# Patient Record
Sex: MALE | Race: WHITE | Employment: OTHER | URBAN - METROPOLITAN AREA
[De-identification: names, ages, dates, MRNs, and addresses within clinical notes are randomized per-mention and may not be internally consistent; named-entity substitution may affect disease eponyms.]

---

## 2016-03-02 ASSESSMENT — MONTREAL COGNITIVE ASSESSMENT (MOCA)
VISUOSPATIAL/EXECUTIVE SUBSCORE: 2
7. [VIGILENCE] TAP WHEN HEARING DESIGNATED LETTER: 1
9. REPEAT EACH SENTENCE: 2
12. MEMORY INDEX SCORE: 1
WHAT LEVEL OF EDUCATION WAS ATTAINED: 0
13. ORIENTATION SUBSCORE: 6
10. [FLUENCY] NAME WORDS STARTING WITH DESIGNATED LETTER: 0
6. READ LIST OF DIGITS [FORWARD/BACKWARD]: 2
8. SERIAL SUBTRACTION OF 7S: 3
WHAT IS THE TOTAL SCORE (OUT OF 30): 20
4. NAME EACH OF THE THREE ANIMALS SHOWN: 2
11. FOR EACH PAIR OF WORDS, WHAT CATEGORY DO THEY BELONG TO (OUT OF 2): 1

## 2017-01-02 ENCOUNTER — ANTICOAGULATION THERAPY VISIT (OUTPATIENT)
Dept: ANTICOAGULATION | Facility: CLINIC | Age: 72
End: 2017-01-02
Payer: COMMERCIAL

## 2017-01-02 DIAGNOSIS — Z79.01 LONG-TERM (CURRENT) USE OF ANTICOAGULANTS: Primary | ICD-10-CM

## 2017-01-02 LAB — INR POINT OF CARE: 1.6 (ref 0.86–1.14)

## 2017-01-02 PROCEDURE — 85610 PROTHROMBIN TIME: CPT | Mod: QW

## 2017-01-02 PROCEDURE — 36416 COLLJ CAPILLARY BLOOD SPEC: CPT

## 2017-01-02 PROCEDURE — 99207 ZZC NO CHARGE NURSE ONLY: CPT

## 2017-01-02 NOTE — MR AVS SNAPSHOT
Cricket Klein   1/2/2017 8:45 AM   Anticoagulation Therapy Visit    Description:  71 year old male   Provider:  NB ANTI HANNA   Department:  [facility]           INR as of 1/2/2017     Selected INR 1.6! (1/2/2017)      Anticoagulation Summary as of 1/2/2017     INR goal 2.0-3.0   Selected INR 1.6! (1/2/2017)   Full instructions 1/2: 3.75 mg; Otherwise 1.25 mg on Mon, Fri; 2.5 mg all other days   Next INR check 1/30/2017    Indications   PE (pulmonary embolism) [I26.99]  Other and unspecified coagulation defects [D68.9]  Long-term (current) use of anticoagulants [Z79.01] [Z79.01]         Description     Take 3.75mg Monday 1/2/2017, then resume 1.25mg every Mon & Fri and 2.5mg all other days.  Recheck INR in 4 weeks.      Your next Anticoagulation Clinic appointment(s)     Jan 30, 2017  9:45 AM   Anticoagulation Visit with NB ANTI COAG   [facility] ([facility])    [address] [place] [address] [phone]              Contact Numbers     Please call [phone] to cancel and/or reschedule your appointment.  Please call [phone] with any problems or questions regarding your therapy          January 2017 Details    Sun Mon Tue Wed Thu Fri Sat     1               2      3.75 mg   See details      3      2.5 mg         4      2.5 mg         5      2.5 mg         6      1.25 mg         7      2.5 mg           8      2.5 mg         9      1.25 mg         10      2.5 mg         11      2.5 mg         12      2.5 mg         13      1.25 mg         14      2.5 mg           15      2.5 mg         16      1.25 mg         17      2.5 mg         18      2.5 mg         19      2.5 mg         20      1.25 mg         21      2.5 mg           22      2.5 mg         23      1.25 mg         24      2.5 mg         25      2.5 mg         26      2.5 mg         27      1.25 mg         28      2.5 mg           29      2.5 mg         30            31                     Date Details   01/02 This INR check       Date of next INR:  1/30/2017         How to take your warfarin dose     To take:  1.25 mg Take 0.5 of a 2.5 mg tablet.    To take:  2.5 mg Take 1 of the 2.5 mg tablets.    To take:  3.75 mg Take 1.5 of the 2.5 mg tablets.

## 2017-01-02 NOTE — PROGRESS NOTES
ANTICOAGULATION FOLLOW-UP CLINIC VISIT    Patient Name:  Cricket Klein  Date:  1/2/2017  Contact Type:  Face to Face    SUBJECTIVE:     Patient Findings     Positives Missed doses (12/30), No Problem Findings           OBJECTIVE    INR PROTIME   Date Value Ref Range Status   01/02/2017 1.6* 0.86 - 1.14 Final       ASSESSMENT / PLAN  INR assessment SUB due to missed dose   Recheck INR In: 4 WEEKS    INR Location Clinic      Anticoagulation Summary as of 1/2/2017     INR goal 2.0-3.0   Selected INR 1.6! (1/2/2017)   Maintenance plan 1.25 mg (2.5 mg x 0.5) on Mon, Fri; 2.5 mg (2.5 mg x 1) all other days   Full instructions 1/2: 3.75 mg; Otherwise 1.25 mg on Mon, Fri; 2.5 mg all other days   Weekly total 15 mg   Plan last modified Gisell Taylor RN (12/12/2016)   Next INR check 1/30/2017   Priority INR   Target end date Indefinite    Indications   PE (pulmonary embolism) [I26.99]  Other and unspecified coagulation defects [D68.9]  Long-term (current) use of anticoagulants [Z79.01] [Z79.01]         Anticoagulation Episode Summary     INR check location     Preferred lab     Send INR reminders to [facility] POOL    Comments * Had PE in 2010 following hip surgery. Has heterozygous prothrombin gene mutation. Second PE 7-26-16. Needs lifelong warfarin. was on warfarin [id number]        Anticoagulation Care Providers     Provider Role Specialty Phone number    Saud Ramon MD [facility] [phone]            See the Encounter Report to view Anticoagulation Flowsheet and Dosing Calendar (Go to Encounters tab in chart review, and find the Anticoagulation Therapy Visit)    Gisell Taylor, [place]

## 2017-01-18 DIAGNOSIS — I26.99 PULMONARY EMBOLUS, LEFT (H): Primary | ICD-10-CM

## 2017-01-18 RX ORDER — WARFARIN SODIUM 2.5 MG/1
TABLET ORAL
Qty: 80 TABLET | Refills: 0 | Status: SHIPPED | OUTPATIENT
Start: 2017-01-18 | End: 2017-04-25

## 2017-01-26 ENCOUNTER — OFFICE VISIT (OUTPATIENT)
Dept: FAMILY MEDICINE | Facility: CLINIC | Age: 72
End: 2017-01-26
Payer: COMMERCIAL

## 2017-01-26 ENCOUNTER — RADIANT APPOINTMENT (OUTPATIENT)
Dept: GENERAL RADIOLOGY | Facility: CLINIC | Age: 72
End: 2017-01-26
Attending: FAMILY MEDICINE
Payer: COMMERCIAL

## 2017-01-26 VITALS
TEMPERATURE: 98.3 F | WEIGHT: 210 LBS | SYSTOLIC BLOOD PRESSURE: 127 MMHG | HEART RATE: 76 BPM | HEIGHT: 67 IN | BODY MASS INDEX: 32.96 KG/M2 | OXYGEN SATURATION: 97 % | DIASTOLIC BLOOD PRESSURE: 77 MMHG

## 2017-01-26 DIAGNOSIS — M25.551 HIP PAIN, RIGHT: Primary | ICD-10-CM

## 2017-01-26 DIAGNOSIS — G56.01 CARPAL TUNNEL SYNDROME OF RIGHT WRIST: ICD-10-CM

## 2017-01-26 DIAGNOSIS — M25.551 HIP PAIN, RIGHT: ICD-10-CM

## 2017-01-26 DIAGNOSIS — S50.02XA TRAUMATIC HEMATOMA OF LEFT ELBOW, INITIAL ENCOUNTER: ICD-10-CM

## 2017-01-26 PROCEDURE — 99213 OFFICE O/P EST LOW 20 MIN: CPT | Mod: 25 | Performed by: FAMILY MEDICINE

## 2017-01-26 PROCEDURE — 73502 X-RAY EXAM HIP UNI 2-3 VIEWS: CPT

## 2017-01-26 PROCEDURE — 20605 DRAIN/INJ JOINT/BURSA W/O US: CPT | Performed by: FAMILY MEDICINE

## 2017-01-26 RX ORDER — TRIAMCINOLONE ACETONIDE 40 MG/ML
20 INJECTION, SUSPENSION INTRA-ARTICULAR; INTRAMUSCULAR ONCE
Qty: 0.5 ML | Refills: 0 | COMMUNITY
Start: 2017-01-26 | End: 2017-07-12

## 2017-01-26 NOTE — MR AVS SNAPSHOT
After Visit Summary   1/26/2017    Cricket Klein    MRN: [id number]           Patient Information     Date Of Birth          1945        Visit Information        Provider Department      1/26/2017 10:00 AM Saud Ramon MD [facility]        Today's Diagnoses     Hip pain, right    -  1     Carpal tunnel syndrome of right wrist         Traumatic hematoma of left elbow, initial encounter           Care Instructions    1. Lets see in injection helps the carpal tunnel.    2. Surgery is still a good option.     3. The hip is arthritis and is stable.  Weight lose  Tylenol ok .     4. Lets watch the elbow it should clear.         Follow-ups after your visit        Your next 10 appointments already scheduled     Jan 30, 2017  9:45 AM   Anticoagulation Visit with NB ANTI COAG   [facility] ([facility])    [address] [place] [address] [phone]              Who to contact     If you have questions or need follow up information about today's clinic visit or your schedule please contact [facility] directly at [phone].  Normal or non-critical lab and imaging results will be communicated to you by Dashridehart, letter or phone within 4 business days after the clinic has received the results. If you do not hear from us within 7 days, please contact the clinic through Premium Storet or phone. If you have a critical or abnormal lab result, we will notify you by phone as soon as possible.  Submit refill requests through Prescreen or call your pharmacy and they will forward the refill request to us. Please allow 3 business days for your refill to be completed.          Additional Information About Your Visit        MyChart Information     Prescreen lets you send messages to your doctor, view your test results, renew your prescriptions, schedule appointments and more. To sign up, go to [email or web address] .  "Click on \"Log in\" on the left side of the screen, which will take you to the Welcome page. Then click on \"Sign up Now\" on the right side of the page.     You will be asked to enter the access code listed below, as well as some personal information. Please follow the directions to create your username and password.     Your access code is: [email or web address]  Expires: 2017 10:47 AM     Your access code will  in 90 days. If you need help or a new code, please call your [facility] or [phone].        Care EveryWhere ID     This is your Care EveryWhere ID. This could be used by other organizations to access your [facility] medical records  [id number]        Your Vitals Were     Pulse Temperature Height BMI (Body Mass Index) Pulse Oximetry       76 98.3  F (36.8  C) (Tympanic) 5' 7\" (1.702 m) 32.88 kg/m2 97%        Blood Pressure from Last 3 Encounters:   17 127/77   10/17/16 149/86   09/15/16 136/82    Weight from Last 3 Encounters:   17 210 lb (95.255 kg)   10/17/16 216 lb (97.977 kg)   09/15/16 216 lb 3.2 oz (98.068 kg)              We Performed the Following     INJECTION INTRAMUSCULAR OR SUB-Q     TRIAMCINOLONE ACET INJ 10 MG        Primary Care Provider Office Phone # Fax #    Saud Ramon [phone]       [address] [address]        Thank you!     Thank you for choosing [facility]  for your care. Our goal is always to provide you with excellent care. Hearing back from our patients is one way we can continue to improve our services. Please take a few minutes to complete the written survey that you may receive in the mail after your visit with us. Thank you!             Your Updated Medication List - Protect others around you: Learn how to safely use, store and throw away your medicines at www.disposemymeds.org.          This list is accurate as of: 17 10:47 AM.  Always use your most " recent med list.                   Brand Name Dispense Instructions for use    atenolol 50 MG tablet    TENORMIN    180 tablet    Take 1 tablet (50 mg) by mouth 2 times daily       ezetimibe 10 MG tablet    ZETIA    90 tablet    Take 1 tablet (10 mg) by mouth daily OFFICE VISIT NEEDED       order for DME      Equipment being ordered: LEXI  Cricket Klein received a [employer] AirSense 10 Auto. Pressures were set at Auto 10 - 18 cm H2O.       triamcinolone acetonide 40 MG/ML injection    KENALOG-40    0.5 mL    0.5 mLs (20 mg) by INTRA-ARTICULAR route once       vitamin D 1000 UNITS capsule      Take 1 capsule by mouth daily.       warfarin 2.5 MG tablet    COUMADIN    80 tablet    Take 1.25mg by mouth every Mon & Fri and 2.5mg all other days or as directed by Anticoagulation Clinic

## 2017-01-26 NOTE — PATIENT INSTRUCTIONS
1. Lets see in injection helps the carpal tunnel.    2. Surgery is still a good option.     3. The hip is arthritis and is stable.  Weight lose  Tylenol ok .     4. Lets watch the elbow it should clear.

## 2017-01-26 NOTE — NURSING NOTE
"Chief Complaint   Patient presents with     Musculoskeletal Problem     Right wrist, Right leg and hip and Right middle finger- requesting cortisone injection     Injury     left elbow/arm       Initial /77 mmHg  Pulse 76  Temp(Src) 98.3  F (36.8  C) (Tympanic)  Ht 5' 7\" (1.702 m)  Wt 210 lb (95.255 kg)  BMI 32.88 kg/m2  SpO2 97% Estimated body mass index is 32.88 kg/(m^2) as calculated from the following:    Height as of this encounter: 5' 7\" (1.702 m).    Weight as of this encounter: 210 lb (95.255 kg).  BP completed using cuff size: Lana Brown / Certified Medical Assistant......1/26/2017 10:06 AM    "

## 2017-01-26 NOTE — PROGRESS NOTES
SUBJECTIVE:                                                    Cricket Klein is a 71 year old male who presents to clinic today for the following health issues: he is in with carpal tunnel on the right hand.  Also some pain in right hip.  Fall on left elbow       Patient is here because of right wrist pain (carpal tunnel) which he is requesting a cortisone injection for.  Also having pain and numbness with right middle finger which goes into palm and lower arm for the past couple months.  Patient's right hip and right leg has been having some pain and has been bothering him for the last couple months and seems to be similar to how his left hip was before he had a replacement.  Patient also had an injury last week and fell over a gate and landed on cement.  Patient's left arm is very bruised/purple/dark, swollen- he states it seems to be going down and getting better, but the elbow does have large nodule that is visible.  Pt states it is not bothering him much though.        Problem list and histories reviewed & adjusted, as indicated.  Additional history: as documented    Patient Active Problem List   Diagnosis     Essential hypertension     Sleep apnea     Transient cerebral ischemia     Pure hypercholesterolemia     Impotence of organic origin     Pulmonary embolism (H)     S/P hip replacement     Other and unspecified coagulation defects     Hyperlipidemia LDL goal <130     Advanced directives, counseling/discussion     CHRISTIANO (obstructive sleep apnea)     PE (pulmonary embolism)     Pulmonary emboli (H)     Long-term (current) use of anticoagulants [Z79.01]     Past Surgical History   Procedure Laterality Date     Colonoscopy  03/17/2003     normal     Surgical history of -   1970     achilles tendon repair     Surgical history of -   3-2010     left hip relpacement     Arthrotomy shoulder, rotator cuff repair, combined  4/2/2012     Procedure:COMBINED ARTHROTOMY SHOULDER, ROTATOR CUFF REPAIR; Left Distal  clavicle excision, Subacromial decompression, Rotator cuff repair; Surgeon:DAVY HUTCHINS; Location:[facility] OR     Arthrotomy shoulder, rotator cuff repair, combined  10/12/2012     Procedure: COMBINED ARTHROTOMY SHOULDER, ROTATOR CUFF REPAIR;  Right shoulder biceps tenodesiss,subacromial decompression;  Surgeon: Davy Hutchins MD;  Location: [facility] OR     Colonoscopy  4/15/2014     Procedure: Colonoscopy;  Surgeon: Angela May MD;  Location: [facility]       Social History   Substance Use Topics     Smoking status: Never Smoker      Smokeless tobacco: Never Used     Alcohol Use: 0.0 oz/week     0 Standard drinks or equivalent per week      Comment: rare     Family History   Problem Relation Age of Onset     Thyroid Disease Mother      thyroid cancer     HEART DISEASE Mother       of heart attack     HEART DISEASE Father       of heart attack     DIABETES Father      Circulatory Brother      Alzheimer Disease Brother      Circulatory Brother      CANCER Brother      laryngeal     Circulatory Brother      HEART DISEASE Brother      CHF     Congenital Anomalies Sister      valve     HEART DISEASE Brother      Heart failure         Current Outpatient Prescriptions   Medication Sig Dispense Refill     warfarin (COUMADIN) 2.5 MG tablet Take 1.25mg by mouth every Mon & Fri and 2.5mg all other days or as directed by Anticoagulation Clinic 80 tablet 0     ezetimibe (ZETIA) 10 MG tablet Take 1 tablet (10 mg) by mouth daily OFFICE VISIT NEEDED 90 tablet 0     atenolol (TENORMIN) 50 MG tablet Take 1 tablet (50 mg) by mouth 2 times daily 180 tablet 1     order for DME Equipment being ordered: LEXI  Cricket Klein received a [employer] AirSense 10 Auto. Pressures were set at Auto 10 - 18 cm H2O.       Cholecalciferol (VITAMIN D) 1000 UNITS capsule Take 1 capsule by mouth daily.       Allergies   Allergen Reactions     Atorvastatin Calcium Other (See Comments)     Myalgia.     Pravastatin Cramps      "Myalgias       Zocor [Hmg-Coa-R Inhibitors] Other (See Comments)     Muscle pain       ROS:  C: NEGATIVE for fever, chills, change in weight    OBJECTIVE:                                                    /77 mmHg  Pulse 76  Temp(Src) 98.3  F (36.8  C) (Tympanic)  Ht 5' 7\" (1.702 m)  Wt 210 lb (95.255 kg)  BMI 32.88 kg/m2  SpO2 97%  Body mass index is 32.88 kg/(m^2).  Right wrist and hand with carpal tunnel findings.    Left elbow with hematoma in olecranon bursa    Right hip with some pain with internal rotation.     Procedure.   15 mg kenalog and 3 cc xylocaine into carpal tunnel on the right   The right hip has early DJD.        ASSESSMENT/PLAN:                                                            1. Hip pain, right  DJD  - XR Pelvis w Hip Right G/E 2 Views; Future    2. Carpal tunnel syndrome of right wrist    - TRIAMCINOLONE ACET INJ 10 MG  - INJECTION INTRAMUSCULAR OR SUB-Q  - triamcinolone acetonide (KENALOG-40) 40 MG/ML injection; 0.5 mLs (20 mg) by INTRA-ARTICULAR route once  Dispense: 0.5 mL; Refill: 0    3. Traumatic hematoma of left elbow, initial encounter  ASSESSMENT/PLAN:      ICD-10-CM    1. Hip pain, right M25.551 XR Pelvis w Hip Right G/E 2 Views   2. Carpal tunnel syndrome of right wrist G56.01 TRIAMCINOLONE ACET INJ 10 MG     INJECTION INTRAMUSCULAR OR SUB-Q     triamcinolone acetonide (KENALOG-40) 40 MG/ML injection   3. Traumatic hematoma of left elbow, initial encounter S50.02XA        Patient Instructions   1. Lets see in injection helps the carpal tunnel.    2. Surgery is still a good option.     3. The hip is arthritis and is stable.  Weight lose  Tylenol ok .     4. Lets watch the elbow it should clear.       Saud Ramon MD  [facility]  "

## 2017-01-30 ENCOUNTER — ANTICOAGULATION THERAPY VISIT (OUTPATIENT)
Dept: ANTICOAGULATION | Facility: CLINIC | Age: 72
End: 2017-01-30
Payer: COMMERCIAL

## 2017-01-30 DIAGNOSIS — Z79.01 LONG-TERM (CURRENT) USE OF ANTICOAGULANTS: Primary | ICD-10-CM

## 2017-01-30 LAB — INR POINT OF CARE: 2.1 (ref 0.86–1.14)

## 2017-01-30 PROCEDURE — 85610 PROTHROMBIN TIME: CPT | Mod: QW

## 2017-01-30 PROCEDURE — 99207 ZZC NO CHARGE NURSE ONLY: CPT

## 2017-01-30 PROCEDURE — 36416 COLLJ CAPILLARY BLOOD SPEC: CPT

## 2017-01-30 NOTE — PROGRESS NOTES
"  ANTICOAGULATION FOLLOW-UP CLINIC VISIT    Patient Name:  Cricket Klein  Date:  1/30/2017  Contact Type:  Face to Face accompanied by spouse    SUBJECTIVE:     Patient Findings     Positives OTC meds (taking over the counter medicines (DayQuil, Mucinex, Tylenol and Benadryl))    Comments Has had a cold \"off and on.\" Productive cough, pt describes as yellow with and has had blood tinge. Advised to see provider if does not improve by end of this week.           OBJECTIVE    INR PROTIME   Date Value Ref Range Status   01/30/2017 2.1* 0.86 - 1.14 Final       ASSESSMENT / PLAN  INR assessment THER    Recheck INR In: 4 WEEKS    INR Location Clinic      Anticoagulation Summary as of 1/30/2017     INR goal 2.0-3.0   Selected INR 2.1 (1/30/2017)   Maintenance plan 1.25 mg (2.5 mg x 0.5) on Mon, Fri; 2.5 mg (2.5 mg x 1) all other days   Full instructions 1.25 mg on Mon, Fri; 2.5 mg all other days   Weekly total 15 mg   No change documented Gisell Taylor RN   Plan last modified Gisell Taylor RN (12/12/2016)   Next INR check 2/27/2017   Priority INR   Target end date Indefinite    Indications   PE (pulmonary embolism) [I26.99]  Other and unspecified coagulation defects [D68.9]  Long-term (current) use of anticoagulants [Z79.01] [Z79.01]         Anticoagulation Episode Summary     INR check location     Preferred lab     Send INR reminders to [facility] CLINIC POOL    Comments * Had PE in 2010 following hip surgery. Has heterozygous prothrombin gene mutation. Second PE 7-26-16. Needs lifelong warfarin. was on warfarin [id number]        Anticoagulation Care Providers     Provider Role Specialty Phone number    Saud Ramon MD Responsible Family Practice [phone]            See the Encounter Report to view Anticoagulation Flowsheet and Dosing Calendar (Go to Encounters tab in chart review, and find the Anticoagulation Therapy Visit)    Gisell Taylor RN                 "

## 2017-01-30 NOTE — MR AVS SNAPSHOT
Cricket Klein   1/30/2017 9:45 AM   Anticoagulation Therapy Visit    Description:  71 year old male   Provider:  NB ANTI COAG   Department:  [facility]           INR as of 1/30/2017     Selected INR 2.1 (1/30/2017)      Anticoagulation Summary as of 1/30/2017     INR goal 2.0-3.0   Selected INR 2.1 (1/30/2017)   Full instructions 1.25 mg on Mon, Fri; 2.5 mg all other days   Next INR check 2/27/2017    Indications   PE (pulmonary embolism) [I26.99]  Other and unspecified coagulation defects [D68.9]  Long-term (current) use of anticoagulants [Z79.01] [Z79.01]         Description     No change, recheck INR in 4 weeks.      Your next Anticoagulation Clinic appointment(s)     Feb 27, 2017  9:45 AM   [facility]    [address] [place] [address] [phone]              Contact Numbers     Please call [phone] to cancel and/or reschedule your appointment.  Please call [phone] with any problems or questions regarding your therapy          January 2017 Details    Sun Mon Tue Wed Thu Fri Sat     1               2               3               4               5               6               7                 8               9               10               11               12               13               14                 15               16               17               18               19               20               21                 22               23               24               25               26               27               28                 29               30      1.25 mg   See details      31      2.5 mg              Date Details   01/30 This INR check               How to take your warfarin dose     To take:  1.25 mg Take 0.5 of a 2.5 mg tablet.    To take:  2.5 mg Take 1 of the 2.5 mg tablets.           February 2017 Details    Sun Mon Tue Wed Thu Fri Sat        1      2.5  mg         2      2.5 mg         3      1.25 mg         4      2.5 mg           5      2.5 mg         6      1.25 mg         7      2.5 mg         8      2.5 mg         9      2.5 mg         10      1.25 mg         11      2.5 mg           12      2.5 mg         13      1.25 mg         14      2.5 mg         15      2.5 mg         16      2.5 mg         17      1.25 mg         18      2.5 mg           19      2.5 mg         20      1.25 mg         21      2.5 mg         22      2.5 mg         23      2.5 mg         24      1.25 mg         25      2.5 mg           26      2.5 mg         27            28                    Date Details   No additional details    Date of next INR:  2/27/2017         How to take your warfarin dose     To take:  1.25 mg Take 0.5 of a 2.5 mg tablet.    To take:  2.5 mg Take 1 of the 2.5 mg tablets.

## 2017-02-10 DIAGNOSIS — E78.00 PURE HYPERCHOLESTEROLEMIA: Primary | ICD-10-CM

## 2017-02-10 RX ORDER — EZETIMIBE 10 MG/1
10 TABLET ORAL DAILY
Qty: 30 TABLET | Refills: 0 | Status: SHIPPED | OUTPATIENT
Start: 2017-02-10 | End: 2017-03-29

## 2017-02-10 NOTE — TELEPHONE ENCOUNTER
Zetia 10  Last Written Prescription Date: 11/07/16  Last Fill Quantity: 90, # refills: 0    Last Office Visit with [facility] [place], P or [facility] prescribing provider:  01/26/17   Future Office Visit:      CHOLESTEROL   Date Value Ref Range Status   02/10/2016 235* <200 mg/dL Final     Comment:     Desirable:       <200 mg/dl     HDL CHOLESTEROL   Date Value Ref Range Status   02/10/2016 64 >39 mg/dL Final     LDL CHOLESTEROL CALCULATED   Date Value Ref Range Status   02/10/2016 140* <100 mg/dL Final     Comment:     Above desirable:  100-129 mg/dl   Borderline High:  130-159 mg/dL   High:             160-189 mg/dL   Very high:       >189 mg/dl       LDL CHOLESTEROL DIRECT   Date Value Ref Range Status   10/02/2012 188* 0 - 129 mg/dL Final     Comment:     Optimal:         <100 mg/dL   Near Optimal:     100-129 mg/dL   Borderline High:  130-159 mg/dL   High:             160-189 mg/dL   Very high:  greater than or equal to 190 mg/dL   Cannot estimate LDL when triglyceride exceeds 400 mg/dL     TRIGLYCERIDES   Date Value Ref Range Status   02/10/2016 154* <150 mg/dL Final     Comment:     Borderline high:  150-199 mg/dl   High:             200-499 mg/dl   Very high:       >499 mg/dl       CHOLESTEROL/HDL RATIO   Date Value Ref Range Status   05/13/2015 3.9 0.0 - 5.0 Final     ALT   Date Value Ref Range Status   07/26/2016 44 0 - 70 U/L Final        Thank You!  Sada Davalos  [facility]  P: [phone] F:[phone]

## 2017-02-27 ENCOUNTER — ANTICOAGULATION THERAPY VISIT (OUTPATIENT)
Dept: ANTICOAGULATION | Facility: CLINIC | Age: 72
End: 2017-02-27
Payer: COMMERCIAL

## 2017-02-27 DIAGNOSIS — Z79.01 LONG-TERM (CURRENT) USE OF ANTICOAGULANTS: ICD-10-CM

## 2017-02-27 LAB — INR POINT OF CARE: 2.4 (ref 0.86–1.14)

## 2017-02-27 PROCEDURE — 99207 ZZC NO CHARGE NURSE ONLY: CPT

## 2017-02-27 PROCEDURE — 85610 PROTHROMBIN TIME: CPT | Mod: QW

## 2017-02-27 PROCEDURE — 36416 COLLJ CAPILLARY BLOOD SPEC: CPT

## 2017-02-27 NOTE — MR AVS SNAPSHOT
Cricket Klein   2/27/2017 9:45 AM   Anticoagulation Therapy Visit    Description:  71 year old male   Provider:  NB ANTI COAG   Department:  Nb Anticoag           INR as of 2/27/2017     Today's INR 2.4      Anticoagulation Summary as of 2/27/2017     INR goal 2.0-3.0   Today's INR 2.4   Full instructions 1.25 mg on Mon, Fri; 2.5 mg all other days   Next INR check 3/27/2017    Indications   PE (pulmonary embolism) [I26.99]  Other and unspecified coagulation defects [D68.9]  Long-term (current) use of anticoagulants [Z79.01] [Z79.01]         Description     No change, recheck INR in 4 weeks.      Your next Anticoagulation Clinic appointment(s)     Feb 27, 2017  9:45 AM CST   [facility]ticoagulation [facility] with NB ANTI COAG   [facility]    [address] [place] [id number] [phone]            Mar 27, 2017  9:30 AM CDT   Anticoagulation Visit with NB ANTI COAG   [facility] ([facility]    [address] [place] [id number]   [phone]              Contact Numbers     Please call [phone] to cancel and/or reschedule your appointment.  Please call [phone] with any problems or questions regarding your therapy          February 2017 Details    Sun Mon Tue Wed Thu Fri Sat        1               2               3               4                 5               6               7               8               9               10               11                 12               13               14               15               16               17               18                 19               20               21               22               23               24               25                 26               27      1.25 mg   See details      28      2.5 mg              Date Details   02/27 This INR check               How to take your warfarin dose     To take:  1.25 mg Take 0.5 of a 2.5 mg  tablet.    To take:  2.5 mg Take 1 of the 2.5 mg tablets.           March 2017 Details    Sun Mon Tue Wed Thu Fri Sat        1      2.5 mg         2      2.5 mg         3      1.25 mg         4      2.5 mg           5      2.5 mg         6      1.25 mg         7      2.5 mg         8      2.5 mg         9      2.5 mg         10      1.25 mg         11      2.5 mg           12      2.5 mg         13      1.25 mg         14      2.5 mg         15      2.5 mg         16      2.5 mg         17      1.25 mg         18      2.5 mg           19      2.5 mg         20      1.25 mg         21      2.5 mg         22      2.5 mg         23      2.5 mg         24      1.25 mg         25      2.5 mg           26      2.5 mg         27            28               29               30               31                 Date Details   No additional details    Date of next INR:  3/27/2017         How to take your warfarin dose     To take:  1.25 mg Take 0.5 of a 2.5 mg tablet.    To take:  2.5 mg Take 1 of the 2.5 mg tablets.

## 2017-02-27 NOTE — PROGRESS NOTES
ANTICOAGULATION FOLLOW-UP CLINIC VISIT    Patient Name:  Cricket Klein  Date:  2/27/2017  Contact Type:  Face to Face    SUBJECTIVE:     Patient Findings     Positives No Problem Findings           OBJECTIVE    INR Protime   Date Value Ref Range Status   02/27/2017 2.4 (A) 0.86 - 1.14 Final       ASSESSMENT / PLAN  INR assessment THER    Recheck INR In: 4 WEEKS    INR Location Clinic      Anticoagulation Summary as of 2/27/2017     INR goal 2.0-3.0   Today's INR 2.4   Maintenance plan 1.25 mg (2.5 mg x 0.5) on Mon, Fri; 2.5 mg (2.5 mg x 1) all other days   Full instructions 1.25 mg on Mon, Fri; 2.5 mg all other days   Weekly total 15 mg   No change documented Gisell Taylor RN   Plan last modified Gisell Taylor RN (12/12/2016)   Next INR check 3/27/2017   Priority INR   Target end date Indefinite    Indications   PE (pulmonary embolism) [I26.99]  Other and unspecified coagulation defects [D68.9]  Long-term (current) use of anticoagulants [Z79.01] [Z79.01]         Anticoagulation Episode Summary     INR check location     Preferred lab     Send INR reminders to [facility]    Comments * Had PE in 2010 following hip surgery. Has heterozygous prothrombin gene mutation. Second PE 7-26-16. Needs lifelong warfarin. was on warfarin [id number]        Anticoagulation Care Providers     Provider Role Specialty Phone number    Saud Ramon MD [facility] [phone]            See the Encounter Report to view Anticoagulation Flowsheet and Dosing Calendar (Go to Encounters tab in chart review, and find the Anticoagulation Therapy Visit)    Gisell Taylor RN

## 2017-03-14 ENCOUNTER — OFFICE VISIT (OUTPATIENT)
Dept: FAMILY MEDICINE | Facility: CLINIC | Age: 72
End: 2017-03-14
Payer: COMMERCIAL

## 2017-03-14 VITALS
HEART RATE: 67 BPM | BODY MASS INDEX: 33.56 KG/M2 | TEMPERATURE: 98.2 F | WEIGHT: 213.8 LBS | HEIGHT: 67 IN | SYSTOLIC BLOOD PRESSURE: 145 MMHG | DIASTOLIC BLOOD PRESSURE: 82 MMHG

## 2017-03-14 DIAGNOSIS — R41.3 MEMORY LOSS: Primary | ICD-10-CM

## 2017-03-14 PROCEDURE — 99213 OFFICE O/P EST LOW 20 MIN: CPT | Performed by: FAMILY MEDICINE

## 2017-03-14 NOTE — MR AVS SNAPSHOT
After Visit Summary   3/14/2017    Cricket Klein    MRN: [id number]           Patient Information     Date Of Birth          1945        Visit Information        Provider Department      3/14/2017 10:40 AM Saud Ramon MD [facility]        Today's Diagnoses     Memory loss    -  1      Care Instructions    1. Go back and see neurology.    2. Lets decide after the visit about next step.     3. Hold on health [facility].         Follow-ups after your visit        Additional Services     NEUROLOGY ADULT REFERRAL       Your provider has referred you to: [facility]: [facility] [phone]   [email or web address]    Reason for Referral: Consult    Please be aware that coverage of these services is subject to the terms and limitations of your health insurance plan.  Call member services at your health plan with any benefit or coverage questions.      Please bring the following with you to your appointment:    (1) Any X-Rays, CTs or MRIs which have been performed.  Contact the facility where they were done to arrange for  prior to your scheduled appointment.    (2) List of current medications  (3) This referral request   (4) Any documents/labs given to you for this referral                  Your next 10 appointments already scheduled     Mar 27, 2017  9:30 AM CDT   Anticoagulation Visit with NB ANTI COAG   [facility] ([facility])    [address] [place] [phone] [phone]              Who to contact     If you have questions or need follow up information about today's clinic visit or your schedule please contact [facility] directly at [phone].  Normal or non-critical lab and imaging results will be communicated to you by MyChart, letter or phone within 4 business days after the clinic has received the results. If you do not hear from us within  "7 days, please contact the clinic through [employer] or phone. If you have a critical or abnormal lab result, we will notify you by phone as soon as possible.  Submit refill requests through [employer] or call your pharmacy and they will forward the refill request to us. Please allow 3 business days for your refill to be completed.          Additional Information About Your Visit        [employer] Information     [employer] lets you send messages to your doctor, view your test results, renew your prescriptions, schedule appointments and more. To sign up, go to [email or web address] . Click on \"Log in\" on the left side of the screen, which will take you to the Welcome page. Then click on \"Sign up Now\" on the right side of the page.     You will be asked to enter the access code listed below, as well as some personal information. Please follow the directions to create your username and password.     Your access code is: [email or web address]  Expires: 2017 11:47 AM     Your access code will  in 90 days. If you need help or a new code, please call your [place] clinic or [phone].        Care EveryWhere ID     This is your Care EveryWhere ID. This could be used by other organizations to access your [place] medical records  [id number]        Your Vitals Were     Pulse Temperature Height BMI (Body Mass Index)          67 98.2  F (36.8  C) (Tympanic) 5' 7\" (1.702 m) 33.49 kg/m2         Blood Pressure from Last 3 Encounters:   17 145/82   17 127/77   10/17/16 149/86    Weight from Last 3 Encounters:   17 213 lb 12.8 oz (97 kg)   17 210 lb (95.3 kg)   10/17/16 216 lb (98 kg)              We Performed the Following     NEUROLOGY ADULT REFERRAL        Primary Care Provider Office Phone # Fax #    Saud Ramon [phone]       [address] [address]        Thank you!     Thank you for choosing [facility]" for your care. Our goal is always to provide you with excellent care. Hearing back from our patients is one way we can continue to improve our services. Please take a few minutes to complete the written survey that you may receive in the mail after your visit with us. Thank you!             Your Updated Medication List - Protect others around you: Learn how to safely use, store and throw away your medicines at www.disposemymeds.org.          This list is accurate as of: 3/14/17 11:28 AM.  Always use your most recent med list.                   Brand Name Dispense Instructions for use    atenolol 50 MG tablet    TENORMIN    180 tablet    Take 1 tablet (50 mg) by mouth 2 times daily       ezetimibe 10 MG tablet    ZETIA    30 tablet    Take 1 tablet (10 mg) by mouth daily DUE FOR FASTING LAB       order for DME      Equipment being ordered: LEXI Klein received a [employer] AirSense 10 Auto. Pressures were set at Auto 10 - 18 cm H2O.       triamcinolone acetonide 40 MG/ML injection    KENALOG-40    0.5 mL    0.5 mLs (20 mg) by INTRA-ARTICULAR route once       vitamin D 1000 UNITS capsule      Take 1 capsule by mouth daily.       warfarin 2.5 MG tablet    COUMADIN    80 tablet    Take 1.25mg by mouth every Mon & Fri and 2.5mg all other days or as directed by Anticoagulation Clinic

## 2017-03-14 NOTE — PROGRESS NOTES
SUBJECTIVE:                                                    Cricket Klein is a 71 year old male who presents to clinic today for the following health issues: HE COMES WITH WIFE TO DISCUSS MEMORY LOSE.  THEY ARE THINKING OF ENTERING A TRAIL OF NASAL INSULIN AT [facility] AND WANT CONSULT ON SAME.  HIS WORK UP for his memory loss so far has included an EEG which was normal, a brain scan that showed some localized chronic atrophy and a psychodynamic testing at the [facility] that was inconclusive. The patient is here today wondering about entering a program of inhaled nasal penicillin sponsored as this double blind study through [facility].    Dizziness     Onset: 1+ weeks ago    Description:   Do you feel faint:  no   Does it feel like the surroundings (bed, room) are moving: no   Unsteady/off balance: YES  Have you passed out or fallen: no     Intensity: moderate    Progression of Symptoms:  intermittent    Accompanying Signs & Symptoms:  Heart palpitations: no   Nausea, vomiting: no   Weakness in arms or legs: no   Fatigue: no   Vision or speech changes: no   Ringing in ears (Tinnitus): no   Hearing Loss: no    History:   Head trauma/concussion hx: YES  Previous similar symptoms: no   Recent bleeding history: no     Precipitating factors:   Worse with activity or head movement: no   Any new medications (BP?): no   Alcohol/drug abuse/withdrawal: no     Alleviating factors:          Patient would also like to discuss short term memory loss.    Problem list and histories reviewed & adjusted, as indicated.  Additional history:     Patient Active Problem List   Diagnosis     Essential hypertension     Sleep apnea     Transient cerebral ischemia     Pure hypercholesterolemia     Impotence of organic origin     Pulmonary embolism (H)     S/P hip replacement     Other and unspecified coagulation defects     Hyperlipidemia LDL goal <130     Advanced directives, counseling/discussion     CHRISTIANO  (obstructive sleep apnea)     PE (pulmonary embolism)     Pulmonary emboli (H)     Long-term (current) use of anticoagulants [Z79.01]     Past Surgical History   Procedure Laterality Date     Colonoscopy  2003     normal     Surgical history of -        achilles tendon repair     Surgical history of -   3-     left hip relpacement     Arthrotomy shoulder, rotator cuff repair, combined  2012     Procedure:COMBINED ARTHROTOMY SHOULDER, ROTATOR CUFF REPAIR; Left Distal clavicle excision, Subacromial decompression, Rotator cuff repair; Surgeon:JOSE MIGUEL HUTCHINS; Location:[facility] OR     Arthrotomy shoulder, rotator cuff repair, combined  10/12/2012     Procedure: COMBINED ARTHROTOMY SHOULDER, ROTATOR CUFF REPAIR;  Right shoulder biceps tenodesiss,subacromial decompression;  Surgeon: Jose Miguel Hutchins MD;  Location: [facility] OR     Colonoscopy  4/15/2014     Procedure: Colonoscopy;  Surgeon: Angela May MD;  Location: [facility]       Social History   Substance Use Topics     Smoking status: Never Smoker     Smokeless tobacco: Never Used     Alcohol use 0.0 oz/week     0 Standard drinks or equivalent per week      Comment: rare     Family History   Problem Relation Age of Onset     Thyroid Disease Mother      thyroid cancer     HEART DISEASE Mother       of heart attack     HEART DISEASE Father       of heart attack     DIABETES Father      Circulatory Brother      Alzheimer Disease Brother      Circulatory Brother      CANCER Brother      laryngeal     Circulatory Brother      HEART DISEASE Brother      CHF     Congenital Anomalies Sister      valve     HEART DISEASE Brother      Heart failure         Current Outpatient Prescriptions   Medication Sig Dispense Refill     ezetimibe (ZETIA) 10 MG tablet Take 1 tablet (10 mg) by mouth daily DUE FOR FASTING LAB 30 tablet 0     triamcinolone acetonide (KENALOG-40) 40 MG/ML injection 0.5 mLs (20 mg) by INTRA-ARTICULAR route once 0.5 mL 0      "warfarin (COUMADIN) 2.5 MG tablet Take 1.25mg by mouth every Mon & Fri and 2.5mg all other days or as directed by Anticoagulation Clinic 80 tablet 0     atenolol (TENORMIN) 50 MG tablet Take 1 tablet (50 mg) by mouth 2 times daily 180 tablet 1     order for DME Equipment being ordered: LEXI Klein received a [employer] AirSense 10 Auto. Pressures were set at Auto 10 - 18 cm H2O.       Cholecalciferol (VITAMIN D) 1000 UNITS capsule Take 1 capsule by mouth daily.         Reviewed and updated as needed this visit by clinical staff       Reviewed and updated as needed this visit by Provider         ROS:  C: NEGATIVE for fever, chills, change in weight  E/M: NEGATIVE for ear, mouth and throat problems  R: NEGATIVE for significant cough or SOB  CV: NEGATIVE for chest pain, palpitations or peripheral edema    OBJECTIVE:                                                    /82  Pulse 67  Temp 98.2  F (36.8  C) (Tympanic)  Ht 5' 7\" (1.702 m)  Wt 213 lb 12.8 oz (97 kg)  BMI 33.49 kg/m2  Body mass index is 33.49 kg/(m^2).  GENERAL: healthy, alert and no distress  NECK: no adenopathy, no asymmetry, masses, or scars and thyroid normal to palpation  MS: no gross musculoskeletal defects noted, no edema  PSYCH: mentation appears normal, affect normal/bright         ASSESSMENT/PLAN:                                                            1. Memory loss  We'll go ahead and have him see Dr. Oconnor again. Currently it looks like we are uncertain about the exact cause of his mental malfunction. I suggested that we revisit with neurology possibly consider either starting medication or consider repeat his tests at the [facility]. He is to come back after that we will sit down and decide next step.- NEUROLOGY ADULT REFERRAL    ASSESSMENT/PLAN:      ICD-10-CM    1. Memory loss R41.3 NEUROLOGY ADULT REFERRAL       Patient Instructions   1. Go back and see neurology.    2. Lets decide after the visit about next step.     3. Hold " on [facility].           Saud Ramon MD  [facility]

## 2017-03-14 NOTE — PATIENT INSTRUCTIONS
1. Go back and see neurology.    2. Lets decide after the visit about next step.     3. Hold on [facility].

## 2017-03-14 NOTE — NURSING NOTE
"Chief Complaint   Patient presents with     Dizziness       Initial /75  Pulse 76  Temp 98.2  F (36.8  C) (Tympanic)  Ht 5' 7\" (1.702 m)  Wt 213 lb 12.8 oz (97 kg)  BMI 33.49 kg/m2 Estimated body mass index is 33.49 kg/(m^2) as calculated from the following:    Height as of this encounter: 5' 7\" (1.702 m).    Weight as of this encounter: 213 lb 12.8 oz (97 kg).  Medication Reconciliation: complete    "

## 2017-03-27 ENCOUNTER — ANTICOAGULATION THERAPY VISIT (OUTPATIENT)
Dept: ANTICOAGULATION | Facility: CLINIC | Age: 72
End: 2017-03-27
Payer: COMMERCIAL

## 2017-03-27 DIAGNOSIS — Z79.01 LONG-TERM (CURRENT) USE OF ANTICOAGULANTS: ICD-10-CM

## 2017-03-27 LAB — INR POINT OF CARE: 2.2 (ref 0.86–1.14)

## 2017-03-27 PROCEDURE — 36416 COLLJ CAPILLARY BLOOD SPEC: CPT

## 2017-03-27 PROCEDURE — 85610 PROTHROMBIN TIME: CPT | Mod: QW

## 2017-03-27 PROCEDURE — 99207 ZZC NO CHARGE NURSE ONLY: CPT

## 2017-03-27 NOTE — PROGRESS NOTES
ANTICOAGULATION FOLLOW-UP CLINIC VISIT    Patient Name:  Cricket Klein  Date:  3/27/2017  Contact Type:  Face to Face    SUBJECTIVE:     Patient Findings     Positives No Problem Findings           OBJECTIVE    INR Protime   Date Value Ref Range Status   03/27/2017 2.2 (A) 0.86 - 1.14 Final       ASSESSMENT / PLAN  INR assessment THER    Recheck INR In: 5 WEEKS    INR Location Clinic      Anticoagulation Summary as of 3/27/2017     INR goal 2.0-3.0   Today's INR 2.2   Maintenance plan 1.25 mg (2.5 mg x 0.5) on Mon, Fri; 2.5 mg (2.5 mg x 1) all other days   Full instructions 1.25 mg on Mon, Fri; 2.5 mg all other days   Weekly total 15 mg   No change documented Kimberly Zamora RN   Plan last modified Gisell Taylor RN (12/12/2016)   Next INR check 5/1/2017   Priority INR   Target end date Indefinite    Indications   PE (pulmonary embolism) [I26.99]  Other and unspecified coagulation defects [D68.9]  Long-term (current) use of anticoagulants [Z79.01] [Z79.01]         Anticoagulation Episode Summary     INR check location     Preferred lab     Send INR reminders to [facility]    Comments * Had PE in 2010 following hip surgery. Has heterozygous prothrombin gene mutation. Second PE 7-26-16. Needs lifelong warfarin. was on warfarin [id number]        Anticoagulation Care Providers     Provider Role Specialty Phone number    Saud Ramon MD [facility] [phone]            See the Encounter Report to view Anticoagulation Flowsheet and Dosing Calendar (Go to Encounters tab in chart review, and find the Anticoagulation Therapy Visit)        Kimberly Zamora RN

## 2017-03-27 NOTE — MR AVS SNAPSHOT
Cricket Klein   3/27/2017 9:30 AM   Anticoagulation Therapy Visit    Description:  71 year old male   Provider:  NB ANTI COAG   Department:  Nb Anticoag           INR as of 3/27/2017     Today's INR 2.2      Anticoagulation Summary as of 3/27/2017     INR goal 2.0-3.0   Today's INR 2.2   Full instructions 1.25 mg on Mon, Fri; 2.5 mg all other days   Next INR check 5/1/2017    Indications   PE (pulmonary embolism) [I26.99]  Other and unspecified coagulation defects [D68.9]  Long-term (current) use of anticoagulants [Z79.01] [Z79.01]         Your next Anticoagulation Clinic appointment(s)     Mar 27, 2017  9:30 AM CDT   Anticoagulation Visit with NB ANTI COAG   [facility]    [address] [place] [id number] [phone]            May 01, 2017  9:30 AM CDT   Anticoagulation Visit with NB ANTI HANNA   [facility] ([facility]    [address] [place] [id number] [phone]              Contact Numbers     Please call [phone] to cancel and/or reschedule your appointment.  Please call [phone] with any problems or questions regarding your therapy          March 2017 Details    Sun Mon Tue Wed Thu Fri Sat        1               2               3               4                 5               6               7               8               9               10               11                 12               13               14               15               16               17               18                 19               20               21               22               23               24               25                 26               27      1.25 mg   See details      28      2.5 mg         29      2.5 mg         30      2.5 mg         31      1.25 mg           Date Details   03/27 This INR check               How to take your warfarin dose     To take:  1.25 mg Take 0.5 of a  2.5 mg tablet.    To take:  2.5 mg Take 1 of the 2.5 mg tablets.           April 2017 Details    Sun Mon Tue Wed Thu Fri Sat           1      2.5 mg           2      2.5 mg         3      1.25 mg         4      2.5 mg         5      2.5 mg         6      2.5 mg         7      1.25 mg         8      2.5 mg           9      2.5 mg         10      1.25 mg         11      2.5 mg         12      2.5 mg         13      2.5 mg         14      1.25 mg         15      2.5 mg           16      2.5 mg         17      1.25 mg         18      2.5 mg         19      2.5 mg         20      2.5 mg         21      1.25 mg         22      2.5 mg           23      2.5 mg         24      1.25 mg         25      2.5 mg         26      2.5 mg         27      2.5 mg         28      1.25 mg         29      2.5 mg           30      2.5 mg                Date Details   No additional details            How to take your warfarin dose     To take:  1.25 mg Take 0.5 of a 2.5 mg tablet.    To take:  2.5 mg Take 1 of the 2.5 mg tablets.           May 2017 Details    Sun Mon Tue Wed Thu Fri Sat      1            2               3               4               5               6                 7               8               9               10               11               12               13                 14               15               16               17               18               19               20                 21               22               23               24               25               26               27                 28               29               30               31                   Date Details   No additional details    Date of next INR:  5/1/2017         How to take your warfarin dose     To take:  1.25 mg Take 0.5 of a 2.5 mg tablet.

## 2017-03-29 DIAGNOSIS — E78.00 PURE HYPERCHOLESTEROLEMIA: ICD-10-CM

## 2017-03-29 RX ORDER — EZETIMIBE 10 MG/1
10 TABLET ORAL DAILY
Qty: 30 TABLET | Refills: 0 | Status: SHIPPED | OUTPATIENT
Start: 2017-03-29 | End: 2017-03-31

## 2017-03-29 NOTE — TELEPHONE ENCOUNTER
Zetia 10       Last Written Prescription Date: 2/10/17  Last Fill Quantity: 30, # refills: 0    Last Office Visit with [facility], [facility] or [facility] prescribing provider:  3/14/17   Future Office Visit:    Next 5 appointments (look out 90 days)     May 03, 2017  9:30 AM CDT   [facility]    [address] [facility] [place] [address]   [phone]                  Cholesterol   Date Value Ref Range Status   02/10/2016 235 (H) <200 mg/dL Final     Comment:     Desirable:       <200 mg/dl     HDL Cholesterol   Date Value Ref Range Status   02/10/2016 64 >39 mg/dL Final     LDL Cholesterol Calculated   Date Value Ref Range Status   02/10/2016 140 (H) <100 mg/dL Final     Comment:     Above desirable:  100-129 mg/dl   Borderline High:  130-159 mg/dL   High:             160-189 mg/dL   Very high:       >189 mg/dl       Triglycerides   Date Value Ref Range Status   02/10/2016 154 (H) <150 mg/dL Final     Comment:     Borderline high:  150-199 mg/dl   High:             200-499 mg/dl   Very high:       >499 mg/dl       Cholesterol/HDL Ratio   Date Value Ref Range Status   05/13/2015 3.9 0.0 - 5.0 Final     ALT   Date Value Ref Range Status   07/26/2016 44 0 - 70 U/L Final        Thank You!  Sada Davalos  [facility]  P: [phone] F:[phone]

## 2017-03-30 DIAGNOSIS — E78.00 PURE HYPERCHOLESTEROLEMIA: ICD-10-CM

## 2017-03-30 LAB
CHOLEST SERPL-MCNC: 241 MG/DL
HDLC SERPL-MCNC: 61 MG/DL
LDLC SERPL CALC-MCNC: 154 MG/DL
NONHDLC SERPL-MCNC: 180 MG/DL
TRIGL SERPL-MCNC: 131 MG/DL

## 2017-03-30 PROCEDURE — 36415 COLL VENOUS BLD VENIPUNCTURE: CPT | Performed by: FAMILY MEDICINE

## 2017-03-30 PROCEDURE — 80061 LIPID PANEL: CPT | Performed by: FAMILY MEDICINE

## 2017-03-31 DIAGNOSIS — E78.00 PURE HYPERCHOLESTEROLEMIA: ICD-10-CM

## 2017-03-31 RX ORDER — EZETIMIBE 10 MG/1
10 TABLET ORAL DAILY
Qty: 90 TABLET | Refills: 3 | Status: SHIPPED | OUTPATIENT
Start: 2017-03-31 | End: 2018-05-07

## 2017-04-25 DIAGNOSIS — I26.99 PULMONARY EMBOLUS, LEFT (H): ICD-10-CM

## 2017-04-25 RX ORDER — WARFARIN SODIUM 2.5 MG/1
TABLET ORAL
Qty: 80 TABLET | Refills: 0 | Status: SHIPPED | OUTPATIENT
Start: 2017-04-25 | End: 2017-08-03

## 2017-04-25 NOTE — TELEPHONE ENCOUNTER
[place]    Last Written Prescription Date: 01/18/2017  Last Fill Qty: 80, # refills: 0  Last Office Visit with [facility], [facility] or [facility] prescribing provider: 03/14/2017  Next 5 appointments (look out 90 days)     May 03, 2017  9:30 AM CDT   Return Visit with Emily Oconnor MD   [facility] ([facility]    [address] [address]   [phone]                   Date and Result of Last PT/INR:   Lab Results   Component Value Date    INR 2.2 03/27/2017    INR 2.4 02/27/2017    INR 1.02 07/27/2016    INR 0.97 07/26/2016      Quynh Mendiola CPhT  [facility]  Float Technician  [place]

## 2017-04-25 NOTE — TELEPHONE ENCOUNTER
Prescription approved per [facility] Refill Protocol.    Roshni Paul, PharmD  [facility]  On behalf of [facility]

## 2017-05-01 ENCOUNTER — ANTICOAGULATION THERAPY VISIT (OUTPATIENT)
Dept: ANTICOAGULATION | Facility: CLINIC | Age: 72
End: 2017-05-01
Payer: COMMERCIAL

## 2017-05-01 DIAGNOSIS — Z79.01 LONG-TERM (CURRENT) USE OF ANTICOAGULANTS: ICD-10-CM

## 2017-05-01 LAB — INR POINT OF CARE: 3.3 (ref 0.86–1.14)

## 2017-05-01 PROCEDURE — 99207 ZZC NO CHARGE NURSE ONLY: CPT

## 2017-05-01 PROCEDURE — 85610 PROTHROMBIN TIME: CPT | Mod: QW

## 2017-05-01 PROCEDURE — 36416 COLLJ CAPILLARY BLOOD SPEC: CPT

## 2017-05-01 NOTE — MR AVS SNAPSHOT
Cricket Klein   5/1/2017 9:30 AM   Anticoagulation Therapy Visit    Description:  71 year old male   Provider:  NB ANTI COAG   Department:  N[facility] Anticoag           INR as of 5/1/2017     Today's INR 3.3!      Anticoagulation Summary as of 5/1/2017     INR goal 2.0-3.0   Today's INR 3.3!   Full instructions 1.25 mg on Mon, Fri; 2.5 mg all other days   Next INR check 6/5/2017    Indications   PE (pulmonary embolism) [I26.99]  Other and unspecified coagulation defects [D68.9]  Long-term (current) use of anticoagulants [Z79.01] [Z79.01]         Description     No change, recheck INR in 5 weeks.      Your next Anticoagulation Clinic appointment(s)     May 01, 2017  9:30 AM CDT   [facility] with NB ANTI COAG   [facility]    [address] [place] [address]   [phone]            Jun 05, 2017 10:30 AM CDT   Anticoagulation Visit with NB ANTI COAG   [facility]    [address] [place] [address]   [phone]              Contact Numbers     Please call [phone] to cancel and/or reschedule your appointment.  Please call [phone] with any problems or questions regarding your therapy          May 2017 Details    Sun Mon Tue Wed Thu Fri Sat      1      1.25 mg   See details      2      2.5 mg         3      2.5 mg         4      2.5 mg         5      1.25 mg         6      2.5 mg           7      2.5 mg         8      1.25 mg         9      2.5 mg         10      2.5 mg         11      2.5 mg         12      1.25 mg         13      2.5 mg           14      2.5 mg         15      1.25 mg         16      2.5 mg         17      2.5 mg         18      2.5 mg         19      1.25 mg         20      2.5 mg           21      2.5 mg         22      1.25 mg         23      2.5 mg         24      2.5 mg         25      2.5 mg         26      1.25 mg         27      2.5 mg            28      2.5 mg         29      1.25 mg         30      2.5 mg         31      2.5 mg             Date Details   05/01 This INR check               How to take your warfarin dose     To take:  1.25 mg Take 0.5 of a 2.5 mg tablet.    To take:  2.5 mg Take 1 of the 2.5 mg tablets.           June 2017 Details    Sun Mon Tue Wed Thu Fri Sat         1      2.5 mg         2      1.25 mg         3      2.5 mg           4      2.5 mg         5            6               7               8               9               10                 11               12               13               14               15               16               17                 18               19               20               21               22               23               24                 25               26               27               28               29               30                 Date Details   No additional details    Date of next INR:  6/5/2017         How to take your warfarin dose     To take:  1.25 mg Take 0.5 of a 2.5 mg tablet.    To take:  2.5 mg Take 1 of the 2.5 mg tablets.

## 2017-05-01 NOTE — PROGRESS NOTES
ANTICOAGULATION FOLLOW-UP CLINIC VISIT    Patient Name:  Cricket Klein  Date:  5/1/2017  Contact Type:  Face to Face    SUBJECTIVE:     Patient Findings     Positives Change in diet/appetite (hasn't had any spinach in awhile), No Problem Findings           OBJECTIVE    INR Protime   Date Value Ref Range Status   05/01/2017 3.3 (A) 0.86 - 1.14 Final       ASSESSMENT / PLAN  INR assessment SUPRA no change for INR 1.8 - 3.3   Recheck INR In: 5 WEEKS    INR Location Clinic      Anticoagulation Summary as of 5/1/2017     INR goal 2.0-3.0   Today's INR 3.3!   Maintenance plan 1.25 mg (2.5 mg x 0.5) on Mon, Fri; 2.5 mg (2.5 mg x 1) all other days   Full instructions 1.25 mg on Mon, Fri; 2.5 mg all other days   Weekly total 15 mg   No change documented Gisell Taylor RN   Plan last modified Gisell Taylor RN (12/12/2016)   Next INR check 6/5/2017   Priority INR   Target end date Indefinite    Indications   PE (pulmonary embolism) [I26.99]  Other and unspecified coagulation defects [D68.9]  Long-term (current) use of anticoagulants [Z79.01] [Z79.01]         Anticoagulation Episode Summary     INR check location     Preferred lab     Send INR reminders to [facility] CLINIC POOL    Comments * Had PE in 2010 following hip surgery. Has heterozygous prothrombin gene mutation. Second PE 7-26-16. Needs lifelong warfarin. was on warfarin [id number]        Anticoagulation Care Providers     Provider Role Specialty Phone number    Saud Ramon MD [facility] [phone]            See the Encounter Report to view Anticoagulation Flowsheet and Dosing Calendar (Go to Encounters tab in chart review, and find the Anticoagulation Therapy Visit)    Gisell Taylor, TRISTON

## 2017-05-03 ENCOUNTER — OFFICE VISIT (OUTPATIENT)
Dept: NEUROLOGY | Facility: CLINIC | Age: 72
End: 2017-05-03
Payer: COMMERCIAL

## 2017-05-03 VITALS
DIASTOLIC BLOOD PRESSURE: 88 MMHG | HEIGHT: 67 IN | SYSTOLIC BLOOD PRESSURE: 158 MMHG | WEIGHT: 213 LBS | HEART RATE: 86 BPM | BODY MASS INDEX: 33.43 KG/M2

## 2017-05-03 DIAGNOSIS — Z87.828 HISTORY OF HEAD INJURY: ICD-10-CM

## 2017-05-03 DIAGNOSIS — R41.3 MEMORY PROBLEM: Primary | ICD-10-CM

## 2017-05-03 PROCEDURE — 99215 OFFICE O/P EST HI 40 MIN: CPT | Performed by: PSYCHIATRY & NEUROLOGY

## 2017-05-03 ASSESSMENT — MONTREAL COGNITIVE ASSESSMENT (MOCA)
WHAT LEVEL OF EDUCATION WAS ATTAINED: 0
7. [VIGILENCE] TAP WHEN HEARING DESIGNATED LETTER: 1
8. SERIAL SUBTRACTION OF 7S: 1
13. ORIENTATION SUBSCORE: 5
VISUOSPATIAL/EXECUTIVE SUBSCORE: 4
10. [FLUENCY] NAME WORDS STARTING WITH DESIGNATED LETTER: 0
6. READ LIST OF DIGITS [FORWARD/BACKWARD]: 2
WHAT IS THE TOTAL SCORE (OUT OF 30): 19
11. FOR EACH PAIR OF WORDS, WHAT CATEGORY DO THEY BELONG TO (OUT OF 2): 2
9. REPEAT EACH SENTENCE: 2
4. NAME EACH OF THE THREE ANIMALS SHOWN: 2
12. MEMORY INDEX SCORE: 0

## 2017-05-03 NOTE — NURSING NOTE
"Chief Complaint   Patient presents with     RECHECK     memory loss       Initial /88 (BP Location: Left arm, Cuff Size: Adult Large)  Pulse 86  Ht 5' 7\" (1.702 m)  Wt 213 lb (96.6 kg)  BMI 33.36 kg/m2 Estimated body mass index is 33.36 kg/(m^2) as calculated from the following:    Height as of this encounter: 5' 7\" (1.702 m).    Weight as of this encounter: 213 lb (96.6 kg).  Medication Reconciliation: complete   Dinora Jon LPN   5/3/2017       "

## 2017-05-03 NOTE — PATIENT INSTRUCTIONS
Plan:    Repeat Neuropsych (cognitive) testing down at the [facility].   Return to neurology after the testing to discuss the results (2-3 months).  Caution with driving (especially since you feel like you are slowing down). We can give you information about a Driving Evaluation if you are interested.   Stay active as much as you are able.

## 2017-05-03 NOTE — MR AVS SNAPSHOT
After Visit Summary   5/3/2017    Cricket Klein    MRN: [id number]           Patient Information     Date Of Birth          1945        Visit Information        Provider Department      5/3/2017 9:30 AM Emily Oconnor MD [facility]        Care Instructions    Plan:    Repeat Neuropsych (cognitive) testing down at the [place].   Return to neurology after the testing to discuss the results (2-3 months).  Caution with driving (especially since you feel like you are slowing down). We can give you information about a Driving Evaluation if you are interested.   Stay active as much as you are able.         Follow-ups after your visit        Your next 10 appointments already scheduled     May 09, 2017 10:00 AM CDT   SHORT with Saud Ramon MD   [facility] ([facility])    [address] [place] [address]   [phone]            Jun 05, 2017 10:30 AM CDT   Anticoagulation Visit with NB ANTI COAG   [facility] ([facility])    [address] [place] [id number] [phone]              Who to contact     If you have questions or need follow up information about today's clinic visit or your schedule please contact [facility] directly at [phone].  Normal or non-critical lab and imaging results will be communicated to you by MyChart, letter or phone within 4 business days after the clinic has received the results. If you do not hear from us within 7 days, please contact the clinic through MyChart or phone. If you have a critical or abnormal lab result, we will notify you by phone as soon as possible.  Submit refill requests through Foxwordy or call your pharmacy and they will forward the refill request to us. Please allow 3 business days for your refill to be completed.          Additional Information About Your Visit        MyChart Information      "[employer]un lets you send messages to your doctor, view your test results, renew your prescriptions, schedule appointments and more. To sign up, go to [email or web address] . Click on \"Log in\" on the left side of the screen, which will take you to the Welcome page. Then click on \"Sign up Now\" on the right side of the page.     You will be asked to enter the access code listed below, as well as some personal information. Please follow the directions to create your username and password.     Your access code is: [email or web address]  Expires: 2017 10:10 AM     Your access code will  in 90 days. If you need help or a new code, please call your [facility] clinic or [phone].        Care EveryWhere ID     This is your Care EveryWhere ID. This could be used by other organizations to access your [facility] medical records  [id number]        Your Vitals Were     Pulse Height BMI (Body Mass Index)             86 5' 7\" (1.702 m) 33.36 kg/m2          Blood Pressure from Last 3 Encounters:   17 158/88   17 145/82   17 127/77    Weight from Last 3 Encounters:   17 213 lb (96.6 kg)   17 213 lb 12.8 oz (97 kg)   17 210 lb (95.3 kg)              Today, you had the following     No orders found for display       Primary Care Provider Office Phone # Fax #    Saud Ramon [phone]       [address] [address]        Thank you!     Thank you for choosing [facility]  for your care. Our goal is always to provide you with excellent care. Hearing back from our patients is one way we can continue to improve our services. Please take a few minutes to complete the written survey that you may receive in the mail after your visit with us. Thank you!             Your Updated Medication List - Protect others around you: Learn how to safely use, store and throw away your medicines at www.disposemymeds.org.        "   This list is accurate as of: 5/3/17 10:10 AM.  Always use your most recent med list.                   Brand Name Dispense Instructions for use    atenolol 50 MG tablet    TENORMIN    180 tablet    Take 1 tablet (50 mg) by mouth 2 times daily       ezetimibe 10 MG tablet    ZETIA    90 tablet    Take 1 tablet (10 mg) by mouth daily       order for DME      Equipment being ordered: LEXI  Cricket Klein received a [employer]master AirSense 10 Auto. Pressures were set at Auto 10 - 18 cm H2O.       triamcinolone acetonide 40 MG/ML injection    KENALOG-40    0.5 mL    0.5 mLs (20 mg) by INTRA-ARTICULAR route once       vitamin D 1000 UNITS capsule      Take 1 capsule by mouth daily.       warfarin 2.5 MG tablet    COUMADIN    80 tablet    Take 1.25mg by mouth every Mon & Fri and 2.5mg all other days or as directed by Anticoagulation Clinic

## 2017-05-03 NOTE — PROGRESS NOTES
ESTABLISHED PATIENT NEUROLOGY NOTE    DATE OF VISIT: 5/3/2017  MRN: [id number]  PATIENT NAME: Cricket Klein  YOB: 1945    Chief Complaint   Patient presents with     RECHECK     memory loss     SUBJECTIVE:                                                      HISTORY OF PRESENT ILLNESS:  Cricket is here for follow up regarding memory. The patient has a history of head injury as a teenager and short term memory problems since. Later evaluation revealed skull fracture and parenchymal damage on the Left. He had some amnestic events and a mildly  abnormal electroencephalogram upon that work-up. He presented to my clinic a little over one year ago for increased forgetfulness, word-finding difficulties and trouble doing certain tasks that were previously easy. Neuropsych testing was consistent with Left-sided brain dysfunction.    The patient is accompanied by his wife in clinic today. The patient tells me that he is noticing more slowing. 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 He does continue to take care of his own ADLs. He also continues to drive. He denies problems with driving. His wife has taken over some of the driving, but does not have any safety concerns when he is behind the wheel. They only drive locally. Names are difficult and he feels that in general the short term memory is getting worse.      He denies problems with stiffness, gait changes, balance. No changes in voice. He endorses rare dizziness.      He does endorse some anxiety about not being able to do things he used to do. Otherwise his wife has not noticed any changes in mood or behavior.      After his previous visit we did follow-up with a brain MRI (which showed only slight progression of his cerebral atrophy and stable Left frontal encephalomalacia. electroencephalogram was normal. In light of this an no clear  spells of altered awareness or recent amnestic events, I did not start the patient on any medication for seizure.     CURRENT MEDICATIONS:     Current Outpatient Prescriptions on File Prior to Visit:  warfarin (COUMADIN) 2.5 MG tablet Take 1.25mg by mouth every Mon & Fri and 2.5mg all other days or as directed by Anticoagulation Clinic   ezetimibe (ZETIA) 10 MG tablet Take 1 tablet (10 mg) by mouth daily   triamcinolone acetonide (KENALOG-40) 40 MG/ML injection 0.5 mLs (20 mg) by INTRA-ARTICULAR route once   atenolol (TENORMIN) 50 MG tablet Take 1 tablet (50 mg) by mouth 2 times daily   order for DME Equipment being ordered: LEXI Klein received a [employer] AirSense 10 Auto. Pressures were set at Auto 10 - 18 cm H2O.   Cholecalciferol (VITAMIN D) 1000 UNITS capsule Take 1 capsule by mouth daily.     No current facility-administered medications on file prior to visit.     RECENT DIAGNOSTIC STUDIES:   Labs:   Results for orders placed or performed in visit on 05/01/17   INR point of care   Result Value Ref Range    INR Protime 3.3 (A) 0.86 - 1.14       Imaging:   MRI Brain (5.23.16):  IMPRESSION:  1. Mild-to-moderate cerebral atrophy which is slightly progressed  since 2007.  2. Stable left frontal encephalomalacia.  3. Few stable nonspecific white matter lesions.  4. No evidence for intracranial hemorrhage, acute infarct, or any  focal mass lesions.    Electroencephalogram (6.2.16):  RESULTS:   BACKGROUND ACTIVITIES: During maximal wakefulness, there is a symmetric, moderate amplitude, poorly formed, approximately 8 1/2 Hz posterior dominant rhythm, which attenuated with eye opening. Sleep stages were observed with well formed sleep spindles and vertex sharps. Hyperventilation was not performed. Photic stimulation produced no driving responses at multiple frequencies.   INTERICTAL ACTIVITIES: There are no focal pathological slowing or epileptiform abnormalities.   ICTAL ACTIVITIES: There are no clinical or  "electrographic seizures during this recording.  IMPRESSION: This is a normal waking and sleep EEG.    REVIEW OF SYSTEMS:                                                      10-point review of systems is negative except as mentioned above in HPI.     EXAM:                                                      Physical Exam:   Vitals: /88 (BP Location: Left arm, Cuff Size: Adult Large)  Pulse 86  Ht 5' 7\" (1.702 m)  Wt 213 lb (96.6 kg)  BMI 33.36 kg/m2  BMI= Body mass index is 33.36 kg/(m^2).  GENERAL: NAD.   Neurologic:  MENTAL STATUS: Alert, attentive. Speech is fluent. Occasional word-finding difficulties. Fair comprehension. MOCA: 19/30 (previous score was 20/30). Processing speed seems very slow.   CRANIAL NERVES: Visual fields intact to confrontation. Pupils equally, round and reactive to light. Facial sensation and movement normal. EOM full. Hearing intact to conversation. Trapezius strength intact. Palate moves symmetrically. Tongue midline.  MOTOR: 5/5 in proximal and distal muscle groups of upper and lower extremities. Tone and bulk normal.   DTRs: Intact and symmetric. Babinski juno-going.   SENSATION: Normal light touch throughout. Intact proprioception. Vibration: Normal at both ankles.   COORDINATION: Normal finger nose finger. Finger tapping normal. Knee heel shin normal.  STATION AND GAIT: Romberg negative. Tandem minimally unsteady.  CV: RRR. S1, S2.   NECK: No bruits.      ASSESSMENT and PLAN:                                                      Assessment and Plan:    ICD-10-CM    1. Memory problem R41.3 NEUROPSYCHOLOGY REFERRAL   2. History of head injury Z87.828 NEUROPSYCHOLOGY REFERRAL       Mr. Klein is a pleasant 72 yo man with history of remote brain injury and memory/language difficulties. We reviewed the results of his prior Neuropsych testing, MRI and electroencephalogram in clinic today. MoCA score was 19/30 compared to previous score of 20/30, so clinically he appears stable. " He does report some increased difficulties at home, so I recommend we repeat the Neuropsych testing at this time. His previous testing did not provide clear etiology for his dysfunction.    In the meantime I reassured the patient that some anxiety in the setting of memory difficulties/frustration is normal. I would like him to let either myself or his primary care provider know if this worsens. The patient understands and agrees with the plan.      Patient Instructions:  Repeat Neuropsych (cognitive) testing down at the [facility].   Return to neurology after the testing to discuss the results (2-3 months).  Caution with driving (especially since you feel like you are slowing down). We can give you information about a Driving Evaluation if you are interested.   Stay active as much as you are able.       Total Time: >40 minutes were spent with the patient. More than 50% of the time spent on counseling (as described above in Assessment and Plan) /coordinating the care.    Emily Oconnor MD  Neurology

## 2017-05-09 ENCOUNTER — OFFICE VISIT (OUTPATIENT)
Dept: FAMILY MEDICINE | Facility: CLINIC | Age: 72
End: 2017-05-09
Payer: COMMERCIAL

## 2017-05-09 VITALS
SYSTOLIC BLOOD PRESSURE: 142 MMHG | HEIGHT: 67 IN | HEART RATE: 71 BPM | OXYGEN SATURATION: 94 % | BODY MASS INDEX: 33.06 KG/M2 | DIASTOLIC BLOOD PRESSURE: 84 MMHG | TEMPERATURE: 99 F | WEIGHT: 210.6 LBS

## 2017-05-09 DIAGNOSIS — M16.11 PRIMARY OSTEOARTHRITIS OF RIGHT HIP: Primary | ICD-10-CM

## 2017-05-09 DIAGNOSIS — L72.3 SEBACEOUS CYST: ICD-10-CM

## 2017-05-09 PROCEDURE — 99214 OFFICE O/P EST MOD 30 MIN: CPT | Performed by: FAMILY MEDICINE

## 2017-05-09 NOTE — MR AVS SNAPSHOT
"              After Visit Summary   5/9/2017    Cricket Klein    MRN: [id number]           Patient Information     Date Of Birth          1945        Visit Information        Provider Department      5/9/2017 10:00 AM Saud Ramon MD [facility]        Care Instructions    1.  This is most likely your right hip arthritis.    2. Lets just observe for now:     3. Lets plan to remove the cyst in one month unless it goes away.         Follow-ups after your visit        Your next 10 appointments already scheduled     Jun 05, 2017 10:30 AM CDT   Anticoagulation Visit with NB ANTI COAG   [facility] ([facility])    [address] [place] [address]   [phone]              Who to contact     If you have questions or need follow up information about today's clinic visit or your schedule please contact [facility] directly at [phone].  Normal or non-critical lab and imaging results will be communicated to you by MyChart, letter or phone within 4 business days after the clinic has received the results. If you do not hear from us within 7 days, please contact the clinic through InvisibleCRMhart or phone. If you have a critical or abnormal lab result, we will notify you by phone as soon as possible.  Submit refill requests through Covocative or call your pharmacy and they will forward the refill request to us. Please allow 3 business days for your refill to be completed.          Additional Information About Your Visit        InvisibleCRMhart Information     Covocative lets you send messages to your doctor, view your test results, renew your prescriptions, schedule appointments and more. To sign up, go to [email or web address] . Click on \"Log in\" on the left side of the screen, which will take you to the Welcome page. Then click on \"Sign up Now\" on the right side of the page.     You will be asked to enter the access code listed " "below, as well as some personal information. Please follow the directions to create your username and password.     Your access code is: [email or web address]  Expires: 2017 10:10 AM     Your access code will  in 90 days. If you need help or a new code, please call your [facility] or [phone].        Care EveryWhere ID     This is your Care EveryWhere ID. This could be used by other organizations to access your [facility] medical records  [id number]        Your Vitals Were     Pulse Temperature Height Pulse Oximetry BMI (Body Mass Index)       71 99  F (37.2  C) (Tympanic) 5' 7\" (1.702 m) 94% 32.98 kg/m2        Blood Pressure from Last 3 Encounters:   17 142/84   17 158/88   17 145/82    Weight from Last 3 Encounters:   17 210 lb 9.6 oz (95.5 kg)   17 213 lb (96.6 kg)   17 213 lb 12.8 oz (97 kg)              Today, you had the following     No orders found for display       Primary Care Provider Office Phone # Fax #    Saud Ramon [phone]       [address] [address]        Thank you!     Thank you for choosing [facility]  for your care. Our goal is always to provide you with excellent care. Hearing back from our patients is one way we can continue to improve our services. Please take a few minutes to complete the written survey that you may receive in the mail after your visit with us. Thank you!             Your Updated Medication List - Protect others around you: Learn how to safely use, store and throw away your medicines at www.disposemymeds.org.          This list is accurate as of: 17 10:36 AM.  Always use your most recent med list.                   Brand Name Dispense Instructions for use    atenolol 50 MG tablet    TENORMIN    180 tablet    Take 1 tablet (50 mg) by mouth 2 times daily       ezetimibe 10 MG tablet    ZETIA    90 tablet    Take 1 tablet (10 " mg) by mouth daily       order for DME      Equipment being ordered: LEXI Klein received a [employer] AirSense 10 Auto. Pressures were set at Auto 10 - 18 cm H2O.       triamcinolone acetonide 40 MG/ML injection    KENALOG-40    0.5 mL    0.5 mLs (20 mg) by INTRA-ARTICULAR route once       vitamin D 1000 UNITS capsule      Take 1 capsule by mouth daily.       warfarin 2.5 MG tablet    COUMADIN    80 tablet    Take 1.25mg by mouth every Mon & Fri and 2.5mg all other days or as directed by Anticoagulation Clinic

## 2017-05-09 NOTE — PROGRESS NOTES
SUBJECTIVE:                                                    Cricket Klein is a 71 year old male who presents to clinic today for the following health issues:he comes with a boil on his back that has ruptured.  Also with right hip pain.  Previous left hip replacement.  Also is planning [facility]     Joint Pain     Onset: 1 month     Description:   Location: right leg   Character: Sharp, Stabbing and Burning    Intensity: moderate 6/10 at its worst     Progression of Symptoms: intermittent    Accompanying Signs & Symptoms:  Other symptoms: radiation of pain to hip   History:   Previous similar pain: No     Precipitating factors:   Trauma or overuse: no     Alleviating factors:  Improved by: rest/inactivity and immobilization       Therapies Tried and outcome: Tylenol with some relief       Concern - Bump on back     Onset: 2 weeks     Description:   Left upper back     Intensity: mild    Progression of Symptoms:  improving    Accompanying Signs & Symptoms:None       Previous history of similar problem:   None     Precipitating factors:   Worsened by: hitting it with the chair.     Alleviating factors:  Improved by: he bummed it on the chair about a week ago, it was bloody and yellow       Therapies Tried and outcome: Tylenol with some relief      Problem list and histories reviewed & adjusted, as indicated.  Additional history: as documented    Patient Active Problem List   Diagnosis     Essential hypertension     Sleep apnea     Transient cerebral ischemia     Pure hypercholesterolemia     Impotence of organic origin     Pulmonary embolism (H)     S/P hip replacement     Other and unspecified coagulation defects     Hyperlipidemia LDL goal <130     Advanced directives, counseling/discussion     CHRISTIANO (obstructive sleep apnea)     PE (pulmonary embolism)     Pulmonary emboli (H)     Long-term (current) use of anticoagulants [Z79.01]     Past Surgical History:   Procedure Laterality  Date     ARTHROTOMY SHOULDER, ROTATOR CUFF REPAIR, COMBINED  2012    Procedure:COMBINED ARTHROTOMY SHOULDER, ROTATOR CUFF REPAIR; Left Distal clavicle excision, Subacromial decompression, Rotator cuff repair; Surgeon:JOSE MIGUEL HUTCHINS; Location:[facility]     ARTHROTOMY SHOULDER, ROTATOR CUFF REPAIR, COMBINED  10/12/2012    Procedure: COMBINED ARTHROTOMY SHOULDER, ROTATOR CUFF REPAIR;  Right shoulder biceps tenodesiss,subacromial decompression;  Surgeon: Jose Miguel Hutchins MD;  Location: [facility]     COLONOSCOPY  2003    normal     COLONOSCOPY  4/15/2014    Procedure: Colonoscopy;  Surgeon: Angela May MD;  Location: [facility]     SURGICAL HISTORY OF -       achilles tendon repair     SURGICAL HISTORY OF -   3-2010    left hip relpacement       Social History   Substance Use Topics     Smoking status: Never Smoker     Smokeless tobacco: Never Used     Alcohol use 0.0 oz/week     0 Standard drinks or equivalent per week      Comment: rare     Family History   Problem Relation Age of Onset     Thyroid Disease Mother      thyroid cancer     HEART DISEASE Mother       of heart attack     HEART DISEASE Father       of heart attack     DIABETES Father      Circulatory Brother      Alzheimer Disease Brother      Circulatory Brother      CANCER Brother      laryngeal     Circulatory Brother      HEART DISEASE Brother      CHF     Congenital Anomalies Sister      valve     HEART DISEASE Brother      Heart failure         Current Outpatient Prescriptions   Medication Sig Dispense Refill     warfarin (COUMADIN) 2.5 MG tablet Take 1.25mg by mouth every Mon & Fri and 2.5mg all other days or as directed by Anticoagulation Clinic 80 tablet 0     ezetimibe (ZETIA) 10 MG tablet Take 1 tablet (10 mg) by mouth daily 90 tablet 3     triamcinolone acetonide (KENALOG-40) 40 MG/ML injection 0.5 mLs (20 mg) by INTRA-ARTICULAR route once 0.5 mL 0     atenolol (TENORMIN) 50 MG tablet Take 1 tablet (50 mg) by  "mouth 2 times daily 180 tablet 1     order for DME Equipment being ordered: CPAP  Cricket Klein received a Resmed AirSense 10 Auto. Pressures were set at Auto 10 - 18 cm H2O.       Cholecalciferol (VITAMIN D) 1000 UNITS capsule Take 1 capsule by mouth daily.         Reviewed and updated as needed this visit by clinical staff       Reviewed and updated as needed this visit by Provider         ROS:  C: NEGATIVE for fever, chills, change in weight  E/M: NEGATIVE for ear, mouth and throat problems  R: NEGATIVE for significant cough or SOB  CV: NEGATIVE for chest pain, palpitations or peripheral edema    OBJECTIVE:                                                    /84 (BP Location: Right arm, Patient Position: Chair, Cuff Size: Adult Large)  Pulse 71  Temp 99  F (37.2  C) (Tympanic)  Ht 5' 7\" (1.702 m)  Wt 210 lb 9.6 oz (95.5 kg)  SpO2 94%  BMI 32.98 kg/m2  Body mass index is 32.98 kg/(m^2).  GENERAL: healthy, alert and no distress  NECK: no adenopathy, no asymmetry, masses, or scars and thyroid normal to palpation  RESP: lungs clear to auscultation - no rales, rhonchi or wheezes  MS: no gross musculoskeletal defects noted, no edema  Back with sebaceous cyst        ASSESSMENT/PLAN:                                                      ASSESSMENT/PLAN:      ICD-10-CM    1. Primary osteoarthritis of right hip M16.11    2. Sebaceous cyst L72.3        Patient Instructions   1.  This is most likely your right hip arthritis.    2. Lets just observe for now:     3. Lets plan to remove the cyst in one month unless it goes away.             There are no diagnoses linked to this encounter.        Saud Ramon MD  [facility]  "

## 2017-05-09 NOTE — NURSING NOTE
"No chief complaint on file.      Initial LMP 02/24/2017 (Exact Date) Estimated body mass index is 23.49 kg/(m^2) as calculated from the following:    Height as of 4/4/17: 5' 7\" (1.702 m).    Weight as of 4/4/17: 150 lb (68 kg).  Medication Reconciliation: complete     Chen Mohamud CMA (AAMA)  "

## 2017-05-09 NOTE — PATIENT INSTRUCTIONS
1.  This is most likely your right hip arthritis.    2. Lets just observe for now:     3. Lets plan to remove the cyst in one month unless it goes away.

## 2017-06-05 ENCOUNTER — ANTICOAGULATION THERAPY VISIT (OUTPATIENT)
Dept: ANTICOAGULATION | Facility: CLINIC | Age: 72
End: 2017-06-05
Payer: COMMERCIAL

## 2017-06-05 DIAGNOSIS — Z79.01 LONG-TERM (CURRENT) USE OF ANTICOAGULANTS: ICD-10-CM

## 2017-06-05 LAB — INR POINT OF CARE: 2.7 (ref 0.86–1.14)

## 2017-06-05 PROCEDURE — 99207 ZZC NO CHARGE NURSE ONLY: CPT

## 2017-06-05 PROCEDURE — 85610 PROTHROMBIN TIME: CPT | Mod: QW

## 2017-06-05 PROCEDURE — 36416 COLLJ CAPILLARY BLOOD SPEC: CPT

## 2017-06-05 NOTE — MR AVS SNAPSHOT
Cricket Klein   6/5/2017 10:30 AM   Anticoagulation Therapy Visit    Description:  71 year old male   Provider:  NB ANTI COAG   Department:  [facility] Antico[facility]           INR as of 6/5/2017     Today's INR 2.7      Anticoagulation Summary as of 6/5/2017     INR goal 2.0-3.0   Today's INR 2.7   Full instructions 1.25 mg on Mon, Fri; 2.5 mg all other days   Next INR check 7/10/2017    Indications   PE (pulmonary embolism) [I26.99]  Other and unspecified coagulation defects [D68.9]  Long-term (current) use of anticoagulants [Z79.01] [Z79.01]         Description     No change, recheck INR in 5 weeks.      Your next Anticoagulation Clinic appointment(s)     Jun 05, 2017 10:30 AM CDT   [facility]    [address] [place] [id number] [phone]            Jul 10, 2017 10:15 AM CDT   An[facility]agulation [facility] with NB ANTI HANNA   [facility]    [address] [place] [id number]   [phone]              Contact Numbers     Please call [phone] to cancel and/or reschedule your appointment.  Please call [phone] with any problems or questions regarding your therapy          June 2017 Details    Sun Mon Tue Wed Thu Fri Sat         1               2               3                 4               5      1.25 mg   See details      6      2.5 mg         7      2.5 mg         8      2.5 mg         9      1.25 mg         10      2.5 mg           11      2.5 mg         12      1.25 mg         13      2.5 mg         14      2.5 mg         15      2.5 mg         16      1.25 mg         17      2.5 mg           18      2.5 mg         19      1.25 mg         20      2.5 mg         21      2.5 mg         22      2.5 mg         23      1.25 mg         24      2.5 mg           25      2.5 mg         26      1.25 mg         27      2.5 mg         28      2.5 mg          29      2.5 mg         30      1.25 mg           Date Details   06/05 This INR check               How to take your warfarin dose     To take:  1.25 mg Take 0.5 of a 2.5 mg tablet.    To take:  2.5 mg Take 1 of the 2.5 mg tablets.           July 2017 Details    Sun Mon Tue Wed Thu Fri Sat           1      2.5 mg           2      2.5 mg         3      1.25 mg         4      2.5 mg         5      2.5 mg         6      2.5 mg         7      1.25 mg         8      2.5 mg           9      2.5 mg         10            11               12               13               14               15                 16               17               18               19               20               21               22                 23               24               25               26               27               28               29                 30               31                     Date Details   No additional details    Date of next INR:  7/10/2017         How to take your warfarin dose     To take:  1.25 mg Take 0.5 of a 2.5 mg tablet.    To take:  2.5 mg Take 1 of the 2.5 mg tablets.

## 2017-06-05 NOTE — PROGRESS NOTES
ANTICOAGULATION FOLLOW-UP CLINIC VISIT    Patient Name:  Cricket Klein  Date:  6/5/2017  Contact Type:  Face to Face    SUBJECTIVE:     Patient Findings     Positives Missed doses (6/4/17)           OBJECTIVE    INR Protime   Date Value Ref Range Status   06/05/2017 2.7 (A) 0.86 - 1.14 Final       ASSESSMENT / PLAN  INR assessment THER    Recheck INR In: 5 WEEKS    INR Location Clinic      Anticoagulation Summary as of 6/5/2017     INR goal 2.0-3.0   Today's INR 2.7   Maintenance plan 1.25 mg (2.5 mg x 0.5) on Mon, Fri; 2.5 mg (2.5 mg x 1) all other days   Full instructions 1.25 mg on Mon, Fri; 2.5 mg all other days   Weekly total 15 mg   No change documented Gisell Taylor RN   Plan last modified Gisell Taylor RN (12/12/2016)   Next INR check 7/10/2017   Priority INR   Target end date Indefinite    Indications   PE (pulmonary embolism) [I26.99]  Other and unspecified coagulation defects [D68.9]  Long-term (current) use of anticoagulants [Z79.01] [Z79.01]         Anticoagulation Episode Summary     INR check location     Preferred lab     Send INR reminders to [facility] CLINIC POOL    Comments * Had PE in 2010 following hip surgery. Has heterozygous prothrombin gene mutation. Second PE 7-26-16. Needs lifelong warfarin. was on warfarin [id number]        Anticoagulation Care Providers     Provider Role Specialty Phone number    Saud Ramon MD [facility] [phone]            See the Encounter Report to view Anticoagulation Flowsheet and Dosing Calendar (Go to Encounters tab in chart review, and find the Anticoagulation Therapy Visit)    Gisell Taylor RN

## 2017-06-22 ENCOUNTER — OFFICE VISIT (OUTPATIENT)
Dept: FAMILY MEDICINE | Facility: CLINIC | Age: 72
End: 2017-06-22
Payer: COMMERCIAL

## 2017-06-22 VITALS
OXYGEN SATURATION: 96 % | WEIGHT: 210 LBS | DIASTOLIC BLOOD PRESSURE: 79 MMHG | BODY MASS INDEX: 32.96 KG/M2 | TEMPERATURE: 98.1 F | SYSTOLIC BLOOD PRESSURE: 131 MMHG | HEIGHT: 67 IN | HEART RATE: 66 BPM

## 2017-06-22 DIAGNOSIS — M25.551 HIP PAIN, RIGHT: Primary | ICD-10-CM

## 2017-06-22 PROCEDURE — 99214 OFFICE O/P EST MOD 30 MIN: CPT | Performed by: FAMILY MEDICINE

## 2017-06-22 NOTE — MR AVS SNAPSHOT
After Visit Summary   6/22/2017    Cricket Klein    MRN: [id number]           Patient Information     Date Of Birth          1945        Visit Information        Provider Department      6/22/2017 2:00 PM Saud Ramon MD [facility]        Today's Diagnoses     Hip pain, right    -  1       Follow-ups after your visit        Additional Services     ORTHO  REFERRAL       [facility] is referring you to the Orthopedic  Services at [facility].       The  Representative will assist you in the coordination of your Orthopedic and Musculoskeletal Care as prescribed by your physician.    The  Representative will call you within 1 business day to help schedule your appointment, or you may contact the [facility] Representative at:    All areas ~ [phone]     Type of Referral : Surgical / Specialist       Timeframe requested: call him with appontment   He want s to see Dr Martinez at [facility] for his arthritis right hip.     Coverage of these services is subject to the terms and limitations of your health insurance plan.  Please call member services at your health plan with any benefit or coverage questions.      If X-rays, CT or MRI's have been performed, please contact the facility where they were done to arrange for , prior to your scheduled appointment.  Please bring this referral request to your appointment and present it to your specialist.                  Your next 10 appointments already scheduled     Jul 10, 2017 10:15 AM CDT   [facility] with NB [facility]TI COAG   [facility]    [address] [place] [id number] [phone]              Who to contact     If you have questions or need follow up information about today's clinic visit or your schedule please contact [facility] directly at  "[phone].  Normal or non-critical lab and imaging results will be communicated to you by MyChart, letter or phone within 4 business days after the clinic has received the results. If you do not hear from us within 7 days, please contact the clinic through [employer] or phone. If you have a critical or abnormal lab result, we will notify you by phone as soon as possible.  Submit refill requests through [employer] or call your pharmacy and they will forward the refill request to us. Please allow 3 business days for your refill to be completed.          Additional Information About Your Visit        EndologixharCrackle Information     [employer]Watt lets you send messages to your doctor, view your test results, renew your prescriptions, schedule appointments and more. To sign up, go to [email or web address] . Click on \"Log in\" on the left side of the screen, which will take you to the Welcome page. Then click on \"Sign up Now\" on the right side of the page.     You will be asked to enter the access code listed below, as well as some personal information. Please follow the directions to create your username and password.     Your access code is: [email or web address]  Expires: 2017 10:10 AM     Your access code will  in 90 days. If you need help or a new code, please call your [facility] clinic or [phone].        Care EveryWhere ID     This is your Care EveryWhere ID. This could be used by other organizations to access your [facility] medical records  [id number]        Your Vitals Were     Pulse Temperature Height Pulse Oximetry BMI (Body Mass Index)       66 98.1  F (36.7  C) (Tympanic) 5' 7\" (1.702 m) 96% 32.89 kg/m2        Blood Pressure from Last 3 Encounters:   17 131/79   17 142/84   17 158/88    Weight from Last 3 Encounters:   17 210 lb (95.3 kg)   17 210 lb 9.6 oz (95.5 kg)   17 213 lb (96.6 kg)              We Performed the Following     ORTHO  REFERRAL        Primary Care Provider " Office Phone # Fax #    Saud Daniel Ramon [phone]       [address] [address]        Equal Access to Services     ROSARIO MOE : Hadchristin bradley hadvestao Soomaali, waaxda luqadaha, qaybta kaalmada adeegyada, nory ramos yogeshkeesha mccall wen hinkle. So [facility] [phone].    ATENCIÓN: Si habla español, tiene a barton disposición servicios gratuitos de asistencia lingüística. Llame al [phone].    We comply with applicable federal civil rights laws and [place] laws. We do not discriminate on the basis of race, color, national origin, age, disability sex, sexual orientation or gender identity.            Thank you!     Thank you for choosing [facility]  for your care. Our goal is always to provide you with excellent care. Hearing back from our patients is one way we can continue to improve our services. Please take a few minutes to complete the written survey that you may receive in the mail after your visit with us. Thank you!             Your Updated Medication List - Protect others around you: Learn how to safely use, store and throw away your medicines at www.disposemymeds.org.          This list is accurate as of: 6/22/17  2:29 PM.  Always use your most recent med list.                   Brand Name Dispense Instructions for use Diagnosis    atenolol 50 MG tablet    TENORMIN    180 tablet    Take 1 tablet (50 mg) by mouth 2 times daily    Benign essential HTN       ezetimibe 10 MG tablet    ZETIA    90 tablet    Take 1 tablet (10 mg) by mouth daily    Pure hypercholesterolemia       order for DME      Equipment being ordered: LEXI Gainesbanks received a Resmed AirSense 10 Auto. Pressures were set at Auto 10 - 18 cm H2O.        triamcinolone acetonide 40 MG/ML injection    KENALOG-40    0.5 mL    0.5 mLs (20 mg) by INTRA-ARTICULAR route once    Carpal tunnel syndrome of right wrist       vitamin D 1000 UNITS  capsule      Take 1 capsule by mouth daily.        warfarin 2.5 MG tablet    COUMADIN    80 tablet    Take 1.25mg by mouth every Mon & Fri and 2.5mg all other days or as directed by Anticoagulation Clinic    Pulmonary embolus, left (H)

## 2017-06-22 NOTE — PROGRESS NOTES
"  SUBJECTIVE:                                                    Cricket Klein is a 71 year old male who presents to clinic today for the following health issues:skin tabs  ALSO WITH RIGHT HIP PAIN  DJD     WART(S)      Onset: couple of days ago started hurting, been around for years    Description (location/number): upper inside right arm  x1    Accompanying signs and symptoms: Painful: YES- when it touchs or hits anything    History: prior warts: no     Therapies tried and outcome: None           Problem list and histories reviewed & adjusted, as indicated.  Additional history:         Reviewed and updated as needed this visit by clinical staff       Reviewed and updated as needed this visit by Provider         ROS:  C: NEGATIVE for fever, chills, change in weight  E/M: NEGATIVE for ear, mouth and throat problems  R: NEGATIVE for significant cough or SOB  CV: NEGATIVE for chest pain, palpitations or peripheral edema    OBJECTIVE:                                                    /79 (BP Location: Right arm, Patient Position: Chair, Cuff Size: Adult Regular)  Pulse 66  Temp 98.1  F (36.7  C) (Tympanic)  Ht 5' 7\" (1.702 m)  Wt 210 lb (95.3 kg)  SpO2 96%  BMI 32.89 kg/m2  Body mass index is 32.89 kg/(m^2).  GENERAL: healthy, alert and no distress  NECK: no adenopathy, no asymmetry, masses, or scars and thyroid normal to palpation  RESP: lungs clear to auscultation - no rales, rhonchi or wheezes  CV: regular rate and rhythm, normal S1 S2, no S3 or S4, no murmur, click or rub, no peripheral edema and peripheral pulses strong  ABDOMEN: soft, nontender, no hepatosplenomegaly, no masses and bowel sounds normal  MS: no gross musculoskeletal defects noted, no edema  SKIN TAGS UNDER RIGHT AXILLA  THREE REMOVER WITH ELECTROCAUTERY       ASSESSMENT/PLAN:                                                            1. Hip pain, right  2. SKIN TAGS  RIGHT AXILLA   - ORTHO  REFERRAL        Saud Sanchez " MD Tristen  [facility]

## 2017-06-22 NOTE — NURSING NOTE
"Chief Complaint   Patient presents with     Derm Problem       Initial /79 (BP Location: Right arm, Patient Position: Chair, Cuff Size: Adult Regular)  Pulse 66  Temp 98.1  F (36.7  C) (Tympanic)  Ht 5' 7\" (1.702 m)  Wt 210 lb (95.3 kg)  SpO2 96%  BMI 32.89 kg/m2 Estimated body mass index is 32.89 kg/(m^2) as calculated from the following:    Height as of this encounter: 5' 7\" (1.702 m).    Weight as of this encounter: 210 lb (95.3 kg).  Medication Reconciliation: complete    Health Maintenance that is potentially due pending provider review:  Gave a copy of the advanced directive for chart.    N/a    [place] cantu        "

## 2017-07-07 ENCOUNTER — TRANSFERRED RECORDS (OUTPATIENT)
Dept: HEALTH INFORMATION MANAGEMENT | Facility: CLINIC | Age: 72
End: 2017-07-07

## 2017-07-10 ENCOUNTER — TELEPHONE (OUTPATIENT)
Dept: ANTICOAGULATION | Facility: CLINIC | Age: 72
End: 2017-07-10

## 2017-07-10 ENCOUNTER — ANTICOAGULATION THERAPY VISIT (OUTPATIENT)
Dept: ANTICOAGULATION | Facility: CLINIC | Age: 72
End: 2017-07-10
Payer: COMMERCIAL

## 2017-07-10 DIAGNOSIS — Z79.01 LONG-TERM (CURRENT) USE OF ANTICOAGULANTS: ICD-10-CM

## 2017-07-10 LAB — INR POINT OF CARE: 1.9 (ref 0.86–1.14)

## 2017-07-10 PROCEDURE — 85610 PROTHROMBIN TIME: CPT | Mod: QW

## 2017-07-10 PROCEDURE — 99207 ZZC NO CHARGE NURSE ONLY: CPT

## 2017-07-10 PROCEDURE — 36416 COLLJ CAPILLARY BLOOD SPEC: CPT

## 2017-07-10 NOTE — PROGRESS NOTES
ANTICOAGULATION FOLLOW-UP CLINIC VISIT    Patient Name:  Cricket Klein  Date:  7/10/2017  Contact Type:  Face to Face    SUBJECTIVE:     Patient Findings     Comments Pt having tooth extraction 7/17, he will plan to have a large serving of greens 7/16/17 and will be coming in for INR that day. Pt also has R hip arthroscopy scheduled 7/28/17 writer sent a telephone encounter to Dr. Ramon to give the ACC Lovenox Bridging instructions           OBJECTIVE    INR Protime   Date Value Ref Range Status   07/10/2017 1.9 (A) 0.86 - 1.14 Final       ASSESSMENT / PLAN  INR assessment THER    Recheck INR In: 1 WEEK    INR Location Clinic      Anticoagulation Summary as of 7/10/2017     INR goal 2.0-3.0   Today's INR 1.9!   Maintenance plan 1.25 mg (2.5 mg x 0.5) on Mon, Fri; 2.5 mg (2.5 mg x 1) all other days   Full instructions 1.25 mg on Mon, Fri; 2.5 mg all other days   Weekly total 15 mg   Plan last modified Gisell Taylor RN (12/12/2016)   Next INR check 7/17/2017   Priority INR   Target end date Indefinite    Indications   PE (pulmonary embolism) [I26.99]  Other and unspecified coagulation defects [D68.9]  Long-term (current) use of anticoagulants [Z79.01] [Z79.01]         Anticoagulation Episode Summary     INR check location     Preferred lab     Send INR reminders to [facility] CLINIC POOL    Comments * Had PE in 2010 following hip surgery. Has heterozygous prothrombin gene mutation. Second PE 7-26-16. Needs lifelong warfarin. was on warfarin [id number]        Anticoagulation Care Providers     Provider Role Specialty Phone number    Saud Ramon MD [facility] [phone]            See the Encounter Report to view Anticoagulation Flowsheet and Dosing Calendar (Go to Encounters tab in chart review, and find the Anticoagulation Therapy Visit)        Citlalli Sethi RN

## 2017-07-10 NOTE — MR AVS SNAPSHOT
Cricket Klein   7/10/2017 10:15 AM   Anticoagulation Therapy Visit    Description:  72 year old male   Provider:  NB ANTI COAG   Department:  Nb Anticoag           INR as of 7/10/2017     Today's INR 1.9!      Anticoagulation Summary as of 7/10/2017     INR goal 2.0-3.0   Today's INR 1.9!   Full instructions 1.25 mg on Mon, Fri; 2.5 mg all other days   Next INR check 7/17/2017    Indications   PE (pulmonary embolism) [I26.99]  Other and unspecified coagulation defects [D68.9]  Long-term (current) use of anticoagulants [Z79.01] [Z79.01]         Description     Warfarin dose: 1.25mg MF and 2.5mg the rest of the days of the week.        Your next Anticoagulation Clinic appointment(s)     Jul 17, 2017 10:30 AM CDT   [facility]agulation [facility] with NB ANTI COAG   [facility]    [address] [place] [id number] [phone]            Aug 21, 2017 10:15 AM CDT   Anticoagulation Visit with NB ANTI HANNA   [facility]    [address] [place] [phone]   [phone]              Contact Numbers     Please call [phone] to cancel and/or reschedule your appointment.  Please call [phone] with any problems or questions regarding your therapy          July 2017 Details    Sun Mon Tue Wed Thu Fri Sat           1                 2               3               4               5               6               7               8                 9               10      1.25 mg   See details      11      2.5 mg         12      2.5 mg         13      2.5 mg         14      1.25 mg         15      2.5 mg           16      2.5 mg   See details      17            18               19               20               21               22                 23               24               25               26               27               28               29                 30               31                      Date Details   07/10 This INR check      07/16 please have a larger serving of greens in preparation for tooth extraction       Date of next INR:  7/17/2017         How to take your warfarin dose     To take:  1.25 mg Take 0.5 of a 2.5 mg tablet.    To take:  2.5 mg Take 1 of the 2.5 mg tablets.

## 2017-07-10 NOTE — TELEPHONE ENCOUNTER
Dr. Iraj Snyder is scheduled to have RIGHT Hip Arthroscopy completed on 7/28/17 and will need to hold warfarin for 5 days.   Patient is currently on warfarin for  heterozygous prothrombin gene mutation and Pulmonary Embolism.  Current CHEST guidelines suggest considering bridging for those with high thrombotic risk.   While the patient is off warfarin, would you recommend the patient use enoxaparin injections as a bridge? If yes, would you like the prophylactic dose (40mg daily) or the therapeutic dose (1mg/kg twice daily)? Should the patient use enoxaparin both before and after the procedure or only afterwards?  CURRENT BRIDGING GUIDELINES  *NOTE: This does not take into consideration the bleeding risk of the procedure.   To calculate HASBLED score click HERE  Pre-Procedural bridging is not needed for most patient's except for the following:    VTE within the previous month    Prior history of recurrent VTE during anticoagulation therapy interruption    Underingoing a procedure with high inherent risk for VTE (ie. Joint replacement, major abdominal cancer resection)     Perioperative Thrombotic Risk Stratification    High Thrombotic Risk Moderate Thrombotic Risk Low Thrombotic Risk     Mechanical Heart Valve   Any mitral valve prosthesis    Any caged-ball or tilting disk aortic valve prothesis    Stroke or TIA within 6 months   Bileaflet aortic valve prothesis and one or more of the following risk factors: Afib, prior stroke or TIA, hypertension, diabetes, CHF, age >75 years   Bileaflet aortic valve prothesis without Afib and no other risk factors for stroke     Atrial Fibrillation   CHADS2-VASc score 7 to 9    Stroke or TIA within 3 months    Rheumatic vavlular heart disease     CHADS2-VASc score of 5 to 6   CHADS2 score of 4 or less (assuming no prior stroke or TIA)       VTE   VTE within 3 months    Severe thrombophilia (eg. Deficiency of protein C, protein S, or antithrombin; antiphospholipid  antibodies; Homozygous Factor V Leiden or Prothrombin Gene Mutation; muliple abormalities)   VTE within the past 3-12 months    Non-severe thrombophilia (eg. Heterozygous Factor V Leiden or Prothrombin Gene Mutation)    Recurrent VTE    Active cancer (treated within 6 months or palliative)   VTE >12 months and no other risk factors    For additional Anticoagulation Bridging Guidelines -- Click HERE    Please respond to the Antico pool ([id number]) so all staff are aware of the plan. The Anticoagulation Clinic will order any necessary medications and contact the patient with a written plan regarding the upcoming procedure. Thank you!  Anticoagulation Clinic Staff  Phone: [phone]  Fax: [phone]  Pool # [id number]

## 2017-07-12 ENCOUNTER — OFFICE VISIT (OUTPATIENT)
Dept: FAMILY MEDICINE | Facility: CLINIC | Age: 72
End: 2017-07-12
Payer: COMMERCIAL

## 2017-07-12 VITALS
HEART RATE: 80 BPM | DIASTOLIC BLOOD PRESSURE: 80 MMHG | WEIGHT: 210 LBS | HEIGHT: 67 IN | SYSTOLIC BLOOD PRESSURE: 144 MMHG | TEMPERATURE: 97.7 F | BODY MASS INDEX: 32.96 KG/M2

## 2017-07-12 DIAGNOSIS — Z01.818 PREOP GENERAL PHYSICAL EXAM: Primary | ICD-10-CM

## 2017-07-12 DIAGNOSIS — M16.11 PRIMARY OSTEOARTHRITIS OF RIGHT HIP: ICD-10-CM

## 2017-07-12 LAB
ALBUMIN SERPL-MCNC: 3.6 G/DL (ref 3.4–5)
ALP SERPL-CCNC: 64 U/L (ref 40–150)
ALT SERPL W P-5'-P-CCNC: 28 U/L (ref 0–70)
ANION GAP SERPL CALCULATED.3IONS-SCNC: 5 MMOL/L (ref 3–14)
AST SERPL W P-5'-P-CCNC: 20 U/L (ref 0–45)
BILIRUB SERPL-MCNC: 0.6 MG/DL (ref 0.2–1.3)
BUN SERPL-MCNC: 14 MG/DL (ref 7–30)
CALCIUM SERPL-MCNC: 8.7 MG/DL (ref 8.5–10.1)
CHLORIDE SERPL-SCNC: 104 MMOL/L (ref 94–109)
CO2 SERPL-SCNC: 27 MMOL/L (ref 20–32)
CREAT SERPL-MCNC: 0.8 MG/DL (ref 0.66–1.25)
ERYTHROCYTE [DISTWIDTH] IN BLOOD BY AUTOMATED COUNT: 13.6 % (ref 10–15)
GFR SERPL CREATININE-BSD FRML MDRD: NORMAL ML/MIN/1.7M2
GLUCOSE SERPL-MCNC: 90 MG/DL (ref 70–99)
HCT VFR BLD AUTO: 39.5 % (ref 40–53)
HGB BLD-MCNC: 13.6 G/DL (ref 13.3–17.7)
MCH RBC QN AUTO: 30.7 PG (ref 26.5–33)
MCHC RBC AUTO-ENTMCNC: 34.4 G/DL (ref 31.5–36.5)
MCV RBC AUTO: 89 FL (ref 78–100)
PLATELET # BLD AUTO: 225 10E9/L (ref 150–450)
POTASSIUM SERPL-SCNC: 4.2 MMOL/L (ref 3.4–5.3)
PROT SERPL-MCNC: 7.4 G/DL (ref 6.8–8.8)
RBC # BLD AUTO: 4.43 10E12/L (ref 4.4–5.9)
SODIUM SERPL-SCNC: 136 MMOL/L (ref 133–144)
WBC # BLD AUTO: 5.2 10E9/L (ref 4–11)

## 2017-07-12 PROCEDURE — 85027 COMPLETE CBC AUTOMATED: CPT | Performed by: FAMILY MEDICINE

## 2017-07-12 PROCEDURE — 93000 ELECTROCARDIOGRAM COMPLETE: CPT | Performed by: FAMILY MEDICINE

## 2017-07-12 PROCEDURE — 99214 OFFICE O/P EST MOD 30 MIN: CPT | Performed by: FAMILY MEDICINE

## 2017-07-12 PROCEDURE — 36415 COLL VENOUS BLD VENIPUNCTURE: CPT | Performed by: FAMILY MEDICINE

## 2017-07-12 PROCEDURE — 80053 COMPREHEN METABOLIC PANEL: CPT | Performed by: FAMILY MEDICINE

## 2017-07-12 NOTE — NURSING NOTE
"Chief Complaint   Patient presents with     Pre-Op Exam       Initial /80 (BP Location: Right arm, Cuff Size: Adult Large)  Pulse 80  Temp 97.7  F (36.5  C) (Tympanic)  Ht 5' 7\" (1.702 m)  Wt 210 lb (95.3 kg)  BMI 32.89 kg/m2 Estimated body mass index is 32.89 kg/(m^2) as calculated from the following:    Height as of this encounter: 5' 7\" (1.702 m).    Weight as of this encounter: 210 lb (95.3 kg).  Medication Reconciliation: complete    Health Maintenance that is potentially due pending provider review:  Hep C, Tdap    Possibly completing today per provider review.    Jailene Rincon MA       "

## 2017-07-12 NOTE — TELEPHONE ENCOUNTER
High risk procedure in a high risk patient.   He should use the enoxaparin injections both before and after the procedure. I think the prophylactic dose would be fine. Please call and let him know about this.  Call me if any questions. Thanks Citlalli for the help.  Saud Ramon

## 2017-07-12 NOTE — PROGRESS NOTES
[facility]  [address] [place] [id number]  [phone]  Dept: [phone]    PRE-OP EVALUATION:  Today's date: 2017    Cricket Klein (: 1945) presents for pre-operative evaluation assessment as requested by Dr. Hutchins.  He requires evaluation and anesthesia risk assessment prior to undergoing surgery/procedure for treatment of hilp .  Proposed procedure: arthroplasty hipl    Date of Surgery/ Procedure: 17  Time of Surgery/ Procedure: 1:00pm  Hospital/Surgical Facility: [facility]  Primary Physician: Saud Ramon  Type of Anesthesia Anticipated: Spinal    Patient has a Health Care Directive or Living Will:  YES     1. NO - Do you have a history of heart attack, stroke, stent, bypass or surgery on an artery in the head, neck, heart or legs?  2. NO - Do you ever have any pain or discomfort in your chest?  3. NO - Do you have a history of  Heart Failure?  4. NO - Are you troubled by shortness of breath when: walking on the level, up a slight hill or at night?  5. NO - Do you currently have a cold, bronchitis or other respiratory infection?  6. NO - Do you have a cough, shortness of breath or wheezing?  7. NO - Do you sometimes get pains in the calves of your legs when you walk?  8. YES, patient had blood clots in his lungs a few years ago - Do you or anyone in your family have previous history of blood clots?  9. NO - Do you or does anyone in your family have a serious bleeding problem such as prolonged bleeding following surgeries or cuts?  10. NO - Have you ever had problems with anemia or been told to take iron pills?  11. NO - Have you had any abnormal blood loss such as black, tarry or bloody stools, or abnormal vaginal bleeding?  12. NO - Have you ever had a blood transfusion?  13. YES, relatives take a long time to come out of anesthesia - Have you or any of your relatives ever had problems with anesthesia?  14. YES, sleep  apnea, patient uses a CPAP- Do you have sleep apnea, excessive snoring or daytime drowsiness?  15. NO - Do you have any prosthetic heart valves?  16. NO - Do you have prosthetic joints?  17. NO - Is there any chance that you may be pregnant?      HPI:                                                      Brief HPI related to upcoming procedure:  Schedule for right hip replacement.  Has some memory lose and also past history of pulmonay emboli      See problem list for active medical problems.  Problems all longstanding and stable, except as noted/documented.  See ROS for pertinent symptoms related to these conditions.                                                                                                  .    MEDICAL HISTORY:                                                      Patient Active Problem List    Diagnosis Date Noted     Long-term (current) use of anticoagulants [Z79.01] 07/27/2016     Priority: Medium     PE (pulmonary embolism) 07/26/2016     Priority: Medium     Pulmonary emboli (H) 07/26/2016     Priority: Medium     CHRISTIANO (obstructive sleep apnea) 06/02/2015     Priority: Medium     Advanced directives, counseling/discussion 10/02/2012     Priority: Medium     Discussed advance care planning with patient; information given to patient to review. 10/2/2012          Hyperlipidemia LDL goal <130 10/31/2010     Priority: Medium     1/3/2011 Patient did not tolerate simvastatin, atorvastatin and pravastatin on a daily basis because of muscle cramping and weakness. Cholestyramine does not cause significant side effects, but is now doing a very good job of lowering the LDL toward goal.       Other and unspecified coagulation defects 04/13/2010     Priority: Medium     Factor  2 mutation-heterozygote  4/13/10 I had discussed this with Dr Chandler, and she recommended that warfarin for life would be advisable.   Cricket did see Dr. Ramos,  Who recommended no warfarin, and stopped it 2/23/12  See 4/28/12 note  of visit with Dr. Ramos. Recommends no warfarin. Would need compression stockings for any surgery. And perhaps prophylaxis.          Pulmonary embolism (H) 03/29/2010     Priority: Medium     Within week after  hip replacement -March 2010.  Hematologist feels no increase risk for recurrence of pulmonary embolism or DVT due to heterozygote status of Factor 2 mutation.       S/P hip replacement 03/29/2010     Priority: Medium     date of surgery was March 8, 2010. had pulmonary embolus about a week later.       Impotence of organic origin 05/14/2007     Priority: Medium     Pure hypercholesterolemia 03/28/2006     Priority: Medium     Essential hypertension      Priority: Medium     Goal is <130/80  Problem list name updated by automated process. Provider to review       Sleep apnea      Priority: Medium     Problem list name updated by automated process. Provider to review       Transient cerebral ischemia      Priority: Medium     April 19, 2007. Transient memory loss.    MRI HEAD SAME DAY    1. Old left frontal depressed skull fracture with underlying gliosis    and encephalomalacia.     2. Nonspecific periventricular white matter ischemic disease adjacent    to the right lateral ventricle anteriorly.    Problem list name updated by automated process. Provider to review        Past Medical History:   Diagnosis Date     TIA      Past Surgical History:   Procedure Laterality Date     ARTHROTOMY SHOULDER, ROTATOR CUFF REPAIR, COMBINED  4/2/2012    Procedure:COMBINED ARTHROTOMY SHOULDER, ROTATOR CUFF REPAIR; Left Distal clavicle excision, Subacromial decompression, Rotator cuff repair; Surgeon:JOSE MIGUEL HUTCHINS; Location:[facility] OR     ARTHROTOMY SHOULDER, ROTATOR CUFF REPAIR, COMBINED  10/12/2012    Procedure: COMBINED ARTHROTOMY SHOULDER, ROTATOR CUFF REPAIR;  Right shoulder biceps tenodesiss,subacromial decompression;  Surgeon: Jose Miguel Hutchins MD;  Location: [facility] OR     COLONOSCOPY  03/17/2003    normal      COLONOSCOPY  4/15/2014    Procedure: Colonoscopy;  Surgeon: Angela May MD;  Location: [facility]     SURGICAL HISTORY OF -   1970    achilles tendon repair     SURGICAL HISTORY OF -   3-2010    left hip relpacement     Current Outpatient Prescriptions   Medication Sig Dispense Refill     warfarin (COUMADIN) 2.5 MG tablet Take 1.25mg by mouth every Mon & Fri and 2.5mg all other days or as directed by Anticoagulation Clinic 80 tablet 0     ezetimibe (ZETIA) 10 MG tablet Take 1 tablet (10 mg) by mouth daily 90 tablet 3     triamcinolone acetonide (KENALOG-40) 40 MG/ML injection 0.5 mLs (20 mg) by INTRA-ARTICULAR route once 0.5 mL 0     atenolol (TENORMIN) 50 MG tablet Take 1 tablet (50 mg) by mouth 2 times daily 180 tablet 1     order for DME Equipment being ordered: LEXI  Cricket Klein received a [employer] AirSense 10 Auto. Pressures were set at Auto 10 - 18 cm H2O.       Cholecalciferol (VITAMIN D) 1000 UNITS capsule Take 1 capsule by mouth daily.       OTC products: None, except as noted above    Allergies   Allergen Reactions     Atorvastatin Calcium Other (See Comments)     Myalgia.     Pravastatin Cramps     Myalgias       Zocor [Hmg-Coa-R Inhibitors] Other (See Comments)     Muscle pain      Latex Allergy: NO    Social History   Substance Use Topics     Smoking status: Never Smoker     Smokeless tobacco: Never Used     Alcohol use 0.0 oz/week     0 Standard drinks or equivalent per week      Comment: rare     History   Drug Use No       REVIEW OF SYSTEMS:                                                    C: NEGATIVE for fever, chills, change in weight  E/M: NEGATIVE for ear, mouth and throat problems  R: NEGATIVE for significant cough or SOB  CV: NEGATIVE for chest pain, palpitations or peripheral edema    EXAM:                                                    There were no vitals taken for this visit.  GENERAL APPEARANCE: healthy, alert and no distress  HENT: ear canals and TM's normal and nose  and mouth without ulcers or lesions  RESP: lungs clear to auscultation - no rales, rhonchi or wheezes  CV: regular rate and rhythm, normal S1 S2, no S3 or S4 and no murmur, click or rub   ABDOMEN: soft, nontender, no HSM or masses and bowel sounds normal  NEURO: Normal strength and tone, sensory exam grossly normal, mentation intact and speech normal    DIAGNOSTICS:                                                    ekg normal and labs pending     Recent Labs   Lab Test 07/10/17 06/05/17   07/26/16   0150  02/10/16   1055   HGB   --    --    --   13.5  14.2   PLT   --    --    --   230  253   INR  1.9*  2.7*   < >   --    --    NA   --    --    --   137  138   POTASSIUM   --    --    --   4.5  4.0   CR   --    --    --   0.81  0.80    < > = values in this interval not displayed.        IMPRESSION:                                                    Diagnosis/reason for consult: hip pain     The proposed surgical procedure is considered LOW risk.    REVISED CARDIAC RISK INDEX  The patient has the following serious cardiovascular risks for perioperative complications such as (MI, PE, VFib and 3  AV Block):  No serious cardiac risks  INTERPRETATION: 0 risks: Class I (very low risk - 0.4% complication rate)    The patient has the following additional risks for perioperative complications:  No identified additional risks  Needs to remain on coumadin because of past history of PE     No diagnosis found.    RECOMMENDATIONS:                                                              APPROVAL GIVEN to proceed with proposed procedure, without further diagnostic evaluation       Signed Electronically by: Saud Ramon MD    Copy of this evaluation report is provided to requesting physician.    [place] Preop Guidelines

## 2017-07-12 NOTE — MR AVS SNAPSHOT
After Visit Summary   7/12/2017    Cricket Klein    MRN: [id number]           Patient Information     Date Of Birth          1945        Visit Information        Provider Department      7/12/2017 10:00 AM Saud Ramon MD [facility]        Today's Diagnoses     Preop general physical exam    -  1    Primary osteoarthritis of right hip          Care Instructions      Before Your Surgery      Call your surgeon if there is any change in your health. This includes signs of a cold or flu (such as a sore throat, runny nose, cough, rash or fever).    Do not smoke, drink alcohol or take over the counter medicine (unless your surgeon or primary care doctor tells you to) for the 24 hours before and after surgery.    If you take prescribed drugs: Follow your doctor s orders about which medicines to take and which to stop until after surgery.    Eating and drinking prior to surgery: follow the instructions from your surgeon    Take a shower or bath the night before surgery. Use the soap your surgeon gave you to gently clean your skin. If you do not have soap from your surgeon, use your regular soap. Do not shave or scrub the surgery site.  Wear clean pajamas and have clean sheets on your bed.           Follow-ups after your visit        Your next 10 appointments already scheduled     Jul 17, 2017 10:30 AM CDT   Anticoagulation Visit with NB ANTI COAG   [facility] ([facility])    [address] [place] [id number] [phone]            Jul 28, 2017   Procedure with Jose Miguel Hutchins MD   [facility] (--)    [address] [place] [id number]   [phone]           The medical center is located at [facility]. (between [address] and [address] in [place], [place] north of [place]).            Aug 21, 2017 10:15 AM CDT   Anticoagulation Visit with NB ANTI COAG   [facility]  "[place] [facility]    [address] [place] [phone]   [phone]              Who to contact     If you have questions or need follow up information about today's clinic visit or your schedule please contact [facility] directly at [phone].  Normal or non-critical lab and imaging results will be communicated to you by MyChart, letter or phone within 4 business days after the clinic has received the results. If you do not hear from us within 7 days, please contact the clinic through MyChart or phone. If you have a critical or abnormal lab result, we will notify you by phone as soon as possible.  Submit refill requests through [employer] or call your pharmacy and they will forward the refill request to us. Please allow 3 business days for your refill to be completed.          Additional Information About Your Visit        MyChart Information     [employer] lets you send messages to your doctor, view your test results, renew your prescriptions, schedule appointments and more. To sign up, go to [email or web address] . Click on \"Log in\" on the left side of the screen, which will take you to the Welcome page. Then click on \"Sign up Now\" on the right side of the page.     You will be asked to enter the access code listed below, as well as some personal information. Please follow the directions to create your username and password.     Your access code is: [email or web address]  Expires: 2017 10:10 AM     Your access code will  in 90 days. If you need help or a new code, please call your [facility] or [phone].        Care EveryWhere ID     This is your Care EveryWhere ID. This could be used by other organizations to access your [facility] medical records  [id number]        Your Vitals Were     Pulse Temperature Height BMI (Body Mass Index)          80 97.7  F (36.5  C) (Tympanic) 5' 7\" (1.702 m) 32.89 kg/m2         Blood Pressure from Last 3 " Encounters:   07/12/17 144/80   06/22/17 131/79   05/09/17 142/84    Weight from Last 3 Encounters:   07/12/17 210 lb (95.3 kg)   06/22/17 210 lb (95.3 kg)   05/09/17 210 lb 9.6 oz (95.5 kg)              We Performed the Following     CBC with platelets     Comprehensive metabolic panel     EKG 12-lead complete w/read - Clinics        Primary Care Provider Office Phone # Fax #    Saud Daniel Ramon [phone]       [address] [address]        Equal Access to Services     THIERRY MOE : Hadii laron bradley hadasho Soinessa, waaxda luqadaha, qaybta kaalmada adekeeshayada, nory hinkle. So [facility] [phone].    ATENCIÓN: Si habla español, tiene a barton disposición servicios gratuitos de asistencia lingüística. Llame al [phone].    We comply with applicable federal civil rights laws and [place] laws. We do not discriminate on the basis of race, color, national origin, age, disability sex, sexual orientation or gender identity.            Thank you!     Thank you for choosing [facility]  for your care. Our goal is always to provide you with excellent care. Hearing back from our patients is one way we can continue to improve our services. Please take a few minutes to complete the written survey that you may receive in the mail after your visit with us. Thank you!             Your Updated Medication List - Protect others around you: Learn how to safely use, store and throw away your medicines at www.disposemymeds.org.          This list is accurate as of: 7/12/17 10:48 AM.  Always use your most recent med list.                   Brand Name Dispense Instructions for use Diagnosis    atenolol 50 MG tablet    TENORMIN    180 tablet    Take 1 tablet (50 mg) by mouth 2 times daily    Benign essential HTN       ezetimibe 10 MG tablet    ZETIA    90 tablet    Take 1 tablet (10 mg) by mouth daily    Pure  hypercholesterolemia       order for DME      Equipment being ordered: CPAP  Cricket Klein received a [employer] AirSense 10 Auto. Pressures were set at Auto 10 - 18 cm H2O.        vitamin D 1000 UNITS capsule      Take 1 capsule by mouth daily.        warfarin 2.5 MG tablet    COUMADIN    80 tablet    Take 1.25mg by mouth every Mon & Fri and 2.5mg all other days or as directed by Anticoagulation Clinic    Pulmonary embolus, left (H)

## 2017-07-13 ENCOUNTER — ANTICOAGULATION THERAPY VISIT (OUTPATIENT)
Dept: ANTICOAGULATION | Facility: CLINIC | Age: 72
End: 2017-07-13
Payer: COMMERCIAL

## 2017-07-13 DIAGNOSIS — Z79.01 LONG-TERM (CURRENT) USE OF ANTICOAGULANTS: ICD-10-CM

## 2017-07-13 PROCEDURE — 99207 ZZC NO CHARGE NURSE ONLY: CPT | Performed by: REGISTERED NURSE

## 2017-07-13 NOTE — PROGRESS NOTES
Last warfarin dose: 7/22/17 7/23/17, NO warfarin  7/24/17, NO warfarin  7/25/17, NO warfarin, begin enoxaparin injections into the abdomen every 12 hours (AM and PM)  7/26/17, NO warfarin, enoxaparin injection into the abdomen every 12 hours (AM and PM)  7/27/17, NO warfarin, enoxaparin injection into the abdomen AM only (no enoxaparin 24 hours prior to surgery)  7/28/17, DAY OF PROCEDURE, NO enoxaparin.   Inpatient Pharmacy will handle your Anticoagulation needs and warfarin dosing while hospitalized.   If you have any questions please call the Anticoagulation Clinic at [phone].  To schedule your appointment please call [phone].          ANTICOAGULATION FOLLOW-UP CLINIC VISIT    Patient Name:  Cricket Klein  Date:  7/13/2017  Contact Type:  Telephone/ Claudio    SUBJECTIVE:     Patient Findings     Comments Upcoming surgery telephone patient and review Enoxaparin usage and explained he will received instructions on Monday at his [facility] appt.  Last warfarin dose: 7/22/17 7/23/17, NO warfarin  7/24/17, NO warfarin  7/25/17, NO warfarin, begin enoxaparin injections into the abdomen every 12 hours (AM and PM)  7/26/17, NO warfarin, enoxaparin injection into the abdomen every 12 hours (AM and PM)  7/27/17, NO warfarin, enoxaparin injection into the abdomen AM only (no enoxaparin 24 hours prior to surgery)  7/28/17, DAY OF PROCEDURE, NO enoxaparin.   Inpatient Pharmacy will handle your Anticoagulation needs and warfarin dosing while hospitalized.   If you have any questions please call the Anticoagulation Clinic at [phone].  To schedule your appointment please call [phone].                     OBJECTIVE    INR Protime   Date Value Ref Range Status   07/10/2017 1.9 (A) 0.86 - 1.14 Final       ASSESSMENT / PLAN  No question data found.  Anticoagulation Summary as of 7/13/2017     INR goal 2.0-3.0   Today's INR No new INR was available at the time of this encounter.   Maintenance plan 1.25 mg (2.5 mg x  0.5) on Mon, Fri; 2.5 mg (2.5 mg x 1) all other days   Full instructions 1.25 mg on Mon, Fri; 2.5 mg all other days   Weekly total 15 mg   Plan last modified Gisell Taylor RN (12/12/2016)   Next INR check 7/17/2017   Priority INR   Target end date Indefinite    Indications   PE (pulmonary embolism) [I26.99]  Other and unspecified coagulation defects [D68.9]  Long-term (current) use of anticoagulants [Z79.01] [Z79.01]         Anticoagulation Episode Summary     INR check location     Preferred lab     Send INR reminders to [facility] POOL    Comments * Had PE in 2010 following hip surgery. Has heterozygous prothrombin gene mutation. Second PE 7-26-16. Needs lifelong warfarin. was on warfarin [phone]        Anticoagulation Care Providers     Provider Role Specialty Phone number    Saud Ramon MD [facility] [phone]            See the Encounter Report to view Anticoagulation Flowsheet and Dosing Calendar (Go to Encounters tab in chart review, and find the Anticoagulation Therapy Visit)        Citlalli Sethi RN

## 2017-07-13 NOTE — MR AVS SNAPSHOT
Cricket Klein   7/13/2017   Anticoagulation Therapy Visit    Description:  72 year old male   Provider:  Citlalli Sethi, RN   Department:  [facility]           INR as of 7/13/2017     Today's INR No new INR was available at the time of this encounter.      Anticoagulation Summary as of 7/13/2017     INR goal 2.0-3.0   Today's INR No new INR was available at the time of this encounter.   Full instructions 1.25 mg on Mon, Fri; 2.5 mg all other days   Next INR check 7/17/2017    Indications   PE (pulmonary embolism) [I26.99]  Other and unspecified coagulation defects [D68.9]  Long-term (current) use of anticoagulants [Z79.01] [Z79.01]         Your next Anticoagulation Clinic appointment(s)     Jul 17, 2017 10:30 AM CDT   [facility]agulation [facility] with NB ANTI COAG   [facility]    [address] [place] [address]   [phone]            Aug 21, 2017 10:15 AM CDT   Anticoagulation Visit with NB ANTI COAG   [facility]    [address] [place] [address]   [phone]              July 2017 Details    Sun Mon Tue Wed Thu Fri Sat           1                 2               3               4               5               6               7               8                 9               10               11               12               13      2.5 mg   See details      14      1.25 mg         15      2.5 mg           16      2.5 mg   See details      17            18               19               20               21               22                 23               24               25               26               27               28               29                 30               31                     Date Details   07/13 This INR check      07/16 please have a larger serving of greens in preparation for tooth extraction       Date of next INR:  7/17/2017         How to take your  warfarin dose     To take:  1.25 mg Take 0.5 of a 2.5 mg tablet.    To take:  2.5 mg Take 1 of the 2.5 mg tablets.

## 2017-07-17 ENCOUNTER — TRANSFERRED RECORDS (OUTPATIENT)
Dept: HEALTH INFORMATION MANAGEMENT | Facility: CLINIC | Age: 72
End: 2017-07-17

## 2017-07-17 ENCOUNTER — ANTICOAGULATION THERAPY VISIT (OUTPATIENT)
Dept: ANTICOAGULATION | Facility: CLINIC | Age: 72
End: 2017-07-17
Payer: COMMERCIAL

## 2017-07-17 DIAGNOSIS — Z79.01 LONG-TERM (CURRENT) USE OF ANTICOAGULANTS: ICD-10-CM

## 2017-07-17 DIAGNOSIS — I26.99 PULMONARY EMBOLUS, LEFT (H): ICD-10-CM

## 2017-07-17 LAB — INR POINT OF CARE: 2.1 (ref 0.86–1.14)

## 2017-07-17 PROCEDURE — 99207 ZZC NO BILLABLE SERVICE THIS VISIT: CPT

## 2017-07-17 PROCEDURE — 85610 PROTHROMBIN TIME: CPT | Mod: QW

## 2017-07-17 PROCEDURE — 36416 COLLJ CAPILLARY BLOOD SPEC: CPT

## 2017-07-17 NOTE — PROGRESS NOTES
ANTICOAGULATION FOLLOW-UP CLINIC VISIT    Patient Name:  Cricket Klein  Date:  2017  Contact Type:  Face to Face, accompanied by spouse    SUBJECTIVE:     Patient Findings     Positives Change in medications (needs to bridge with therapeutic Lovenox), Change in diet/appetite (increased greens the past week so INR would stay well enough for dental work), Dental/Other procedures (dental appt today, one tooth to be extracted. Hip surgery scheduled 17.), Intentional hold of therapy (needs to hold warfarin 5 days prior to surgery,  - )    Comments Pt reports his current weight as 210lbs (95kg).  Needs to have quick refresher on administration and wasting medication for proper dosing - pt will schedule with clinic RN sometime either today after dental appt, or this week.  Has eight 100mg/mL syringes that do not  until 2018; box brought in to appt. Instructed to bring with for RN appt.           OBJECTIVE    INR Protime   Date Value Ref Range Status   2017 2.1 (A) 0.86 - 1.14 Final       ASSESSMENT / PLAN  INR assessment THER    Recheck INR In: 2 WEEKS TBD after surgery   INR Location Clinic      Anticoagulation Summary as of 2017     INR goal 2.0-3.0   Today's INR 2.1   Maintenance plan 1.25 mg (2.5 mg x 0.5) on Mon, Fri; 2.5 mg (2.5 mg x 1) all other days   Full instructions : Hold; : Hold; : Hold; : Hold; : Hold; Otherwise 1.25 mg on Mon, Fri; 2.5 mg all other days   Weekly total 15 mg   Plan last modified Gisell Taylor, RN (2016)   Next INR check 2017   Priority INR   Target end date Indefinite    Indications   PE (pulmonary embolism) [I26.99]  Other and unspecified coagulation defects [D68.9]  Long-term (current) use of anticoagulants [Z79.01] [Z79.01]         Anticoagulation Episode Summary     INR check location     Preferred lab     Send INR reminders to [facility] CLINIC POOL    Comments * Had PE in  following hip surgery. Has  heterozygous prothrombin gene mutation. Second PE 7-26-16. Needs lifelong warfarin. was on warfarin [id number]        Anticoagulation Care Providers     Provider Role Specialty Phone number    Saud Ramon MD [facility] [phone]            See the Encounter Report to view Anticoagulation Flowsheet and Dosing Calendar (Go to Encounters tab in chart review, and find the Anticoagulation Therapy Visit)    Dosage adjustment made based on physician directed care plan.  Pre-surgical bridging plan teaching done.    Gisell Taylor RN

## 2017-07-17 NOTE — MR AVS SNAPSHOT
Cricket ROB Ernie   7/17/2017 10:30 AM   Anticoagulation Therapy Visit    Description:  72 year old male   Provider:  NB ANTI COAG   Department:  [facility]           INR as of 7/17/2017     Today's INR 2.1      Anticoagulation Summary as of 7/17/2017     INR goal 2.0-3.0   Today's INR 2.1   Full instructions 7/23: Hold; 7/24: Hold; 7/25: Hold; 7/26: Hold; 7/27: Hold; Otherwise 1.25 mg on Mon, Fri; 2.5 mg all other days   Next INR check 7/28/2017    Indications   PE (pulmonary embolism) [I26.99]  Other and unspecified coagulation defects [D68.9]  Long-term (current) use of anticoagulants [Z79.01] [Z79.01]         Description     Continue warfarin 1.25mg Mon & Fri and 2.5mg all other days.  Last warfarin dose: 7/22/17 7/23/17, NO warfarin, no enoxaparin    7/24/17, NO warfarin, no enoxaparin    7/25/17, NO warfarin, begin enoxaparin injections 0.95mL into the abdomen every 12 hours (AM and PM)    7/26/17, NO warfarin, enoxaparin injection into the abdomen every 12 hours (AM and PM)    7/27/17, NO warfarin, enoxaparin injection into the abdomen AM only (no enoxaparin 24 hours prior to surgery)    7/28/17, DAY OF PROCEDURE, NO enoxaparin.     Inpatient Pharmacy will handle your Anticoagulation needs and warfarin dosing while you are hospitalized.     If you have any questions please call the Anticoagulation Clinic at [phone].    To schedule your appointment please call [phone].      Your next Anticoagulation Clinic appointment(s)     Aug 21, 2017 10:15 AM CDT   Anticoagulation Visit with NB ANTI COAG   [facility]    [address] [place] [address] [phone]              Contact Numbers     Please call [phone] to cancel and/or reschedule your appointment.  Please call [phone] with any problems or questions regarding your therapy          July 2017 Details    Sun Mon Tue Wed Thu Fri Sat           1                 2                3               4               5               6               7               8                 9               10               11               12               13               14               15                 16               17      1.25 mg   See details      18      2.5 mg         19      2.5 mg         20      2.5 mg         21      1.25 mg         22      2.5 mg   See details        23      Hold   See details      24      Hold   See details      25      Hold   See details      26      Hold   See details      27      Hold   See details      28      See details      29                 30               31                     Date Details   07/17 This INR check      07/22 Last warfarin dose: 7/22/17 07/23 Hold dose   NO warfarin, no enoxaparin      07/24 Hold dose   NO warfarin, no enoxaparin      07/25 Hold dose   NO warfarin, begin enoxaparin injections into the abdomen every 12 hours (AM and PM)        07/26 Hold dose   NO warfarin, enoxaparin injection into the abdomen every 12 hours (AM and PM)      07/27 Hold dose   NO warfarin, enoxaparin injection into the abdomen AM only (no enoxaparin 24 hours prior to surgery)        07/28 DAY OF PROCEDURE, NO enoxaparin. Inpatient Pharmacy will handle your Anticoagulation needs and warfarin dosing while you are hospitalized.        Date of next INR:  7/28/2017         How to take your warfarin dose     To take:  1.25 mg Take 0.5 of a 2.5 mg tablet.    To take:  2.5 mg Take 1 of the 2.5 mg tablets.    Hold Do not take your warfarin dose. See the Details table to the right for additional instructions.

## 2017-07-18 ENCOUNTER — ALLIED HEALTH/NURSE VISIT (OUTPATIENT)
Dept: FAMILY MEDICINE | Facility: CLINIC | Age: 72
End: 2017-07-18
Payer: COMMERCIAL

## 2017-07-18 DIAGNOSIS — Z79.01 LONG-TERM (CURRENT) USE OF ANTICOAGULANTS: Primary | ICD-10-CM

## 2017-07-18 PROCEDURE — 99207 ZZC NO CHARGE NURSE ONLY: CPT

## 2017-07-18 NOTE — MR AVS SNAPSHOT
"              After Visit Summary   7/18/2017    Cricket Klein    MRN: [id number]           Patient Information     Date Of Birth          1945        Visit Information        Provider Department      7/18/2017 10:00 AM [place] NB RN [facility]        Today's Diagnoses     Long-term (current) use of anticoagulants [Z79.01]    -  1       Follow-ups after your visit        Your next 10 appointments already scheduled     Jul 28, 2017   Procedure with Jose Miguel Hutchins MD   [facility] (--)    [address] [facility] [address] [phone]           The medical center is located at [address]. (between [address] in [place], [place] miles north of [place]).            Aug 21, 2017 10:15 AM CDT   Anticoagulation Visit with NB ANTI COAG   [facility] ([facility])    [address] [place] [address] [phone]              Who to contact     If you have questions or need follow up information about today's clinic visit or your schedule please contact [facility] directly at [phone].  Normal or non-critical lab and imaging results will be communicated to you by MyChart, letter or phone within 4 business days after the clinic has received the results. If you do not hear from us within 7 days, please contact the clinic through MyChart or phone. If you have a critical or abnormal lab result, we will notify you by phone as soon as possible.  Submit refill requests through [employer] or call your pharmacy and they will forward the refill request to us. Please allow 3 business days for your refill to be completed.          Additional Information About Your Visit        MyChart Information     [employer] lets you send messages to your doctor, view your test results, renew your prescriptions, schedule appointments and more. To sign up, go to [email or web address] . Click on \"Log in\" on " "the left side of the screen, which will take you to the Welcome page. Then click on \"Sign up Now\" on the right side of the page.     You will be asked to enter the access code listed below, as well as some personal information. Please follow the directions to create your username and password.     Your access code is: [email or web address]  Expires: 2017 10:10 AM     Your access code will  in 90 days. If you need help or a new code, please call your [facility] clinic or [phone].        Care EveryWhere ID     This is your Care EveryWhere ID. This could be used by other organizations to access your [facility] medical records  [id number]         Blood Pressure from Last 3 Encounters:   17 144/80   17 131/79   17 142/84    Weight from Last 3 Encounters:   17 210 lb (95.3 kg)   17 210 lb (95.3 kg)   17 210 lb 9.6 oz (95.5 kg)              Today, you had the following     No orders found for display       Primary Care Provider Office Phone # Fax #    Saud Ramon [phone]       [place] [id number]        Equal Access to Services     ROSARIO MOE : Hadii aad ku hadasho Soomaali, waaxda luqadaha, qaybta kaalmada adeegyada, waxalva dacostain hayaan dory carver . So [facility] [phone].    ATENCIÓN: Si habla español, tiene a barton disposición servicios gratuitos de asistencia lingüística. Llame al [phone].    We comply with applicable federal civil rights laws and [place] laws. We do not discriminate on the basis of race, color, national origin, age, disability sex, sexual orientation or gender identity.            Thank you!     Thank you for choosing [facility]  for your care. Our goal is always to provide you with excellent care. Hearing back from our patients is one way we can continue to improve our services. Please take a few minutes to complete the written survey that you " may receive in the mail after your visit with us. Thank you!             Your Updated Medication List - Protect others around you: Learn how to safely use, store and throw away your medicines at www.disposemymeds.org.          This list is accurate as of: 7/18/17 10:10 AM.  Always use your most recent med list.                   Brand Name Dispense Instructions for use Diagnosis    atenolol 50 MG tablet    TENORMIN    180 tablet    Take 1 tablet (50 mg) by mouth 2 times daily    Benign essential HTN       enoxaparin 100 MG/ML injection    LOVENOX    15.2 mL    Inject 0.95 mLs (95 mg) Subcutaneous every 12 hours    Pulmonary embolus, left (H)       ezetimibe 10 MG tablet    ZETIA    90 tablet    Take 1 tablet (10 mg) by mouth daily    Pure hypercholesterolemia       order for DME      Equipment being ordered: LEXI Klein received a [employer] AirSense 10 Auto. Pressures were set at Auto 10 - 18 cm H2O.        vitamin D 1000 UNITS capsule      Take 1 capsule by mouth daily.        warfarin 2.5 MG tablet    COUMADIN    80 tablet    Take 1.25mg by mouth every Mon & Fri and 2.5mg all other days or as directed by Anticoagulation Clinic    Pulmonary embolus, left (H)

## 2017-07-18 NOTE — NURSING NOTE
Claudio is here for a refresher is Lovenox injections. He has done this in the past. He and his wife saw [facility] yesterday and reviewed dosing instructions and how to waste the medication. He does not have the medication with him today and he is not due to begin it until next week. He is able to show me where to give the medication and proper technique. They will stop by or call if they have further questions next week.

## 2017-07-26 ENCOUNTER — ANESTHESIA EVENT (OUTPATIENT)
Dept: SURGERY | Facility: CLINIC | Age: 72
DRG: 470 | End: 2017-07-26
Payer: MEDICARE

## 2017-07-28 ENCOUNTER — ANESTHESIA (OUTPATIENT)
Dept: SURGERY | Facility: CLINIC | Age: 72
DRG: 470 | End: 2017-07-28
Payer: MEDICARE

## 2017-07-28 ENCOUNTER — HOSPITAL ENCOUNTER (INPATIENT)
Facility: CLINIC | Age: 72
LOS: 2 days | Discharge: HOME | DRG: 470 | End: 2017-07-30
Attending: ORTHOPAEDIC SURGERY | Admitting: ORTHOPAEDIC SURGERY
Payer: MEDICARE

## 2017-07-28 ENCOUNTER — APPOINTMENT (OUTPATIENT)
Dept: GENERAL RADIOLOGY | Facility: CLINIC | Age: 72
DRG: 470 | End: 2017-07-28
Attending: ORTHOPAEDIC SURGERY
Payer: MEDICARE

## 2017-07-28 DIAGNOSIS — I26.99 PULMONARY EMBOLISM (H): Primary | Chronic | ICD-10-CM

## 2017-07-28 DIAGNOSIS — Z96.641 S/P HIP REPLACEMENT, RIGHT: ICD-10-CM

## 2017-07-28 DIAGNOSIS — I26.99 PULMONARY EMBOLUS, LEFT (H): ICD-10-CM

## 2017-07-28 PROBLEM — M16.9 HIP ARTHROSIS: Status: ACTIVE | Noted: 2017-07-28

## 2017-07-28 LAB — INR PPP: 0.93 (ref 0.86–1.14)

## 2017-07-28 PROCEDURE — 25000128 H RX IP 250 OP 636: Performed by: NURSE ANESTHETIST, CERTIFIED REGISTERED

## 2017-07-28 PROCEDURE — 25000128 H RX IP 250 OP 636: Performed by: ORTHOPAEDIC SURGERY

## 2017-07-28 PROCEDURE — C1776 JOINT DEVICE (IMPLANTABLE): HCPCS | Performed by: ORTHOPAEDIC SURGERY

## 2017-07-28 PROCEDURE — 27210794 ZZH OR GENERAL SUPPLY STERILE: Performed by: ORTHOPAEDIC SURGERY

## 2017-07-28 PROCEDURE — 27210995 ZZH RX 272: Performed by: ORTHOPAEDIC SURGERY

## 2017-07-28 PROCEDURE — 25800025 ZZH RX 258: Performed by: ORTHOPAEDIC SURGERY

## 2017-07-28 PROCEDURE — 25000132 ZZH RX MED GY IP 250 OP 250 PS 637: Mod: GY | Performed by: ORTHOPAEDIC SURGERY

## 2017-07-28 PROCEDURE — 27110028 ZZH OR GENERAL SUPPLY NON-STERILE: Performed by: ORTHOPAEDIC SURGERY

## 2017-07-28 PROCEDURE — A9270 NON-COVERED ITEM OR SERVICE: HCPCS | Mod: GY | Performed by: ORTHOPAEDIC SURGERY

## 2017-07-28 PROCEDURE — 36000063 ZZH SURGERY LEVEL 4 EA 15 ADDTL MIN: Performed by: ORTHOPAEDIC SURGERY

## 2017-07-28 PROCEDURE — 40000306 ZZH STATISTIC PRE PROC ASSESS II: Performed by: ORTHOPAEDIC SURGERY

## 2017-07-28 PROCEDURE — 25000132 ZZH RX MED GY IP 250 OP 250 PS 637: Mod: GY | Performed by: PHYSICIAN ASSISTANT

## 2017-07-28 PROCEDURE — 72170 X-RAY EXAM OF PELVIS: CPT

## 2017-07-28 PROCEDURE — 25000125 ZZHC RX 250: Performed by: NURSE ANESTHETIST, CERTIFIED REGISTERED

## 2017-07-28 PROCEDURE — 71000012 ZZH RECOVERY PHASE 1 LEVEL 1 FIRST HR: Performed by: ORTHOPAEDIC SURGERY

## 2017-07-28 PROCEDURE — 37000008 ZZH ANESTHESIA TECHNICAL FEE, 1ST 30 MIN: Performed by: ORTHOPAEDIC SURGERY

## 2017-07-28 PROCEDURE — 0SR901A REPLACEMENT OF RIGHT HIP JOINT WITH METAL SYNTHETIC SUBSTITUTE, UNCEMENTED, OPEN APPROACH: ICD-10-PCS | Performed by: ORTHOPAEDIC SURGERY

## 2017-07-28 PROCEDURE — A9270 NON-COVERED ITEM OR SERVICE: HCPCS | Mod: GY | Performed by: PHYSICIAN ASSISTANT

## 2017-07-28 PROCEDURE — 25000125 ZZHC RX 250: Performed by: ORTHOPAEDIC SURGERY

## 2017-07-28 PROCEDURE — 12000007 ZZH R&B INTERMEDIATE

## 2017-07-28 PROCEDURE — 40000275 ZZH STATISTIC RCP TIME EA 10 MIN

## 2017-07-28 PROCEDURE — 36415 COLL VENOUS BLD VENIPUNCTURE: CPT | Performed by: NURSE ANESTHETIST, CERTIFIED REGISTERED

## 2017-07-28 PROCEDURE — 36000093 ZZH SURGERY LEVEL 4 1ST 30 MIN: Performed by: ORTHOPAEDIC SURGERY

## 2017-07-28 PROCEDURE — 37000009 ZZH ANESTHESIA TECHNICAL FEE, EACH ADDTL 15 MIN: Performed by: ORTHOPAEDIC SURGERY

## 2017-07-28 PROCEDURE — 25000128 H RX IP 250 OP 636: Performed by: PHYSICIAN ASSISTANT

## 2017-07-28 PROCEDURE — 85610 PROTHROMBIN TIME: CPT | Performed by: NURSE ANESTHETIST, CERTIFIED REGISTERED

## 2017-07-28 DEVICE — IMP INSERT HIP DEPUY PINNACLE ALTRX 36X52MM 1221-36-052: Type: IMPLANTABLE DEVICE | Site: HIP | Status: FUNCTIONAL

## 2017-07-28 DEVICE — IMPLANTABLE DEVICE: Type: IMPLANTABLE DEVICE | Site: HIP | Status: FUNCTIONAL

## 2017-07-28 DEVICE — IMP HEAD FEMORAL DEPUY 36MM +5 1365-52-000: Type: IMPLANTABLE DEVICE | Site: HIP | Status: FUNCTIONAL

## 2017-07-28 RX ORDER — PROCHLORPERAZINE MALEATE 5 MG
5 TABLET ORAL EVERY 6 HOURS PRN
Status: DISCONTINUED | OUTPATIENT
Start: 2017-07-28 | End: 2017-07-30 | Stop reason: HOSPADM

## 2017-07-28 RX ORDER — DEXAMETHASONE SODIUM PHOSPHATE 4 MG/ML
4 INJECTION, SOLUTION INTRA-ARTICULAR; INTRALESIONAL; INTRAMUSCULAR; INTRAVENOUS; SOFT TISSUE
Status: DISCONTINUED | OUTPATIENT
Start: 2017-07-28 | End: 2017-07-28 | Stop reason: HOSPADM

## 2017-07-28 RX ORDER — ACETAMINOPHEN 325 MG/1
650 TABLET ORAL EVERY 4 HOURS PRN
Status: DISCONTINUED | OUTPATIENT
Start: 2017-07-31 | End: 2017-07-30 | Stop reason: HOSPADM

## 2017-07-28 RX ORDER — CEFAZOLIN SODIUM 1 G/3ML
1 INJECTION, POWDER, FOR SOLUTION INTRAMUSCULAR; INTRAVENOUS SEE ADMIN INSTRUCTIONS
Status: DISCONTINUED | OUTPATIENT
Start: 2017-07-28 | End: 2017-07-28 | Stop reason: HOSPADM

## 2017-07-28 RX ORDER — DEXAMETHASONE SODIUM PHOSPHATE 4 MG/ML
INJECTION, SOLUTION INTRA-ARTICULAR; INTRALESIONAL; INTRAMUSCULAR; INTRAVENOUS; SOFT TISSUE PRN
Status: DISCONTINUED | OUTPATIENT
Start: 2017-07-28 | End: 2017-07-28

## 2017-07-28 RX ORDER — HYDROXYZINE HYDROCHLORIDE 10 MG/1
10 TABLET, FILM COATED ORAL EVERY 6 HOURS PRN
Status: DISCONTINUED | OUTPATIENT
Start: 2017-07-28 | End: 2017-07-30 | Stop reason: HOSPADM

## 2017-07-28 RX ORDER — ONDANSETRON 2 MG/ML
4 INJECTION INTRAMUSCULAR; INTRAVENOUS EVERY 30 MIN PRN
Status: DISCONTINUED | OUTPATIENT
Start: 2017-07-28 | End: 2017-07-28 | Stop reason: HOSPADM

## 2017-07-28 RX ORDER — ONDANSETRON 2 MG/ML
INJECTION INTRAMUSCULAR; INTRAVENOUS PRN
Status: DISCONTINUED | OUTPATIENT
Start: 2017-07-28 | End: 2017-07-28

## 2017-07-28 RX ORDER — EPHEDRINE SULFATE 50 MG/ML
INJECTION, SOLUTION INTRAMUSCULAR; INTRAVENOUS; SUBCUTANEOUS PRN
Status: DISCONTINUED | OUTPATIENT
Start: 2017-07-28 | End: 2017-07-28

## 2017-07-28 RX ORDER — AMOXICILLIN 250 MG
1-2 CAPSULE ORAL 2 TIMES DAILY
Status: DISCONTINUED | OUTPATIENT
Start: 2017-07-28 | End: 2017-07-30 | Stop reason: HOSPADM

## 2017-07-28 RX ORDER — NALOXONE HYDROCHLORIDE 0.4 MG/ML
.1-.4 INJECTION, SOLUTION INTRAMUSCULAR; INTRAVENOUS; SUBCUTANEOUS
Status: DISCONTINUED | OUTPATIENT
Start: 2017-07-28 | End: 2017-07-30 | Stop reason: HOSPADM

## 2017-07-28 RX ORDER — LIDOCAINE 40 MG/G
CREAM TOPICAL
Status: DISCONTINUED | OUTPATIENT
Start: 2017-07-28 | End: 2017-07-29

## 2017-07-28 RX ORDER — LIDOCAINE HYDROCHLORIDE 10 MG/ML
INJECTION, SOLUTION INFILTRATION; PERINEURAL PRN
Status: DISCONTINUED | OUTPATIENT
Start: 2017-07-28 | End: 2017-07-28

## 2017-07-28 RX ORDER — GABAPENTIN 100 MG/1
100 CAPSULE ORAL
Status: COMPLETED | OUTPATIENT
Start: 2017-07-28 | End: 2017-07-28

## 2017-07-28 RX ORDER — WARFARIN SODIUM 2.5 MG/1
2.5 TABLET ORAL
Status: COMPLETED | OUTPATIENT
Start: 2017-07-28 | End: 2017-07-28

## 2017-07-28 RX ORDER — ONDANSETRON 4 MG/1
4 TABLET, ORALLY DISINTEGRATING ORAL EVERY 30 MIN PRN
Status: DISCONTINUED | OUTPATIENT
Start: 2017-07-28 | End: 2017-07-28 | Stop reason: HOSPADM

## 2017-07-28 RX ORDER — FENTANYL CITRATE 50 UG/ML
25-50 INJECTION, SOLUTION INTRAMUSCULAR; INTRAVENOUS
Status: DISCONTINUED | OUTPATIENT
Start: 2017-07-28 | End: 2017-07-28 | Stop reason: HOSPADM

## 2017-07-28 RX ORDER — SODIUM CHLORIDE, SODIUM LACTATE, POTASSIUM CHLORIDE, CALCIUM CHLORIDE 600; 310; 30; 20 MG/100ML; MG/100ML; MG/100ML; MG/100ML
INJECTION, SOLUTION INTRAVENOUS CONTINUOUS
Status: DISCONTINUED | OUTPATIENT
Start: 2017-07-28 | End: 2017-07-28 | Stop reason: HOSPADM

## 2017-07-28 RX ORDER — ONDANSETRON 4 MG/1
4 TABLET, ORALLY DISINTEGRATING ORAL EVERY 6 HOURS PRN
Status: DISCONTINUED | OUTPATIENT
Start: 2017-07-28 | End: 2017-07-30 | Stop reason: HOSPADM

## 2017-07-28 RX ORDER — CEFAZOLIN SODIUM 2 G/100ML
2 INJECTION, SOLUTION INTRAVENOUS EVERY 8 HOURS
Status: COMPLETED | OUTPATIENT
Start: 2017-07-28 | End: 2017-07-29

## 2017-07-28 RX ORDER — PROPOFOL 10 MG/ML
INJECTION, EMULSION INTRAVENOUS CONTINUOUS PRN
Status: DISCONTINUED | OUTPATIENT
Start: 2017-07-28 | End: 2017-07-28

## 2017-07-28 RX ORDER — GABAPENTIN 100 MG/1
100 CAPSULE ORAL 3 TIMES DAILY
Status: DISCONTINUED | OUTPATIENT
Start: 2017-07-28 | End: 2017-07-30 | Stop reason: HOSPADM

## 2017-07-28 RX ORDER — HYDROMORPHONE HYDROCHLORIDE 2 MG/1
2-4 TABLET ORAL EVERY 4 HOURS PRN
Status: DISCONTINUED | OUTPATIENT
Start: 2017-07-28 | End: 2017-07-30 | Stop reason: HOSPADM

## 2017-07-28 RX ORDER — OXYCODONE HCL 10 MG/1
10 TABLET, FILM COATED, EXTENDED RELEASE ORAL ONCE
Status: COMPLETED | OUTPATIENT
Start: 2017-07-28 | End: 2017-07-28

## 2017-07-28 RX ORDER — DEXTROSE MONOHYDRATE, SODIUM CHLORIDE, AND POTASSIUM CHLORIDE 50; 1.49; 4.5 G/1000ML; G/1000ML; G/1000ML
INJECTION, SOLUTION INTRAVENOUS CONTINUOUS
Status: DISCONTINUED | OUTPATIENT
Start: 2017-07-28 | End: 2017-07-30 | Stop reason: HOSPADM

## 2017-07-28 RX ORDER — ACETAMINOPHEN 325 MG/1
975 TABLET ORAL EVERY 8 HOURS
Status: DISCONTINUED | OUTPATIENT
Start: 2017-07-28 | End: 2017-07-30 | Stop reason: HOSPADM

## 2017-07-28 RX ORDER — EZETIMIBE 10 MG/1
10 TABLET ORAL DAILY
Status: DISCONTINUED | OUTPATIENT
Start: 2017-07-28 | End: 2017-07-30 | Stop reason: HOSPADM

## 2017-07-28 RX ORDER — ATENOLOL 50 MG/1
50 TABLET ORAL 2 TIMES DAILY
Status: DISCONTINUED | OUTPATIENT
Start: 2017-07-28 | End: 2017-07-30 | Stop reason: HOSPADM

## 2017-07-28 RX ORDER — ONDANSETRON 2 MG/ML
4 INJECTION INTRAMUSCULAR; INTRAVENOUS EVERY 6 HOURS PRN
Status: DISCONTINUED | OUTPATIENT
Start: 2017-07-28 | End: 2017-07-30 | Stop reason: HOSPADM

## 2017-07-28 RX ORDER — CEFAZOLIN SODIUM 2 G/100ML
2 INJECTION, SOLUTION INTRAVENOUS
Status: COMPLETED | OUTPATIENT
Start: 2017-07-28 | End: 2017-07-28

## 2017-07-28 RX ORDER — LIDOCAINE 40 MG/G
CREAM TOPICAL
Status: DISCONTINUED | OUTPATIENT
Start: 2017-07-28 | End: 2017-07-28 | Stop reason: HOSPADM

## 2017-07-28 RX ORDER — HYDROMORPHONE HYDROCHLORIDE 1 MG/ML
.3-.5 INJECTION, SOLUTION INTRAMUSCULAR; INTRAVENOUS; SUBCUTANEOUS
Status: DISCONTINUED | OUTPATIENT
Start: 2017-07-28 | End: 2017-07-30 | Stop reason: HOSPADM

## 2017-07-28 RX ORDER — FENTANYL CITRATE 50 UG/ML
INJECTION, SOLUTION INTRAMUSCULAR; INTRAVENOUS PRN
Status: DISCONTINUED | OUTPATIENT
Start: 2017-07-28 | End: 2017-07-28

## 2017-07-28 RX ADMIN — PROPOFOL 50 MCG/KG/MIN: 10 INJECTION, EMULSION INTRAVENOUS at 13:32

## 2017-07-28 RX ADMIN — ONDANSETRON 4 MG: 2 INJECTION INTRAMUSCULAR; INTRAVENOUS at 13:27

## 2017-07-28 RX ADMIN — Medication 5 MG: at 13:54

## 2017-07-28 RX ADMIN — POTASSIUM CHLORIDE, DEXTROSE MONOHYDRATE AND SODIUM CHLORIDE: 150; 5; 450 INJECTION, SOLUTION INTRAVENOUS at 20:16

## 2017-07-28 RX ADMIN — WARFARIN SODIUM 2.5 MG: 2.5 TABLET ORAL at 17:21

## 2017-07-28 RX ADMIN — SODIUM CHLORIDE, POTASSIUM CHLORIDE, SODIUM LACTATE AND CALCIUM CHLORIDE: 600; 310; 30; 20 INJECTION, SOLUTION INTRAVENOUS at 12:06

## 2017-07-28 RX ADMIN — Medication 5 MG: at 13:56

## 2017-07-28 RX ADMIN — HYDROMORPHONE HYDROCHLORIDE 4 MG: 2 TABLET ORAL at 22:04

## 2017-07-28 RX ADMIN — ACETAMINOPHEN 975 MG: 325 TABLET, FILM COATED ORAL at 17:20

## 2017-07-28 RX ADMIN — EZETIMIBE 10 MG: 10 TABLET ORAL at 17:21

## 2017-07-28 RX ADMIN — MIDAZOLAM HYDROCHLORIDE 1 MG: 1 INJECTION, SOLUTION INTRAMUSCULAR; INTRAVENOUS at 13:19

## 2017-07-28 RX ADMIN — Medication 10 MG: at 14:18

## 2017-07-28 RX ADMIN — GABAPENTIN 100 MG: 100 CAPSULE ORAL at 11:52

## 2017-07-28 RX ADMIN — GABAPENTIN 100 MG: 100 CAPSULE ORAL at 20:13

## 2017-07-28 RX ADMIN — Medication 5 MG: at 13:36

## 2017-07-28 RX ADMIN — SENNOSIDES AND DOCUSATE SODIUM 1 TABLET: 8.6; 5 TABLET ORAL at 20:13

## 2017-07-28 RX ADMIN — LIDOCAINE HYDROCHLORIDE 5 ML: 10 INJECTION, SOLUTION INFILTRATION; PERINEURAL at 13:32

## 2017-07-28 RX ADMIN — FENTANYL CITRATE 50 MCG: 50 INJECTION, SOLUTION INTRAMUSCULAR; INTRAVENOUS at 13:17

## 2017-07-28 RX ADMIN — CEFAZOLIN SODIUM 2 G: 2 INJECTION, SOLUTION INTRAVENOUS at 21:59

## 2017-07-28 RX ADMIN — LIDOCAINE HYDROCHLORIDE 1 ML: 10 INJECTION, SOLUTION EPIDURAL; INFILTRATION; INTRACAUDAL; PERINEURAL at 12:05

## 2017-07-28 RX ADMIN — HYDROMORPHONE HYDROCHLORIDE 2 MG: 2 TABLET ORAL at 17:20

## 2017-07-28 RX ADMIN — Medication 5 MG: at 13:38

## 2017-07-28 RX ADMIN — FENTANYL CITRATE 25 MCG: 50 INJECTION, SOLUTION INTRAMUSCULAR; INTRAVENOUS at 14:38

## 2017-07-28 RX ADMIN — Medication 5 MG: at 13:45

## 2017-07-28 RX ADMIN — ATENOLOL 50 MG: 50 TABLET ORAL at 20:13

## 2017-07-28 RX ADMIN — CEFAZOLIN SODIUM 2 G: 2 INJECTION, SOLUTION INTRAVENOUS at 13:17

## 2017-07-28 RX ADMIN — OXYCODONE HYDROCHLORIDE 10 MG: 10 TABLET, FILM COATED, EXTENDED RELEASE ORAL at 11:52

## 2017-07-28 RX ADMIN — FENTANYL CITRATE 25 MCG: 50 INJECTION, SOLUTION INTRAMUSCULAR; INTRAVENOUS at 14:32

## 2017-07-28 RX ADMIN — MIDAZOLAM HYDROCHLORIDE 1 MG: 1 INJECTION, SOLUTION INTRAMUSCULAR; INTRAVENOUS at 13:17

## 2017-07-28 RX ADMIN — DEXAMETHASONE SODIUM PHOSPHATE 6 MG: 4 INJECTION, SOLUTION INTRA-ARTICULAR; INTRALESIONAL; INTRAMUSCULAR; INTRAVENOUS; SOFT TISSUE at 13:27

## 2017-07-28 RX ADMIN — SODIUM CHLORIDE, POTASSIUM CHLORIDE, SODIUM LACTATE AND CALCIUM CHLORIDE: 600; 310; 30; 20 INJECTION, SOLUTION INTRAVENOUS at 14:18

## 2017-07-28 NOTE — IP AVS SNAPSHOT
[facility]    [address] [facility] [address]    Phone:  [phone]    Fax:  [phone]                                       After Visit Summary   7/28/2017    Cricket Klein    MRN: [id number]           After Visit Summary Signature Page     I have received my discharge instructions, and my questions have been answered. I have discussed any challenges I see with this plan with the nurse or doctor.    ..........................................................................................................................................  Patient/Patient Representative Signature      ..........................................................................................................................................  Patient Representative Print Name and Relationship to Patient    ..................................................               ................................................  Date                                            Time    ..........................................................................................................................................  Reviewed by Signature/Title    ...................................................              ..............................................  Date                                                            Time

## 2017-07-28 NOTE — ANESTHESIA POSTPROCEDURE EVALUATION
Patient: Cricket Klein    Procedure(s):  Right total hip arthroplasty - Wound Class: I-Clean    Diagnosis:Right hip DJD  Diagnosis Additional Information: No value filed.    Anesthesia Type:  Spinal    Note:  Anesthesia Post Evaluation    Patient location during evaluation: PACU and Bedside  Patient participation: Able to participate in evaluation but full recovery from regional anesthesia has not yet ocurrred but is anticipated to occur within 48 hours  Level of consciousness: awake and alert  Pain management: adequate  Airway patency: patent  Cardiovascular status: acceptable  Respiratory status: acceptable  Hydration status: acceptable  PONV: none     Anesthetic complications: None          Last vitals:  Vitals:    07/28/17 1500 07/28/17 1515 07/28/17 1530   BP: 123/87 113/88 103/74   Resp: 16 16 16   Temp:      SpO2: 98% 95% 94%         Electronically Signed By: JATINDER Graves CRNA  July 28, 2017  3:42 PM

## 2017-07-28 NOTE — PHARMACY-ANTICOAGULATION SERVICE
Clinical Pharmacy - Warfarin Dosing Consult     Pharmacy has been consulted to manage this patient s warfarin therapy.  Indication: DVT/ PE Treatment  Therapy Goal: INR 2-3  OP Anticoag Clinic: [facility]  Warfarin Prior to Admission: Yes  Warfarin PTA Regimen: 1.25 mg MF and 2.5 mg ROW  Recent documented change in oral intake/nutrition: Unknown  Dose Comments: INR of 0.93 will give 2.5 mg    INR   Date Value Ref Range Status   07/28/2017 0.93 0.86 - 1.14 Final     INR Protime   Date Value Ref Range Status   07/17/2017 2.1 (A) 0.86 - 1.14 Final       Recommend warfarin 2.5 mg today.  Pharmacy will monitor Cricket Klein daily and order warfarin doses to achieve specified goal.      Please contact pharmacy as soon as possible if the warfarin needs to be held for a procedure or if the warfarin goals change.

## 2017-07-28 NOTE — ANESTHESIA CARE TRANSFER NOTE
Patient: Cricket Klein    Procedure(s):  Right total hip arthroplasty - Wound Class: I-Clean    Diagnosis: Right hip DJD  Diagnosis Additional Information: No value filed.    Anesthesia Type:   Spinal     Note:  Airway :Nasal Cannula  Patient transferred to:PACU  Comments: Patient's VSS. Spontaneous respirations. Patient awake and oriented. IV patent. Spinal level T4. Report to TRISTON Guy.      Vitals: (Last set prior to Anesthesia Care Transfer)    CRNA VITALS  7/28/2017 1419 - 7/28/2017 1500      7/28/2017             Pulse: 84    SpO2: 97 %                Electronically Signed By: JATINDER Graves CRNA  July 28, 2017  3:00 PM

## 2017-07-28 NOTE — BRIEF OP NOTE
[facility]  Brief Operative Note      Pre-operative diagnosis: Right hip DJD   Post-operative diagnosis: Same   Procedure: Total hip arthoplasty (Right)   Surgeon: Jose Miguel Hutchins MD     Assistant(s): FAWN Pace   Anesthesia: Spinal Anesthesia   Estimated blood loss: Less than 100 ml               Drains: None   Specimens: None       Findings: See full dictated operative note for details   Complications: None                   Comments: See dictated operative report for full details     Condition: Stable   Weight bearing status: Weight bearing as tolerated   Activity: Activity as tolerated  Patient may move about with assist as indicated or with supervision   Anticoagulation plan:                 Lovenox as ordered with Coumadin until INR 1.7, then D/C lovenox and continue coumadin   for 42 days  Follow up plan                           Follow up in 2 week(s)

## 2017-07-28 NOTE — PROGRESS NOTES
Numbness at knees at this time. Pt unable to void, bladder zski=928, straight cath 800cc clear michel urine.  Dilaudid po given at 1725 anticipation of pain, denies pain with each check. Ice to hip, CMS +.  Pt wife plan to stay NOC as pt has history of confusion after ansthesia. Pt currently oriented x3, forgetful, repeats same question. Reorients easily.

## 2017-07-28 NOTE — H&P (VIEW-ONLY)
[facility]  [address] [place] [id number]  [phone]  Dept: [phone]    PRE-OP EVALUATION:  Today's date: 2017    Cricket Klein (: 1945) presents for pre-operative evaluation assessment as requested by Dr. Hutchins.  He requires evaluation and anesthesia risk assessment prior to undergoing surgery/procedure for treatment of hilp .  Proposed procedure: arthroplasty hipl    Date of Surgery/ Procedure: 17  Time of Surgery/ Procedure: 1:00pm  Hospital/Surgical Facility: [facility]  Primary Physician: Saud Ramon  Type of Anesthesia Anticipated: Spinal    Patient has a Health Care Directive or Living Will:  YES     1. NO - Do you have a history of heart attack, stroke, stent, bypass or surgery on an artery in the head, neck, heart or legs?  2. NO - Do you ever have any pain or discomfort in your chest?  3. NO - Do you have a history of  Heart Failure?  4. NO - Are you troubled by shortness of breath when: walking on the level, up a slight hill or at night?  5. NO - Do you currently have a cold, bronchitis or other respiratory infection?  6. NO - Do you have a cough, shortness of breath or wheezing?  7. NO - Do you sometimes get pains in the calves of your legs when you walk?  8. YES, patient had blood clots in his lungs a few years ago - Do you or anyone in your family have previous history of blood clots?  9. NO - Do you or does anyone in your family have a serious bleeding problem such as prolonged bleeding following surgeries or cuts?  10. NO - Have you ever had problems with anemia or been told to take iron pills?  11. NO - Have you had any abnormal blood loss such as black, tarry or bloody stools, or abnormal vaginal bleeding?  12. NO - Have you ever had a blood transfusion?  13. YES, relatives take a long time to come out of anesthesia - Have you or any of your relatives ever had problems with anesthesia?  14. YES, sleep  apnea, patient uses a CPAP- Do you have sleep apnea, excessive snoring or daytime drowsiness?  15. NO - Do you have any prosthetic heart valves?  16. NO - Do you have prosthetic joints?  17. NO - Is there any chance that you may be pregnant?      HPI:                                                      Brief HPI related to upcoming procedure:  Schedule for right hip replacement.  Has some memory lose and also past history of pulmonay emboli      See problem list for active medical problems.  Problems all longstanding and stable, except as noted/documented.  See ROS for pertinent symptoms related to these conditions.                                                                                                  .    MEDICAL HISTORY:                                                      Patient Active Problem List    Diagnosis Date Noted     Long-term (current) use of anticoagulants [Z79.01] 07/27/2016     Priority: Medium     PE (pulmonary embolism) 07/26/2016     Priority: Medium     Pulmonary emboli (H) 07/26/2016     Priority: Medium     CHRISTIANO (obstructive sleep apnea) 06/02/2015     Priority: Medium     Advanced directives, counseling/discussion 10/02/2012     Priority: Medium     Discussed advance care planning with patient; information given to patient to review. 10/2/2012          Hyperlipidemia LDL goal <130 10/31/2010     Priority: Medium     1/3/2011 Patient did not tolerate simvastatin, atorvastatin and pravastatin on a daily basis because of muscle cramping and weakness. Cholestyramine does not cause significant side effects, but is now doing a very good job of lowering the LDL toward goal.       Other and unspecified coagulation defects 04/13/2010     Priority: Medium     Factor  2 mutation-heterozygote  4/13/10 I had discussed this with Dr Chandler, and she recommended that warfarin for life would be advisable.   Cricket did see Dr. Ramos,  Who recommended no warfarin, and stopped it 2/23/12  See 4/28/12 note  of visit with Dr. Ramos. Recommends no warfarin. Would need compression stockings for any surgery. And perhaps prophylaxis.          Pulmonary embolism (H) 03/29/2010     Priority: Medium     Within week after  hip replacement -March 2010.  Hematologist feels no increase risk for recurrence of pulmonary embolism or DVT due to heterozygote status of Factor 2 mutation.       S/P hip replacement 03/29/2010     Priority: Medium     date of surgery was March 8, 2010. had pulmonary embolus about a week later.       Impotence of organic origin 05/14/2007     Priority: Medium     Pure hypercholesterolemia 03/28/2006     Priority: Medium     Essential hypertension      Priority: Medium     Goal is <130/80  Problem list name updated by automated process. Provider to review       Sleep apnea      Priority: Medium     Problem list name updated by automated process. Provider to review       Transient cerebral ischemia      Priority: Medium     April 19, 2007. Transient memory loss.    MRI HEAD SAME DAY    1. Old left frontal depressed skull fracture with underlying gliosis    and encephalomalacia.     2. Nonspecific periventricular white matter ischemic disease adjacent    to the right lateral ventricle anteriorly.    Problem list name updated by automated process. Provider to review        Past Medical History:   Diagnosis Date     TIA      Past Surgical History:   Procedure Laterality Date     ARTHROTOMY SHOULDER, ROTATOR CUFF REPAIR, COMBINED  4/2/2012    Procedure:COMBINED ARTHROTOMY SHOULDER, ROTATOR CUFF REPAIR; Left Distal clavicle excision, Subacromial decompression, Rotator cuff repair; Surgeon:JOSE MIGUEL HUTCHINS; Location:[facility]     ARTHROTOMY SHOULDER, ROTATOR CUFF REPAIR, COMBINED  10/12/2012    Procedure: COMBINED ARTHROTOMY SHOULDER, ROTATOR CUFF REPAIR;  Right shoulder biceps tenodesiss,subacromial decompression;  Surgeon: Jose Miguel Hutchins MD;  Location: [facility] OR     COLONOSCOPY  03/17/2003    normal      COLONOSCOPY  4/15/2014    Procedure: Colonoscopy;  Surgeon: Angela May MD;  Location: [facility]     SURGICAL HISTORY OF -   1970    achilles tendon repair     SURGICAL HISTORY OF -   3-2010    left hip relpacement     Current Outpatient Prescriptions   Medication Sig Dispense Refill     warfarin (COUMADIN) 2.5 MG tablet Take 1.25mg by mouth every Mon & Fri and 2.5mg all other days or as directed by Anticoagulation Clinic 80 tablet 0     ezetimibe (ZETIA) 10 MG tablet Take 1 tablet (10 mg) by mouth daily 90 tablet 3     triamcinolone acetonide (KENALOG-40) 40 MG/ML injection 0.5 mLs (20 mg) by INTRA-ARTICULAR route once 0.5 mL 0     atenolol (TENORMIN) 50 MG tablet Take 1 tablet (50 mg) by mouth 2 times daily 180 tablet 1     order for DME Equipment being ordered: LEXI  Cricket Klein received a [employer] AirSense 10 Auto. Pressures were set at Auto 10 - 18 cm H2O.       Cholecalciferol (VITAMIN D) 1000 UNITS capsule Take 1 capsule by mouth daily.       OTC products: None, except as noted above    Allergies   Allergen Reactions     Atorvastatin Calcium Other (See Comments)     Myalgia.     Pravastatin Cramps     Myalgias       Zocor [Hmg-Coa-R Inhibitors] Other (See Comments)     Muscle pain      Latex Allergy: NO    Social History   Substance Use Topics     Smoking status: Never Smoker     Smokeless tobacco: Never Used     Alcohol use 0.0 oz/week     0 Standard drinks or equivalent per week      Comment: rare     History   Drug Use No       REVIEW OF SYSTEMS:                                                    C: NEGATIVE for fever, chills, change in weight  E/M: NEGATIVE for ear, mouth and throat problems  R: NEGATIVE for significant cough or SOB  CV: NEGATIVE for chest pain, palpitations or peripheral edema    EXAM:                                                    There were no vitals taken for this visit.  GENERAL APPEARANCE: healthy, alert and no distress  HENT: ear canals and TM's normal and nose  and mouth without ulcers or lesions  RESP: lungs clear to auscultation - no rales, rhonchi or wheezes  CV: regular rate and rhythm, normal S1 S2, no S3 or S4 and no murmur, click or rub   ABDOMEN: soft, nontender, no HSM or masses and bowel sounds normal  NEURO: Normal strength and tone, sensory exam grossly normal, mentation intact and speech normal    DIAGNOSTICS:                                                    ekg normal and labs pending     Recent Labs   Lab Test 07/10/17 06/05/17   07/26/16   0150  02/10/16   1055   HGB   --    --    --   13.5  14.2   PLT   --    --    --   230  253   INR  1.9*  2.7*   < >   --    --    NA   --    --    --   137  138   POTASSIUM   --    --    --   4.5  4.0   CR   --    --    --   0.81  0.80    < > = values in this interval not displayed.        IMPRESSION:                                                    Diagnosis/reason for consult: hip pain     The proposed surgical procedure is considered LOW risk.    REVISED CARDIAC RISK INDEX  The patient has the following serious cardiovascular risks for perioperative complications such as (MI, PE, VFib and 3  AV Block):  No serious cardiac risks  INTERPRETATION: 0 risks: Class I (very low risk - 0.4% complication rate)    The patient has the following additional risks for perioperative complications:  No identified additional risks  Needs to remain on coumadin because of past history of PE     No diagnosis found.    RECOMMENDATIONS:                                                              APPROVAL GIVEN to proceed with proposed procedure, without further diagnostic evaluation       Signed Electronically by: Saud Ramon MD    Copy of this evaluation report is provided to requesting physician.    [place] Preop Guidelines

## 2017-07-28 NOTE — IP AVS SNAPSHOT
MRN:[id number]                      After Visit Summary   7/28/2017    Cricket Klein    MRN: [id number]           Thank you!     Thank you for choosing [facility] for your care. Our goal is always to provide you with excellent care. Hearing back from our patients is one way we can continue to improve our services. Please take a few minutes to complete the written survey that you may receive in the mail after you visit with us. Thank you!        Patient Information     Date Of Birth          1945        Designated Caregiver       Most Recent Value    Caregiver    Will someone help with your care after discharge? yes    Name of designated caregiver SEE COMMENT    Phone number of caregiver SEE COMMENT ABOVE    Caregiver address SEE COMMENT ABOVE      About your hospital stay     You were admitted on:  July 28, 2017 You last received care in the:  [facility]    You were discharged on:  July 30, 2017        Reason for your hospital stay       Right total hip replacment                  Who to Call     For medical emergencies, please call 911.  For non-urgent questions about your medical care, please call your primary care provider or clinic, [phone]  For questions related to your surgery, please call your surgery clinic        Attending Provider     Provider Specialty    Jose Miguel Hutchins MD Orthopaedic Surgery       Primary Care Provider Office Phone # Fax #    Saud Ramon [phone]      After Care Instructions     Activity       Your activity upon discharge: activity as tolerated            Diet       Follow this diet upon discharge: Orders Placed This Encounter      Advance Diet as Tolerated: Regular Diet Adult            Discharge Instructions                 Follow-up Appointments     Follow-up and recommended labs and tests        Follow up with Dr. Hutchins , at (location with clinic name or city) ortho clinic, within 2  "weeks  to evaluate after surgery. No follow up labs or test are needed.                  Your next 10 appointments already scheduled     Aug 21, 2017 10:15 AM CDT   Anticoagulation [facility] with [facility] ANTI COAG   [facility]    [address] [place] [id number] [phone]              Additional Services     INR CLINIC REFERRAL       Your provider has referred you to INR Services.    Please be aware that coverage of these services is subject to the terms and limitations of your health insurance plan.  Call member services at your health plan with any benefit or coverage questions.      Indication for Anticoagulation: Other: PE history  If nonstandard INR is desired, indicate goal range and explanation: 2-3  Expected Duration of Therapy: Lifetime    To [facility] on Thursday, 8/3/17.                  Further instructions from your care team       lovenox needs to be continued until the INR is 1.7 or higher.  Pharmacy will provide you with specific coumadin clinic follow up instructions.    Pending Results     No orders found from 7/26/2017 to 7/29/2017.            Statement of Approval     Ordered          07/30/17 1237  I have reviewed and agree with all the recommendations and orders detailed in this document.  EFFECTIVE NOW     Approved and electronically signed by:  Edwar Ramirez MD             Admission Information     Date & Time Provider Department Dept. Phone    7/28/2017 Jose Miguel Hutchins MD [facility] [phone]      Your Vitals Were     Blood Pressure Pulse Temperature Respirations Height Weight    110/59 64 98.6  F (37  C) (Oral) 18 1.702 m (5' 7\") 93 kg (205 lb)    Pulse Oximetry BMI (Body Mass Index)                96% 32.11 kg/m2          MyChart Information     [employer] lets you send messages to your doctor, view your test results, renew your prescriptions, schedule appointments and " "more. To sign up, go to [email or web address] . Click on \"Log in\" on the left side of the screen, which will take you to the Welcome page. Then click on \"Sign up Now\" on the right side of the page.     You will be asked to enter the access code listed below, as well as some personal information. Please follow the directions to create your username and password.     Your access code is: [email or web address]  Expires: 2017 10:10 AM     Your access code will  in 90 days. If you need help or a new code, please call your [facility] clinic or [phone].        Care EveryWhere ID     This is your Care EveryWhere ID. This could be used by other organizations to access your [facility] medical records  [id number]        Equal Access to Services     ROSARIO MOE : Kaylee Soto, amaris umana, jose urbano, nory carver . So [facility] [phone].    ATENCIÓN: Si habla español, tiene a barton disposición servicios gratuitos de asistencia lingüística. Desiree al [phone].    We comply with applicable federal civil rights laws and [place] laws. We do not discriminate on the basis of race, color, national origin, age, disability sex, sexual orientation or gender identity.               Review of your medicines      START taking        Dose / Directions    HYDROmorphone 2 MG tablet   Commonly known as:  DILAUDID   Used for:  S/P hip replacement, right        Dose:  2-4 mg   Take 1-2 tablets (2-4 mg) by mouth every 4 hours as needed for moderate to severe pain   Quantity:  60 tablet   Refills:  0         CONTINUE these medicines which may have CHANGED, or have new prescriptions. If we are uncertain of the size of tablets/capsules you have at home, strength may be listed as something that might have changed.        Dose / Directions    enoxaparin 100 MG/ML injection   Commonly known as:  LOVENOX   This may have changed:    - how much to take  - when to take this  - " additional instructions   Used for:  Pulmonary embolus, left (H)        Dose:  40 mg   Inject 0.4 mLs (40 mg) Subcutaneous daily for 3 days Use until appointment on Thursday, 8/3/17 or until further notice.   Quantity:  1.2 mL   Refills:  0         CONTINUE these medicines which have NOT CHANGED        Dose / Directions    atenolol 50 MG tablet   Commonly known as:  TENORMIN   Used for:  Benign essential HTN        Dose:  50 mg   Take 1 tablet (50 mg) by mouth 2 times daily   Quantity:  180 tablet   Refills:  1       ezetimibe 10 MG tablet   Commonly known as:  ZETIA   Used for:  Pure hypercholesterolemia        Dose:  10 mg   Take 1 tablet (10 mg) by mouth daily   Quantity:  90 tablet   Refills:  3       order for DME        Equipment being ordered: LEXI Klein received a [employer] AirSense 10 Auto. Pressures were set at Auto 10 - 18 cm H2O.   Refills:  0       vitamin D 1000 UNITS capsule        Dose:  1 capsule   Take 1 capsule by mouth daily.   Refills:  0       warfarin 2.5 MG tablet   Commonly known as:  COUMADIN   Used for:  Pulmonary embolus, left (H)        Take 1.25mg by mouth every Mon & Fri and 2.5mg all other days or as directed by Anticoagulation Clinic   Quantity:  80 tablet   Refills:  0            Where to get your medicines      These medications were sent to [facility] [place], [place] - [address] [facility]  [address] [facility] [address]     Phone:  [phone]     enoxaparin 100 MG/ML injection         Some of these will need a paper prescription and others can be bought over the counter. Ask your nurse if you have questions.     Bring a paper prescription for each of these medications     HYDROmorphone 2 MG tablet                Protect others around you: Learn how to safely use, store and throw away your medicines at www.disposemymeds.org.             Medication List: This is a list of all your medications and when to take them. Check marks below indicate your daily  home schedule. Keep this list as a reference.      Medications           Morning Afternoon Evening Bedtime As Needed    atenolol 50 MG tablet   Commonly known as:  TENORMIN   Take 1 tablet (50 mg) by mouth 2 times daily   Last time this was given:  50 mg on 7/30/2017  8:06 AM                                enoxaparin 100 MG/ML injection   Commonly known as:  LOVENOX   Inject 0.4 mLs (40 mg) Subcutaneous daily for 3 days Use until appointment on Thursday, 8/3/17 or until further notice.   Last time this was given:  40 mg on 7/29/2017  1:08 PM                                ezetimibe 10 MG tablet   Commonly known as:  ZETIA   Take 1 tablet (10 mg) by mouth daily   Last time this was given:  10 mg on 7/30/2017  8:06 AM                                HYDROmorphone 2 MG tablet   Commonly known as:  DILAUDID   Take 1-2 tablets (2-4 mg) by mouth every 4 hours as needed for moderate to severe pain   Last time this was given:  4 mg on 7/30/2017  2:38 AM                                order for DME   Equipment being ordered: LEXI Klein received a [employer] AirSense 10 Auto. Pressures were set at Auto 10 - 18 cm H2O.                                vitamin D 1000 UNITS capsule   Take 1 capsule by mouth daily.                                warfarin 2.5 MG tablet   Commonly known as:  COUMADIN   Take 1.25mg by mouth every Mon & Fri and 2.5mg all other days or as directed by Anticoagulation Clinic   Last time this was given:  5 mg on 7/30/2017 12:43 PM

## 2017-07-28 NOTE — ANESTHESIA PREPROCEDURE EVALUATION
Anesthesia Evaluation     . Pt has had prior anesthetic. Type: General, MAC and Regional    No history of anesthetic complications          ROS/MED HX    ENT/Pulmonary:     (+)sleep apnea, uses CPAP , . .    Neurologic:     (+)TIA     Cardiovascular:     (+) Dyslipidemia, hypertension----. Taking blood thinners (pt last took 95mg lovenox on 7/27/17 @ 0800) : . . . :. . Previous cardiac testing Echodate:7/26/16results:Interpretation Summary     The right ventricle is normal in structure, function and size.  The left ventricle is normal in structure, function and size.  The visual ejection fraction is estimated at 55-60%.  Doppler interrogation does not demonstrate signifcant stenosis or  insufficiency involvng cardiac valves.  ______________________________________________________________________________           Left Ventricle  The left ventricle is normal in structure, function and size. The visual  ejection fraction is estimated at 55-60%. There is no thrombus seen in the  left ventricle.     Right Ventricle  The right ventricle is normal in structure, function and size. There is no  mass or thrombus in the right ventricle.  Atria  Normal left atrial size. Right atrial size is normal. There is no atrial  shunt seen.     Mitral Valve  The mitral valve leaflets appear normal. There is no evidence of stenosis,  fluttering, or prolapse. There is no mitral regurgitation noted. There is no  mitral valve stenosis.     Tricuspid Valve  Normal tricuspid valve. No tricuspid regurgitation. Right ventricular  systolic pressure could not be approximated due to inadequate tricuspid  regurgitation. There is no tricuspid stenosis.     Aortic Valve  The aortic valve is trileaflet. No aortic regurgitation is present. No aortic  stenosis is present.     Pulmonic Valve  Normal pulmonic valve. There is no pulmonic valvular regurgitation. There is  no pulmonic valvular stenosis.     Vessels  Borderline aortic root dilatation. Normal  size ascending aorta. The IVC is  normal in size and reactivity with respiration, suggesting normal central  venous pressure. The pulmonary artery is normal size.  Pericardium  The pericardium appears normal. There is no pleural effusion.     Rhythm  The rhythm was normal sinus.date: results:ECG reviewed date:7/12/17 results:Sinus Rhythm date: results:          METS/Exercise Tolerance:     Hematologic:     (+) History of blood clots (PE) pt is anticoagulated, -      Musculoskeletal:  - neg musculoskeletal ROS       GI/Hepatic:  - neg GI/hepatic ROS       Renal/Genitourinary:  - ROS Renal section negative       Endo:  - neg endo ROS       Psychiatric:  - neg psychiatric ROS       Infectious Disease:  - neg infectious disease ROS       Malignancy:      - no malignancy   Other:                     Physical Exam  Normal systems: cardiovascular, pulmonary and dental    Airway   Mallampati: II  TM distance: >3 FB  Neck ROM: full    Dental     Cardiovascular       Pulmonary                     Anesthesia Plan      History & Physical Review  History and physical reviewed and following examination; no interval change.    ASA Status:  2 .    NPO Status:  > 8 hours    Plan for Spinal   PONV prophylaxis:  Ondansetron (or other 5HT-3) and Dexamethasone or Solumedrol       Postoperative Care  Postoperative pain management:  IV analgesics, Oral pain medications and Neuraxial analgesia.      Consents  Anesthetic plan, risks, benefits and alternatives discussed with:  Patient.  Use of blood products discussed: No .   .                          .

## 2017-07-28 NOTE — PROGRESS NOTES
"[facility] ADMISSION NOTE    Patient admitted to room 2213 at approximately 1625 via cart from surgery. Patient was accompanied by transport tech.     Verbal SBAR report received from Malena GOLDSTEIN prior to patient arrival.     Patient trasferred to bed via air socttie. Patient alert and oriented X 3. The patient is not having any pain.  . Admission vital signs: Blood pressure 139/77, temperature 95.6  F (35.3  C), temperature source Axillary, resp. rate 18, height 1.702 m (5' 7\"), weight 93 kg (205 lb), SpO2 95 %. Patient and spouse were oriented to plan of care, call light, bed controls, tv, telephone, bathroom and visiting hours.     The following safety risks were identified during admission: fall. Yellow risk band applied: YES.     Lily Villegas"

## 2017-07-29 ENCOUNTER — APPOINTMENT (OUTPATIENT)
Dept: PHYSICAL THERAPY | Facility: CLINIC | Age: 72
DRG: 470 | End: 2017-07-29
Attending: ORTHOPAEDIC SURGERY
Payer: MEDICARE

## 2017-07-29 LAB
CREAT SERPL-MCNC: 0.66 MG/DL (ref 0.66–1.25)
GFR SERPL CREATININE-BSD FRML MDRD: NORMAL ML/MIN/1.7M2
HGB BLD-MCNC: 12.1 G/DL (ref 13.3–17.7)
INR PPP: 1.09 (ref 0.86–1.14)
PLATELET # BLD AUTO: 241 10E9/L (ref 150–450)

## 2017-07-29 PROCEDURE — 97116 GAIT TRAINING THERAPY: CPT | Mod: GP | Performed by: PHYSICAL THERAPIST

## 2017-07-29 PROCEDURE — A9270 NON-COVERED ITEM OR SERVICE: HCPCS | Mod: GY | Performed by: ORTHOPAEDIC SURGERY

## 2017-07-29 PROCEDURE — 97530 THERAPEUTIC ACTIVITIES: CPT | Mod: GP | Performed by: PHYSICAL THERAPIST

## 2017-07-29 PROCEDURE — 36415 COLL VENOUS BLD VENIPUNCTURE: CPT | Performed by: ORTHOPAEDIC SURGERY

## 2017-07-29 PROCEDURE — 12000000 ZZH R&B MED SURG/OB

## 2017-07-29 PROCEDURE — 85049 AUTOMATED PLATELET COUNT: CPT | Performed by: ORTHOPAEDIC SURGERY

## 2017-07-29 PROCEDURE — 99232 SBSQ HOSP IP/OBS MODERATE 35: CPT | Performed by: FAMILY MEDICINE

## 2017-07-29 PROCEDURE — 25800025 ZZH RX 258: Performed by: ORTHOPAEDIC SURGERY

## 2017-07-29 PROCEDURE — A9270 NON-COVERED ITEM OR SERVICE: HCPCS | Mod: GY | Performed by: PHYSICIAN ASSISTANT

## 2017-07-29 PROCEDURE — 25000128 H RX IP 250 OP 636: Performed by: ORTHOPAEDIC SURGERY

## 2017-07-29 PROCEDURE — 97110 THERAPEUTIC EXERCISES: CPT | Mod: GP | Performed by: PHYSICAL THERAPIST

## 2017-07-29 PROCEDURE — 85610 PROTHROMBIN TIME: CPT | Performed by: ORTHOPAEDIC SURGERY

## 2017-07-29 PROCEDURE — 97162 PT EVAL MOD COMPLEX 30 MIN: CPT | Mod: GP | Performed by: PHYSICAL THERAPIST

## 2017-07-29 PROCEDURE — 25000132 ZZH RX MED GY IP 250 OP 250 PS 637: Mod: GY | Performed by: ORTHOPAEDIC SURGERY

## 2017-07-29 PROCEDURE — 25000132 ZZH RX MED GY IP 250 OP 250 PS 637: Mod: GY | Performed by: PHYSICIAN ASSISTANT

## 2017-07-29 PROCEDURE — 82565 ASSAY OF CREATININE: CPT | Performed by: ORTHOPAEDIC SURGERY

## 2017-07-29 PROCEDURE — 85018 HEMOGLOBIN: CPT | Performed by: ORTHOPAEDIC SURGERY

## 2017-07-29 PROCEDURE — 40000193 ZZH STATISTIC PT WARD VISIT: Performed by: PHYSICAL THERAPIST

## 2017-07-29 RX ORDER — WARFARIN SODIUM 2.5 MG/1
2.5 TABLET ORAL
Status: COMPLETED | OUTPATIENT
Start: 2017-07-29 | End: 2017-07-29

## 2017-07-29 RX ADMIN — SENNOSIDES AND DOCUSATE SODIUM 1 TABLET: 8.6; 5 TABLET ORAL at 21:10

## 2017-07-29 RX ADMIN — GABAPENTIN 100 MG: 100 CAPSULE ORAL at 08:17

## 2017-07-29 RX ADMIN — CEFAZOLIN SODIUM 2 G: 2 INJECTION, SOLUTION INTRAVENOUS at 06:06

## 2017-07-29 RX ADMIN — ACETAMINOPHEN 975 MG: 325 TABLET, FILM COATED ORAL at 16:52

## 2017-07-29 RX ADMIN — GABAPENTIN 100 MG: 100 CAPSULE ORAL at 21:10

## 2017-07-29 RX ADMIN — HYDROMORPHONE HYDROCHLORIDE 2 MG: 2 TABLET ORAL at 21:10

## 2017-07-29 RX ADMIN — POTASSIUM CHLORIDE, DEXTROSE MONOHYDRATE AND SODIUM CHLORIDE: 150; 5; 450 INJECTION, SOLUTION INTRAVENOUS at 03:01

## 2017-07-29 RX ADMIN — ACETAMINOPHEN 975 MG: 325 TABLET, FILM COATED ORAL at 08:19

## 2017-07-29 RX ADMIN — WARFARIN SODIUM 2.5 MG: 2.5 TABLET ORAL at 17:15

## 2017-07-29 RX ADMIN — ATENOLOL 50 MG: 50 TABLET ORAL at 08:17

## 2017-07-29 RX ADMIN — ATENOLOL 50 MG: 50 TABLET ORAL at 21:10

## 2017-07-29 RX ADMIN — HYDROMORPHONE HYDROCHLORIDE 4 MG: 2 TABLET ORAL at 03:01

## 2017-07-29 RX ADMIN — ACETAMINOPHEN 975 MG: 325 TABLET, FILM COATED ORAL at 01:28

## 2017-07-29 RX ADMIN — GABAPENTIN 100 MG: 100 CAPSULE ORAL at 13:08

## 2017-07-29 RX ADMIN — HYDROMORPHONE HYDROCHLORIDE 2 MG: 2 TABLET ORAL at 13:08

## 2017-07-29 RX ADMIN — HYDROMORPHONE HYDROCHLORIDE 2 MG: 2 TABLET ORAL at 16:52

## 2017-07-29 RX ADMIN — EZETIMIBE 10 MG: 10 TABLET ORAL at 08:17

## 2017-07-29 RX ADMIN — SENNOSIDES AND DOCUSATE SODIUM 1 TABLET: 8.6; 5 TABLET ORAL at 08:17

## 2017-07-29 RX ADMIN — ENOXAPARIN SODIUM 40 MG: 40 INJECTION SUBCUTANEOUS at 13:08

## 2017-07-29 NOTE — PROGRESS NOTES
Attempted to see Pt for OT eval.  Per RN, Pt is experiencing a lot of pain and would benefit from rest.   Will plan to initiate OT tomorrow.

## 2017-07-29 NOTE — PROGRESS NOTES
[facility]      Subjective:  Feels good  Some pain  Had to be cathed  Apparently has some baseline cognitive issues    Review of Systems:  C: NEGATIVE for fever, chills, change in weight  I: NEGATIVE for worrisome rashes, moles or lesions  E: NEGATIVE for vision changes or irritation  E/M: NEGATIVE for ear, mouth and throat problems  R: NEGATIVE for significant cough or SOB  B: NEGATIVE for masses, tenderness or discharge  CV: NEGATIVE for chest pain, palpitations or peripheral edema  GI: NEGATIVE for nausea, abdominal pain, heartburn, or change in bowel habits  : NEGATIVE for frequency, dysuria, or hematuria  MUSCULOSKELETAL:right hip pain  N: NEGATIVE for weakness, dizziness or paresthesias  E: NEGATIVE for temperature intolerance, skin/hair changes  H: NEGATIVE for bleeding problems  P: NEGATIVE for changes in mood or affect    Physical Exam:  Vitals Were Reviewed    Patient Vitals for the past 16 hrs:   BP Temp Temp src Heart Rate Resp SpO2   07/29/17 0815 151/82 97.5  F (36.4  C) Oral 68 18 96 %   07/29/17 0419 133/81 96.5  F (35.8  C) Oral 77 18 96 %   07/29/17 0013 118/61 96.7  F (35.9  C) Oral 64 18 97 %         Intake/Output Summary (Last 24 hours) at 07/29/17 1510  Last data filed at 07/29/17 1400   Gross per 24 hour   Intake             3129 ml   Output             2250 ml   Net              879 ml       GENERAL APPEARANCE: healthy, alert and no distress---some baseline confusion  EYES: conjunctiva clear, eyes grossly normal  RESP: lungs clear to auscultation - no rales, rhonchi or wheezes  CV: regular rate and rhythm, normal S1 S2, no S3 or S4 and no murmur, click or rub   ABDOMEN: soft, nontender, no HSM or masses and bowel sounds normal  MS: incision clean  SKIN: clear without significant rashes or lesions    Lab:  Recent Labs   Lab Test  07/29/17   0710  07/12/17   1028  07/26/16   0150   NA   --   136  137   POTASSIUM   --   4.2  4.5   CHLORIDE   --   104  106   CO2   --    27  23   ANIONGAP   --   5  8   GLC   --   90  108*   BUN   --   14  22   CR  0.66  0.80  0.81   ROSA MARIA   --   8.7  8.1*     CBC RESULTS:   Recent Labs   Lab Test  07/29/17   0710  07/12/17   1028  07/26/16   0150   WBC   --   5.2  7.8   RBC   --   4.43  4.48   HGB  12.1*  13.6  13.5   HCT   --   39.5*  39.4*   PLT  241  225  230       Results for orders placed or performed during the hospital encounter of 07/28/17 (from the past 24 hour(s))   XR Pelvis Port 1/2 Views    Narrative    XR PELVIS PORT 1/2 VW 7/28/2017 3:23 PM    COMPARISON: 1/26/2017    HISTORY: Arthroplasty      Impression    IMPRESSION: Postoperative changes of bilateral total hip  arthroplasties, new on the right and unchanged on the left. No  fractures are seen. Hardware appears intact.    ANDERSON NINO   Hemoglobin   Result Value Ref Range    Hemoglobin 12.1 (L) 13.3 - 17.7 g/dL   INR   Result Value Ref Range    INR 1.09 0.86 - 1.14   Creatinine   Result Value Ref Range    Creatinine 0.66 0.66 - 1.25 mg/dL    GFR Estimate >90  Non  GFR Calc   >60 mL/min/1.7m2    GFR Estimate If Black >90   GFR Calc   >60 mL/min/1.7m2   Platelet count   Result Value Ref Range    Platelet Count 241 150 - 450 10e9/L       Assessment and Plan:    Day one right hip arthroplasty    Essential hypertension  -atenolol    Hyperlipidemia LDL goal <130  -zetia    CHRISTIANO (obstructive sleep apnea)    Long-term (current) use of anticoagulants [Z79.01]  Had dvt after hip surgery, getting lovenox until inr 1.7 then couamdin    ? Baseline dementia

## 2017-07-29 NOTE — OP NOTE
Surgeon / Clinician: Jose Miguel Hutchins MD    DATE OF SURGERY:  07/28/2017    PREOPERATIVE DIAGNOSIS:  Primary osteoarthritis right hip.    POSTOPERATIVE DIAGNOSIS:  Primary osteoarthritis right hip.     Procedure:  DePuy MIS right total hip arthroplasty.    Components:  Acetabular 52 mm outside diameter, pinnacle ingrowth with GRIPTION, the liner is a neutral ALTRIX.  The stem is a #4 high offset [place] ingrowth, the head and neck +5/36 mm cobalt chrome.     Surgeon:  Jose Miguel Hutchins MD    Assistant:  Cyndie Epperson PA-C    Anesthesia:  Spinal.    Estimated Blood Loss:  100 mL.      Complications:  None apparent.    Indications:  Cricket Klein is a very pleasant 72-year-old gentleman presented with endstage primary osteoarthrosis of the right hip, recalcitrant to conservative treatment.  After alternatives, risks, possible complications were carefully discussed, the patient desired surgical intervention therefore  consent was obtained.    Description of Procedure:  The patient was brought to the main operating room after spinal anesthesia was obtained, placed in the left lateral decubitus position.  Two grams of Ancef were given intravenously.  The right lower extremity was prepped and draped in the usual sterile fashion.    An oblique incision was made from posterolateral approach of the right hip.  Subcutaneous tissue was divided sharply and the deep fascia incised in line with the incision.  ___ retractor was placed and released external rotators, incised the capsule and dislocated the femoral head.  Then resected the capital fragment at the appropriate level, removing it from the operative field.      With retractors placed around the acetabulum, resected out the remains of the capsular labral tissue and resected osteophytes.  We then sequentially reamed to 51 mm with excellent circumferential bleeding bone.  Therefore, a size 52-mm cup went back in place with appropriate anteversion and coverage and a neutral  liner was placed.      We then reamed and broached the femoral canal, fitted a 4 high offset stem.  Immediately had stability, excellent rotation.  Therefore, I removed trial components, pulse lavaged surfaces clean.  We then impacted the cup into place verifying it had seated appropriately and likewise impacted the liner solidly into place again verifying that all tabs had seated appropriately.  Likewise impacted the stem solidly down onto the calcar.  Again performed trial reduction, elected to go with a +5 head and neck combination; therefore, the bulb was impacted on a clean Loera taper and the hip was reduced.    Leg lengths were appropriate and definitely not tight on extension, unable to dislocate in extension, external rotation.  Ninety degrees of flexion, and 20 degrees of abduction, that took us to 90 degrees of internal rotation before he began to ride out of the cup.  Therefore, final concentric reduction performed.  Pulse lavaged surfaces clean.  We then soaked the hip with weak Betadine solution and closed deep fascia  #1 STRATAFIX.  We then closed subcutaneous tissue with #2 STRATAFIX, completed closure with 3-0 STRATAFIX.  PRINEO was placed around the wound, followed by sterile compression bandage and the hip abduction pillow.  The patient was returned supine, returned to PAR in stable condition without apparent complication.        Jose Miguel Hutchins MD    D:  07/28/2017 15:33 T:  07/28/2017 21:20  Document:  [id number] GC\DM\HS

## 2017-07-29 NOTE — PLAN OF CARE
Problem: Pain, Acute (Adult)  Goal: Acceptable Pain Control/Comfort Level  Patient will demonstrate the desired outcomes by discharge/transition of care.   Outcome: Improving  Patient did not c/o pain until 1300 this afternoon at which time oral pain meds were given. His pain is in the right hip/groin area. Ice was also applied. Patient has been up quite a bit today working with physical therapy and rehab. He has been in the chair for both meals also. After one hour of giving Dilaudid the patient's pain was reassessed and he stated he felt it was a little better (he looks to his wife for support and confirmation). She states she felt it was better also. Will continue to assess and will offer pain meds to patient on a frequent basis rather than wait for him to ask as this writer feels he may just have waited to long to request pain medication .

## 2017-07-29 NOTE — PROGRESS NOTES
"[facility] Orthopaedics Progress Note      Post-operative Day: 1 Day Post-Op    Procedure(s):  Right total hip arthroplasty - Wound Class: I-Clean      Subjective:   Up in chair and feels well.  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  PO and void ok.  Pleased with progress for his POD1.    Pain: moderate  Chest pain, SOB:  No      Objective:  Blood pressure 151/82, temperature 97.5  F (36.4  C), temperature source Oral, resp. rate 18, height 1.702 m (5' 7\"), weight 93 kg (205 lb), SpO2 96 %.    Patient Vitals for the past 24 hrs:   BP Temp Temp src Heart Rate Resp SpO2 Height Weight   07/29/17 0815 151/82 97.5  F (36.4  C) Oral 68 18 96 % - -   07/29/17 0419 133/81 96.5  F (35.8  C) Oral 77 18 96 % - -   07/29/17 0013 118/61 96.7  F (35.9  C) Oral 64 18 97 % - -   07/28/17 1930 138/80 - - 76 18 95 % - -   07/28/17 1830 132/83 96.9  F (36.1  C) Oral 82 18 96 % - -   07/28/17 1730 136/77 - - 81 18 95 % - -   07/28/17 1700 130/68 - - 61 16 94 % - -   07/28/17 1632 139/77 95.6  F (35.3  C) Axillary 70 18 95 % - -   07/28/17 1600 114/66 - - 60 16 - - -   07/28/17 1545 111/72 - - 61 16 92 % - -   07/28/17 1543 - 97.4  F (36.3  C) Oral - - - - -   07/28/17 1530 103/74 - - 78 16 94 % - -   07/28/17 1515 113/88 - - 61 16 95 % - -   07/28/17 1500 123/87 - - 67 16 98 % - -   07/28/17 1455 116/77 97.5  F (36.4  C) Oral 78 16 97 % - -   07/28/17 1137 161/90 98  F (36.7  C) Oral 71 18 99 % 1.702 m (5' 7\") 93 kg (205 lb)   07/28/17 1134 - 98  F (36.7  C) Oral 66 18 98 % 1.702 m (5' 7\") 93 kg (205 lb)       Wt Readings from Last 4 Encounters:   07/28/17 93 kg (205 lb)   07/12/17 95.3 kg (210 lb)   06/22/17 95.3 kg (210 lb)   05/09/17 95.5 kg (210 lb 9.6 oz)         Motor function, sensation, and circulation intact   Yes  Wound status: incisions are clean dry and intact. Yes  Calf tenderness: Bilateral  No    Pertinent Labs   Lab Results: personally reviewed.     Recent Labs   Lab Test  07/29/17   0710  " 07/28/17   1200 07/17/17 07/12/17   1028   07/26/16   0150  02/10/16   1055   INR  1.09  0.93  2.1*   --    < >   --    --    HGB  12.1*   --    --   13.6   --   13.5  14.2   HCT   --    --    --   39.5*   --   39.4*  41.4   MCV   --    --    --   89   --   88  89   PLT  241   --    --   225   --   230  253   NA   --    --    --   136   --   137  138    < > = values in this interval not displayed.       Plan: Anticoagulation protocol: Lovenox as ordered with Coumadin until INR 1.7, then D/C lovenox and continue coumadin   x lifelong            Pain medications:  oxycontin            Weight bearing status:  WBAT            Disposition:  [facility] home tomorrow.             Continue cares and rehabilitation     Report completed by:  Edwar Ramirez MD  Date: 7/29/2017  Time: 8:33 AM

## 2017-07-30 ENCOUNTER — APPOINTMENT (OUTPATIENT)
Dept: OCCUPATIONAL THERAPY | Facility: CLINIC | Age: 72
DRG: 470 | End: 2017-07-30
Attending: ORTHOPAEDIC SURGERY
Payer: MEDICARE

## 2017-07-30 ENCOUNTER — APPOINTMENT (OUTPATIENT)
Dept: PHYSICAL THERAPY | Facility: CLINIC | Age: 72
DRG: 470 | End: 2017-07-30
Attending: ORTHOPAEDIC SURGERY
Payer: MEDICARE

## 2017-07-30 VITALS
HEIGHT: 67 IN | SYSTOLIC BLOOD PRESSURE: 110 MMHG | DIASTOLIC BLOOD PRESSURE: 59 MMHG | OXYGEN SATURATION: 96 % | WEIGHT: 205 LBS | RESPIRATION RATE: 18 BRPM | BODY MASS INDEX: 32.18 KG/M2 | TEMPERATURE: 98.6 F | HEART RATE: 64 BPM

## 2017-07-30 LAB
HGB BLD-MCNC: 11.2 G/DL (ref 13.3–17.7)
INR PPP: 1.16 (ref 0.86–1.14)

## 2017-07-30 PROCEDURE — 40000133 ZZH STATISTIC OT WARD VISIT

## 2017-07-30 PROCEDURE — 97116 GAIT TRAINING THERAPY: CPT | Mod: GP | Performed by: PHYSICAL THERAPIST

## 2017-07-30 PROCEDURE — 85610 PROTHROMBIN TIME: CPT | Performed by: ORTHOPAEDIC SURGERY

## 2017-07-30 PROCEDURE — 97110 THERAPEUTIC EXERCISES: CPT | Mod: GP | Performed by: PHYSICAL THERAPIST

## 2017-07-30 PROCEDURE — A9270 NON-COVERED ITEM OR SERVICE: HCPCS | Mod: GY | Performed by: ORTHOPAEDIC SURGERY

## 2017-07-30 PROCEDURE — 36415 COLL VENOUS BLD VENIPUNCTURE: CPT | Performed by: ORTHOPAEDIC SURGERY

## 2017-07-30 PROCEDURE — 97535 SELF CARE MNGMENT TRAINING: CPT | Mod: GO

## 2017-07-30 PROCEDURE — 97165 OT EVAL LOW COMPLEX 30 MIN: CPT | Mod: GO

## 2017-07-30 PROCEDURE — 99232 SBSQ HOSP IP/OBS MODERATE 35: CPT | Performed by: FAMILY MEDICINE

## 2017-07-30 PROCEDURE — A9270 NON-COVERED ITEM OR SERVICE: HCPCS | Mod: GY | Performed by: PHYSICIAN ASSISTANT

## 2017-07-30 PROCEDURE — 40000193 ZZH STATISTIC PT WARD VISIT: Performed by: PHYSICAL THERAPIST

## 2017-07-30 PROCEDURE — 25000132 ZZH RX MED GY IP 250 OP 250 PS 637: Mod: GY | Performed by: PHYSICIAN ASSISTANT

## 2017-07-30 PROCEDURE — 25000132 ZZH RX MED GY IP 250 OP 250 PS 637: Mod: GY | Performed by: ORTHOPAEDIC SURGERY

## 2017-07-30 PROCEDURE — 85018 HEMOGLOBIN: CPT | Performed by: ORTHOPAEDIC SURGERY

## 2017-07-30 RX ORDER — HYDROMORPHONE HYDROCHLORIDE 2 MG/1
2-4 TABLET ORAL EVERY 4 HOURS PRN
Qty: 60 TABLET | Refills: 0 | Status: SHIPPED | OUTPATIENT
Start: 2017-07-30 | End: 2017-10-06

## 2017-07-30 RX ORDER — WARFARIN SODIUM 5 MG/1
5 TABLET ORAL ONCE
Status: COMPLETED | OUTPATIENT
Start: 2017-07-30 | End: 2017-07-30

## 2017-07-30 RX ORDER — WARFARIN SODIUM 5 MG/1
5 TABLET ORAL
Status: DISCONTINUED | OUTPATIENT
Start: 2017-07-30 | End: 2017-07-30

## 2017-07-30 RX ADMIN — EZETIMIBE 10 MG: 10 TABLET ORAL at 08:06

## 2017-07-30 RX ADMIN — SENNOSIDES AND DOCUSATE SODIUM 1 TABLET: 8.6; 5 TABLET ORAL at 08:07

## 2017-07-30 RX ADMIN — HYDROMORPHONE HYDROCHLORIDE 4 MG: 2 TABLET ORAL at 02:38

## 2017-07-30 RX ADMIN — GABAPENTIN 100 MG: 100 CAPSULE ORAL at 08:06

## 2017-07-30 RX ADMIN — ACETAMINOPHEN 975 MG: 325 TABLET, FILM COATED ORAL at 00:04

## 2017-07-30 RX ADMIN — WARFARIN SODIUM 5 MG: 5 TABLET ORAL at 12:43

## 2017-07-30 RX ADMIN — HYDROXYZINE HYDROCHLORIDE 10 MG: 10 TABLET ORAL at 02:38

## 2017-07-30 RX ADMIN — ATENOLOL 50 MG: 50 TABLET ORAL at 08:06

## 2017-07-30 RX ADMIN — ACETAMINOPHEN 975 MG: 325 TABLET, FILM COATED ORAL at 08:06

## 2017-07-30 NOTE — PLAN OF CARE
Problem: Goal Outcome Summary  Goal: Goal Outcome Summary  Occupational Therapy Discharge Summary     Reason for therapy discharge:    Discharged to home with home therapy.     Progress towards therapy goal(s). See goals on Care Plan in [facility] electronic health record for goal details.  Goals met     Therapy recommendation(s):    No further therapy is recommended.

## 2017-07-30 NOTE — DISCHARGE INSTRUCTIONS
lovenox needs to be continued until the INR is 1.7 or higher.  Pharmacy will provide you with specific coumadin clinic follow up instructions.

## 2017-07-30 NOTE — PROGRESS NOTES
07/29/17 0900   Quick Adds   Type of Visit Initial PT Evaluation   Living Environment   Lives With spouse   Living Arrangements house   Home Accessibility bed and bath are not on the first floor;bed and bath on same level;stairs (2 railings present);stairs to enter home;stairs within home;tub/shower is not walk in   Number of Stairs to Enter Home 3   Number of Stairs Within Home 13   Stair Railings at Home inside, present at both sides;outside, present at both sides   Transportation Available car;family or friend will provide   Self-Care   Dominant Hand right   Usual Activity Tolerance moderate   Current Activity Tolerance fair   Regular Exercise no   Equipment Currently Used at Home none   Functional Level Prior   Ambulation 0-->independent   Transferring 0-->independent   Toileting 0-->independent   Bathing 0-->independent   Dressing 0-->independent   Eating 0-->independent   Communication 0-->understands/communicates without difficulty   Swallowing 0-->swallows foods/liquids without difficulty   Cognition 1 - attention or memory deficits   Fall history within last six months no   Which of the above functional risks had a recent onset or change? none   General Information   Onset of Illness/Injury or Date of Surgery - Date 07/28/17   Referring Physician Zac Hutchins   Patient/Family Goals Statement Out patient per spouse   Weight-Bearing Status - LUE full weight-bearing   Weight-Bearing Status - RUE full weight-bearing   Weight-Bearing Status - LLE full weight-bearing   Weight-Bearing Status - RLE weight-bearing as tolerated   Cognitive Status Examination   Orientation person   Level of Consciousness alert   Follows Commands and Answers Questions 100% of the time   Personal Safety and Judgment impaired   Memory impaired   Cognitive Comment Spouse very aware and redirects Claudio when needed.   Pain Assessment   Patient Currently in Pain (Yes unable to rate.)   Range of Motion (ROM)   ROM Comment Right hip FF 75, knee  "flexin 90    Strength   Strength Comments Good strength able to SLR FF and Strong QS   Bed Mobility   Bed Mobility Comments Verbal, tactile cues and gestures.  Minimal assistance   Transfer Skills   Transfer Comments RW  Verbal, tactile cues and gestures.  Minimal assistance   Gait   Gait Comments Verbal, tactile cues and gestures.  Minimal assistance    General Therapy Interventions   Planned Therapy Interventions bed mobility training;gait training;ROM;strengthening;stretching;transfer training;risk factor education;home program guidelines;progressive activity/exercise   Clinical Impression   Criteria for Skilled Therapeutic Intervention yes, treatment indicated   PT Diagnosis Right FRANCISCA OA   Influenced by the following impairments Pain, decreased strength, decreased ROM   Functional limitations due to impairments Right hip precautions AD dependnet,    Clinical Presentation Evolving/Changing   Clinical Presentation Rationale Cognitive issues and recent surgery with precautions   Clinical Decision Making (Complexity) Moderate complexity   Therapy Frequency` 2 times/day   Predicted Duration of Therapy Intervention (days/wks) 3 days   Anticipated Discharge Disposition Home with Assist;Home with Home Therapy   Risk & Benefits of therapy have been explained Yes   Patient, Family & other staff in agreement with plan of care Yes   Clinical Impression Comments Requires spouse support for meomory deficets   [facility] AM-PAC  \"6 Clicks\" V.2 Basic Mobility Inpatient Short Form   1. Turning from your back to your side while in a flat bed without using bedrails? 2 - A Lot   2. Moving from lying on your back to sitting on the side of a flat bed without using bedrails? 2 - A Lot   3. Moving to and from a bed to a chair (including a wheelchair)? 2 - A Lot   4. Standing up from a chair using your arms (e.g., wheelchair, or bedside chair)? 2 - A Lot   5. To walk in hospital room? 2 - A Lot   6. Climbing 3-5 steps with a " railing? 2 - A Lot   Basic Mobility Raw Score (Score out of 24.Lower scores equate to lower levels of function) 12   Total Evaluation Time   Total Evaluation Time (Minutes) 30

## 2017-07-30 NOTE — PLAN OF CARE
Problem: Pain, Acute (Adult)  Goal: Acceptable Pain Control/Comfort Level  Patient will demonstrate the desired outcomes by discharge/transition of care.   Outcome: Improving  Pain controlled with oral medications. Does better with pain control when we offer meds at available times. Patient frequently forgetful, has some baseline dementia. Does well with wife close by, frequently looks to her for answers and clarification[facility] Had [facility]. Will continue to monitor.

## 2017-07-30 NOTE — PROGRESS NOTES
"   07/30/17 0900   Signing Clinician's Name / Credentials   Signing clinician's name / credentials Danielle Zavaleta PT DPT CEEAA   Quick Adds   Rehab Discipline PT   Gait Training   Minutes of Treatment (Gait Training) 15   Symptoms Noted During/After Treatment (Gait Training) none   Treatment Detail Patient up out of chair with set up RW only.  Gait in woods with minmal to no cues.  Required some direction for avoiding actvities in the woods.   [place] Level (Gait Training) contact guard   Physical Assistance Level (Gait Training) set-up required;supervision   Weight Bearing (Gait Training) weight-bearing as tolerated   Assistive Device (Gait Training) rolling walker   Gait Distance 200ft x 2   Pattern Analysis (Gait Training) swing-through gait   Gait Analysis Deviations decreased silvia;increased time in double stance;decreased toe-to-floor clearance   Impairments (Gait Analysis/Training) pain;ROM decreased   Therapeutic Activity   Minutes of Treatment 10 minutes   Treatment Detail Hip precuations with functional guidance to Nikolai and Claudio.  Verbalized and demonstrated good understanding.   Therapeutic Exercise   Minutes of Treatment 15   Treatment Detail FRANCISCA right hip exercises.  Written instruction and review.  Demonstration, verbal cues and practiced.  Education on exercise prescrioptin and frequnecy at discharge.  QS, GS, heel slides, SLR FF, hip abd, SAQ vs LAQ heel toe raises x 10 with progresin to 2 sets of 10 as able.  Not to increase the entire 10 if unable.   Additional Documentation   Rehab Comments Pat and Claudio feel they are ready to discharge.  PT Goals met.   PT Plan D/C home with spouse.   [facility] AM-PAC  \"6 Clicks\" V.2 Basic Mobility Inpatient Short Form   1. Turning from your back to your side while in a flat bed without using bedrails? 3 - A Little   2. Moving from lying on your back to sitting on the side of a flat bed without using bedrails? 3 - A Little   3. Moving to and from a bed to " a chair (including a wheelchair)? 3 - A Little   4. Standing up from a chair using your arms (e.g., wheelchair, or bedside chair)? 3 - A Little   5. To walk in hospital room? 3 - A Little   6. Climbing 3-5 steps with a railing? 3 - A Little   Basic Mobility Raw Score (Score out of 24.Lower scores equate to lower levels of function) 18   Total Session Time   Total Session Time (minutes) 30 minutes

## 2017-07-30 NOTE — PROGRESS NOTES
SPIRITUAL HEALTH SERVICES  SPIRITUAL ASSESSMENT Progress Note   Hospital [place] [facility] [place]  ON-CALL      Spoke with nurse on unit and she indicted it was not urgent to see Cricket today and that he could wait till Monday morning.  Left instructions to page me if things change during the day today.                                                                                                                                             Galileo Wolfe MA.  Associate Staff   Pager [phone]

## 2017-07-30 NOTE — PLAN OF CARE
Problem: Pain, Acute (Adult)  Goal: Identify Related Risk Factors and Signs and Symptoms  Related risk factors and signs and symptoms are identified upon initiation of Human Response Clinical Practice Guideline (CPG)   Outcome: Improving  Patient taking scheduled Tylenol & as needed oral pain meds with good relief & control   Wife at bedside to assist patient with underlying Dementia  Plan is hopefully to be discharged today later on

## 2017-07-30 NOTE — PLAN OF CARE
Problem: Goal Outcome Summary  Goal: Goal Outcome Summary  Outcome: Completed Date Met:  07/30/17  PT:  All PT goals met and no equipment needs identified by Claudio or his wife Shawna upon discharge.  Will discharge home with assistance and home care    Comments:   Recommendation:  Home with asssitance

## 2017-07-30 NOTE — DISCHARGE SUMMARY
[facility] Orthopedics Discharge Summary                                  [facility]     LARRY CR [id number]   Age: 72 year old  PCP: Saud Ramon, [phone] 1945     Date of Admission:  7/28/2017  Date of Discharge::  7/30/2017  Discharge Physician:  Edwar Ramirez    Code status:  Prior    Admission Information:  Admission Diagnosis:  Right hip DJD  Hip arthrosis    Post-Operative Day: 2 Days Post-Op     Reason for admission:  The patient was admitted for the following:Procedure(s) (LRB):  ARTHROPLASTY HIP (Right)    Principal Problem:    Hip arthrosis  Active Problems:    Essential hypertension    Hyperlipidemia LDL goal <130    CHRISTIANO (obstructive sleep apnea)    Long-term (current) use of anticoagulants [Z79.01]      Allergies:  Atorvastatin calcium; Pravastatin; and Zocor [hmg-coa-r inhibitors]    Following the procedure noted above the patient was transferred to the post-op floor and started on:    Therapy:  physical therapy  Anticoagulation Plan: Resume pre-op coumadin management per primary care provider/hospitalist   Pain Management: dilaudid  Weight bearing status: Weight bearing as tolerated     The patient was followed and co-managed by the hospitalist service during the inpatient treatment course  Complications:  None  Consultations:  None     Pertinent Labs   Lab Results: personally reviewed.     Recent Labs   Lab Test  07/30/17   0640  07/29/17   0710  07/28/17   1200   07/12/17   1028   07/26/16   0150  02/10/16   1055   INR  1.16*  1.09  0.93   < >   --    < >   --    --    HGB  11.2*  12.1*   --    --   13.6   --   13.5  14.2   HCT   --    --    --    --   39.5*   --   39.4*  41.4   MCV   --    --    --    --   89   --   88  89   PLT   --   241   --    --   225   --   230  253   NA   --    --    --    --   136   --   137  138    < > = values in this interval not displayed.          Discharge Information:  Condition at discharge: Stable  Discharge  destination:  Discharged to home     Medications at discharge:  Current Discharge Medication List      START taking these medications    Details   HYDROmorphone (DILAUDID) 2 MG tablet Take 1-2 tablets (2-4 mg) by mouth every 4 hours as needed for moderate to severe pain  Qty: 60 tablet, Refills: 0    Associated Diagnoses: S/P hip replacement, right         CONTINUE these medications which have CHANGED    Details   enoxaparin (LOVENOX) 100 MG/ML injection Inject 0.95 mLs (95 mg) Subcutaneous every 12 hours for 5 doses Use until appointment on Thursday, 8/3/17 or until further notice.  Qty: 4.75 mL, Refills: 0    Associated Diagnoses: Pulmonary embolus, left (H)         CONTINUE these medications which have NOT CHANGED    Details   warfarin (COUMADIN) 2.5 MG tablet Take 1.25mg by mouth every Mon & Fri and 2.5mg all other days or as directed by Anticoagulation Clinic  Qty: 80 tablet, Refills: 0    Associated Diagnoses: Pulmonary embolus, left (H)      ezetimibe (ZETIA) 10 MG tablet Take 1 tablet (10 mg) by mouth daily  Qty: 90 tablet, Refills: 3    Associated Diagnoses: Pure hypercholesterolemia      atenolol (TENORMIN) 50 MG tablet Take 1 tablet (50 mg) by mouth 2 times daily  Qty: 180 tablet, Refills: 1    Associated Diagnoses: Benign essential HTN      order for DME Equipment being ordered: LEXI  Cricket ROB Ernie received a [employer] AirSense 10 Auto. Pressures were set at Auto 10 - 18 cm H2O.      Cholecalciferol (VITAMIN D) 1000 UNITS capsule Take 1 capsule by mouth daily.                        Follow-Up Care:  Patient should be seen in the office in 14 days by the Orthopedic Surgeon/Physician Assistant.  Call [phone] for appointment or questions.    Edwar Ramirez      Lovenox adjusted to match inpatient use,  40 mg SQ daily, for the next three days until coumadin therapuetic.    Edwar Ramirez MD  [facility]  Date:  7/30/2017  1:05 PM

## 2017-07-30 NOTE — PHARMACY-ANTICOAGULATION SERVICE
Clinical Pharmacy- Warfarin Discharge Note  This patient is currently on warfarin for the treatment of PE.  INR Goal= 2-3  Expected length of therapy lifetime.    Anticoagulation Dose History     Recent Dosing and Labs Latest Ref Rng & Units 5/1/2017 6/5/2017 7/10/2017 7/17/2017 7/28/2017 7/29/2017 7/30/2017    Warfarin 2.5 mg - - - - - 2.5 mg 2.5 mg -    INR 0.86 - 1.14 - - - - 0.93 1.09 1.16(H)    INR 0.86 - 1.14 3.3(A) 2.7(A) 1.9(A) 2.1(A) - - -    INR Point of Care 0.86 - 1.14 - - - - - - -          Vitamin K doses administered during the last 7 days: None  FFP administered during the last 7 days: None  Recommend discharging the patient on a warfarin regimen of 1.25 mg on Mon & Fri, 2.5 mg other days as prior to admission.  Also discharged on Lovenox - to be continued until INR > 1.7 per Ortho orders.      The patient should have an INR checked August / 03 / 2017 at [facility].

## 2017-07-30 NOTE — PLAN OF CARE
Goal Outcome Summary:  OT eval completed and treatment initiated.   Pt currently requires SBA for functional mobility with 2ww. Pt reporting confusion overnight, but is oriented x4 at time of OT eval. He is forgetful, and needs reminders at times. Pt's wife reports he has had some confusion following general anesthesia in the past, and she will be able to provide 24 hr supervision upon dc.   Provided education regarding posterior hip precautions, and impact on ADLs. Gave Pt handout with pictures and written description of precautions. Pt and wife verbalize understanding.   Pt educated on use of AE for LE dressing, requires mod A to complete LE dressing tasks with reacher and sock aid. Reviewed all other AE needs, recommend shower chair for increased safety and ind with bathing. Provided Pt with AE handout, and resource handout for obtaining equipment.     Recommendation:   Pt appears safe to dc home with assist from wife when medically ready.      07/30/17 1200   Quick Adds   Type of Visit Initial Occupational Therapy Evaluation   Living Environment   Lives With spouse   Living Arrangements house   Home Accessibility bed and bath are not on the first floor;bed and bath on same level;stairs (2 railings present);stairs to enter home;stairs within home;tub/shower is not walk in   Number of Stairs to Enter Home 3   Number of Stairs Within Home 13   Stair Railings at Home inside, present at both sides;outside, present at both sides   Transportation Available car;family or friend will provide   Self-Care   Dominant Hand right   Usual Activity Tolerance moderate   Current Activity Tolerance fair   Regular Exercise no   Functional Level Prior   Ambulation 0-->independent   Transferring 0-->independent   Toileting 0-->independent   Bathing 0-->independent   Dressing 0-->independent   Eating 0-->independent   Communication 0-->understands/communicates without difficulty   Swallowing 0-->swallows foods/liquids without  difficulty   Cognition 1 - attention or memory deficits   Fall history within last six months no   Which of the above functional risks had a recent onset or change? none   Prior Functional Level Comment PLOF: Pt ind with ADLs, shares IADLs with spouse.    General Information   Onset of Illness/Injury or Date of Surgery - Date 07/28/17   Referring Physician Dr. Hutchins   Patient/Family Goals Statement Pt wants to dc home with [facility] today   Additional Occupational Profile Info/Pertinent History of Current Problem Pt presents with pain and post surgical precautions impacting his ability to complete ADLs safely and independently.    Precautions/Limitations fall precautions;right hip precautions   Weight-Bearing Status - RLE weight-bearing as tolerated   General Observations pleasant and cooperative   General Info Comments wife present for evaluation   Cognitive Status Examination   Orientation orientation to person, place and time   Level of Consciousness alert   Able to Follow Commands WNL/WFL   Cognitive Comment Has been intermittently confused since surgery. Per wife, he has experienced confusion after general anesthesia in the past. Pt is alert and oriented x4 at time of OT eval, but is forgetful at times. Wife reports she will provide 24 hr supervision upon dc.    Pain Assessment   Patient Currently in Pain Yes, see Vital Sign flowsheet   Range of Motion (ROM)   ROM Quick Adds No deficits were identified   Strength   Manual Muscle Testing Quick Adds No deficits were identified   Mobility   Bed Mobility Bed mobility skill: Sit to supine   Bed Mobility Skill: Sit to Supine   Level of [place]: Sit/Supine minimum assist (75% patients effort)   Physical Assist/Nonphysical Assist: Sit/Supine 1 person assist;verbal cues   Transfer Skill: Bed to Chair/Chair to Bed   Level of [place]: Bed to Chair stand-by assist   Physical Assist/Nonphysical Assist: Bed to Chair supervision   Weight-Bearing Restrictions  weight-bearing as tolerated   Assistive Device - Transfer Skill Bed to Chair Chair to Bed Rehab Eval rolling walker   Transfer Skill: Sit to Stand   Level of [place]: Sit/Stand stand-by assist   Physical Assist/Nonphysical Assist: Sit/Stand supervision;verbal cues   Transfer Skill: Sit to Stand weight-bearing as tolerated   Assistive Device for Transfer: Sit/Stand rolling walker   Transfer Skill: Toilet Transfer   Level of [place]: Toilet stand-by assist   Physical Assist/Nonphysical Assist: Toilet supervision   Weight-Bearing Restrictions: Toilet weight-bearing as tolerated   Assistive Device rolling walker;grab bars   Toilet Transfer Skill Comments simulated home set up   Lower Body Dressing   Level of [place]: Dress Lower Body moderate assist (50% patients effort)   Physical Assist/Nonphysical Assist: Dress Lower Body 1 person assist   Assistive Device reacher;sock-aid   Instrumental Activities of Daily Living (IADL)   IADL Comments shares responsibility with wife   Activities of Daily Living Analysis   Impairments Contributing to Impaired Activities of Daily Living pain;post surgical precautions   General Therapy Interventions   Planned Therapy Interventions ADL retraining;transfer training   Clinical Impression   Criteria for Skilled Therapeutic Interventions Met yes, treatment indicated;no   OT Diagnosis decreased functional ind s/p FRANCISCA   Influenced by the following impairments pain, post surgical precautions. confusion   Assessment of Occupational Performance 1-3 Performance Deficits   Identified Performance Deficits LE dressing, bathing, bed mobility   Clinical Decision Making (Complexity) Low complexity   Therapy Frequency daily   Predicted Duration of Therapy Intervention (days/wks) 1 session   Anticipated Equipment Needs at Discharge shower chair   Anticipated Discharge Disposition Home with Assist;Home with Home Therapy   Risks and Benefits of Treatment have been explained. Yes   Patient,  "Family & other staff in agreement with plan of care Yes   Clinical Impression Comments Pt will benefit from 1 OT session to maximize safety and ind with ADLs. Recommend shower chair at OR.    [facility] TM \"6 Clicks\"   2016, Trustees of [facility], under license to [employer].  All rights reserved.   6 Clicks Short Forms Daily Activity Inpatient Short Form   [facility] AM-PAC  \"6 Clicks\" Daily Activity Inpatient Short Form   1. Putting on and taking off regular lower body clothing? 2 - A Lot   2. Bathing (including washing, rinsing, drying)? 3 - A Little   3. Toileting, which includes using toilet, bedpan or urinal? 4 - None   4. Putting on and taking off regular upper body clothing? 4 - None   5. Taking care of personal grooming such as brushing teeth? 4 - None   6. Eating meals? 4 - None   Daily Activity Raw Score (Score out of 24.Lower scores equate to lower levels of function) 21   Total Evaluation Time   Total Evaluation Time (Minutes) 15     See care plan for goals    Karin Patel OTR/L  "

## 2017-07-30 NOTE — PLAN OF CARE
Problem: Individualization  Goal: Patient Preferences  Outcome: Improving  0900- walking in room and to bathroom with walker. Taking diet without  Difficulty or complaint of nausea. Passing flattus. Steady on feet with SBA.  Has ahlzeimers and states his pain is tolerable- spouse states he  Has not been acting agitated . Spouse at bedside through nite and during the day.   No drainage from incision.

## 2017-07-30 NOTE — PROGRESS NOTES
"[facility] Orthopaedics Progress Note      Post-operative Day: 2 Days Post-Op    Procedure(s):  Right total hip arthroplasty - Wound Class: I-Clean      Subjective:  Pt dressed and ready to go home.  Eating well, void ok, comfortable.    Pain: minimal  Chest pain, SOB:  No      Objective:  Blood pressure 110/59, pulse 64, temperature 98.6  F (37  C), temperature source Oral, resp. rate 18, height 1.702 m (5' 7\"), weight 93 kg (205 lb), SpO2 96 %.    Patient Vitals for the past 24 hrs:   BP Temp Temp src Pulse Resp SpO2   07/30/17 0954 110/59 98.6  F (37  C) Oral 64 18 96 %   07/29/17 2252 (!) 165/93 98.6  F (37  C) Oral 70 18 98 %   07/29/17 2251 - 98.6  F (37  C) - - - -   07/29/17 1540 140/78 98.7  F (37.1  C) Oral 65 18 98 %       Wt Readings from Last 4 Encounters:   07/28/17 93 kg (205 lb)   07/12/17 95.3 kg (210 lb)   06/22/17 95.3 kg (210 lb)   05/09/17 95.5 kg (210 lb 9.6 oz)         Motor function, sensation, and circulation intact   Yes  Wound status: incisions are clean dry and intact. Yes  Calf tenderness: Bilateral  No    Pertinent Labs   Lab Results: personally reviewed.     Recent Labs   Lab Test  07/30/17   0640  07/29/17   0710  07/28/17   1200   07/12/17   1028   07/26/16   0150  02/10/16   1055   INR  1.16*  1.09  0.93   < >   --    < >   --    --    HGB  11.2*  12.1*   --    --   13.6   --   13.5  14.2   HCT   --    --    --    --   39.5*   --   39.4*  41.4   MCV   --    --    --    --   89   --   88  89   PLT   --   241   --    --   225   --   230  253   NA   --    --    --    --   136   --   137  138    < > = values in this interval not displayed.       Plan: Anticoagulation protocol: Resume pre-op coumadin management per primary care provider/hospitalist              Pain medications:  dilaudid            Weight bearing status:  WBAT            Disposition:  Home today             Continue cares and rehabilitation     Report completed by:  Edwar Ramirez MD  Date: 7/30/2017  Time: 12:31 " PM

## 2017-07-30 NOTE — PROGRESS NOTES
[facility] Hospitalist Service      Subjective:  Doing very well'  Minimal pain    Review of Systems:  C: NEGATIVE for fever, chills, change in weight  E/M: NEGATIVE for ear, mouth and throat problems  R: NEGATIVE for significant cough or SOB  CV: NEGATIVE for chest pain, palpitations or peripheral edema    Physical Exam:  Vitals Were Reviewed    Patient Vitals for the past 16 hrs:   BP Temp Temp src Pulse Resp SpO2   07/30/17 0954 110/59 98.6  F (37  C) Oral 64 18 96 %   07/29/17 2252 (!) 165/93 98.6  F (37  C) Oral 70 18 98 %   07/29/17 2251 - 98.6  F (37  C) - - - -         Intake/Output Summary (Last 24 hours) at 07/30/17 1025  Last data filed at 07/29/17 1400   Gross per 24 hour   Intake                0 ml   Output              400 ml   Net             -400 ml       GENERAL APPEARANCE: healthy, alert and no distress  RESP: lungs clear to auscultation - no rales, rhonchi or wheezes  CV: regular rate and rhythm, normal S1 S2, no S3 or S4 and no murmur, click or rub   ABDOMEN: soft, nontender, no HSM or masses and bowel sounds normal  MS: no clubbing, cyanosis; no edema  SKIN: clear without significant rashes or lesions    Lab:  Recent Labs   Lab Test  07/29/17   0710  07/12/17   1028  07/26/16   0150   NA   --   136  137   POTASSIUM   --   4.2  4.5   CHLORIDE   --   104  106   CO2   --   27  23   ANIONGAP   --   5  8   GLC   --   90  108*   BUN   --   14  22   CR  0.66  0.80  0.81   ROSA MARIA   --   8.7  8.1*     CBC RESULTS:   Recent Labs   Lab Test  07/30/17   0640  07/29/17   0710  07/12/17   1028  07/26/16   0150   WBC   --    --   5.2  7.8   RBC   --    --   4.43  4.48   HGB  11.2*  12.1*  13.6  13.5   HCT   --    --   39.5*  39.4*   PLT   --   241  225  230       Results for orders placed or performed during the hospital encounter of 07/28/17 (from the past 24 hour(s))   Hemoglobin   Result Value Ref Range    Hemoglobin 11.2 (L) 13.3 - 17.7 g/dL   INR   Result Value Ref Range    INR 1.16 (H) 0.86 - 1.14        Assessment and Plan:    Day two right hip arthroplasty     Essential hypertension  -atenolol     Hyperlipidemia LDL goal <130  -zetia     CHRISTIANO (obstructive sleep apnea)     Long-term (current) use of anticoagulants [Z79.01]  Had dvt after hip surgery, getting lovenox until inr 1.7 then couamdin     ? Baseline dementia                  Home today likely   Needs lovenox until INR is 1.7 or greater  Wife has lovenox at home for three days and is able to give

## 2017-07-30 NOTE — PROGRESS NOTES
07/29/17 1400   Signing Clinician's Name / Credentials   Signing clinician's name / credentials Dainelle Zavaleta PT DPT [facility]   Quick Adds   Rehab Discipline PT   Gait Training   Minutes of Treatment (Gait Training) 15   Symptoms Noted During/After Treatment (Gait Training) fatigue;increased pain   Treatment Detail Patietn required verbal cues and redirection with transfers and mobility for hip precautions.  Spouse (Pat) redirected and no concerns with safety .  [place] very aware.   [place] Level (Gait Training) minimum assist (75% patient effort)   Physical Assistance Level (Gait Training) set-up required;supervision;verbal cues;nonverbal cues (demo/gestures);1 person assist   Weight Bearing (Gait Training) weight-bearing as tolerated   Assistive Device (Gait Training) rolling walker   Gait Distance 125 ft   Pattern Analysis (Gait Training) swing-to gait   Gait Analysis Deviations decreased silvia;increased time in double stance;decreased toe-to-floor clearance   Impairments (Gait Analysis/Training) pain;ROM decreased;strength decreased   Stair, Performance   Symptoms Noted During/After Treatment fatigue;increased pain   Treatment Detail set up and cues    Number of Stairs 10   Stair Railings present at both sides   Physical Assist/Nonphysical Assist set-up required;supervision;verbal cues;nonverbal cues (demo/gestures);1 person assist   Level of [place] contact guard   Stairs, Impairments pain;ROM decreased   Therapeutic Exercise   Minutes of Treatment 15   Treatment Detail FRANCISCA right hip exercises.  Written instruction and review.  Demonstration, verbal cues and practiced.  Education on exercise prescrioptin and frequnecy at discharge.  QS, GS, heel slides, SLR FF, hip abd, SAQ vs LAQ heel toe raises x 10 with progresin to 2 sets of 10 as able.  Not to increase the entire 10 if unable.   Additional Documentation   Rehab Comments Supervision and 1:1 with spouse to redirect as needed and monitor with  "precatuions.   PT Plan Complaint of pain to nursing but with skilled services \"feels better when I move\".   Total Session Time   Total Session Time (minutes) 30 minutes     "

## 2017-07-31 ENCOUNTER — TELEPHONE (OUTPATIENT)
Dept: FAMILY MEDICINE | Facility: CLINIC | Age: 72
End: 2017-07-31

## 2017-07-31 ENCOUNTER — ANTICOAGULATION THERAPY VISIT (OUTPATIENT)
Dept: ANTICOAGULATION | Facility: CLINIC | Age: 72
End: 2017-07-31
Payer: COMMERCIAL

## 2017-07-31 DIAGNOSIS — Z79.01 LONG-TERM (CURRENT) USE OF ANTICOAGULANTS: ICD-10-CM

## 2017-07-31 PROCEDURE — 99207 ZZC NO CHARGE NURSE ONLY: CPT

## 2017-07-31 NOTE — PROGRESS NOTES
ANTICOAGULATION FOLLOW-UP CLINIC VISIT    Patient Name:  Cricket Klein  Date:  7/31/2017  Contact Type:  Chart review only // all dosing instructions given by inpatient pharmacy while hospitalized    SUBJECTIVE:     Patient Findings     Positives Change in medications (Dilaudid)    Comments Hospitalized 7/28-7/30 for right FRANCISCA. Patient was instructed by ortho to continue Lovenox until INR >1.7.           OBJECTIVE    INR   Date Value Ref Range Status   07/30/2017 1.16 (H) 0.86 - 1.14 Final       ASSESSMENT / PLAN  No question data found.  Anticoagulation Summary as of 7/31/2017     INR goal 2.0-3.0   Today's INR 1.16! (7/30/2017)   Maintenance plan 1.25 mg (2.5 mg x 0.5) on Mon, Fri; 2.5 mg (2.5 mg x 1) all other days   Full instructions 1.25 mg on Mon, Fri; 2.5 mg all other days   Weekly total 15 mg   No change documented Hoda Meng RN   Plan last modified Gisell [facility] RN (12/12/2016)   Next INR check 8/3/2017   Priority INR   Target end date Indefinite    Indications   Other and unspecified coagulation defects [D68.9]  Long-term (current) use of anticoagulants [Z79.01] [Z79.01]         Anticoagulation Episode Summary     INR check location     Preferred lab     Send INR reminders to [facility]    Comments * Had PE in 2010 following hip surgery. Has heterozygous prothrombin gene mutation. Second PE 7-26-16. Needs lifelong warfarin. was on warfarin [phone]        Anticoagulation Care Providers     Provider Role Specialty Phone number    Saud Ramon MD [facility] [phone]            See the Encounter Report to view Anticoagulation Flowsheet and Dosing Calendar (Go to Encounters tab in chart review, and find the Anticoagulation Therapy Visit)        Hoda Meng, TRISTON

## 2017-07-31 NOTE — TELEPHONE ENCOUNTER
"Hospital/TCU/ED for chronic condition Discharge Protocol    \"Hi, my name is Danni Batres, a registered nurse, and I am calling from [facility].  I am calling to follow up and see how things are going for you after your recent emergency visit/hospital/TCU stay.\"    Tell me how you are doing now that you are home?\" Getting along really well      Discharge Instructions    \"Let's review your discharge instructions.  What is/are the follow-up recommendations?  Pt. Response: see ortho in 2 weeks    \"Has an appointment with your primary care provider been scheduled?\"   Yes. (confirm)    \"When you see the provider, I would recommend that you bring your medications with you.\"    Medications    \"Tell me what changed about your medicines when you discharged?\"    Changes to chronic meds?    0-1    \"What questions do you have about your medications?\"    None     New diagnoses of heart failure, COPD, diabetes, or MI?    No            On warfarin: \"Were you given any recommendations for follow-up with the anticoagulation clinic?\" Yes - need to schedule/reschedule Anticoagulation clinic appointment    Medication reconciliation completed? Yes  Was MTM referral placed (*Make sure to put transitions as reason for referral)?   No    Call Summary    \"What questions or concerns do you have about your recent visit and your follow-up care?\"     none    \"If you have questions or things don't continue to improve, we encourage you contact us through the main clinic number (give number).  Even if the clinic is not open, triage nurses are available 24/7 to help you.     We would like you to know that our clinic has extended hours (provide information).  We also have urgent care (provide details on closest location and hours/contact info)\"      \"Thank you for your time and take care!\"           "

## 2017-07-31 NOTE — MR AVS SNAPSHOT
Cricket Klein   7/31/2017   Anticoagulation Therapy Visit    Description:  72 year old male   Provider:  Hoda Meng, RN   Department:  [facility]           INR as of 7/31/2017     Today's INR 1.16! (7/30/2017)      Anticoagulation Summary as of 7/31/2017     INR goal 2.0-3.0   Today's INR 1.16! (7/30/2017)   Full instructions 1.25 mg on Mon, Fri; 2.5 mg all other days   Next INR check 8/3/2017    Indications   Other and unspecified coagulation defects [D68.9]  Long-term (current) use of anticoagulants [Z79.01] [Z79.01]         Description     Continue Lovenox injections every 12 hours.      Your next Anticoagulation Clinic appointment(s)     Aug 03, 2017 10:00 AM CDT   Anticoagulation [facility] with [facility] ANTI COAG   [facility]    [address] [place] [id number]   [phone]            Aug 21, 2017 10:15 AM CDT   Anticoagulation Visit with NB ANTI COAG   [facility] ([facility]    [address] [place] [id number]   [phone]              July 2017 Details    Sun Mon Tue Wed Thu Fri Sat           1                 2               3               4               5               6               7               8                 9               10               11               12               13               14               15                 16               17               18               19               20               21               22                 23               24               25               26               27               28               29                 30               31      1.25 mg   See details            Date Details   07/31 This INR check               How to take your warfarin dose     To take:  1.25 mg Take 0.5 of a 2.5 mg tablet.           August 2017 Details    Sun Mon Tue Wed Thu Fri Sat       1      2.5 mg         2      2.5 mg         3             4               5                 6               7               8               9               10               11               12                 13               14               15               16               17               18               19                 20               21               22               23               24               25               26                 27               28               29               30               31                  Date Details   No additional details    Date of next INR:  8/3/2017         How to take your warfarin dose     To take:  2.5 mg Take 1 of the 2.5 mg tablets.

## 2017-07-31 NOTE — TELEPHONE ENCOUNTER
ED/UC/IP follow up phone call: [facility] discharge 7/30/17  Hip Arthrosis, Right Hip DJD  RN please call to follow up.    Number of ED visits in past 12 months = 0

## 2017-08-03 ENCOUNTER — ANTICOAGULATION THERAPY VISIT (OUTPATIENT)
Dept: ANTICOAGULATION | Facility: CLINIC | Age: 72
End: 2017-08-03
Payer: COMMERCIAL

## 2017-08-03 DIAGNOSIS — I10 BENIGN ESSENTIAL HTN: ICD-10-CM

## 2017-08-03 DIAGNOSIS — Z79.01 LONG-TERM (CURRENT) USE OF ANTICOAGULANTS: ICD-10-CM

## 2017-08-03 DIAGNOSIS — I26.99 PULMONARY EMBOLUS, LEFT (H): ICD-10-CM

## 2017-08-03 LAB — INR POINT OF CARE: 1.8 (ref 0.86–1.14)

## 2017-08-03 PROCEDURE — 36416 COLLJ CAPILLARY BLOOD SPEC: CPT

## 2017-08-03 PROCEDURE — 85610 PROTHROMBIN TIME: CPT | Mod: QW

## 2017-08-03 PROCEDURE — 99207 ZZC NO CHARGE NURSE ONLY: CPT

## 2017-08-03 RX ORDER — ATENOLOL 50 MG/1
TABLET ORAL
Qty: 180 TABLET | Refills: 1 | Status: SHIPPED | OUTPATIENT
Start: 2017-08-03 | End: 2018-03-29

## 2017-08-03 NOTE — TELEPHONE ENCOUNTER
atenolol (TENORMIN) 50 MG tablet      Last Written Prescription Date: 9/15/2016  Last Fill Quantity: 180, # refills: 1    Last Office Visit with [facility], [facility] or [facility] prescribing provider:  7/12/2017   Future Office Visit:        BP Readings from Last 3 Encounters:   07/30/17 110/59   07/12/17 144/80   06/22/17 131/79

## 2017-08-03 NOTE — MR AVS SNAPSHOT
Cricket Klein   8/3/2017 10:00 AM   Anticoagulation Therapy Visit    Description:  72 year old male   Provider:  NB ANTI COAG   Department:  Nb Anticoag           INR as of 8/3/2017     Today's INR 1.8!      Anticoagulation Summary as of 8/3/2017     INR goal 2.0-3.0   Today's INR 1.8!   Full instructions 8/3: 5 mg; 8/4: 2.5 mg; Otherwise 1.25 mg on Mon, Fri; 2.5 mg all other days   Next INR check 8/7/2017    Indications   Other and unspecified coagulation defects [D68.9]  Long-term (current) use of anticoagulants [Z79.01] [Z79.01]         Description     Warfarin dose: 5mg today and 2.5mg Fri then resume 1.25mg MF and 2.5mg the rest of the days of the week.        Your next Anticoagulation Clinic appointment(s)     Aug 07, 2017  1:30 PM CDT   [facility] with NB ANTI HANNA   [facility]    [address] [place] [id number] [phone]            Aug 21, 2017 10:15 AM CDT   Anticoagulation Visit with NB ANTI COAG   [facility]    [address] [place] [id number] [phone]              Contact Numbers     Please call [phone] to cancel and/or reschedule your appointment.  Please call [phone] with any problems or questions regarding your therapy          August 2017 Details    Sun Mon Tue Wed Thu Fri Sat       1               2               3      5 mg   See details      4      2.5 mg         5      2.5 mg           6      2.5 mg         7            8               9               10               11               12                 13               14               15               16               17               18               19                 20               21               22               23               24               25               26                 27               28               29               30               31                   Date Details   08/03 This INR check   Take 5 mg (2.5 mg tablets x 2)   P       Date of next INR:  8/7/2017         How to take your warfarin dose     To take:  1.25 mg Take 0.5 of a 2.5 mg tablet.    To take:  2.5 mg Take 1 of the 2.5 mg tablets.    To take:  5 mg Take 2 of the 2.5 mg tablets.

## 2017-08-03 NOTE — PROGRESS NOTES
"  ANTICOAGULATION FOLLOW-UP CLINIC VISIT    Patient Name:  Cricket Klein  Date:  8/3/2017  Contact Type:  Face to Face    SUBJECTIVE:     Patient Findings     Comments Per discharge Pharmacist and Physician order:   \"Vitamin K doses administered during the last 7 days: None  FFP administered during the last 7 days: None  Recommend discharging the patient on a warfarin regimen of 1.25 mg on Mon & Fri, 2.5 mg other days as prior to admission.  Also discharged on Lovenox - to be continued until INR > 1.7 per Ortho orders.      The patient should have an INR checked August / 03 / 2017 at [facility].\"   Electronically signed by Stan Desai [facility] at 7/30/2017 10:42 AM    Writer will discontinue Lovenox as directed; however, pt has not reached his therapeutic INR goal, Therefore, writer will increase warfarin over the next two day then resume his maintenance dose.               OBJECTIVE    INR Protime   Date Value Ref Range Status   08/03/2017 1.8 (A) 0.86 - 1.14 Final       ASSESSMENT / PLAN  INR assessment SUB    Recheck INR In: 4 DAYS    INR Location Clinic      Anticoagulation Summary as of 8/3/2017     INR goal 2.0-3.0   Today's INR 1.8!   Maintenance plan 1.25 mg (2.5 mg x 0.5) on Mon, Fri; 2.5 mg (2.5 mg x 1) all other days   Full instructions 8/3: 5 mg; 8/4: 2.5 mg; Otherwise 1.25 mg on Mon, Fri; 2.5 mg all other days   Weekly total 15 mg   Plan last modified Gisell Taylor RN (12/12/2016)   Next INR check 8/7/2017   Priority INR   Target end date Indefinite    Indications   Other and unspecified coagulation defects [D68.9]  Long-term (current) use of anticoagulants [Z79.01] [Z79.01]         Anticoagulation Episode Summary     INR check location     Preferred lab     Send INR reminders to [facility]    Comments * Had PE in 2010 following hip surgery. Has heterozygous prothrombin gene mutation. Second PE 7-26-16. Needs lifelong warfarin. was on warfarin [id number]        Anticoagulation Care " Providers     Provider Role Specialty Phone number    Saud Ramon MD [facility] [phone]            See the Encounter Report to view Anticoagulation Flowsheet and Dosing Calendar (Go to Encounters tab in chart review, and find the Anticoagulation Therapy Visit)        Citlalli Sethi RN

## 2017-08-03 NOTE — TELEPHONE ENCOUNTER
Jantoven 2.5mg     Last Written Prescription Date: 4/25/17  Last Fill Qty: 80, # refills: 0  Last Office Visit with G, P or [facility] prescribing provider: 7/12/17       Date and Result of Last PT/INR:   Lab Results   Component Value Date    INR 1.8 08/03/2017    INR 1.16 07/30/2017    INR 1.09 07/29/2017        Thank you!  Diane Beach   [facility]  P: [phone] F: [phone]

## 2017-08-04 RX ORDER — WARFARIN SODIUM 2.5 MG/1
TABLET ORAL
Qty: 80 TABLET | Refills: 0 | Status: SHIPPED | OUTPATIENT
Start: 2017-08-04 | End: 2017-10-02

## 2017-08-07 ENCOUNTER — ANTICOAGULATION THERAPY VISIT (OUTPATIENT)
Dept: ANTICOAGULATION | Facility: CLINIC | Age: 72
End: 2017-08-07
Payer: COMMERCIAL

## 2017-08-07 DIAGNOSIS — Z79.01 LONG-TERM (CURRENT) USE OF ANTICOAGULANTS: ICD-10-CM

## 2017-08-07 LAB — INR POINT OF CARE: 2.6 (ref 0.86–1.14)

## 2017-08-07 PROCEDURE — 36416 COLLJ CAPILLARY BLOOD SPEC: CPT

## 2017-08-07 PROCEDURE — 99207 ZZC NO CHARGE NURSE ONLY: CPT

## 2017-08-07 PROCEDURE — 85610 PROTHROMBIN TIME: CPT | Mod: QW

## 2017-08-07 NOTE — MR AVS SNAPSHOT
Cricket Klein   8/7/2017 1:30 PM   Anticoagulation Therapy Visit    Description:  72 year old male   Provider:  NB ANTI HANNA   Department:  Nb Anticoag           INR as of 8/7/2017     Today's INR 2.6      Anticoagulation Summary as of 8/7/2017     INR goal 2.0-3.0   Today's INR 2.6   Full instructions 2.5 mg every day   Next INR check 8/21/2017    Indications   Other and unspecified coagulation defects [D68.9]  Long-term (current) use of anticoagulants [Z79.01] [Z79.01]         Description     2.5mg every day.  Recheck INR in two weeks.      Your next Anticoagulation Clinic appointment(s)     Aug 07, 2017  1:30 PM CDT   Anticoagulation Visit with NB ANTI HANNA   [facility]    [address] [place] [id number] [phone]            Aug 21, 2017 10:15 AM CDT   Anticoagulation Visit with NB ANTI HANNA   [facility]    [address] [place] [id number]   [phone]              Contact Numbers     Please call [phone] to cancel and/or reschedule your appointment.  Please call [phone] with any problems or questions regarding your therapy          August 2017 Details    Sun Mon Tue Wed Thu Fri Sat       1               2               3               4               5                 6               7      2.5 mg   See details      8      2.5 mg         9      2.5 mg         10      2.5 mg         11      2.5 mg         12      2.5 mg           13      2.5 mg         14      2.5 mg         15      2.5 mg         16      2.5 mg         17      2.5 mg         18      2.5 mg         19      2.5 mg           20      2.5 mg         21            22               23               24               25               26                 27               28               29               30               31                  Date Details   08/07 This INR check       Date of next INR:   8/21/2017         How to take your warfarin dose     To take:  2.5 mg Take 1 of the 2.5 mg tablets.

## 2017-08-07 NOTE — PROGRESS NOTES
ANTICOAGULATION FOLLOW-UP CLINIC VISIT    Patient Name:  Cricket Klein  Date:  8/7/2017  Contact Type:  Face to Face, accompanied by spouse    SUBJECTIVE:     Patient Findings     Positives Change in diet/appetite (has avoided greens), Activity level change (using crutches)    Comments Has appt with Dr. Hutchins this Friday.           OBJECTIVE    INR Protime   Date Value Ref Range Status   08/07/2017 2.6 (A) 0.86 - 1.14 Final       ASSESSMENT / PLAN  INR assessment THER pt has had 18.75mg/7 days. Will reduce slightly to 17.5mg/7 days. Prior maintenance dosing was 15mg/week.   Recheck INR In: 2 WEEKS    INR Location Clinic      Anticoagulation Summary as of 8/7/2017     INR goal 2.0-3.0   Today's INR 2.6   Maintenance plan 2.5 mg (2.5 mg x 1) every day   Full instructions 2.5 mg every day   Weekly total 17.5 mg   Plan last modified Gisell Taylor RN (8/7/2017)   Next INR check 8/21/2017   Priority INR   Target end date Indefinite    Indications   Other and unspecified coagulation defects [D68.9]  Long-term (current) use of anticoagulants [Z79.01] [Z79.01]         Anticoagulation Episode Summary     INR check location     Preferred lab     Send INR reminders to [facility]    Comments * Had PE in 2010 following hip surgery. Has heterozygous prothrombin gene mutation. Second PE 7-26-16. Needs lifelong warfarin. was on warfarin [id number]        Anticoagulation Care Providers     Provider Role Specialty Phone number    Saud Ramon MD [facility] [phone]            See the Encounter Report to view Anticoagulation Flowsheet and Dosing Calendar (Go to Encounters tab in chart review, and find the Anticoagulation Therapy Visit)    Gisell Taylor RN

## 2017-08-15 ENCOUNTER — HOSPITAL ENCOUNTER (OUTPATIENT)
Dept: PHYSICAL THERAPY | Facility: CLINIC | Age: 72
Setting detail: THERAPIES SERIES
End: 2017-08-15
Attending: NURSE PRACTITIONER
Payer: MEDICARE

## 2017-08-15 PROCEDURE — G8979 MOBILITY GOAL STATUS: HCPCS | Mod: GP,CI

## 2017-08-15 PROCEDURE — 97110 THERAPEUTIC EXERCISES: CPT | Mod: GP

## 2017-08-15 PROCEDURE — 97161 PT EVAL LOW COMPLEX 20 MIN: CPT | Mod: GP

## 2017-08-15 PROCEDURE — 40000718 ZZHC STATISTIC PT DEPARTMENT ORTHO VISIT

## 2017-08-15 PROCEDURE — G8978 MOBILITY CURRENT STATUS: HCPCS | Mod: GP,CK

## 2017-08-15 NOTE — PROGRESS NOTES
08/15/17 0700   General Information   Type of Visit Initial OP Ortho PT Evaluation   Start of Care Date 08/15/17   Referring Physician TRACY Oliveira ([facility])    Patient/Family Goals Statement squatting, lifting groceries, walking without axillary crutch, don/doff socks   Orders Evaluate and Treat   Date of Order 08/11/17   Insurance Type Medicare;Blue Cross   Insurance Comments/Visits Authorized MC/BCBS: full MC cap, NO IONTOPHORESIS   Medical Diagnosis s/p R FRANCISCA   Surgical/Medical history reviewed Yes  (L FRANCISCA, B shoulder surgeries)   Precautions/Limitations right hip precautions   General Information Comments PMH: Transient cerebral ischemia, S/P L hip replacement, pulmonary embolism, HTN, hyperlimpdemia, Impotence of organic origin, Other and unspecified coagulation defects, Advanced directives, counseling/discussion, Long-term (current) use of anticoagulants    Body Part(s)   Body Part(s) Hip   Presentation and Etiology   Pertinent history of current problem (include personal factors and/or comorbidities that impact the POC) Pt has R FRANCISCA 4 weeks, posterior approach. Pt unable to state precautions. After cueing pt able to recoginize all three. L FRANCISCA was 10 weeks or 10 months ago, a while ago. Pt having difficutly with hx dates   Impairments B. Decreased WB tolerance;D. Decreased ROM;F. Decreased strength and endurance;H. Impaired gait   Symptom Location R lat/post hip   How/Where did it occur Other  (R FRANCISCA)   Onset date of current episode/exacerbation 07/21/17   Pain rating (0-10 point scale) Best (/10);Worst (/10)   Best (/10) 0   Worst (/10) 7   Pain quality B. Dull;C. Aching   Frequency of pain/symptoms C. With activity   Pain/symptoms exacerbated by I. Bending;L. Work tasks   Pain/symptoms eased by J. 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 No fractures are seen. Hardware appears intact.   Current Level of Function   Patient role/employment history F. Retired  ()   Fall Risk Screen   Fall screen completed by PT   Per patient - Fall 2 or more times in past year? No   Per patient - Fall with injury in past year? No   Timed Up and Go score (seconds) 11.6   Is patient a fall risk? No   Functional Scales   Functional Scales Other   Other Scales  LEFS: 39/80 (51% disability)   Hip Objective Findings   Side (if bilateral, select both right and left) Right   Observation slow transfers, uses B UE for transfers   Gait/Locomotion axillary crutch in L side, trandelenberg with trunk lean to L, decresed R hip flex/ext, decreased R step length   Right Hip Flexion PROM 96   Right Hip Abduction PROM 32   Right Hip ER PROM 33   Right Hip IR PROM 21   Right Hip Flexion Strength 3+   Right Hip Abduction Strength 3   Right Hip IR Strength 3   Right Hip ER Strength 3   Right Knee Flexion Strength 4+   Right Knee Extension Strength 4-   Planned Therapy Interventions   Planned Therapy Interventions balance training;gait training;joint mobilization;manual therapy;neuromuscular re-education;orthotic fitting/training;ROM;strengthening;stretching   Planned Modality Interventions   Planned Modality Interventions TENS;Electrical stimulation;Cryotherapy   Clinical Impression   Criteria for Skilled Therapeutic Interventions Met yes, treatment indicated   PT Diagnosis Gait impairment, s/p R FRANCISCA   Influenced by the following impairments strength, ROM, gait, transfers   Functional limitations due to impairments squatting, lifting groceries, walking without axillary crutch, don/doff socks   Clinical Presentation Stable/Uncomplicated   Clinical Presentation Rationale (+) good recovery form L FRANCISCA, good motivation (-) poor historian,    Clinical Decision Making (Complexity) Low complexity   Therapy Frequency 2 times/Week    Predicted Duration of Therapy Intervention (days/wks) 8 weeks   Risk & Benefits of therapy have been explained Yes   Patient, Family & other staff in agreement with plan of care Yes   Clinical Impression Comments Pt is pleasant man with difficulty stating dates of past surgeries, most recent was s/p R FRANCISCA at 3 weeks. Pt presents with gait impairment per gait analysis, hip motion restrictions per ROM, hip weakness per MMT, transfer impairment per observation. Pt is appropriate for skilled PT to address impairments and improve function in R hip.   Education Assessment   Preferred Learning Style Demonstration   Barriers to Learning Cognitive   ORTHO GOALS   PT Ortho Eval Goals 1;2;3;4   Ortho Goal 1   Goal Identifier don/doff socks   Goal Description Following PT interventions, pt will increase R hip flex AROM to 120* to be able to don/doff socks   Target Date 08/29/17   Ortho Goal 2   Goal Identifier walking without axillary crutch   Goal Description Following PT interventions, pt will not present with trandelenberg gait to walk without axillary crutch   Target Date 09/12/17   Ortho Goal 3   Goal Identifier lifting groceries   Goal Description Following PT interventions, pt will increase R hip IR/ER MMT to 4/5 grade to be able to lift groceries   Target Date 09/26/17   Ortho Goal 4   Goal Identifier squatting   Goal Description Following PT interventions, pt will score 60/80 on LEFS for less difficulty with squatting   Target Date 10/10/17   Total Evaluation Time   Total Evaluation Time 20min   Therapy Certification   Certification date from 08/15/17   Certification date to 10/10/17   Medical Diagnosis s/p R FRANCISCA   Ricco Manrique, PT, DPT   Doctor of Physical Therapy # [id number]  [facility]  [phone]

## 2017-08-15 NOTE — PROGRESS NOTES
[facility]          OUTPATIENT PHYSICAL THERAPY ORTHOPEDIC EVALUATION  PLAN OF TREATMENT FOR OUTPATIENT REHABILITATION  (COMPLETE FOR INITIAL CLAIMS ONLY)  Patient's Last Name, First Name, M.I.  YOB: 1945  Cricket Klein    Provider s Name:  [facility]   Medical Record No.  [id number]   Start of Care Date:  08/15/17   Onset Date:  07/21/17   Type:     _X__PT   ___OT   ___SLP Medical Diagnosis:  s/p R FRANCISCA     PT Diagnosis:  Gait impairment, s/p R FRANCISCA   Visits from SOC:  1      _________________________________________________________________________________  Plan of Treatment/Functional Goals:  balance training, gait training, joint mobilization, manual therapy, neuromuscular re-education, orthotic fitting/training, ROM, strengthening, stretching     TENS, Electrical stimulation, Cryotherapy     Goals  Goal Identifier: don/doff socks  Goal Description: Following PT interventions, pt will increase R hip flex AROM to 120* to be able to don/doff socks  Target Date: 08/29/17    Goal Identifier: walking without axillary crutch  Goal Description: Following PT interventions, pt will not present with trandelenberg gait to walk without axillary crutch  Target Date: 09/12/17    Goal Identifier: lifting groceries  Goal Description: Following PT interventions, pt will increase R hip IR/ER MMT to 4/5 grade to be able to lift groceries  Target Date: 09/26/17    Goal Identifier: squatting  Goal Description: Following PT interventions, pt will score 60/80 on LEFS for less difficulty with squatting  Target Date: 10/10/17               Therapy Frequency:  2 times/Week  Predicted Duration of Therapy Intervention:  8 weeks    Ricco Manrique, PT                 I CERTIFY THE NEED FOR THESE SERVICES FURNISHED UNDER        THIS PLAN OF TREATMENT AND WHILE UNDER MY CARE     (Physician co-signature of this document indicates review and certification of the therapy plan).                          Certification Date From:  08/15/17   Certification Date To:  10/10/17    Referring Provider:  TRACY Oliveira ([facility])     Initial Assessment        See Epic Evaluation Start of Care Date: 08/15/17             Thank You,    Ricco Manrique, PT, DPT  Doctor of Physical Therapy  [facility]  [phone]

## 2017-08-17 ENCOUNTER — HOSPITAL ENCOUNTER (OUTPATIENT)
Dept: PHYSICAL THERAPY | Facility: CLINIC | Age: 72
Setting detail: THERAPIES SERIES
End: 2017-08-17
Attending: NURSE PRACTITIONER
Payer: MEDICARE

## 2017-08-17 PROCEDURE — 40000718 ZZHC STATISTIC PT DEPARTMENT ORTHO VISIT

## 2017-08-17 PROCEDURE — 97110 THERAPEUTIC EXERCISES: CPT | Mod: GP

## 2017-08-21 ENCOUNTER — ANTICOAGULATION THERAPY VISIT (OUTPATIENT)
Dept: ANTICOAGULATION | Facility: CLINIC | Age: 72
End: 2017-08-21
Payer: COMMERCIAL

## 2017-08-21 DIAGNOSIS — Z79.01 LONG-TERM (CURRENT) USE OF ANTICOAGULANTS: ICD-10-CM

## 2017-08-21 LAB — INR POINT OF CARE: 2.2 (ref 0.86–1.14)

## 2017-08-21 PROCEDURE — 36416 COLLJ CAPILLARY BLOOD SPEC: CPT

## 2017-08-21 PROCEDURE — 99207 ZZC NO CHARGE NURSE ONLY: CPT

## 2017-08-21 PROCEDURE — 85610 PROTHROMBIN TIME: CPT | Mod: QW

## 2017-08-21 NOTE — PROGRESS NOTES
ANTICOAGULATION FOLLOW-UP CLINIC VISIT    Patient Name:  Cricket Klein  Date:  8/21/2017  Contact Type:  Face to Face, accompanied by spouse    SUBJECTIVE:     Patient Findings     Positives Activity level change (just starting PT), No Problem Findings           OBJECTIVE    INR Protime   Date Value Ref Range Status   08/21/2017 2.2 (A) 0.86 - 1.14 Final       ASSESSMENT / PLAN  INR assessment THER    Recheck INR In: 5 WEEKS    INR Location Clinic      Anticoagulation Summary as of 8/21/2017     INR goal 2.0-3.0   Today's INR 2.2   Maintenance plan 2.5 mg (2.5 mg x 1) every day   Full instructions 2.5 mg every day   Weekly total 17.5 mg   No change documented Gisell Taylor RN   Plan last modified Gisell Taylor RN (8/7/2017)   Next INR check 9/25/2017   Priority INR   Target end date Indefinite    Indications   Other and unspecified coagulation defects [D68.9]  Long-term (current) use of anticoagulants [Z79.01] [Z79.01]         Anticoagulation Episode Summary     INR check location     Preferred lab     Send INR reminders to [facility]    Comments * Had PE in 2010 following hip surgery. Has heterozygous prothrombin gene mutation. Second PE 7-26-16. Needs lifelong warfarin. was on warfarin [id number]        Anticoagulation Care Providers     Provider Role Specialty Phone number    Saud Ramon MD [facility] [phone]            See the Encounter Report to view Anticoagulation Flowsheet and Dosing Calendar (Go to Encounters tab in chart review, and find the Anticoagulation Therapy Visit)    Gisell Taylor, RN

## 2017-08-21 NOTE — MR AVS SNAPSHOT
Cricket Klein   8/21/2017 10:15 AM   Anticoagulation Therapy Visit    Description:  72 year old male   Provider:  NB ANTI COAG   Department:  Nb Anticoag           INR as of 8/21/2017     Today's INR 2.2      Anticoagulation Summary as of 8/21/2017     INR goal 2.0-3.0   Today's INR 2.2   Full instructions 2.5 mg every day   Next INR check 9/25/2017    Indications   Other and unspecified coagulation defects [D68.9]  Long-term (current) use of anticoagulants [Z79.01] [Z79.01]         Description     No change, recheck INR in 5 weeks.      Your next Anticoagulation Clinic appointment(s)     Sep 25, 2017 10:00 AM CDT   Anticoagulation Visit with NB ANTI COAG   [facility] ([facility])    [address] [place] [address] [phone]              Contact Numbers     Please call [phone] to cancel and/or reschedule your appointment.  Please call [phone] with any problems or questions regarding your therapy          August 2017 Details    Sun Mon Tue Wed Thu Fri Sat       1               2               3               4               5                 6               7               8               9               10               11               12                 13               14               15               16               17               18               19                 20               21      2.5 mg   See details      22      2.5 mg         23      2.5 mg         24      2.5 mg         25      2.5 mg         26      2.5 mg           27      2.5 mg         28      2.5 mg         29      2.5 mg         30      2.5 mg         31      2.5 mg            Date Details   08/21 This INR check               How to take your warfarin dose     To take:  2.5 mg Take 1 of the 2.5 mg tablets.           September 2017 Details    Sun Mon Tue Wed Thu Fri Sat          1      2.5 mg         2      2.5 mg           3      2.5 mg         4      2.5 mg          5      2.5 mg         6      2.5 mg         7      2.5 mg         8      2.5 mg         9      2.5 mg           10      2.5 mg         11      2.5 mg         12      2.5 mg         13      2.5 mg         14      2.5 mg         15      2.5 mg         16      2.5 mg           17      2.5 mg         18      2.5 mg         19      2.5 mg         20      2.5 mg         21      2.5 mg         22      2.5 mg         23      2.5 mg           24      2.5 mg         25            26               27               28               29               30                Date Details   No additional details    Date of next INR:  9/25/2017         How to take your warfarin dose     To take:  2.5 mg Take 1 of the 2.5 mg tablets.

## 2017-08-28 ENCOUNTER — HOSPITAL ENCOUNTER (OUTPATIENT)
Dept: PHYSICAL THERAPY | Facility: CLINIC | Age: 72
Setting detail: THERAPIES SERIES
End: 2017-08-28
Attending: NURSE PRACTITIONER
Payer: MEDICARE

## 2017-08-28 PROCEDURE — 97110 THERAPEUTIC EXERCISES: CPT | Mod: GP

## 2017-08-28 PROCEDURE — 40000718 ZZHC STATISTIC PT DEPARTMENT ORTHO VISIT

## 2017-09-05 ENCOUNTER — HOSPITAL ENCOUNTER (OUTPATIENT)
Dept: PHYSICAL THERAPY | Facility: CLINIC | Age: 72
Setting detail: THERAPIES SERIES
End: 2017-09-05
Attending: NURSE PRACTITIONER
Payer: MEDICARE

## 2017-09-05 PROCEDURE — 40000718 ZZHC STATISTIC PT DEPARTMENT ORTHO VISIT

## 2017-09-05 PROCEDURE — 97110 THERAPEUTIC EXERCISES: CPT | Mod: GP

## 2017-09-25 ENCOUNTER — ANTICOAGULATION THERAPY VISIT (OUTPATIENT)
Dept: ANTICOAGULATION | Facility: CLINIC | Age: 72
End: 2017-09-25
Payer: COMMERCIAL

## 2017-09-25 DIAGNOSIS — Z79.01 LONG-TERM (CURRENT) USE OF ANTICOAGULANTS: ICD-10-CM

## 2017-09-25 LAB — INR POINT OF CARE: 3.7 (ref 0.86–1.14)

## 2017-09-25 PROCEDURE — 85610 PROTHROMBIN TIME: CPT | Mod: QW

## 2017-09-25 PROCEDURE — 99207 ZZC NO CHARGE NURSE ONLY: CPT

## 2017-09-25 PROCEDURE — 36416 COLLJ CAPILLARY BLOOD SPEC: CPT

## 2017-09-25 NOTE — MR AVS SNAPSHOT
Cricket Klein   9/25/2017 10:00 AM   Anticoagulation Therapy Visit    Description:  72 year old male   Provider:  NB ANTI COAG   Department:  [facility] Antico[facility]           INR as of 9/25/2017     Today's INR 3.7!      Anticoagulation Summary as of 9/25/2017     INR goal 2.0-3.0   Today's INR 3.7!   Full instructions 9/25: Hold; Otherwise 2.5 mg every day   Next INR check 10/2/2017    Indications   Other and unspecified coagulation defects [D68.9]  Long-term (current) use of anticoagulants [Z79.01] [Z79.01]         Description     No warfarin Monday 9/25.  Then resume 2.5mg daily.  Recheck INR in one week.      Your next Anticoagulation Clinic appointment(s)     Oct 02, 2017 10:45 AM CDT   Anticoagulation Visit with NB ANTI COAG   [facility]    [address] [place] [address] [phone]              Contact Numbers     Please call [phone] to cancel and/or reschedule your appointment.  Please call [phone] with any problems or questions regarding your therapy          September 2017 Details    Sun Mon Tue Wed Thu Fri Sat          1               2                 3               4               5               6               7               8               9                 10               11               12               13               14               15               16                 17               18               19               20               21               22               23                 24               25      Hold   See details      26      2.5 mg         27      2.5 mg         28      2.5 mg         29      2.5 mg         30      2.5 mg          Date Details   09/25 This INR check               How to take your warfarin dose     To take:  2.5 mg Take 1 of the 2.5 mg tablets.    Hold Do not take your warfarin dose. See the Details table to the right for additional instructions.                October 2017 Details     Sun Mon Tue Wed Thu Fri Sat     1      2.5 mg         2            3               4               5               6               7                 8               9               10               11               12               13               14                 15               16               17               18               19               20               21                 22               23               24               25               26               27               28                 29               30               31                    Date Details   No additional details    Date of next INR:  10/2/2017         How to take your warfarin dose     To take:  2.5 mg Take 1 of the 2.5 mg tablets.

## 2017-09-25 NOTE — PROGRESS NOTES
ANTICOAGULATION FOLLOW-UP CLINIC VISIT    Patient Name:  Cricket Klein  Date:  9/25/2017  Contact Type:  Face to Face, accompanied by spouse    SUBJECTIVE:     Patient Findings     Positives Change in diet/appetite (has not been having really any ETOH lately. Having more mixed greens (spinach, arugula) than lighter greens.), Missed doses (9/20 & 9/21), Unexplained INR or factor level change    Comments Sister in law was in town last week from [place].            OBJECTIVE    INR Protime   Date Value Ref Range Status   09/25/2017 3.7 (A) 0.86 - 1.14 Final       ASSESSMENT / PLAN  INR assessment SUPRA hold x1 dose   Recheck INR In: 1 WEEK    INR Location Clinic      Anticoagulation Summary as of 9/25/2017     INR goal 2.0-3.0   Today's INR 3.7!   Maintenance plan 2.5 mg (2.5 mg x 1) every day   Full instructions 9/25: Hold; Otherwise 2.5 mg every day   Weekly total 17.5 mg   Plan last modified Gisell Taylor RN (8/7/2017)   Next INR check 10/2/2017   Priority INR   Target end date Indefinite    Indications   Other and unspecified coagulation defects [D68.9]  Long-term (current) use of anticoagulants [Z79.01] [Z79.01]         Anticoagulation Episode Summary     INR check location     Preferred lab     Send INR reminders to [facility] CLINIC POOL    Comments * Had PE in 2010 following hip surgery. Has heterozygous prothrombin gene mutation. Second PE 7-26-16. Needs lifelong warfarin. was on warfarin [id number]        Anticoagulation Care Providers     Provider Role Specialty Phone number    Saud Ramon MD [facility] [phone]            See the Encounter Report to view Anticoagulation Flowsheet and Dosing Calendar (Go to Encounters tab in chart review, and find the Anticoagulation Therapy Visit)    Gisell Taylor, TRISTON

## 2017-10-02 ENCOUNTER — ANTICOAGULATION THERAPY VISIT (OUTPATIENT)
Dept: ANTICOAGULATION | Facility: CLINIC | Age: 72
End: 2017-10-02
Payer: COMMERCIAL

## 2017-10-02 ENCOUNTER — HOSPITAL ENCOUNTER (OUTPATIENT)
Dept: PHYSICAL THERAPY | Facility: CLINIC | Age: 72
Setting detail: THERAPIES SERIES
End: 2017-10-02
Attending: NURSE PRACTITIONER
Payer: MEDICARE

## 2017-10-02 DIAGNOSIS — I26.99 PULMONARY EMBOLUS, LEFT (H): ICD-10-CM

## 2017-10-02 DIAGNOSIS — Z79.01 LONG-TERM (CURRENT) USE OF ANTICOAGULANTS: ICD-10-CM

## 2017-10-02 LAB — INR POINT OF CARE: 2 (ref 0.86–1.14)

## 2017-10-02 PROCEDURE — 99207 ZZC NO CHARGE NURSE ONLY: CPT

## 2017-10-02 PROCEDURE — 85610 PROTHROMBIN TIME: CPT | Mod: QW

## 2017-10-02 PROCEDURE — 36416 COLLJ CAPILLARY BLOOD SPEC: CPT

## 2017-10-02 PROCEDURE — 40000718 ZZHC STATISTIC PT DEPARTMENT ORTHO VISIT

## 2017-10-02 PROCEDURE — 97110 THERAPEUTIC EXERCISES: CPT | Mod: GP

## 2017-10-02 RX ORDER — WARFARIN SODIUM 2.5 MG/1
TABLET ORAL
Qty: 80 TABLET | Refills: 0 | COMMUNITY
Start: 2017-10-02 | End: 2017-11-06

## 2017-10-02 NOTE — MR AVS SNAPSHOT
Cricket Klein   10/2/2017 10:45 AM   Anticoagulation Therapy Visit    Description:  72 year old male   Provider:  NB ANTI COAG   Department:  [facility] Anticoag           INR as of 10/2/2017     Today's INR 2.0      Anticoagulation Summary as of 10/2/2017     INR goal 2.0-3.0   Today's INR 2.0   Full instructions 1.25 mg on Mon, Wed, Fri; 2.5 mg all other days   Next INR check 10/16/2017    Indications   Other and unspecified coagulation defects [D68.9]  Long-term (current) use of anticoagulants [Z79.01] [Z79.01]         Description     Take 1.25mg MWF and 2.5mg all other days.  Recheck INR in two weeks      Your next Anticoagulation Clinic appointment(s)     Oct 02, 2017 10:45 AM CDT   Anticoagulation Visit with NB ANTI HANNA   [facility]    [address] [place] [id number] [phone]            Oct 16, 2017  9:30 AM CDT   Anticoagulation Visit with NB ANTI HANNA   [facility]    [address] [place] [address]   [phone]              Contact Numbers     Please call [phone] to cancel and/or reschedule your appointment.  Please call [phone] with any problems or questions regarding your therapy          October 2017 Details    Sun Mon Tue Wed Thu Fri Sat     1               2      1.25 mg   See details      3      2.5 mg         4      1.25 mg         5      2.5 mg         6      1.25 mg         7      2.5 mg           8      2.5 mg         9      1.25 mg         10      2.5 mg         11      1.25 mg         12      2.5 mg         13      1.25 mg         14      2.5 mg           15      2.5 mg         16            17               18               19               20               21                 22               23               24               25               26               27               28                 29               30               31                     Date Details   10/02 This INR check       Date of next INR:  10/16/2017         How to take your warfarin dose     To take:  1.25 mg Take 0.5 of a 2.5 mg tablet.    To take:  2.5 mg Take 1 of the 2.5 mg tablets.

## 2017-10-02 NOTE — PROGRESS NOTES
ANTICOAGULATION FOLLOW-UP CLINIC VISIT    Patient Name:  Cricket Klein  Date:  10/2/2017  Contact Type:  Face to Face, accompanied by spouse    SUBJECTIVE:     Patient Findings     Positives Intentional hold of therapy (9/25/17), Missed doses (9/26), No Problem Findings           OBJECTIVE    INR Protime   Date Value Ref Range Status   10/02/2017 2.0 (A) 0.86 - 1.14 Final       ASSESSMENT / PLAN  INR assessment THER increase from 12.5mg to 13.75mg/7 days (10% increase to stay within goal range)   Recheck INR In: 2 WEEKS    INR Location Clinic      Anticoagulation Summary as of 10/2/2017     INR goal 2.0-3.0   Today's INR 2.0   Maintenance plan 1.25 mg (2.5 mg x 0.5) on Mon, Wed, Fri; 2.5 mg (2.5 mg x 1) all other days   Full instructions 1.25 mg on Mon, Wed, Fri; 2.5 mg all other days   Weekly total 13.75 mg   Plan last modified Gisell Taylor RN (10/2/2017)   Next INR check 10/16/2017   Priority INR   Target end date Indefinite    Indications   Other and unspecified coagulation defects [D68.9]  Long-term (current) use of anticoagulants [Z79.01] [Z79.01]         Anticoagulation Episode Summary     INR check location     Preferred lab     Send INR reminders to [facility] CLINIC POOL    Comments * Had PE in 2010 following hip surgery. Has heterozygous prothrombin gene mutation. Second PE 7-26-16. Needs lifelong warfarin. was on warfarin [id number]        Anticoagulation Care Providers     Provider Role Specialty Phone number    Saud Ramon MD [facility] [phone]            See the Encounter Report to view Anticoagulation Flowsheet and Dosing Calendar (Go to Encounters tab in chart review, and find the Anticoagulation Therapy Visit)    Gisell Taylor RN

## 2017-10-06 ENCOUNTER — OFFICE VISIT (OUTPATIENT)
Dept: SLEEP MEDICINE | Facility: CLINIC | Age: 72
End: 2017-10-06
Payer: COMMERCIAL

## 2017-10-06 VITALS
BODY MASS INDEX: 32.49 KG/M2 | WEIGHT: 207 LBS | HEIGHT: 67 IN | SYSTOLIC BLOOD PRESSURE: 140 MMHG | HEART RATE: 66 BPM | OXYGEN SATURATION: 96 % | DIASTOLIC BLOOD PRESSURE: 83 MMHG

## 2017-10-06 DIAGNOSIS — G47.39 COMPLEX SLEEP APNEA SYNDROME: Primary | ICD-10-CM

## 2017-10-06 PROCEDURE — 99214 OFFICE O/P EST MOD 30 MIN: CPT | Performed by: FAMILY MEDICINE

## 2017-10-06 NOTE — PATIENT INSTRUCTIONS

## 2017-10-06 NOTE — MR AVS SNAPSHOT
After Visit Summary   10/6/2017    Cricket Klein    MRN: [id number]           Patient Information     Date Of Birth          1945        Visit Information        Provider Department      10/6/2017 10:30 AM Saud Fuller MD [facility]        Today's Diagnoses     Complex sleep apnea syndrome    -  1      Care Instructions      Your BMI is Body mass index is 32.41 kg/(m^2).  Weight management is a personal decision.  If you are interested in exploring weight loss strategies, the following discussion covers the approaches that may be successful. Body mass index (BMI) is one way to tell whether you are at a healthy weight, overweight, or obese. It measures your weight in relation to your height.  A BMI of 18.5 to 24.9 is in the healthy range. A person with a BMI of 25 to 29.9 is considered overweight, and someone with a BMI of 30 or greater is considered obese. More than two-thirds of American adults are considered overweight or obese.  Being overweight or obese increases the risk for further weight gain. Excess weight may lead to heart disease and diabetes.  Creating and following plans for healthy eating and physical activity may help you improve your health.  Weight control is part of healthy lifestyle and includes exercise, emotional health, and healthy eating habits. Careful eating habits lifelong are the mainstay of weight control. Though there are significant health benefits from weight loss, long-term weight loss with diet alone may be very difficult to achieve- studies show long-term success with dietary management in less than 10% of people. Attaining a healthy weight may be especially difficult to achieve in those with severe obesity. In some cases, medications, devices and surgical management might be considered.  What can you do?  If you are overweight or obese and are interested in methods for weight loss, you should discuss this with your provider.      Consider reducing daily calorie intake by 500 calories.     Keep a food journal.     Avoiding skipping meals, consider cutting portions instead.    Diet combined with exercise helps maintain muscle while optimizing fat loss. Strength training is particularly important for building and maintaining muscle mass. Exercise helps reduce stress, increase energy, and improves fitness. Increasing exercise without diet control, however, may not burn enough calories to loose weight.       Start walking three days a week 10-20 minutes at a time    Work towards walking thirty minutes five days a week     Eventually, increase the speed of your walking for 1-2 minutes at time    In addition, we recommend that you review healthy lifestyles and methods for weight loss available through the National Institutes of Health patient information sites:  http://win.niddk.nih.gov/publications/index.htm    And look into health and wellness programs that may be available through your health insurance provider, employer, local community center, or nellie club.    Weight management plan: Patient was referred to their PCP to discuss a diet and exercise plan.      Your Body mass index is 32.41 kg/(m^2).  Weight management is a personal decision.  If you are interested in exploring weight loss strategies, the following discussion covers the approaches that may be successful. Body mass index (BMI) is one way to tell whether you are at a healthy weight, overweight, or obese. It measures your weight in relation to your height.  A BMI of 18.5 to 24.9 is in the healthy range. A person with a BMI of 25 to 29.9 is considered overweight, and someone with a BMI of 30 or greater is considered obese. More than two-thirds of American adults are considered overweight or obese.  Being overweight or obese increases the risk for further weight gain. Excess weight may lead to heart disease and diabetes.  Creating and following plans for healthy eating and  physical activity may help you improve your health.  Weight control is part of healthy lifestyle and includes exercise, emotional health, and healthy eating habits. Careful eating habits lifelong are the mainstay of weight control. Though there are significant health benefits from weight loss, long-term weight loss with diet alone may be very difficult to achieve- studies show long-term success with dietary management in less than 10% of people. Attaining a healthy weight may be especially difficult to achieve in those with severe obesity. In some cases, medications, devices and surgical management might be considered.  What can you do?  If you are overweight or obese and are interested in methods for weight loss, you should discuss this with your provider.     Consider reducing daily calorie intake by 500 calories.     Keep a food journal.     Avoiding skipping meals, consider cutting portions instead.    Diet combined with exercise helps maintain muscle while optimizing fat loss. Strength training is particularly important for building and maintaining muscle mass. Exercise helps reduce stress, increase energy, and improves fitness. Increasing exercise without diet control, however, may not burn enough calories to loose weight.       Start walking three days a week 10-20 minutes at a time    Work towards walking thirty minutes five days a week     Eventually, increase the speed of your walking for 1-2 minutes at time    In addition, we recommend that you review healthy lifestyles and methods for weight loss available through the National Institutes of Health patient information sites:  http://win.niddk.nih.gov/publications/index.htm    And look into health and wellness programs that may be available through your health insurance provider, employer, local community center, or nellie club.    Weight management plan: Patient was referred to their PCP to discuss a diet and exercise plan.            Follow-ups after your  visit        Your next 10 appointments already scheduled     Oct 09, 2017  9:00 AM CDT   Ortho Treatment with Ricco Manrique PT   [facility] ([facility])    [address] [place] [id number]   [phone]            Oct 16, 2017  9:00 AM CDT   Ortho Treatment with Ricco Manrique PT   [facility] ([facility])    [address] [place] [address]   [phone]            Oct 16, 2017  9:30 AM CDT   Anticoagulation Visit with NB ANTI COAG   [facility]    [address] [place] [address]   [phone]            Oct 17, 2017  8:00 PM CDT   PSG Titration with SLEEP LAB, BED TWO   [facility]    [address]  [facility] [address]   [phone]            Oct 20, 2017 12:00 PM CDT   Telephone Visit with Saud Fuller MD   [facility] ([facility]    [address]  [facility] [address]   [phone]           Note: this is not an onsite visit; there is no need to come to the facility.              Future tests that were ordered for you today     Open Future Orders        Priority Expected Expires Ordered    Comprehensive Sleep Study Routine  4/4/2018 10/6/2017            Who to contact     If you have questions or need follow up information about today's clinic visit or your schedule please contact [facility] directly at [phone].  Normal or non-critical lab and imaging results will be communicated to you by MyChart, letter or phone within 4 business days after the clinic has received the results. If you do not hear from us within 7 days, please contact the clinic through MyChart or phone. If you have a critical or abnormal lab result, we will notify you by phone as soon as possible.  Submit refill requests  "through [facility] or call your pharmacy and they will forward the refill request to us. Please allow 3 business days for your refill to be completed.          Additional Information About Your Visit        GrabTax[employer] Information     [facility] lets you send messages to your doctor, view your test results, renew your prescriptions, schedule appointments and more. To sign up, go to [email or web address] . Click on \"Log in\" on the left side of the screen, which will take you to the Welcome page. Then click on \"Sign up Now\" on the right side of the page.     You will be asked to enter the access code listed below, as well as some personal information. Please follow the directions to create your username and password.     Your access code is: [email or web address]  Expires: 2018 12:54 PM     Your access code will  in 90 days. If you need help or a new code, please call your [facility] clinic or [phone].        Care EveryWhere ID     This is your Care EveryWhere ID. This could be used by other organizations to access your [facility] medical records  [id number]        Your Vitals Were     Pulse Height Pulse Oximetry BMI (Body Mass Index)          66 1.702 m (5' 7.01\") 96% 32.41 kg/m2         Blood Pressure from Last 3 Encounters:   10/06/17 140/83   17 110/59   17 144/80    Weight from Last 3 Encounters:   10/06/17 93.9 kg (207 lb)   17 93 kg (205 lb)   17 95.3 kg (210 lb)              We Performed the Following     Sleep Comprehensive DME        Primary Care Provider Office Phone # Fax #    Saud Ramon [phone]       [address] [place] [address]        Equal Access to Services     THIERRY MOE : Kaylee Soto, amaris umana, qaybta kaalmanory hammond. So [facility] [phone].    ATENCIÓN: Si habla español, tiene a barton disposición servicios gratuitos de asistencia lingüística. Llame al " [phone].    We comply with applicable federal civil rights laws and [place] laws. We do not discriminate on the basis of race, color, national origin, age, disability, sex, sexual orientation, or gender identity.            Thank you!     Thank you for choosing [facility]  for your care. Our goal is always to provide you with excellent care. Hearing back from our patients is one way we can continue to improve our services. Please take a few minutes to complete the written survey that you may receive in the mail after your visit with us. Thank you!             Your Updated Medication List - Protect others around you: Learn how to safely use, store and throw away your medicines at www.disposemymeds.org.          This list is accurate as of: 10/6/17 12:54 PM.  Always use your most recent med list.                   Brand Name Dispense Instructions for use Diagnosis    atenolol 50 MG tablet    TENORMIN    180 tablet    TAKE ONE TABLET BY MOUTH TWICE A DAY    Benign essential HTN       ezetimibe 10 MG tablet    ZETIA    90 tablet    Take 1 tablet (10 mg) by mouth daily    Pure hypercholesterolemia       order for DME      Equipment being ordered: LEXI Klein received a [employer] AirSense 10 Auto. Pressures were set at Auto 10 - 18 cm H2O.        vitamin D 1000 UNITS capsule      Take 1 capsule by mouth daily.        warfarin 2.5 MG tablet    COUMADIN    80 tablet    Take 1.25mg by mouth every MWF and 2.5mg all other days or as directed by Anticoagulation Clinic    Pulmonary embolus, left (H)

## 2017-10-06 NOTE — PROGRESS NOTES
Obstructive Sleep Apnea - PAP Follow-Up Visit:    Chief Complaint   Patient presents with     CPAP Follow Up     Yearly follow up on c-pap. No complaints. Needs order for supplies       Cricket Klein comes in today for annual follow-up of CHRISTIANO (unclear severity, presumed moderate to severe) and treated with auto-titrate CPAP 10-18 cm H2O.  He is seen with his wife today.  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    10/17/2016 - 6 month follow-up after new CPAP auto-titrate 10-18 cm H2O.  Clinically doing well.  He is working with Dr. Oconnor in regards to cognitive decline, neuropsyche testing with potential for sub-clinical seizures versus dementing illness.  Daytime EEG WNL and brain MRI unchanged.  Noted to have consistently elevated BP's.  A/P to continue CPAP auto 10-18, new supplies, monitor BP and no change to atenolol 50mg BID.    Today - Returns for annual follow-up, seen with his wife today.  Continues to be followed for memory concerns, did have follow-up with Dr. Oconnor.  Not felt to representing seizure activity, plan for repeat neuropsyche testing.  He feels his CPAP is working well, no snoring or observed apnea.    Most recent echocardiogram on 7/26/2016 with LVEF 55-60%, normal RV and LV function / size / structure.    CPAP download from 9/5/2017 - 10/4/2017 on auto-titrate 10-18 cm H2O.  Average daily usage of 7:05, used >= 4 hours on 93% of nights.  Pressure median 10.9 cm H2O, 95th%ile of 12.1 cm H2O.  AHI 11.8 (AI 10.7 with TRAVIS 7.7).  On night by night review, AHI varies wildly from ~2 to ~35, does not seem to correlate clearly with leak or pressure.    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 10/2/2012          Hyperlipidemia LDL goal <130 10/31/2010     Priority: Medium     1/3/2011 Patient did not tolerate simvastatin, atorvastatin and pravastatin on a daily basis because of muscle cramping and weakness. Cholestyramine does not cause significant side effects, but is now doing a very good job of lowering the LDL toward goal.       Other and unspecified coagulation defects 04/13/2010     Priority: Medium     Factor  2 mutation-heterozygote  4/13/10 I had discussed this with Dr Chandler, and she recommended that warfarin for life would be advisable.   Cricket did see Dr. Ramos,  Who recommended no warfarin, and stopped it 2/23/12  See 4/28/12 note of visit with Dr. Ramos. Recommends no warfarin. Would need compression stockings for any surgery. And perhaps prophylaxis.          Pulmonary embolism (H) 03/29/2010     Priority: Medium     Within week after  hip replacement -March 2010.  Hematologist feels no increase risk for recurrence of pulmonary embolism or DVT due to heterozygote status of Factor 2 mutation.       S/P hip replacement 03/29/2010     Priority: Medium     date of surgery was March 8, 2010. had pulmonary embolus about a week later.       Impotence of organic origin 05/14/2007     Priority: Medium     Pure hypercholesterolemia 03/28/2006     Priority: Medium     Essential hypertension      Priority: Medium     Goal is <130/80  Problem list name updated by automated process. Provider to review       Transient cerebral ischemia      Priority: Medium     April 19, 2007. Transient memory loss.    MRI HEAD SAME DAY    1. Old left frontal depressed skull fracture with underlying gliosis    and encephalomalacia.     2. Nonspecific periventricular white matter ischemic disease adjacent    to the right lateral ventricle anteriorly.    Problem list name updated by  "automated process. Provider to review            /83  Pulse 66  Ht 1.702 m (5' 7.01\")  Wt 93.9 kg (207 lb)  SpO2 96%  BMI 32.41 kg/m2    Impression/Plan:    1.)  Sleep disordered breathing -> concern for complex sleep apnea   - Overall, difficult to assess severity based on PSG in 2001, but clearly documents presumed moderate to likely severe CHRISTIANO   - Co-morbid memory concerns   - CPAP download is suggestive of residual apnea and suggests primarily central component   - Given his co-morbid memory concerns, I feel it is worthwhile to ensure optimal treatment with potential for underlying complex sleep apnea.   - Echo on 7/26/2016 with LVEF 55-60%, normal RV and LV function / size / structure   - Will arrange all-night PAP titration PSG.  Start with CPAP, but plan to transition to ASV if AHI > 10 and if more than 50% of events are central or mixed in nature.   - Will also adjust current CPAP setting to auto 8-15 cm H2O given median very close to prior min of 10 cm H2O.    Cricket Klein will follow up in about 1 month(s).     Twenty-five minutes spent with patient, all of which were spent face-to-face counseling, consulting, coordinating plan of care.      Saud Fuller MD, MD    CC:  Saud Ramon  "

## 2017-10-06 NOTE — NURSING NOTE
"Chief Complaint   Patient presents with     CPAP Follow Up     Yearly follow up on c-pap. No complaints. Needs order for supplies       Initial /83  Pulse 66  Ht 1.702 m (5' 7.01\")  Wt 93.9 kg (207 lb)  SpO2 96%  BMI 32.41 kg/m2 Estimated body mass index is 32.41 kg/(m^2) as calculated from the following:    Height as of this encounter: 1.702 m (5' 7.01\").    Weight as of this encounter: 93.9 kg (207 lb).  Medication Reconciliation: complete  "

## 2017-10-09 ENCOUNTER — HOSPITAL ENCOUNTER (OUTPATIENT)
Dept: PHYSICAL THERAPY | Facility: CLINIC | Age: 72
Setting detail: THERAPIES SERIES
End: 2017-10-09
Attending: NURSE PRACTITIONER
Payer: MEDICARE

## 2017-10-09 PROCEDURE — 40000718 ZZHC STATISTIC PT DEPARTMENT ORTHO VISIT

## 2017-10-09 PROCEDURE — G8979 MOBILITY GOAL STATUS: HCPCS | Mod: GP,CI

## 2017-10-09 PROCEDURE — 97110 THERAPEUTIC EXERCISES: CPT | Mod: GP

## 2017-10-09 PROCEDURE — G8978 MOBILITY CURRENT STATUS: HCPCS | Mod: GP,CJ

## 2017-10-09 NOTE — PROGRESS NOTES
Outpatient Physical Therapy Progress Note     Patient: Cricket Klein  : 1945    Beginning/End Dates of Reporting Period:  8/15/17 to 10/9/2017    Referring Provider: TRACY Oliveira    Therapy Diagnosis: Gait impairment, s/p R FRANCISCA     Client Self Report: Pt still gets tender in R ant/lat tibia area, R outer hip. Tips with walking, lifting and walking getting better. Pt states improvement from skilled PT at 70%.     Objective Measurements:  Objective Measure: Gait  Details: no assistive device. trandelenberg with L trunk lean, decreased stance time on RLE, L hip hiking  Objective Measure: MMT (per 10/2/17)  Details: L hip abd=3+  Objective Measure: LEFS  Details: 70/80 (39/80 at eval)  Objective Measure: ROM (Per 10/2/17)  Details: B hip flex=100*                    Outcome Measures (most recent score):  See LEFS above    Goals:  Goal Identifier don/doff socks   Goal Description Following PT interventions, pt will increase R hip flex AROM to 120* to be able to don/doff socks   Target Date 17   Date Met  10/02/17   Progress:     Goal Identifier walking without axillary crutch   Goal Description Following PT interventions, pt will not present with trandelenberg gait to walk without axillary crutch   Target Date 10/30/17   Date Met   (no assistive device, still trandelenberg)   Progress:     Goal Identifier lifting groceries   Goal Description Following PT interventions, pt will increase R hip IR/ER MMT to 4/5 grade to be able to lift groceries   Target Date 17   Date Met   (increased reps)   Progress:     Goal Identifier squatting   Goal Description Following PT interventions, pt will score 60/80 on LEFS for less difficulty with squatting   Target Date 10/10/17   Date Met  10/09/17   Progress:     Goal Identifier     Goal Description     Target Date     Date Met      Progress:     Goal Identifier     Goal Description     Target Date     Date Met      Progress:     Goal Identifier     Goal  Description     Target Date     Date Met      Progress:     Goal Identifier     Goal Description     Target Date     Date Met      Progress:     Progress Toward Goals:   Progress this reporting period: Pt had absence form skilled PT for nearly 30 days prior to last week's follow up visit. Pt has met 50% of goals with progress towards all goals. Pt has gotten back into HEP routine with improved gait with slightly decreased pain. Pt is limited by strength, WNL gait mechanics, L hip pain. Pt is appropriate for continuing skilled PT to continue progressing towards goals.             Plan:  Continue therapy per current plan of care.    RECERTIFICATION    Cricket LIANE Ernie  1945    Session Number: 6/12 /BCBS since start of care.    Reasons for Continuing Treatment:   See above    Frequency/Duration  1 times per week for 6 weeks for a total of 12 visits.    Recertification Period  10/9/17 - 11/20/17    Physician Signature:    Date:    X_______________________________________________________    Physician Name: TRACY Oliveira    I certify the need for these services furnished under this plan of treatment and while under my care. Physician co-signature of this document indicates review and certification of the therapy plan.  This signature may be written on paper, or electronically signed within EPIC.       Thank You,    Ricco Manrique, PT, DPT  Doctor of Physical Therapy  [facility]  [phone]

## 2017-10-16 ENCOUNTER — ALLIED HEALTH/NURSE VISIT (OUTPATIENT)
Dept: FAMILY MEDICINE | Facility: CLINIC | Age: 72
End: 2017-10-16
Payer: COMMERCIAL

## 2017-10-16 ENCOUNTER — ANTICOAGULATION THERAPY VISIT (OUTPATIENT)
Dept: ANTICOAGULATION | Facility: CLINIC | Age: 72
End: 2017-10-16
Payer: COMMERCIAL

## 2017-10-16 ENCOUNTER — HOSPITAL ENCOUNTER (OUTPATIENT)
Dept: PHYSICAL THERAPY | Facility: CLINIC | Age: 72
Setting detail: THERAPIES SERIES
End: 2017-10-16
Attending: NURSE PRACTITIONER
Payer: MEDICARE

## 2017-10-16 DIAGNOSIS — Z23 NEED FOR PROPHYLACTIC VACCINATION AND INOCULATION AGAINST INFLUENZA: Primary | ICD-10-CM

## 2017-10-16 DIAGNOSIS — Z23 NEED FOR TDAP VACCINATION: ICD-10-CM

## 2017-10-16 DIAGNOSIS — Z79.01 LONG-TERM (CURRENT) USE OF ANTICOAGULANTS: ICD-10-CM

## 2017-10-16 LAB — INR POINT OF CARE: 2.3 (ref 0.86–1.14)

## 2017-10-16 PROCEDURE — 97112 NEUROMUSCULAR REEDUCATION: CPT | Mod: GP

## 2017-10-16 PROCEDURE — 90715 TDAP VACCINE 7 YRS/> IM: CPT

## 2017-10-16 PROCEDURE — G0008 ADMIN INFLUENZA VIRUS VAC: HCPCS

## 2017-10-16 PROCEDURE — 99207 ZZC NO CHARGE NURSE ONLY: CPT

## 2017-10-16 PROCEDURE — 40000718 ZZHC STATISTIC PT DEPARTMENT ORTHO VISIT

## 2017-10-16 PROCEDURE — 36416 COLLJ CAPILLARY BLOOD SPEC: CPT

## 2017-10-16 PROCEDURE — 90662 IIV NO PRSV INCREASED AG IM: CPT

## 2017-10-16 PROCEDURE — 90472 IMMUNIZATION ADMIN EACH ADD: CPT

## 2017-10-16 PROCEDURE — 85610 PROTHROMBIN TIME: CPT | Mod: QW

## 2017-10-16 PROCEDURE — 97110 THERAPEUTIC EXERCISES: CPT | Mod: GP

## 2017-10-16 NOTE — NURSING NOTE
Prior to injection verified patient identity using patient's name and date of birth.    Screening Questionnaire for Adult Immunization    Are you sick today?   No   Do you have allergies to medications, food, a vaccine component or latex?   No   Have you ever had a serious reaction after receiving a vaccination?   No   Do you have a long-term health problem with heart disease, lung disease, asthma, kidney disease, metabolic disease (e.g. diabetes), anemia, or other blood disorder?   No   Do you have cancer, leukemia, HIV/AIDS, or any other immune system problem?   No   In the past 3 months, have you taken medications that affect  your immune system, such as prednisone, other steroids, or anticancer drugs; drugs for the treatment of rheumatoid arthritis, Crohn s disease, or psoriasis; or have you had radiation treatments?   No   Have you had a seizure, or a brain or other nervous system problem?   No   During the past year, have you received a transfusion of blood or blood     products, or been given immune (gamma) globulin or antiviral drug?   No   For women: Are you pregnant or is there a chance you could become        pregnant during the next month?   No   Have you received any vaccinations in the past 4 weeks?   No     Immunization questionnaire answers were all negative.        Per orders of Dr. Ramon, injection of Tdap and Flu given by Jailene Fierro. Patient instructed to remain in clinic for 15 minutes afterwards, and to report any adverse reaction to me immediately.       Screening performed by Jailene Fierro on 10/16/2017 at 10:03 AM.

## 2017-10-16 NOTE — MR AVS SNAPSHOT
After Visit Summary   10/16/2017    Cricket Klein    MRN: [id number]           Patient Information     Date Of Birth          1945        Visit Information        Provider Department      10/16/2017 9:45 AM FL HAYLIE OSULLIVAN/LPN [facility]        Today's Diagnoses     Need for prophylactic vaccination and inoculation against influenza    -  1    Need for Tdap vaccination           Follow-ups after your visit        Your next 10 appointments already scheduled     Oct 17, 2017  8:00 PM CDT   PSG Titration with SLEEP LAB, BED TWO   [facility] ([facility])    [id number]  [facility] [address]   [phone]            Oct 20, 2017 12:00 PM CDT   Telephone Visit with Saud Fuller MD   [facility] ([facility])    [address]  [facility] [address]   [phone]           Note: this is not an onsite visit; there is no need to come to the facility.            Oct 23, 2017  2:00 PM CDT   Ortho Treatment with Ricco Manrique PT   [facility] ([facility])    [address] [place] [address]   [phone]            Nov 06, 2017  9:15 AM CST   Anticoagulation Visit with NB ANTI COAG   [facility] ([facility])    [address] [place] [address]   [phone]              Who to contact     If you have questions or need follow up information about today's clinic visit or your schedule please contact [facility] directly at [phone].  Normal or non-critical lab and imaging results will be communicated to you by MyChart, letter or phone within 4 business days after the clinic has received the results. If you do not hear from us within 7 days, please contact the clinic through MyChart or phone. If you have a critical or abnormal lab result, we will notify you by  "phone as soon as possible.  Submit refill requests through [employer] or call your pharmacy and they will forward the refill request to us. Please allow 3 business days for your refill to be completed.          Additional Information About Your Visit        Geosho Information     [employer]ho lets you send messages to your doctor, view your test results, renew your prescriptions, schedule appointments and more. To sign up, go to [email or web address] . Click on \"Log in\" on the left side of the screen, which will take you to the Welcome page. Then click on \"Sign up Now\" on the right side of the page.     You will be asked to enter the access code listed below, as well as some personal information. Please follow the directions to create your username and password.     Your access code is: [email or web address]  Expires: 2018 12:54 PM     Your access code will  in 90 days. If you need help or a new code, please call your [facility] clinic or [phone].        Care EveryWhere ID     This is your Care EveryWhere ID. This could be used by other organizations to access your [facility] medical records  [id number]         Blood Pressure from Last 3 Encounters:   10/06/17 140/83   17 110/59   17 144/80    Weight from Last 3 Encounters:   10/06/17 207 lb (93.9 kg)   17 205 lb (93 kg)   17 210 lb (95.3 kg)              We Performed the Following     FLU VACCINE, INCREASED ANTIGEN, PRESV FREE, AGE 65+ [46548]     TDAP VACCINE (ADACEL)     Vaccine Administration, Initial [48217]        Primary Care Provider Office Phone # Fax #    Saud Ramon [phone]       [address] [place] [id number]        Equal Access to Services     [facility] : Kaylee Soto, wafermínda luqjonathan, qaybta kaalmajosselin urbano, nory hinkle. So [facility] [phone].    ATENCIÓN: Si habla español, tiene a barton disposición servicios gratuitos de asistencia " lingüística. Desiree al [phone].    We comply with applicable federal civil rights laws and [place] laws. We do not discriminate on the basis of race, color, national origin, age, disability, sex, sexual orientation, or gender identity.            Thank you!     Thank you for choosing [facility]  for your care. Our goal is always to provide you with excellent care. Hearing back from our patients is one way we can continue to improve our services. Please take a few minutes to complete the written survey that you may receive in the mail after your visit with us. Thank you!             Your Updated Medication List - Protect others around you: Learn how to safely use, store and throw away your medicines at www.disposemymeds.org.          This list is accurate as of: 10/16/17 11:59 PM.  Always use your most recent med list.                   Brand Name Dispense Instructions for use Diagnosis    atenolol 50 MG tablet    TENORMIN    180 tablet    TAKE ONE TABLET BY MOUTH TWICE A DAY    Benign essential HTN       ezetimibe 10 MG tablet    ZETIA    90 tablet    Take 1 tablet (10 mg) by mouth daily    Pure hypercholesterolemia       order for DME      Equipment being ordered: LEXI Klein received a "[employer]" AirSense 10 Auto. Pressures were set at Auto 10 - 18 cm H2O.        vitamin D 1000 UNITS capsule      Take 1 capsule by mouth daily.        warfarin 2.5 MG tablet    COUMADIN    80 tablet    Take 1.25mg by mouth every MWF and 2.5mg all other days or as directed by Anticoagulation Clinic    Pulmonary embolus, left (H)

## 2017-10-16 NOTE — MR AVS SNAPSHOT
Cricket Klein   10/16/2017 9:30 AM   Anticoagulation Therapy Visit    Description:  72 year old male   Provider:  NB ANTI COAG   Department:  [facility] Anticoag           INR as of 10/16/2017     Today's INR 2.3      Anticoagulation Summary as of 10/16/2017     INR goal 2.0-3.0   Today's INR 2.3   Full instructions 1.25 mg on Mon, Wed, Fri; 2.5 mg all other days   Next INR check 11/6/2017    Indications   Other and unspecified coagulation defects [D68.9]  Long-term (current) use of anticoagulants [Z79.01] [Z79.01]         Description     Warfarin dose 1.25mg MWF and 2.5mg the rest of the days of the week.        Your next Anticoagulation Clinic appointment(s)     Nov 06, 2017  9:15 AM CST   Anticoagulation Visit with NB ANTI COAG   [facility] ([facility])    [address] [place] [address] [phone]              Contact Numbers     Please call [phone] to cancel and/or reschedule your appointment.  Please call [phone] with any problems or questions regarding your therapy          October 2017 Details    Sun Mon Tue Wed Thu Fri Sat     1               2               3               4               5               6               7                 8               9               10               11               12               13               14                 15               16      1.25 mg   See details      17      2.5 mg         18      1.25 mg         19      2.5 mg         20      1.25 mg         21      2.5 mg           22      2.5 mg         23      1.25 mg         24      2.5 mg         25      1.25 mg         26      2.5 mg         27      1.25 mg         28      2.5 mg           29      2.5 mg         30      1.25 mg         31      2.5 mg              Date Details   10/16 This INR check               How to take your warfarin dose     To take:  1.25 mg Take 0.5 of a 2.5 mg tablet.    To take:  2.5 mg Take 1 of the 2.5 mg tablets.            November 2017 Details    Sun Mon Tue Wed Thu Fri Sat        1      1.25 mg         2      2.5 mg         3      1.25 mg         4      2.5 mg           5      2.5 mg         6            7               8               9               10               11                 12               13               14               15               16               17               18                 19               20               21               22               23               24               25                 26               27               28               29               30                  Date Details   No additional details    Date of next INR:  11/6/2017         How to take your warfarin dose     To take:  1.25 mg Take 0.5 of a 2.5 mg tablet.    To take:  2.5 mg Take 1 of the 2.5 mg tablets.

## 2017-10-16 NOTE — PROGRESS NOTES
ANTICOAGULATION FOLLOW-UP CLINIC VISIT    Patient Name:  Cricket Klein  Date:  10/16/2017  Contact Type:  Face to Face    SUBJECTIVE:        OBJECTIVE    INR Protime   Date Value Ref Range Status   10/16/2017 2.3 (A) 0.86 - 1.14 Final       ASSESSMENT / PLAN  INR assessment THER    Recheck INR In: 3 WEEKS    INR Location Clinic      Anticoagulation Summary as of 10/16/2017     INR goal 2.0-3.0   Today's INR 2.3   Maintenance plan 1.25 mg (2.5 mg x 0.5) on Mon, Wed, Fri; 2.5 mg (2.5 mg x 1) all other days   Full instructions 1.25 mg on Mon, Wed, Fri; 2.5 mg all other days   Weekly total 13.75 mg   Plan last modified Gisell Taylor RN (10/2/2017)   Next INR check 11/6/2017   Priority INR   Target end date Indefinite    Indications   Other and unspecified coagulation defects [D68.9]  Long-term (current) use of anticoagulants [Z79.01] [Z79.01]         Anticoagulation Episode Summary     INR check location     Preferred lab     Send INR reminders to [facility]    Comments * Had PE in 2010 following hip surgery. Has heterozygous prothrombin gene mutation. Second PE 7-26-16. Needs lifelong warfarin. was on warfarin [phone]        Anticoagulation Care Providers     Provider Role Specialty Phone number    Saud Ramon MD [facility] [phone]            See the Encounter Report to view Anticoagulation Flowsheet and Dosing Calendar (Go to Encounters tab in chart review, and find the Anticoagulation Therapy Visit)        Citlalli Sethi RN

## 2017-10-16 NOTE — PROGRESS NOTES
Injectable Influenza Immunization Documentation    1.  Is the person to be vaccinated sick today?   No    2. Does the person to be vaccinated have an allergy to a component   of the vaccine?   No    3. Has the person to be vaccinated ever had a serious reaction   to influenza vaccine in the past?   No    4. Has the person to be vaccinated ever had Guillain-Barré syndrome?   No    Form completed by Jailene Fierro MA

## 2017-10-17 ENCOUNTER — THERAPY VISIT (OUTPATIENT)
Dept: SLEEP MEDICINE | Facility: CLINIC | Age: 72
End: 2017-10-17
Payer: COMMERCIAL

## 2017-10-17 DIAGNOSIS — G47.39 COMPLEX SLEEP APNEA SYNDROME: ICD-10-CM

## 2017-10-17 PROCEDURE — 95811 POLYSOM 6/>YRS CPAP 4/> PARM: CPT | Performed by: FAMILY MEDICINE

## 2017-10-17 NOTE — MR AVS SNAPSHOT
After Visit Summary   10/17/2017    Cricket Klein    MRN: [id number]           Patient Information     Date Of Birth          1945        Visit Information        Provider Department      10/17/2017 8:00 PM SLEEP LAB, BED TWO [facility]        Today's Diagnoses     Complex sleep apnea syndrome          Care Instructions    Pt arrived at [facility] Sleep lab for sleep study.  Completed a all night titration PSG per provider order.   A final therapeutic PAP pressure was achieved.    Supine REM was seen on therapeutic pressure.    Patient reports feeling refreshed in AM.  MEDICATIONS: warfarin (COUMADIN) 2.5 MG tablet     atenolol (TENORMIN) 50 MG tablet    ezetimibe (ZETIA) 10 MG tablet     order for DME     Cholecalciferol (VITAMIN D) 1000 UNITS capsule      STUDY TYPE: TITRATION  SLEEP AID: n/a  PAP ACCLIMATION: Pt currently using a p-10 small pillow, trialed the dreamwear small for comparison.   ECG Baseline Heart Rate= 64 BPM/NSR  SPO2-Baseline=97% with Jeramy= 70  SNORING: no  TCO2 MONITORING: n/a  SUPPLEMENTAL 02= n/a  HOB ELEVATION: flat  TITRATION: Starting with the dreamwear at pt comfort at 6cm H20. Mouth puffing with variable leak. Mask changed to ff mask; f-10 and simplus. Leak continued. Mask changed to Kelley view small. Leak settled. Pressures increased to 12 cm H20. Greater percentage of respiratory events were central in nature. Pressures changed to ASV defaults, 5/15/3 and Increasing to 7/15/4 for respiratory events and snore  LEAK RATE: 0-20  SLEEP STAGES: NREM and REM supine          Follow-ups after your visit        Your next 10 appointments already scheduled     Oct 20, 2017 12:00 PM CDT   Telephone Visit with Saud Fuller MD   [facility] ([facility])    [address]  [place] [id number] [phone]           Note: this is not an onsite visit; there is no need to come to the facility.             "Oct 23, 2017  2:00 PM CDT   Ortho Treatment with Ricco Manrique, SHERYL   [facility] ([facility])    [address] [place] [address]   [phone]            2017  9:15 AM CST   Anticoagulation Visit with NB ANTI COAG   [facility] ([facility])    [address] [place] [address]   [phone]              Who to contact     If you have questions or need follow up information about today's clinic visit or your schedule please contact [facility] directly at [phone].  Normal or non-critical lab and imaging results will be communicated to you by MyChart, letter or phone within 4 business days after the clinic has received the results. If you do not hear from us within 7 days, please contact the clinic through [employer] or phone. If you have a critical or abnormal lab result, we will notify you by phone as soon as possible.  Submit refill requests through [employer] or call your pharmacy and they will forward the refill request to us. Please allow 3 business days for your refill to be completed.          Additional Information About Your Visit        MyChart Information     Ui[employer] lets you send messages to your doctor, view your test results, renew your prescriptions, schedule appointments and more. To sign up, go to [email or web address] . Click on \"Log in\" on the left side of the screen, which will take you to the Welcome page. Then click on \"Sign up Now\" on the right side of the page.     You will be asked to enter the access code listed below, as well as some personal information. Please follow the directions to create your username and password.     Your access code is: [email or web address]  Expires: 2018 12:54 PM     Your access code will  in 90 days. If you need help or a new code, please call your [place] clinic or [phone].        Care EveryWhere ID     This is your Care " EveryWhere ID. This could be used by other organizations to access your [facility] medical records  [id number]         Blood Pressure from Last 3 Encounters:   10/06/17 140/83   07/30/17 110/59   07/12/17 144/80    Weight from Last 3 Encounters:   10/06/17 93.9 kg (207 lb)   07/28/17 93 kg (205 lb)   07/12/17 95.3 kg (210 lb)              We Performed the Following     Comprehensive Sleep Study        Primary Care Provider Office Phone # Fax #    Saud Ramon [phone]       [address] [facility] [address]        Equal Access to Services     [facility] Hospital[facility] : Hadii laron bradley hadasho Soinessa, waaxda luqadaha, qaybta kaalmada adeegyada, nory carver . So [facility] [phone].    ATENCIÓN: Si habla español, tiene a barton disposición servicios gratuitos de asistencia lingüística. Llame al [phone].    We comply with applicable federal civil rights laws and [place] laws. We do not discriminate on the basis of race, color, national origin, age, disability, sex, sexual orientation, or gender identity.            Thank you!     Thank you for choosing [facility]  for your care. Our goal is always to provide you with excellent care. Hearing back from our patients is one way we can continue to improve our services. Please take a few minutes to complete the written survey that you may receive in the mail after your visit with us. Thank you!             Your Updated Medication List - Protect others around you: Learn how to safely use, store and throw away your medicines at www.disposemymeds.org.          This list is accurate as of: 10/17/17 11:59 PM.  Always use your most recent med list.                   Brand Name Dispense Instructions for use Diagnosis    atenolol 50 MG tablet    TENORMIN    180 tablet    TAKE ONE TABLET BY MOUTH TWICE A DAY    Benign essential HTN       ezetimibe 10 MG tablet    ZETIA    90 tablet    Take 1 tablet (10  mg) by mouth daily    Pure hypercholesterolemia       order for DME      Equipment being ordered: LEXI  Cricket Klein received a Resmed AirSense 10 Auto. Pressures were set at Auto 10 - 18 cm H2O.        vitamin D 1000 UNITS capsule      Take 1 capsule by mouth daily.        warfarin 2.5 MG tablet    COUMADIN    80 tablet    Take 1.25mg by mouth every MWF and 2.5mg all other days or as directed by Anticoagulation Clinic    Pulmonary embolus, left (H)

## 2017-10-18 NOTE — PATIENT INSTRUCTIONS
Pt arrived at [facility] Sleep lab for sleep study.  Completed a all night titration PSG per provider order.   A final therapeutic PAP pressure was achieved.    Supine REM was seen on therapeutic pressure.    Patient reports feeling refreshed in AM.  MEDICATIONS: warfarin (COUMADIN) 2.5 MG tablet     atenolol (TENORMIN) 50 MG tablet    ezetimibe (ZETIA) 10 MG tablet     order for DME     Cholecalciferol (VITAMIN D) 1000 UNITS capsule      STUDY TYPE: TITRATION  SLEEP AID: n/a  PAP ACCLIMATION: Pt currently using a p-10 small pillow, trialed the dreamwear small for comparison.   ECG Baseline Heart Rate= 64 BPM/NSR  SPO2-Baseline=97% with Jeramy= 70  SNORING: no  TCO2 MONITORING: n/a  SUPPLEMENTAL 02= n/a  HOB ELEVATION: flat  TITRATION: Starting with the dreamwear at pt comfort at 6cm H20. Mouth puffing with variable leak. Mask changed to ff mask; f-10 and simplus. Leak continued. Mask changed to Kelley view small. Leak settled. Pressures increased to 12 cm H20. Greater percentage of respiratory events were central in nature. Pressures changed to ASV defaults, 5/15/3 and Increasing to 7/15/4 for respiratory events and snore  LEAK RATE: 0-20  SLEEP STAGES: NREM and REM supine

## 2017-10-20 ENCOUNTER — VIRTUAL VISIT (OUTPATIENT)
Dept: SLEEP MEDICINE | Facility: CLINIC | Age: 72
End: 2017-10-20
Payer: COMMERCIAL

## 2017-10-20 DIAGNOSIS — G47.39 COMPLEX SLEEP APNEA SYNDROME: Primary | ICD-10-CM

## 2017-10-20 PROCEDURE — 99442 ZZC PHYSICIAN TELEPHONE EVALUATION 11-20 MIN: CPT | Performed by: FAMILY MEDICINE

## 2017-10-20 NOTE — PROGRESS NOTES
"Cricket Klein is a 72 year old male who is being evaluated via a telephone visit.      The patient has been notified of following:     \"This telephone visit will be conducted via a call between you and your physician/provider. We have found that certain health care needs can be provided without the need for a physical exam.  This service lets us provide the care you need with a short phone conversation.  If a prescription is necessary we can send it directly to your pharmacy.  If lab work is needed we can place an order for that and you can then stop by our lab to have the test done at a later time.    We will bill your insurance company for this service.  Please check with your medical insurance if this type of visit is covered. You may be responsible for the cost of this type of visit if insurance coverage is denied.  The typical cost is $30 (10min), $59 (11-20min) and $85 (21-30min).  Most often these visits are shorter than 10 minutes.    If during the course of the call the physician/provider feels a telephone visit is not appropriate, you will not be charged for this service.\"       Consent has been obtained for this service by 2 care team members: no. See the scanned image in the medical record.    Cricket Klein complains of  Titration sleep study      I have reviewed and updated the patient's Past Medical History, Social History, Family History and Medication List.    ALLERGIES  Atorvastatin calcium; Pravastatin; and Zocor [hmg-coa-r inhibitors]    Apoorva Lawrence CMA   (MA signature)    Additional provider notes: Reviewed titration PSG.  CPAP seen to be incompletely effective, with emergence of frequent central apneas and central hypopneas with AHI > 5 and over 50% of events were central in nature.  Transitioned to ASV, with EEP 7 / min PS 4 / max PS 15 and appeared effective including supine NREM (N1 and N2, no N3) and supine REM.    Assessment/Plan:  1.)  Complex sleep apnea   - ASV with EEP 7 / " min PS 4 / max PS 15 effective    I have reviewed the note as documented above.  This accurately captures the substance of my conversation with the patient,  Cricket Klein    Total time of call between patient and provider was 15 minutes

## 2017-10-20 NOTE — PROCEDURES
"SLEEP STUDY INTERPRETATION  TITRATION STUDY      Patient: LARRY CR  YOB: 1945  Study Date: 10/17/2017  MRN: [id number]  Referring Provider: Saud Fuller MD  Ordering Provider: Saud Fuller MD    Indications for Polysomnography: The patient is a 72 y old Male who is 5' 7\" and weighs 207.0 lbs.  His BMI is 32.5, [place] sleepiness scale is 7.0 and neck size is 45.0.  Relevant medical history includes recurrent PE's on chronic anticoagulation, HTN, TIA, memory impairment, CHRISTIANO (unclear severity, presumed moderate to severe).  A PAP titration was performed to find optimal PAP setting with suspicion for underlying complex sleep apnea.    Polysomnogram Data:  A full night polysomnogram recorded the standard physiologic parameters including EEG, EOG, EMG, ECG, nasal and oral airflow.  Respiratory parameters of chest and abdominal movements were recorded with respiratory inductance plethysmography.  Oxygen saturation was recorded by pulse oximetry.      Treatment PSG:  Sleep Architecture: Short sleep latency, overall highly fragmented  The total recording time of the study was 449.0 minutes.  The total sleep time was 324.0 minutes.  Sleep latency was decreased at 4.0 minutes without the use of a sleep aid.  REM latency was 62.5 minutes.  Arousal index was increased at 43.9 arousals per hour.  Sleep efficiency was decreased at 72.2%.  Wake after sleep onset was 117.5 minutes.   The patient spent 34.0% of total sleep time in Stage N1, 53.5% in Stage N2, 4.6% in Stage N3 and 7.9% in REM.     Respiration: ASV with EEP 7, min PS 4, max PS 15 appeared effective and included supine NREM and REM.    Study initiated with CPAP, titrated to zenith of 12 cm H2O, where seen to have AHI > 5 with over 50% of events appearing as central apneas or central hypopneas.  Transitioned to ASV, titrated to final setting of EEp 7, min PS 4, max PS 15 and appeared effective in supine NREM / REM at end of study.    This " titration was considered adequate (residual AHI with 75% decrease, or above constraints without REM-supine sleep at final pressure).    Snoring - was reported as absent.    Respiratory rate and pattern - was notable for normal respiratory rate and pattern.    Sustained Sleep Associated Hypoventilation - Transcutaneous carbon dioxide monitoring was not used, however significant hypoventilation was not suggested by oximetry.    Sleep Associated Hypoxemia - (Greater than 5 minutes O2 sat below 89%) was not present.  Baseline oxygen saturation was 95.1%. Lowest oxygen saturation was 74.2%.  Time spent less than or equal to 88% was 1.5 minutes.  Time spent less than or equal to 89% was 2.7 minutes.         Movement Activity: Nothing of note    Periodic Limb Activity - There were - PLMs during the entire study. The PLM index was - movements per hour.  The PLM Arousal Index was - per hour.    REM EMG Activity - Excessive transient / sustained muscle activity was not present.    Nocturnal Behavior - Abnormal sleep related behaviors were not noted during / arising out of NREM / REM sleep.      Bruxism - None apparent.    Cardiac Summary: Appears NSR  The average pulse rate was 59.6 bpm.  The minimum pulse rate was 48.9 bpm while the maximum pulse rate was 90.0 bpm. The rhythm is normal sinus. Arrhythmias were not noted.    Assessment:     Sleep architecture highly fragmented.    ASV with EEP 7, min PS 4, max PS 15 appeared effective and included supine NREM and REM, at end of study.    Recommendations:    Treatment of complex sleep apnea with ASV with EEP 7, min PS 4, max PS 15.    Advise regarding the risks of drowsy driving.    Suggest optimizing sleep schedule and avoiding sleep deprivation.    Weight management (if BMI > 30).    Diagnostic Codes:      Obstructive Sleep Apnea G47.33    Primary Central Sleep Apnea G47.31    Unspecified Sleep Disturbance G47.9              Table of Oximetry Distribution    Range(%) Time in  range (min) Time in range (%) Time in or below range (min) Time in or below range (%)   0.0 - 88.0 1.5 0.4% 1.5 0.4%   0.0 - 89.0 2.7 0.6% 2.7 0.6%

## 2017-10-20 NOTE — PATIENT INSTRUCTIONS
Contacted Danni at [facility]. She will call patient and schedule a set up. Orders placed. Study faxed to [phone] % danni

## 2017-10-20 NOTE — MR AVS SNAPSHOT
After Visit Summary   10/20/2017    Cricket Klein    MRN: [id number]           Patient Information     Date Of Birth          1945        Visit Information        Provider Department      10/20/2017 12:00 PM Saud Fuller MD [facility]        Today's Diagnoses     Complex sleep apnea syndrome    -  1       Follow-ups after your visit        Your next 10 appointments already scheduled     Oct 20, 2017 12:00 PM CDT   Telephone Visit with Saud Fuller MD   [facility] ([facility]    [address]  [facility] [address]   [phone]           Note: this is not an onsite visit; there is no need to come to the facility.            Oct 23, 2017  2:00 PM CDT   Ortho Treatment with Ricco Manrique PT   [facility] ([facility])    [address] [place] [address]   [phone]            Nov 06, 2017  9:15 AM CST   Anticoagulation Visit with NB ANTI COALIANE   [facility]    [address] [place] [address]   [phone]              Who to contact     If you have questions or need follow up information about today's clinic visit or your schedule please contact [facility]note [facility] directly at [phone].  Normal or non-critical lab and imaging results will be communicated to you by MyChart, letter or phone within 4 business days after the clinic has received the results. If you do not hear from us within 7 days, please contact the clinic through MyChart or phone. If you have a critical or abnormal lab result, we will notify you by phone as soon as possible.  Submit refill requests through [employer] or call your pharmacy and they will forward the refill request to us. Please allow 3 business days for your refill to be completed.          Additional Information About Your Visit        MyChart  "Information     [employer] lets you send messages to your doctor, view your test results, renew your prescriptions, schedule appointments and more. To sign up, go to [email or web address] . Click on \"Log in\" on the left side of the screen, which will take you to the Welcome page. Then click on \"Sign up Now\" on the right side of the page.     You will be asked to enter the access code listed below, as well as some personal information. Please follow the directions to create your username and password.     Your access code is: [email or web address]  Expires: 2018 12:54 PM     Your access code will  in 90 days. If you need help or a new code, please call your [place] clinic or [phone].        Care EveryWhere ID     This is your Care EveryWhere ID. This could be used by other organizations to access your [place] medical records  [id number]         Blood Pressure from Last 3 Encounters:   10/06/17 140/83   17 110/59   17 144/80    Weight from Last 3 Encounters:   10/06/17 93.9 kg (207 lb)   17 93 kg (205 lb)   17 95.3 kg (210 lb)              We Performed the Following     Comprehensive DME        Primary Care Provider Office Phone # Fax #    Saud Ramon [phone]       [address] [place] [address]        Equal Access to Services     THIERRY MOE AH: Hadii laron godfreyo Soinessa, waaxda luqadaha, qaybta kaalmada adekeeshayada, nory hinkle. So [facility] [phone].    ATENCIÓN: Si habla español, tiene a barton disposición servicios gratuitos de asistencia lingüística. Desiree al [phone].    We comply with applicable federal civil rights laws and [place] laws. We do not discriminate on the basis of race, color, national origin, age, disability, sex, sexual orientation, or gender identity.            Thank you!     Thank you for choosing [facility]  for your care. Our goal is always to provide you with " excellent care. Hearing back from our patients is one way we can continue to improve our services. Please take a few minutes to complete the written survey that you may receive in the mail after your visit with us. Thank you!             Your Updated Medication List - Protect others around you: Learn how to safely use, store and throw away your medicines at www.disposemymeds.org.          This list is accurate as of: 10/20/17 11:36 AM.  Always use your most recent med list.                   Brand Name Dispense Instructions for use Diagnosis    atenolol 50 MG tablet    TENORMIN    180 tablet    TAKE ONE TABLET BY MOUTH TWICE A DAY    Benign essential HTN       ezetimibe 10 MG tablet    ZETIA    90 tablet    Take 1 tablet (10 mg) by mouth daily    Pure hypercholesterolemia       order for DME      Equipment being ordered: LEXI Klein received a [employer] AirSense 10 Auto. Pressures were set at Auto 10 - 18 cm H2O.        vitamin D 1000 UNITS capsule      Take 1 capsule by mouth daily.        warfarin 2.5 MG tablet    COUMADIN    80 tablet    Take 1.25mg by mouth every MWF and 2.5mg all other days or as directed by Anticoagulation Clinic    Pulmonary embolus, left (H)

## 2017-10-23 ENCOUNTER — HOSPITAL ENCOUNTER (OUTPATIENT)
Dept: PHYSICAL THERAPY | Facility: CLINIC | Age: 72
Setting detail: THERAPIES SERIES
End: 2017-10-23
Attending: NURSE PRACTITIONER
Payer: MEDICARE

## 2017-10-23 PROCEDURE — 97112 NEUROMUSCULAR REEDUCATION: CPT | Mod: GP

## 2017-10-23 PROCEDURE — 40000718 ZZHC STATISTIC PT DEPARTMENT ORTHO VISIT

## 2017-10-23 PROCEDURE — 97110 THERAPEUTIC EXERCISES: CPT | Mod: GP

## 2017-10-24 ENCOUNTER — DOCUMENTATION ONLY (OUTPATIENT)
Dept: SLEEP MEDICINE | Facility: CLINIC | Age: 72
End: 2017-10-24

## 2017-10-24 NOTE — Clinical Note
BREA LEBRON, THIS ONE FELL BETWEEN THE CRACKS, HE WAS A LONG TIME CPAP USER WHO ADVANCED TO AN ASV.  IF HIS USAGE LOOKS GOOD HE PROBABLY DOESN'T NEED STM. GILBERTO

## 2017-10-31 ENCOUNTER — HOSPITAL ENCOUNTER (OUTPATIENT)
Dept: PHYSICAL THERAPY | Facility: CLINIC | Age: 72
Setting detail: THERAPIES SERIES
End: 2017-10-31
Attending: NURSE PRACTITIONER
Payer: MEDICARE

## 2017-10-31 PROCEDURE — 97110 THERAPEUTIC EXERCISES: CPT | Mod: GP

## 2017-10-31 PROCEDURE — 40000718 ZZHC STATISTIC PT DEPARTMENT ORTHO VISIT

## 2017-10-31 PROCEDURE — 97112 NEUROMUSCULAR REEDUCATION: CPT | Mod: GP

## 2017-11-02 NOTE — PROGRESS NOTES
Patient was offered choice of vendor and chose [facility].  Patient Cricket Klein was set up at [facility] on October 24, 2017. Patient received a Resmed AirCurve 10 ASV. Pressures were set at EPAP 7, PS MIN 4 MAX 15, RR AUTO.    Patient received a Resmed Mask name: P10  Pillow mask Size Small, heated tubing and heated humidifier.  Patient is enrolled in the STM Program and does need to meet compliance. Patient has a follow up on TBD with Dr. Fuller.    Danni Khan

## 2017-11-03 ENCOUNTER — DOCUMENTATION ONLY (OUTPATIENT)
Dept: SLEEP MEDICINE | Facility: CLINIC | Age: 72
End: 2017-11-03

## 2017-11-06 ENCOUNTER — ANTICOAGULATION THERAPY VISIT (OUTPATIENT)
Dept: ANTICOAGULATION | Facility: CLINIC | Age: 72
End: 2017-11-06
Payer: COMMERCIAL

## 2017-11-06 DIAGNOSIS — Z79.01 LONG-TERM (CURRENT) USE OF ANTICOAGULANTS: ICD-10-CM

## 2017-11-06 DIAGNOSIS — I26.99 PULMONARY EMBOLUS, LEFT (H): ICD-10-CM

## 2017-11-06 LAB — INR POINT OF CARE: 1.6 (ref 0.86–1.14)

## 2017-11-06 PROCEDURE — 85610 PROTHROMBIN TIME: CPT | Mod: QW

## 2017-11-06 PROCEDURE — 36416 COLLJ CAPILLARY BLOOD SPEC: CPT

## 2017-11-06 PROCEDURE — 99207 ZZC NO CHARGE NURSE ONLY: CPT

## 2017-11-06 RX ORDER — WARFARIN SODIUM 2.5 MG/1
TABLET ORAL
Qty: 80 TABLET | Refills: 0 | Status: SHIPPED | OUTPATIENT
Start: 2017-11-06 | End: 2018-01-08

## 2017-11-06 NOTE — MR AVS SNAPSHOT
Cricket lKein   11/6/2017 9:15 AM   Anticoagulation Therapy Visit    Description:  72 year old male   Provider:  NB ANTI COAG   Department:  N[facility] Anticoag           INR as of 11/6/2017     Today's INR 1.6!      Anticoagulation Summary as of 11/6/2017     INR goal 2.0-3.0   Today's INR 1.6!   Full instructions 1.25 mg on Fri; 2.5 mg all other days   Next INR check 12/4/2017    Indications   Other and unspecified coagulation defects [D68.9]  Long-term (current) use of anticoagulants [Z79.01] [Z79.01]         Your next Anticoagulation Clinic appointment(s)     Dec 04, 2017  9:15 AM CST   Anticoagulation Visit with NB ANTI COAG   [facility] ([facility])    [address] [place] [address] [phone]              Contact Numbers     Please call [phone] to cancel and/or reschedule your appointment.  Please call [phone] with any problems or questions regarding your therapy          November 2017 Details    Sun Mon Tue Wed Thu Fri Sat        1               2               3               4                 5               6      2.5 mg   See details      7      2.5 mg         8      2.5 mg         9      2.5 mg         10      1.25 mg         11      2.5 mg           12      2.5 mg         13      2.5 mg         14      2.5 mg         15      2.5 mg         16      2.5 mg         17      1.25 mg         18      2.5 mg           19      2.5 mg         20      2.5 mg         21      2.5 mg         22      2.5 mg         23      2.5 mg         24      1.25 mg         25      2.5 mg           26      2.5 mg         27      2.5 mg         28      2.5 mg         29      2.5 mg         30      2.5 mg            Date Details   11/06 This INR check               How to take your warfarin dose     To take:  1.25 mg Take 0.5 of a 2.5 mg tablet.    To take:  2.5 mg Take 1 of the 2.5 mg tablets.           December 2017 Details    Sun Mon Tue Wed Thu Fri Sat           1      1.25 mg         2      2.5 mg           3      2.5 mg         4            5               6               7               8               9                 10               11               12               13               14               15               16                 17               18               19               20               21               22               23                 24               25               26               27               28               29               30                 31                      Date Details   No additional details    Date of next INR:  12/4/2017         How to take your warfarin dose     To take:  1.25 mg Take 0.5 of a 2.5 mg tablet.    To take:  2.5 mg Take 1 of the 2.5 mg tablets.

## 2017-11-06 NOTE — PROGRESS NOTES
ANTICOAGULATION FOLLOW-UP CLINIC VISIT    Patient Name:  Cricket Klein  Date:  11/6/2017  Contact Type:  Face to Face    SUBJECTIVE:     Patient Findings     Positives Change in diet/appetite (21 meal plan that includes more Vit K rich foods)           OBJECTIVE    INR Protime   Date Value Ref Range Status   11/06/2017 1.6 (A) 0.86 - 1.14 Final       ASSESSMENT / PLAN  No question data found.  Anticoagulation Summary as of 11/6/2017     INR goal 2.0-3.0   Today's INR 1.6!   Maintenance plan 1.25 mg (2.5 mg x 0.5) on Fri; 2.5 mg (2.5 mg x 1) all other days   Full instructions 1.25 mg on Fri; 2.5 mg all other days   Weekly total 16.25 mg   Plan last modified Citlalli Sethi RN (11/6/2017)   Next INR check 12/4/2017   Priority INR   Target end date Indefinite    Indications   Other and unspecified coagulation defects [D68.9]  Long-term (current) use of anticoagulants [Z79.01] [Z79.01]         Anticoagulation Episode Summary     INR check location     Preferred lab     Send INR reminders to [facility] CLINIC POOL    Comments * Had PE in 2010 following hip surgery. Has heterozygous prothrombin gene mutation. Second PE 7-26-16. Needs lifelong warfarin. was on warfarin [id number]        Anticoagulation Care Providers     Provider Role Specialty Phone number    Saud Ramon MD [facility] [phone]            See the Encounter Report to view Anticoagulation Flowsheet and Dosing Calendar (Go to Encounters tab in chart review, and find the Anticoagulation Therapy Visit)    Patient's new diet plan is rich in Vit K, writer will increase warfarin maintenance dose to achieve a therapeutic INR.    Citlalli Sethi RN

## 2017-11-08 ENCOUNTER — HOSPITAL ENCOUNTER (OUTPATIENT)
Dept: PHYSICAL THERAPY | Facility: CLINIC | Age: 72
Setting detail: THERAPIES SERIES
End: 2017-11-08
Attending: NURSE PRACTITIONER
Payer: MEDICARE

## 2017-11-08 ENCOUNTER — DOCUMENTATION ONLY (OUTPATIENT)
Dept: SLEEP MEDICINE | Facility: CLINIC | Age: 72
End: 2017-11-08

## 2017-11-08 PROCEDURE — 40000718 ZZHC STATISTIC PT DEPARTMENT ORTHO VISIT

## 2017-11-08 PROCEDURE — 97110 THERAPEUTIC EXERCISES: CPT | Mod: GP

## 2017-11-08 PROCEDURE — G8979 MOBILITY GOAL STATUS: HCPCS | Mod: GP,CI

## 2017-11-08 PROCEDURE — 97112 NEUROMUSCULAR REEDUCATION: CPT | Mod: GP

## 2017-11-08 PROCEDURE — G8980 MOBILITY D/C STATUS: HCPCS | Mod: GP,CI

## 2017-11-08 NOTE — DISCHARGE SUMMARY
Outpatient Physical Therapy Discharge Note     Patient: Cricket Klein  : 1945    Beginning/End Dates of Reporting Period:  8/15/17 to 2017    Referring Provider: TRACY Oliveira     Therapy Diagnosis: Gait impairment, s/p R FRANCISCA     Client Self Report: Pt is ready for DC today. Pt states improvement from skilled PT.    Objective Measurements:  Objective Measure: Gait  Details: mild trandelenberg  Objective Measure: MMT   Details: R hip: IR=4+, ER=4  Objective Measure: LEFS  Details: 74/80 (70/80 at last progress note, 39/80 at eval)        Outcome Measures (most recent score):  See LEFS above    Goals:  Goal Identifier don/doff socks   Goal Description Following PT interventions, pt will increase R hip flex AROM to 120* to be able to don/doff socks   Target Date 17   Date Met  10/02/17   Progress:     Goal Identifier walking without axillary crutch   Goal Description Following PT interventions, pt will not present with trandelenberg gait to walk without axillary crutch   Target Date 10/30/17   Date Met   (no assistive device)   Progress:     Goal Identifier lifting groceries   Goal Description Following PT interventions, pt will increase R hip IR/ER MMT to 4/5 grade to be able to lift groceries   Target Date 17   Date Met  17   Progress:     Goal Identifier squatting   Goal Description Following PT interventions, pt will score 60/80 on LEFS for less difficulty with squatting   Target Date 10/10/17   Date Met  10/09/17   Progress:     Goal Identifier     Goal Description     Target Date     Date Met      Progress:     Goal Identifier     Goal Description     Target Date     Date Met      Progress:     Goal Identifier     Goal Description     Target Date     Date Met      Progress:     Goal Identifier     Goal Description     Target Date     Date Met      Progress:     Progress Toward Goals:   Progress this reporting period: Pt has MET all goals, improved strength, balance,  decreased fall risk, increased motion and function in R hip. Pt is appropriate for DC from skilled PT to independent HEP progression.            Plan:  Discharge from therapy.    Discharge:    Reason for Discharge: Patient has met all goals.    Equipment Issued: t-bands    Discharge Plan: Patient to continue home program.

## 2017-11-08 NOTE — PROGRESS NOTES
14 DAY STM VISIT    Message left for patient to return call     Assessment: Pt not meeting objective benchmarks for leak    Action plan: waiting for patient to return call.  and pt to have 30 day STM visit.    Device type: ASV  ASV ResMed    ResMed ASV mode ()    EPAP Fixed 7    PS Min 4    PS Max 15    Rate Mode Auto        PAP settings: Objective measures: 14 day rolling measures -     Compliance 100%      Leak  44 lpm  last  upload      AHI 3.35   last  upload      Average number of minutes 453      Objective measure goal  Compliance   Goal >70%  Leak   Goal < 24 lpm  AHI  Goal < 5  Usage  Goal >240

## 2017-11-09 NOTE — PROGRESS NOTES
Patient returned call.     Subjective measures:  Patient meeting subjective benchmarks.     Action plan:pt to have 30 day STM visit.

## 2017-11-27 ENCOUNTER — DOCUMENTATION ONLY (OUTPATIENT)
Dept: SLEEP MEDICINE | Facility: CLINIC | Age: 72
End: 2017-11-27

## 2017-11-27 NOTE — PROGRESS NOTES
30 [facility]    Message left for patient to return call     Assessment: Pt not meeting objective benchmarks for leak   Action plan: waiting for patient to return call.   Patient has no follow up visit scheduled.   Device type: ASV  PAP settings:   ResMed ASV mode ()    EPAP Fixed 7    PS Min 4    PS Max 15    Rate Mode Auto          95th% pressure 16.8 cm  H20   Objective measures: 14 day rolling measures      Compliance  100 %      Leak  36.64 lpm  last  upload      AHI 2.39   last  upload      Average number of minutes 444           Objective measure goal  Compliance   Goal >70%  Leak   Goal < 24 lpm  AHI  Goal < 5  Usage  Goal >240

## 2017-12-04 ENCOUNTER — ANTICOAGULATION THERAPY VISIT (OUTPATIENT)
Dept: ANTICOAGULATION | Facility: CLINIC | Age: 72
End: 2017-12-04
Payer: COMMERCIAL

## 2017-12-04 DIAGNOSIS — Z79.01 LONG-TERM (CURRENT) USE OF ANTICOAGULANTS: ICD-10-CM

## 2017-12-04 LAB — INR POINT OF CARE: 1.8 (ref 0.86–1.14)

## 2017-12-04 PROCEDURE — 99207 ZZC NO CHARGE NURSE ONLY: CPT

## 2017-12-04 PROCEDURE — 85610 PROTHROMBIN TIME: CPT | Mod: QW

## 2017-12-04 PROCEDURE — 36416 COLLJ CAPILLARY BLOOD SPEC: CPT

## 2017-12-04 NOTE — MR AVS SNAPSHOT
Cricket Klein   12/4/2017 9:15 AM   Anticoagulation Therapy Visit    Description:  72 year old male   Provider:  NB ANTI HANNA   Department:  Nb Anticoag           INR as of 12/4/2017     Today's INR 1.8!      Anticoagulation Summary as of 12/4/2017     INR goal 2.0-3.0   Today's INR 1.8!   Full instructions 12/4: 5 mg; Otherwise 1.25 mg on Fri; 2.5 mg all other days   Next INR check 1/8/2018    Indications   Other and unspecified coagulation defects [D68.9]  Long-term (current) use of anticoagulants [Z79.01] [Z79.01]         Your next Anticoagulation Clinic appointment(s)     Jan 08, 2018 10:45 AM CST   Anticoagulation Visit with NB ANTI COAG   [facility]    [address] [place] [id number] [phone]              Contact Numbers     Please call [phone] to cancel and/or reschedule your appointment.  Please call [phone] with any problems or questions regarding your therapy          December 2017 Details    Sun Mon Tue Wed Thu Fri Sat          1               2                 3               4      5 mg   See details      5      2.5 mg         6      2.5 mg         7      2.5 mg         8      1.25 mg         9      2.5 mg           10      2.5 mg         11      2.5 mg         12      2.5 mg         13      2.5 mg         14      2.5 mg         15      1.25 mg         16      2.5 mg           17      2.5 mg         18      2.5 mg         19      2.5 mg         20      2.5 mg         21      2.5 mg         22      1.25 mg         23      2.5 mg           24      2.5 mg         25      2.5 mg         26      2.5 mg         27      2.5 mg         28      2.5 mg         29      1.25 mg         30      2.5 mg           31      2.5 mg                Date Details   12/04 This INR check               How to take your warfarin dose     To take:  1.25 mg Take 0.5 of a 2.5 mg tablet.    To take:  2.5 mg Take 1 of the 2.5 mg tablets.    To  take:  5 mg Take 2 of the 2.5 mg tablets.           January 2018 Details    Sun Mon Tue Wed Thu Fri Sat      1      2.5 mg         2      2.5 mg         3      2.5 mg         4      2.5 mg         5      1.25 mg         6      2.5 mg           7      2.5 mg         8            9               10               11               12               13                 14               15               16               17               18               19               20                 21               22               23               24               25               26               27                 28               29               30               31                   Date Details   No additional details    Date of next INR:  1/8/2018         How to take your warfarin dose     To take:  1.25 mg Take 0.5 of a 2.5 mg tablet.    To take:  2.5 mg Take 1 of the 2.5 mg tablets.

## 2017-12-04 NOTE — PROGRESS NOTES
ANTICOAGULATION FOLLOW-UP CLINIC VISIT    Patient Name:  Cricket Klein  Date:  12/4/2017  Contact Type:  Face to Face    SUBJECTIVE:     Patient Findings     Positives Missed doses (pt states he missed last Monday's wrafarin dose)           OBJECTIVE    INR Protime   Date Value Ref Range Status   12/04/2017 1.8 (A) 0.86 - 1.14 Final       ASSESSMENT / PLAN  INR assessment SUB missed dose   Recheck INR In: 5 WEEKS    INR Location Clinic      Anticoagulation Summary as of 12/4/2017     INR goal 2.0-3.0   Today's INR 1.8!   Maintenance plan 1.25 mg (2.5 mg x 0.5) on Fri; 2.5 mg (2.5 mg x 1) all other days   Full instructions 12/4: 5 mg; Otherwise 1.25 mg on Fri; 2.5 mg all other days   Weekly total 16.25 mg   Plan last modified Citlalli Sethi RN (11/6/2017)   Next INR check 1/8/2018   Priority INR   Target end date Indefinite    Indications   Other and unspecified coagulation defects [D68.9]  Long-term (current) use of anticoagulants [Z79.01] [Z79.01]         Anticoagulation Episode Summary     INR check location     Preferred lab     Send INR reminders to [facility] CLINIC POOL    Comments * Had PE in 2010 following hip surgery. Has heterozygous prothrombin gene mutation. Second PE 7-26-16. Needs lifelong warfarin. was on warfarin [id number]        Anticoagulation Care Providers     Provider Role Specialty Phone number    Saud Ramon MD [facility] [phone]            See the Encounter Report to view Anticoagulation Flowsheet and Dosing Calendar (Go to Encounters tab in chart review, and find the Anticoagulation Therapy Visit)    Writer instructed pt to take 5mg of warfarin today as he missed a dose last week. He will then resume his 16.25mg weekly maintenance dose.    Citlalli Sethi RN

## 2018-01-08 ENCOUNTER — ANTICOAGULATION THERAPY VISIT (OUTPATIENT)
Dept: ANTICOAGULATION | Facility: CLINIC | Age: 73
End: 2018-01-08
Payer: COMMERCIAL

## 2018-01-08 DIAGNOSIS — Z79.01 LONG-TERM (CURRENT) USE OF ANTICOAGULANTS: ICD-10-CM

## 2018-01-08 DIAGNOSIS — I26.99 PULMONARY EMBOLUS, LEFT (H): ICD-10-CM

## 2018-01-08 LAB — INR POINT OF CARE: 1.4 (ref 0.86–1.14)

## 2018-01-08 PROCEDURE — 99207 ZZC NO CHARGE NURSE ONLY: CPT

## 2018-01-08 PROCEDURE — 85610 PROTHROMBIN TIME: CPT | Mod: QW

## 2018-01-08 PROCEDURE — 36416 COLLJ CAPILLARY BLOOD SPEC: CPT

## 2018-01-08 RX ORDER — WARFARIN SODIUM 2.5 MG/1
TABLET ORAL
Qty: 90 TABLET | Refills: 0 | COMMUNITY
Start: 2018-01-08 | End: 2018-01-22

## 2018-01-08 NOTE — PROGRESS NOTES
ANTICOAGULATION FOLLOW-UP CLINIC VISIT    Patient Name:  Cricket Klein  Date:  1/8/2018  Contact Type:  Face to Face    SUBJECTIVE:     Patient Findings     Positives Missed doses (missed Friday and Saturdays doses)           OBJECTIVE    INR Protime   Date Value Ref Range Status   01/08/2018 1.4 (A) 0.86 - 1.14 Final       ASSESSMENT / PLAN  No question data found.  Anticoagulation Summary as of 1/8/2018     INR goal 2.0-3.0   Today's INR 1.4!   Maintenance plan 1.25 mg (2.5 mg x 0.5) on Fri; 2.5 mg (2.5 mg x 1) all other days   Full instructions 1/8: 5 mg; 1/9: 5 mg; 1/19: 2.5 mg; Otherwise 1.25 mg on Fri; 2.5 mg all other days   Weekly total 16.25 mg   Plan last modified Citlalli Sethi RN (11/6/2017)   Next INR check 1/22/2018   Priority INR   Target end date Indefinite    Indications   Other and unspecified coagulation defects [D68.9]  Long-term (current) use of anticoagulants [Z79.01] [Z79.01]         Anticoagulation Episode Summary     INR check location     Preferred lab     Send INR reminders to [facility] CLINIC POOL    Comments * Had PE in 2010 following hip surgery. Has heterozygous prothrombin gene mutation. Second PE 7-26-16. Needs lifelong warfarin. was on warfarin [id number]        Anticoagulation Care Providers     Provider Role Specialty Phone number    Saud Ramon MD [facility] [phone]            See the Encounter Report to view Anticoagulation Flowsheet and Dosing Calendar (Go to Encounters tab in chart review, and find the Anticoagulation Therapy Visit)    Patient was instructed to take 5mg Mon/Tues, he will begin a new weekly maintenance dose of 17.5 mg. He will return to recheck is INR in two weeks.    Citlalli Sethi RN

## 2018-01-08 NOTE — MR AVS SNAPSHOT
Cricket Klein   1/8/2018 10:45 AM   Anticoagulation Therapy Visit    Description:  72 year old male   Provider:  NB ANTI COAG   Department:  Nb Anticoag           INR as of 1/8/2018     Today's INR 1.4!      Anticoagulation Summary as of 1/8/2018     INR goal 2.0-3.0   Today's INR 1.4!   Full instructions 1/8: 5 mg; 1/9: 5 mg; 1/19: 2.5 mg; Otherwise 1.25 mg on Fri; 2.5 mg all other days   Next INR check 1/22/2018    Indications   Other and unspecified coagulation defects [D68.9]  Long-term (current) use of anticoagulants [Z79.01] [Z79.01]         January 2018 Details    Sun Mon Tue Wed Thu Fri Sat      1               2               3               4               5               6                 7               8      5 mg   See details      9      5 mg         10      2.5 mg         11      2.5 mg         12      1.25 mg         13      2.5 mg           14      2.5 mg         15      2.5 mg         16      2.5 mg         17      2.5 mg         18      2.5 mg         19      2.5 mg         20      2.5 mg           21      2.5 mg         22            23               24               25               26               27                 28               29               30               31                   Date Details   01/08 This INR check       Date of next INR:  1/22/2018         How to take your warfarin dose     To take:  1.25 mg Take 0.5 of a 2.5 mg tablet.    To take:  2.5 mg Take 1 of the 2.5 mg tablets.    To take:  5 mg Take 2 of the 2.5 mg tablets.

## 2018-01-15 ENCOUNTER — ANTICOAGULATION THERAPY VISIT (OUTPATIENT)
Dept: ANTICOAGULATION | Facility: CLINIC | Age: 73
End: 2018-01-15
Payer: COMMERCIAL

## 2018-01-15 DIAGNOSIS — Z79.01 LONG-TERM (CURRENT) USE OF ANTICOAGULANTS: ICD-10-CM

## 2018-01-15 LAB — INR POINT OF CARE: 2.1 (ref 0.86–1.14)

## 2018-01-15 PROCEDURE — 85610 PROTHROMBIN TIME: CPT | Mod: QW

## 2018-01-15 PROCEDURE — 36416 COLLJ CAPILLARY BLOOD SPEC: CPT

## 2018-01-15 PROCEDURE — 99207 ZZC NO CHARGE NURSE ONLY: CPT

## 2018-01-15 NOTE — PROGRESS NOTES
ANTICOAGULATION FOLLOW-UP CLINIC VISIT    Patient Name:  Cricket Klein  Date:  1/15/2018  Contact Type:  Face to Face accompanied by spouse      SUBJECTIVE:     Patient Findings     Comments Patient has had 21.25mg of warfarin in the last 7 days, writer would like to see him mid range of his goal so will have him begin 5mg W/F and 2.5mg the rest of the days of the week.           OBJECTIVE    INR Protime   Date Value Ref Range Status   01/15/2018 2.1 (A) 0.86 - 1.14 Final       ASSESSMENT / PLAN  INR assessment THER    Recheck INR In: 1 WEEK    INR Location Clinic      Anticoagulation Summary as of 1/15/2018     INR goal 2.0-3.0   Today's INR 2.1   Maintenance plan 5 mg (2.5 mg x 2) on Wed, Fri; 2.5 mg (2.5 mg x 1) all other days   Full instructions 5 mg on Wed, Fri; 2.5 mg all other days   Weekly total 22.5 mg   Plan last modified Citlalli Sethi RN (1/15/2018)   Next INR check 1/22/2018   Priority INR   Target end date Indefinite    Indications   Other and unspecified coagulation defects [D68.9]  Long-term (current) use of anticoagulants [Z79.01] [Z79.01]         Anticoagulation Episode Summary     INR check location     Preferred lab     Send INR reminders to [facility]    Comments * Had PE in 2010 following hip surgery. Has heterozygous prothrombin gene mutation. Second PE 7-26-16. Needs lifelong warfarin. was on warfarin [phone]        Anticoagulation Care Providers     Provider Role Specialty Phone number    Saud Ramon MD [facility] [phone]            See the Encounter Report to view Anticoagulation Flowsheet and Dosing Calendar (Go to Encounters tab in chart review, and find the Anticoagulation Therapy Visit)    Patient has has 21.25mg in the past seven days, increased dose to 22.5mg (5mg Wed/Fri and 2.5mg the rest of the days of the week) he will recheck his INR in one week.    Citlalli Sethi RN

## 2018-01-15 NOTE — MR AVS SNAPSHOT
Cricket Klein   1/15/2018 11:00 AM   Anticoagulation Therapy Visit    Description:  72 year old male   Provider:  NB ANTI COAG   Department:  Nb Anticoag           INR as of 1/15/2018     Today's INR 2.1      Anticoagulation Summary as of 1/15/2018     INR goal 2.0-3.0   Today's INR 2.1   Full instructions 5 mg on Wed, Fri; 2.5 mg all other days   Next INR check 1/22/2018    Indications   Other and unspecified coagulation defects [D68.9]  Long-term (current) use of anticoagulants [Z79.01] [Z79.01]         Your next Anticoagulation Clinic appointment(s)     Jan 22, 2018  9:30 AM CST   Anticoagulation Visit with NB ANTI COAG   [facility]    [address] [place] [address] [phone]              Contact Numbers     Please call [phone] to cancel and/or reschedule your appointment.  Please call [phone] with any problems or questions regarding your therapy          January 2018 Details    Sun Mon Tue Wed Thu Fri Sat      1               2               3               4               5               6                 7               8               9               10               11               12               13                 14               15      2.5 mg   See details      16      2.5 mg         17      5 mg         18      2.5 mg         19      5 mg         20      2.5 mg           21      2.5 mg         22            23               24               25               26               27                 28               29               30               31                   Date Details   01/15 This INR check       Date of next INR:  1/22/2018         How to take your warfarin dose     To take:  2.5 mg Take 1 of the 2.5 mg tablets.    To take:  5 mg Take 2 of the 2.5 mg tablets.

## 2018-01-18 ENCOUNTER — OFFICE VISIT (OUTPATIENT)
Dept: SLEEP MEDICINE | Facility: CLINIC | Age: 73
End: 2018-01-18
Payer: COMMERCIAL

## 2018-01-18 VITALS
OXYGEN SATURATION: 95 % | WEIGHT: 208 LBS | DIASTOLIC BLOOD PRESSURE: 80 MMHG | HEIGHT: 67 IN | BODY MASS INDEX: 32.65 KG/M2 | SYSTOLIC BLOOD PRESSURE: 145 MMHG | HEART RATE: 66 BPM

## 2018-01-18 DIAGNOSIS — G47.39 COMPLEX SLEEP APNEA SYNDROME: Primary | ICD-10-CM

## 2018-01-18 PROCEDURE — 99214 OFFICE O/P EST MOD 30 MIN: CPT | Performed by: FAMILY MEDICINE

## 2018-01-18 NOTE — PATIENT INSTRUCTIONS

## 2018-01-18 NOTE — MR AVS SNAPSHOT
After Visit Summary   1/18/2018    Cricket Klein    MRN: [id number]           Patient Information     Date Of Birth          1945        Visit Information        Provider Department      1/18/2018 2:00 PM Saud Fuller MD [facility] [place]        Today's Diagnoses     Complex sleep apnea syndrome    -  1      Care Instructions      Your BMI is Body mass index is 32.58 kg/(m^2).  Weight management is a personal decision.  If you are interested in exploring weight loss strategies, the following discussion covers the approaches that may be successful. Body mass index (BMI) is one way to tell whether you are at a healthy weight, overweight, or obese. It measures your weight in relation to your height.  A BMI of 18.5 to 24.9 is in the healthy range. A person with a BMI of 25 to 29.9 is considered overweight, and someone with a BMI of 30 or greater is considered obese. More than two-thirds of American adults are considered overweight or obese.  Being overweight or obese increases the risk for further weight gain. Excess weight may lead to heart disease and diabetes.  Creating and following plans for healthy eating and physical activity may help you improve your health.  Weight control is part of healthy lifestyle and includes exercise, emotional health, and healthy eating habits. Careful eating habits lifelong are the mainstay of weight control. Though there are significant health benefits from weight loss, long-term weight loss with diet alone may be very difficult to achieve- studies show long-term success with dietary management in less than 10% of people. Attaining a healthy weight may be especially difficult to achieve in those with severe obesity. In some cases, medications, devices and surgical management might be considered.  What can you do?  If you are overweight or obese and are interested in methods for weight loss, you should discuss this with your provider.      Consider reducing daily calorie intake by 500 calories.     Keep a food journal.     Avoiding skipping meals, consider cutting portions instead.    Diet combined with exercise helps maintain muscle while optimizing fat loss. Strength training is particularly important for building and maintaining muscle mass. Exercise helps reduce stress, increase energy, and improves fitness. Increasing exercise without diet control, however, may not burn enough calories to loose weight.       Start walking three days a week 10-20 minutes at a time    Work towards walking thirty minutes five days a week     Eventually, increase the speed of your walking for 1-2 minutes at time    In addition, we recommend that you review healthy lifestyles and methods for weight loss available through the National Institutes of Health patient information sites:  http://win.niddk.nih.gov/publications/index.htm    And look into health and wellness programs that may be available through your health insurance provider, employer, local community center, or nlelie club.    Weight management plan: Patient was referred to their PCP to discuss a diet and exercise plan.      Your Body mass index is 32.58 kg/(m^2).  Weight management is a personal decision.  If you are interested in exploring weight loss strategies, the following discussion covers the approaches that may be successful. Body mass index (BMI) is one way to tell whether you are at a healthy weight, overweight, or obese. It measures your weight in relation to your height.  A BMI of 18.5 to 24.9 is in the healthy range. A person with a BMI of 25 to 29.9 is considered overweight, and someone with a BMI of 30 or greater is considered obese. More than two-thirds of American adults are considered overweight or obese.  Being overweight or obese increases the risk for further weight gain. Excess weight may lead to heart disease and diabetes.  Creating and following plans for healthy eating and  physical activity may help you improve your health.  Weight control is part of healthy lifestyle and includes exercise, emotional health, and healthy eating habits. Careful eating habits lifelong are the mainstay of weight control. Though there are significant health benefits from weight loss, long-term weight loss with diet alone may be very difficult to achieve- studies show long-term success with dietary management in less than 10% of people. Attaining a healthy weight may be especially difficult to achieve in those with severe obesity. In some cases, medications, devices and surgical management might be considered.  What can you do?  If you are overweight or obese and are interested in methods for weight loss, you should discuss this with your provider.     Consider reducing daily calorie intake by 500 calories.     Keep a food journal.     Avoiding skipping meals, consider cutting portions instead.    Diet combined with exercise helps maintain muscle while optimizing fat loss. Strength training is particularly important for building and maintaining muscle mass. Exercise helps reduce stress, increase energy, and improves fitness. Increasing exercise without diet control, however, may not burn enough calories to loose weight.       Start walking three days a week 10-20 minutes at a time    Work towards walking thirty minutes five days a week     Eventually, increase the speed of your walking for 1-2 minutes at time    In addition, we recommend that you review healthy lifestyles and methods for weight loss available through the National Institutes of Health patient information sites:  http://win.niddk.nih.gov/publications/index.htm    And look into health and wellness programs that may be available through your health insurance provider, employer, local community center, or nellie club.    Weight management plan: Patient was referred to their PCP to discuss a diet and exercise plan.            Follow-ups after your  "visit        Your next 10 appointments already scheduled     2018  9:30 AM CST   Anticoagulation Visit with NB ANTI COAG   [facility]    [address] [place] [id number] [phone]              Who to contact     If you have questions or need follow up information about today's clinic visit or your schedule please contact [facility] directly at [phone].  Normal or non-critical lab and imaging results will be communicated to you by [employer], letter or phone within 4 business days after the clinic has received the results. If you do not hear from us within 7 days, please contact the clinic through [employer] or phone. If you have a critical or abnormal lab result, we will notify you by phone as soon as possible.  Submit refill requests through [email or web address] or call your pharmacy and they will forward the refill request to us. Please allow 3 business days for your refill to be completed.          Additional Information About Your Visit        [employer] Information     [email or web address] lets you send messages to your doctor, view your test results, renew your prescriptions, schedule appointments and more. To sign up, go to [email or web address] . Click on \"Log in\" on the left side of the screen, which will take you to the Welcome page. Then click on \"Sign up Now\" on the right side of the page.     You will be asked to enter the access code listed below, as well as some personal information. Please follow the directions to create your username and password.     Your access code is: [email or web address]  Expires: 2018  2:27 PM     Your access code will  in 90 days. If you need help or a new code, please call your [place] clinic or [phone].        Care EveryWhere ID     This is your Care EveryWhere ID. This could be used by other organizations to access your [place] medical records  [id number]        Your Vitals Were     Pulse " "Height Pulse Oximetry BMI (Body Mass Index)          66 1.702 m (5' 7\") 95% 32.58 kg/m2         Blood Pressure from Last 3 Encounters:   01/18/18 145/80   10/06/17 140/83   07/30/17 110/59    Weight from Last 3 Encounters:   01/18/18 94.3 kg (208 lb)   10/06/17 93.9 kg (207 lb)   07/28/17 93 kg (205 lb)              Today, you had the following     No orders found for display       Primary Care Provider Office Phone # Fax #    Saud Ramon [phone]       [address] [id number]        Equal Access to Services     ROSARIO MOE : Kaylee Soto, wasintia umana, qaybta kaalmada adeashley, nory hinkle. So [facility] [phone].    ATENCIÓN: Si habla español, tiene a barton disposición servicios gratuitos de asistencia lingüística. Llame al [phone].    We comply with applicable federal civil rights laws and [place] laws. We do not discriminate on the basis of race, color, national origin, age, disability, sex, sexual orientation, or gender identity.            Thank you!     Thank you for choosing [facility]  for your care. Our goal is always to provide you with excellent care. Hearing back from our patients is one way we can continue to improve our services. Please take a few minutes to complete the written survey that you may receive in the mail after your visit with us. Thank you!             Your Updated Medication List - Protect others around you: Learn how to safely use, store and throw away your medicines at www.disposemymeds.org.          This list is accurate as of: 1/18/18  2:27 PM.  Always use your most recent med list.                   Brand Name Dispense Instructions for use Diagnosis    atenolol 50 MG tablet    TENORMIN    180 tablet    TAKE ONE TABLET BY MOUTH TWICE A DAY    Benign essential HTN       ezetimibe 10 MG tablet    ZETIA    90 tablet    Take 1 tablet (10 mg) by mouth daily    " Pure hypercholesterolemia       * order for DME      Equipment being ordered: CPAP  Cricket Klein received a Resmed AirSense 10 Auto. Pressures were set at Auto 10 - 18 cm H2O.        * order for DME      Equipment ordered: RESMED ASV Mask type: Full face  Settings: ASV EPAP 7, PS MIN 4 MAX 15, RR AUTO        vitamin D 1000 UNITS capsule      Take 1 capsule by mouth daily.        warfarin 2.5 MG tablet    COUMADIN    90 tablet    Take 2.5mg daily or as directed by Anticoagulation Clinic    Pulmonary embolus, left (H)       * Notice:  This list has 2 medication(s) that are the same as other medications prescribed for you. Read the directions carefully, and ask your doctor or other care provider to review them with you.

## 2018-01-18 NOTE — PROGRESS NOTES
Complex Sleep Apnea - PAP Follow-Up Visit:    Chief Complaint   Patient presents with     CPAP Follow Up     compliance follow up       Cricket Klein comes in today for ASV compliance follow-up of complex sleep apnea.  He is seen with his wife today.  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     10/17/2016 - 6 month follow-up after new CPAP auto-titrate 10-18 cm H2O.  Clinically doing well.  He is working with Dr. Oconnor in regards to cognitive decline, neuropsyche testing with potential for sub-clinical seizures versus dementing illness.  Daytime EEG WNL and brain MRI unchanged.  Noted to have consistently elevated BP's.  A/P to continue CPAP auto 10-18, new supplies, monitor BP and no change to atenolol 50mg BID.    10/6/2017 - Returns for annual follow-up, seen with his wife today.  Continues to be followed for memory concerns, did have follow-up with Dr. Oconnor.  Not felt to representing seizure activity, plan for repeat neuropsyche testing.  He feels his CPAP is working well, no snoring or observed apnea.  Most recent echocardiogram on 7/26/2016 with LVEF 55-60%, normal RV and LV function / size / structure.  CPAP download from 9/5/2017 - 10/4/2017 on auto-titrate 10-18 cm H2O.  Average daily usage of 7:05, used >= 4 hours on 93% of nights.  Pressure median 10.9 cm H2O, 95th%ile of 12.1 cm H2O.  AHI 11.8 (AI 10.7 with TRAVIS 7.7).  On night by night review, AHI varies wildly from ~2 to ~35, does not seem to correlate clearly with leak or pressure.  A/P of concern for potential complex sleep apnea, will proceed with PAP titration PSG with trial of ASV if does appear to be complex / central in nature.    10/17/2017 - All-night PAP titration PSG.  CPAP seen to be incompletely effective, with emergence of frequent central apneas and central hypopneas with AHI > 5 and over 50% of events were central in nature.  Transitioned to ASV, with EEP 7 / min PS 4 / max PS 15  and appeared effective including supine NREM (N1 and N2, no N3) and supine REM.  A/P to proceed with ASV with EEP 7 / min PS 4 / max PS 15.    Today - Returns for ASV compliance follow-up.  Joined by wife and 3 yo grand daughter today.  Overall, he feels he has done very well with his ASV, wife thinks he has had some mild improvement in thinking / memory.  ASV download from 12/18/2017 - 1/16/2018 with EEP 7, min PS 4, max PS 15.  Average daily usage of 7:30, used >= 4 hours on 100% of nights.  AHI 2.3.    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 10/2/2012          Hyperlipidemia LDL goal <130 10/31/2010     Priority: Medium     1/3/2011 Patient did not tolerate simvastatin, atorvastatin and pravastatin on a daily basis because of muscle cramping and weakness. Cholestyramine does not cause significant side effects, but is now doing a very good job of lowering the LDL toward goal.       Other and unspecified coagulation defects 04/13/2010     Priority: Medium     Factor  2 mutation-heterozygote  4/13/10 I had discussed this with Dr Chandler, and she recommended that warfarin for life would be advisable.   Cricket did see Dr. Ramos,  Who recommended no warfarin, and stopped it 2/23/12  See 4/28/12 note of visit with Dr. Ramos. Recommends no warfarin. Would need compression stockings for any surgery. And perhaps prophylaxis.          Pulmonary embolism (H) 03/29/2010     Priority: Medium     Within week after  hip replacement -March 2010.  Hematologist feels no increase risk for recurrence of pulmonary embolism or DVT due to  "heterozygote status of Factor 2 mutation.       S/P hip replacement 03/29/2010     Priority: Medium     date of surgery was March 8, 2010. had pulmonary embolus about a week later.       Impotence of organic origin 05/14/2007     Priority: Medium     Pure hypercholesterolemia 03/28/2006     Priority: Medium     Essential hypertension      Priority: Medium     Goal is <130/80  Problem list name updated by automated process. Provider to review       Transient cerebral ischemia      Priority: Medium     April 19, 2007. Transient memory loss.    MRI HEAD SAME DAY    1. Old left frontal depressed skull fracture with underlying gliosis    and encephalomalacia.     2. Nonspecific periventricular white matter ischemic disease adjacent    to the right lateral ventricle anteriorly.    Problem list name updated by automated process. Provider to review            /80  Pulse 66  Ht 1.702 m (5' 7\")  Wt 94.3 kg (208 lb)  SpO2 95%  BMI 32.58 kg/m2    Impression/Plan:    1.)  Complex sleep apnea   - Appears well controlled with ASV with EEP 7, min PS 4, max PS 15   - Mild improvements in cognition / memory   - Continue ASV at current settings.     Cricket Klein will follow up in about 6 month(s).     Twenty-five minutes spent with patient, all of which were spent face-to-face counseling, consulting, coordinating plan of care.      Saud Fuller MD, MD    CC:  Saud Ramon  "

## 2018-01-18 NOTE — NURSING NOTE
"Chief Complaint   Patient presents with     CPAP Follow Up     compliance follow up       Initial /80  Pulse 66  Ht 1.702 m (5' 7\")  Wt 94.3 kg (208 lb)  SpO2 95%  BMI 32.58 kg/m2 Estimated body mass index is 32.58 kg/(m^2) as calculated from the following:    Height as of this encounter: 1.702 m (5' 7\").    Weight as of this encounter: 94.3 kg (208 lb).  Medication Reconciliation: complete    "

## 2018-01-22 ENCOUNTER — ANTICOAGULATION THERAPY VISIT (OUTPATIENT)
Dept: ANTICOAGULATION | Facility: CLINIC | Age: 73
End: 2018-01-22
Payer: COMMERCIAL

## 2018-01-22 DIAGNOSIS — I26.99 PULMONARY EMBOLUS, LEFT (H): ICD-10-CM

## 2018-01-22 DIAGNOSIS — Z79.01 LONG-TERM (CURRENT) USE OF ANTICOAGULANTS: ICD-10-CM

## 2018-01-22 LAB — INR POINT OF CARE: 2.8 (ref 0.86–1.14)

## 2018-01-22 PROCEDURE — 85610 PROTHROMBIN TIME: CPT | Mod: QW

## 2018-01-22 PROCEDURE — 99207 ZZC NO CHARGE NURSE ONLY: CPT

## 2018-01-22 PROCEDURE — 36416 COLLJ CAPILLARY BLOOD SPEC: CPT

## 2018-01-22 RX ORDER — WARFARIN SODIUM 2.5 MG/1
TABLET ORAL
Qty: 112 TABLET | Refills: 0 | Status: SHIPPED | OUTPATIENT
Start: 2018-01-22 | End: 2018-03-29

## 2018-01-22 NOTE — MR AVS SNAPSHOT
Cricket Klein   1/22/2018 9:30 AM   Anticoagulation Therapy Visit    Description:  72 year old male   Provider:  NB ANTI COAG   Department:  Nb Anticoag           INR as of 1/22/2018     Today's INR 2.8      Anticoagulation Summary as of 1/22/2018     INR goal 2.0-3.0   Today's INR 2.8   Full instructions 5 mg on Wed, Fri; 2.5 mg all other days   Next INR check 2/19/2018    Indications   Other and unspecified coagulation defects [D68.9]  Long-term (current) use of anticoagulants [Z79.01] [Z79.01]         Your next Anticoagulation Clinic appointment(s)     Feb 19, 2018  9:30 AM CST   Anticoagulation Visit with NB ANTI COAG   [facility]    [address] [place] [id number] [phone]              Contact Numbers     Please call [phone] with any problems or questions regarding your therapy.    If you need to cancel and/or reschedule your appointment please call one of the following numbers:  [facility] [phone]  [place] - [phone]  [place] - [phone]  [facility] [phone]  [place] - [phone]            January 2018 Details    Sun Mon Tue Wed Thu Fri Sat      1               2               3               4               5               6                 7               8               9               10               11               12               13                 14               15               16               17               18               19               20                 21               22      2.5 mg   See details      23      2.5 mg         24      5 mg         25      2.5 mg         26      5 mg         27      2.5 mg           28      2.5 mg         29      2.5 mg         30      2.5 mg         31      5 mg             Date Details   01/22 This INR check               How to take your warfarin dose     To take:  2.5 mg Take 1 of the 2.5 mg tablets.    To take:  5 mg Take 2 of the 2.5 mg  tablets.           February 2018 Details    Sun Mon Tue Wed Thu Fri Sat         1      2.5 mg         2      5 mg         3      2.5 mg           4      2.5 mg         5      2.5 mg         6      2.5 mg         7      5 mg         8      2.5 mg         9      5 mg         10      2.5 mg           11      2.5 mg         12      2.5 mg         13      2.5 mg         14      5 mg         15      2.5 mg         16      5 mg         17      2.5 mg           18      2.5 mg         19            20               21               22               23               24                 25               26               27               28                   Date Details   No additional details    Date of next INR:  2/19/2018         How to take your warfarin dose     To take:  2.5 mg Take 1 of the 2.5 mg tablets.    To take:  5 mg Take 2 of the 2.5 mg tablets.

## 2018-01-22 NOTE — PROGRESS NOTES
ANTICOAGULATION FOLLOW-UP CLINIC VISIT    Patient Name:  Cricket Klein  Date:  1/22/2018  Contact Type:  Face to Face    SUBJECTIVE:     Patient Findings     Positives No Problem Findings           OBJECTIVE    INR Protime   Date Value Ref Range Status   01/22/2018 2.8 (A) 0.86 - 1.14 Final       ASSESSMENT / PLAN  INR assessment THER    Recheck INR In: 4 WEEKS    INR Location Clinic      Anticoagulation Summary as of 1/22/2018     INR goal 2.0-3.0   Today's INR 2.8   Maintenance plan 5 mg (2.5 mg x 2) on Wed, Fri; 2.5 mg (2.5 mg x 1) all other days   Full instructions 5 mg on Wed, Fri; 2.5 mg all other days   Weekly total 22.5 mg   Plan last modified Citlalli Sethi RN (1/15/2018)   Next INR check 2/19/2018   Priority INR   Target end date Indefinite    Indications   Other and unspecified coagulation defects [D68.9]  Long-term (current) use of anticoagulants [Z79.01] [Z79.01]         Anticoagulation Episode Summary     INR check location     Preferred lab     Send INR reminders to [facility]    Comments * Had PE in 2010 following hip surgery. Has heterozygous prothrombin gene mutation. Second PE 7-26-16. Needs lifelong warfarin. was on warfarin [id number]        Anticoagulation Care Providers     Provider Role Specialty Phone number    Saud Ramon MD [facility] [phone]            See the Encounter Report to view Anticoagulation Flowsheet and Dosing Calendar (Go to Encounters tab in chart review, and find the Anticoagulation Therapy Visit)    Patient to continue the same warfarin maintenance dose, INR therapeutic.      Citlalli Sethi RN

## 2018-02-06 ENCOUNTER — OFFICE VISIT (OUTPATIENT)
Dept: FAMILY MEDICINE | Facility: CLINIC | Age: 73
End: 2018-02-06
Payer: COMMERCIAL

## 2018-02-06 VITALS
DIASTOLIC BLOOD PRESSURE: 87 MMHG | RESPIRATION RATE: 12 BRPM | SYSTOLIC BLOOD PRESSURE: 159 MMHG | TEMPERATURE: 97.3 F | HEART RATE: 59 BPM | BODY MASS INDEX: 32.73 KG/M2 | WEIGHT: 209 LBS

## 2018-02-06 DIAGNOSIS — G56.03 BILATERAL CARPAL TUNNEL SYNDROME: Primary | ICD-10-CM

## 2018-02-06 PROCEDURE — 99213 OFFICE O/P EST LOW 20 MIN: CPT | Performed by: FAMILY MEDICINE

## 2018-02-06 NOTE — PROGRESS NOTES
SUBJECTIVE:   Cricket Klein is a 72 year old male who presents to clinic today for the following health issues: Claudio comes in today because he has fairly classic bilateral carpal tunnel syndrome.  The right is worse than the left.  We have tried injecting this in the past that he has gotten some relief but today he is having quite a bit numbness tingling and pain in his right hand.      Musculoskeletal problem/pain      Duration: 1 year or so    Description  Location: bilateral wrist, right hand worse than left - right hand dominant    Intensity:  moderate, severe    Accompanying signs and symptoms: tingling and painful, numb, cold at times    History  Previous similar problem: YES- has had injections done - last injection done 6 months ago didn't help that long  Previous evaluation:  none    Precipitating or alleviating factors:  Trauma or overuse: no   Aggravating factors include: worse after waking up - hands are numb and painful    Therapies tried and outcome: acetaminophen          Problem list and histories reviewed & adjusted, as indicated.  Additional history: as documented    Patient Active Problem List   Diagnosis     Essential hypertension     Transient cerebral ischemia     Pure hypercholesterolemia     Impotence of organic origin     Pulmonary embolism (H)     S/P hip replacement     Other and unspecified coagulation defects     Hyperlipidemia LDL goal <130     Advanced directives, counseling/discussion     CHRISTIANO (obstructive sleep apnea)     Long-term (current) use of anticoagulants [Z79.01]     Hip arthrosis     Past Surgical History:   Procedure Laterality Date     ARTHROPLASTY HIP Right 7/28/2017    Procedure: ARTHROPLASTY HIP;  Right total hip arthroplasty;  Surgeon: Jose Miguel Hutchins MD;  Location: [facility]     ARTHROTOMY SHOULDER, ROTATOR CUFF REPAIR, COMBINED  4/2/2012    Procedure:COMBINED ARTHROTOMY SHOULDER, ROTATOR CUFF REPAIR; Left Distal clavicle excision, Subacromial decompression,  Rotator cuff repair; Surgeon:JOSE MIGUEL HUTCHINS; Location:[facility]     ARTHROTOMY SHOULDER, ROTATOR CUFF REPAIR, COMBINED  10/12/2012    Procedure: COMBINED ARTHROTOMY SHOULDER, ROTATOR CUFF REPAIR;  Right shoulder biceps tenodesiss,subacromial decompression;  Surgeon: Jose Miguel Hutchins MD;  Location: [facility]     COLONOSCOPY  2003    normal     COLONOSCOPY  4/15/2014    Procedure: Colonoscopy;  Surgeon: Angela May MD;  Location: [facility]     SURGICAL HISTORY OF -       achilles tendon repair     SURGICAL HISTORY OF -   3-2010    left hip relpacement       Social History   Substance Use Topics     Smoking status: Never Smoker     Smokeless tobacco: Never Used     Alcohol use 0.0 oz/week     0 Standard drinks or equivalent per week      Comment: rare     Family History   Problem Relation Age of Onset     Thyroid Disease Mother      thyroid cancer     HEART DISEASE Mother       of heart attack     HEART DISEASE Father       of heart attack     DIABETES Father      Circulatory Brother      Alzheimer Disease Brother      Circulatory Brother      CANCER Brother      laryngeal     Circulatory Brother      HEART DISEASE Brother      CHF     Congenital Anomalies Sister      valve     HEART DISEASE Brother      Heart failure     Other - See Comments Other 12     Special needs child         Current Outpatient Prescriptions   Medication Sig Dispense Refill     warfarin (COUMADIN) 2.5 MG tablet Take 5mg Wednesday/Friday and 2.5mg the rest of the days of the week or as directed by Anticoagulation Clinic 112 tablet 0     order for DME Equipment ordered: RESMED ASV Mask type: Full face  Settings: ASV EPAP 7, PS MIN 4 MAX 15, RR AUTO       atenolol (TENORMIN) 50 MG tablet TAKE ONE TABLET BY MOUTH TWICE A  tablet 1     ezetimibe (ZETIA) 10 MG tablet Take 1 tablet (10 mg) by mouth daily 90 tablet 3     order for DME Equipment being ordered: CPAP  Cricket Klein received a Resmed AirSense 10  Auto. Pressures were set at Auto 10 - 18 cm H2O.       Cholecalciferol (VITAMIN D) 1000 UNITS capsule Take 1 capsule by mouth daily.       Allergies   Allergen Reactions     Atorvastatin Calcium Other (See Comments)     Myalgia.     Pravastatin Cramps     Myalgias       Zocor [Hmg-Coa-R Inhibitors] Other (See Comments)     Muscle pain       Reviewed and updated as needed this visit by clinical staff  Tobacco  Allergies       Reviewed and updated as needed this visit by Provider         ROS:  C: NEGATIVE for fever, chills, change in weight  E/M: NEGATIVE for ear, mouth and throat problems  R: NEGATIVE for significant cough or SOB  CV: NEGATIVE for chest pain, palpitations or peripheral edema    OBJECTIVE:     /87  Pulse 59  Temp 97.3  F (36.3  C) (Tympanic)  Resp 12  Wt 209 lb (94.8 kg)  BMI 32.73 kg/m2  Body mass index is 32.73 kg/(m^2).  GENERAL: healthy, alert and no distress  NECK: no adenopathy, no asymmetry, masses, or scars and thyroid normal to palpation  RESP: lungs clear to auscultation - no rales, rhonchi or wheezes  CV: regular rate and rhythm, normal S1 S2, no S3 or S4, no murmur, click or rub, no peripheral edema and peripheral pulses strong  ABDOMEN: soft, nontender, no hepatosplenomegaly, no masses and bowel sounds normal  MS: His right hand has some distinct decreased sensation over the distribution of the median nerve.  Pulses are good.        ASSESSMENT/PLAN:             1. Bilateral carpal tunnel syndrome    - ORTHO  REFERRAL    We will go ahead and get orthopedic consultation.  ASSESSMENT/PLAN:      ICD-10-CM    1. Bilateral carpal tunnel syndrome G56.03 ORTHO  REFERRAL       Patient Instructions   1. THIS LOOKS TO ME LIKE CARPAL TUNNEL    2. GO AND SEE THE ORTHO HAND MD.           Saud Ramon MD  [facility]

## 2018-02-06 NOTE — MR AVS SNAPSHOT
After Visit Summary   2/6/2018    Cricket Klein    MRN: [id number]           Patient Information     Date Of Birth          1945        Visit Information        Provider Department      2/6/2018 10:40 AM Saud Ramon MD [facility]        Today's Diagnoses     Bilateral carpal tunnel syndrome    -  1      Care Instructions    1. THIS LOOKS TO ME LIKE CARPAL TUNNEL    2. GO AND SEE THE ORTHO HAND MD.           Follow-ups after your visit        Additional Services     ORTHO  REFERRAL       [facility] is referring you to the Orthopedic  Services at [facility].  NEEDS HAND CONSULT AT [place] FOR CARPAL TUNNEL DR PEDRO       The  Representative will assist you in the coordination of your Orthopedic and Musculoskeletal Care as prescribed by your physician.    The  Representative will call you within 1 business day to help schedule your appointment, or you may contact the  Representative at:    All areas ~ [phone]     Type of Referral : Surgical / Specialist       Timeframe requested: Within 2 weeks    Coverage of these services is subject to the terms and limitations of your health insurance plan.  Please call member services at your health plan with any benefit or coverage questions.      If X-rays, CT or MRI's have been performed, please contact the facility where they were done to arrange for , prior to your scheduled appointment.  Please bring this referral request to your appointment and present it to your specialist.                  Your next 10 appointments already scheduled     Feb 19, 2018  9:30 AM CST   Anticoagulation Visit with NB ANTI COAG   [facility] ([facility]    [address] [place] [address] [phone]              Who to contact     If you have questions or need follow up information about  "today's clinic visit or your schedule please contact [facility] directly at [phone].  Normal or non-critical lab and imaging results will be communicated to you by MyChart, letter or phone within 4 business days after the clinic has received the results. If you do not hear from us within 7 days, please contact the clinic through XYZEhart or phone. If you have a critical or abnormal lab result, we will notify you by phone as soon as possible.  Submit refill requests through [employer] or call your pharmacy and they will forward the refill request to us. Please allow 3 business days for your refill to be completed.          Additional Information About Your Visit        XYZEhart Information     [employer]visor lets you send messages to your doctor, view your test results, renew your prescriptions, schedule appointments and more. To sign up, go to [email or web address] . Click on \"Log in\" on the left side of the screen, which will take you to the Welcome page. Then click on \"Sign up Now\" on the right side of the page.     You will be asked to enter the access code listed below, as well as some personal information. Please follow the directions to create your username and password.     Your access code is: [email or web address]  Expires: 2018  2:27 PM     Your access code will  in 90 days. If you need help or a new code, please call your [place] clinic or [phone].        Care EveryWhere ID     This is your Care EveryWhere ID. This could be used by other organizations to access your [place] medical records  [id number]        Your Vitals Were     Pulse Temperature Respirations BMI (Body Mass Index)          59 97.3  F (36.3  C) (Tympanic) 12 32.73 kg/m2         Blood Pressure from Last 3 Encounters:   18 159/87   18 145/80   10/06/17 140/83    Weight from Last 3 Encounters:   18 209 lb (94.8 kg)   18 208 lb (94.3 kg)   10/06/17 207 lb (93.9 kg)              We Performed the " Following     ORTHO  REFERRAL        Primary Care Provider Office Phone # Fax #    Saud Ramon [phone]       [address] [place] [address]        Equal Access to Services     ROSARIO MOE : Hadii aad ku hadvestao Sojose guadalupeali, waaxda luqadaha, qaybta kaalmada adeegyada, waxalva benjaminn dory mccall laRubiantoinette hinkle. So [facility] [phone].    ATENCIÓN: Si habla español, tiene a barton disposición servicios gratuitos de asistencia lingüística. Llame al [phone].    We comply with applicable federal civil rights laws and [place] laws. We do not discriminate on the basis of race, color, national origin, age, disability, sex, sexual orientation, or gender identity.            Thank you!     Thank you for choosing [facility]  for your care. Our goal is always to provide you with excellent care. Hearing back from our patients is one way we can continue to improve our services. Please take a few minutes to complete the written survey that you may receive in the mail after your visit with us. Thank you!             Your Updated Medication List - Protect others around you: Learn how to safely use, store and throw away your medicines at www.disposemymeds.org.          This list is accurate as of 2/6/18 10:47 AM.  Always use your most recent med list.                   Brand Name Dispense Instructions for use Diagnosis    atenolol 50 MG tablet    TENORMIN    180 tablet    TAKE ONE TABLET BY MOUTH TWICE A DAY    Benign essential HTN       ezetimibe 10 MG tablet    ZETIA    90 tablet    Take 1 tablet (10 mg) by mouth daily    Pure hypercholesterolemia       * order for DME      Equipment being ordered: LEXI  Cricket Klein received a Resmed AirSense 10 Auto. Pressures were set at Auto 10 - 18 cm H2O.        * order for DME      Equipment ordered: RESMED ASV Mask type: Full face  Settings: ASV EPAP 7, PS MIN 4 MAX 15, RR AUTO        vitamin D 1000 UNITS  capsule      Take 1 capsule by mouth daily.        warfarin 2.5 MG tablet    COUMADIN    112 tablet    Take 5mg Wednesday/Friday and 2.5mg the rest of the days of the week or as directed by Anticoagulation Clinic    Pulmonary embolus, left (H)       * Notice:  This list has 2 medication(s) that are the same as other medications prescribed for you. Read the directions carefully, and ask your doctor or other care provider to review them with you.

## 2018-02-06 NOTE — NURSING NOTE
"Chief Complaint   Patient presents with     Musculoskeletal Problem     hand pain, tingling, cold, numb        Initial /87  Pulse 59  Temp 97.3  F (36.3  C) (Tympanic)  Resp 12  Wt 209 lb (94.8 kg)  BMI 32.73 kg/m2 Estimated body mass index is 32.73 kg/(m^2) as calculated from the following:    Height as of 1/18/18: 5' 7\" (1.702 m).    Weight as of this encounter: 209 lb (94.8 kg).      Health Maintenance that is potentially due pending provider review:  NONE    n/a    Is there anyone who you would like to be able to receive your results? No  If yes have patient fill out LETTY      "

## 2018-02-16 ENCOUNTER — TRANSFERRED RECORDS (OUTPATIENT)
Dept: HEALTH INFORMATION MANAGEMENT | Facility: CLINIC | Age: 73
End: 2018-02-16

## 2018-02-19 ENCOUNTER — ANTICOAGULATION THERAPY VISIT (OUTPATIENT)
Dept: ANTICOAGULATION | Facility: CLINIC | Age: 73
End: 2018-02-19
Payer: COMMERCIAL

## 2018-02-19 DIAGNOSIS — Z79.01 LONG-TERM (CURRENT) USE OF ANTICOAGULANTS: ICD-10-CM

## 2018-02-19 LAB — INR POINT OF CARE: 3.5 (ref 0.86–1.14)

## 2018-02-19 PROCEDURE — 85610 PROTHROMBIN TIME: CPT | Mod: QW

## 2018-02-19 PROCEDURE — 36416 COLLJ CAPILLARY BLOOD SPEC: CPT

## 2018-02-19 PROCEDURE — 99207 ZZC NO CHARGE NURSE ONLY: CPT

## 2018-02-19 NOTE — MR AVS SNAPSHOT
Cricket Klein   2/19/2018 9:30 AM   Anticoagulation Therapy Visit    Description:  72 year old male   Provider:  NB ANTI COAG   Department:  N[facility] Anticoag           INR as of 2/19/2018     Today's INR 3.5!      Anticoagulation Summary as of 2/19/2018     INR goal 2.0-3.0   Today's INR 3.5!   Full instructions 2.5 mg every day   Next INR check 3/5/2018    Indications   Other and unspecified coagulation defects [D68.9]  Long-term (current) use of anticoagulants [Z79.01] [Z79.01]         Instructions    have a salad today please         Your next Anticoagulation Clinic appointment(s)     Mar 05, 2018 11:45 AM CST   Anticoagulation Visit with NB ANTI COAG   [facility]    [address] [place] [address] [phone]              Contact Numbers     Please call [phone] with any problems or questions regarding your therapy.    If you need to cancel and/or reschedule your appointment please call one of the following numbers:  [facility] [phone]  [place] - [phone]  [facility] [phone]  [facility] [phone]  [place] - [phone]            February 2018 Details    Sun Mon Tue Wed Thu Fri Sat         1               2               3                 4               5               6               7               8               9               10                 11               12               13               14               15               16               17                 18               19      2.5 mg   See details      20      2.5 mg         21      2.5 mg         22      2.5 mg         23      2.5 mg         24      2.5 mg           25      2.5 mg         26      2.5 mg         27      2.5 mg         28      2.5 mg             Date Details   02/19 This INR check               How to take your warfarin dose     To take:  2.5 mg Take 1 of the 2.5 mg tablets.           March 2018 Details    Sun Mon Tue Wed Thu Fri  Sat         1      2.5 mg         2      2.5 mg         3      2.5 mg           4      2.5 mg         5            6               7               8               9               10                 11               12               13               14               15               16               17                 18               19               20               21               22               23               24                 25               26               27               28               29               30               31                Date Details   No additional details    Date of next INR:  3/5/2018         How to take your warfarin dose     To take:  2.5 mg Take 1 of the 2.5 mg tablets.

## 2018-02-19 NOTE — PROGRESS NOTES
ANTICOAGULATION FOLLOW-UP CLINIC VISIT    Patient Name:  Cricket Klein  Date:  2/19/2018  Contact Type:  Face to Face    SUBJECTIVE:     Patient Findings     Positives Missed doses, Unexplained INR or factor level change    Comments Patient denies signs and symptoms of bleeding. Patient was educated regarding what to look for and instructed when seek immediate medical attention. Patient verbalized understanding.             OBJECTIVE    INR Protime   Date Value Ref Range Status   02/19/2018 3.5 (A) 0.86 - 1.14 Final       ASSESSMENT / PLAN  INR assessment SUPRA    Recheck INR In: 2 WEEKS    INR Location Clinic      Anticoagulation Summary as of 2/19/2018     INR goal 2.0-3.0   Today's INR 3.5!   Maintenance plan 2.5 mg (2.5 mg x 1) every day   Full instructions 2.5 mg every day   Weekly total 17.5 mg   Plan last modified Citlalli Sethi RN (2/19/2018)   Next INR check 3/5/2018   Priority INR   Target end date Indefinite    Indications   Other and unspecified coagulation defects [D68.9]  Long-term (current) use of anticoagulants [Z79.01] [Z79.01]         Anticoagulation Episode Summary     INR check location     Preferred lab     Send INR reminders to [facility]    Comments * Had PE in 2010 following hip surgery. Has heterozygous prothrombin gene mutation. Second PE 7-26-16. Needs lifelong warfarin. was on warfarin [id number]        Anticoagulation Care Providers     Provider Role Specialty Phone number    Saud Ramon MD [facility] [phone]            See the Encounter Report to view Anticoagulation Flowsheet and Dosing Calendar (Go to Encounters tab in chart review, and find the Anticoagulation Therapy Visit)    Patient instructed to consume a salad today for lunch, then begin warfarin  2.5mg daily.  Citlalli Sethi RN

## 2018-03-05 ENCOUNTER — ANTICOAGULATION THERAPY VISIT (OUTPATIENT)
Dept: ANTICOAGULATION | Facility: CLINIC | Age: 73
End: 2018-03-05
Payer: COMMERCIAL

## 2018-03-05 DIAGNOSIS — Z79.01 LONG-TERM (CURRENT) USE OF ANTICOAGULANTS: ICD-10-CM

## 2018-03-05 LAB — INR POINT OF CARE: 2.8 (ref 0.86–1.14)

## 2018-03-05 PROCEDURE — 85610 PROTHROMBIN TIME: CPT | Mod: QW

## 2018-03-05 PROCEDURE — 99207 ZZC NO CHARGE NURSE ONLY: CPT

## 2018-03-05 PROCEDURE — 36416 COLLJ CAPILLARY BLOOD SPEC: CPT

## 2018-03-05 NOTE — PROGRESS NOTES
ANTICOAGULATION FOLLOW-UP CLINIC VISIT    Patient Name:  Cricket Klein  Date:  3/5/2018  Contact Type:  Face to Face accompanied by wife    SUBJECTIVE:        OBJECTIVE    INR Protime   Date Value Ref Range Status   03/05/2018 2.8 (A) 0.86 - 1.14 Final       ASSESSMENT / PLAN  INR assessment THER    Recheck INR In: 4 WEEKS    INR Location Clinic      Anticoagulation Summary as of 3/5/2018     INR goal 2.0-3.0   Today's INR 2.8   Maintenance plan 2.5 mg (2.5 mg x 1) every day   Full instructions 2.5 mg every day   Weekly total 17.5 mg   No change documented Citlalli Sethi RN   Plan last modified Citlalli Sethi RN (2/19/2018)   Next INR check 3/26/2018   Priority INR   Target end date Indefinite    Indications   Other and unspecified coagulation defects [D68.9]  Long-term (current) use of anticoagulants [Z79.01] [Z79.01]         Anticoagulation Episode Summary     INR check location     Preferred lab     Send INR reminders to [facility] CLINIC POOL    Comments * Had PE in 2010 following hip surgery. Has heterozygous prothrombin gene mutation. Second PE 7-26-16. Needs lifelong warfarin. was on warfarin [id number]        Anticoagulation Care Providers     Provider Role Specialty Phone number    Saud Ramon MD [facility] [phone]            See the Encounter Report to view Anticoagulation Flowsheet and Dosing Calendar (Go to Encounters tab in chart review, and find the Anticoagulation Therapy Visit)    Patient advised to continue the same warfarin maintenance dose, INR therapeutic.      Citlalli Sethi RN

## 2018-03-07 ENCOUNTER — TRANSFERRED RECORDS (OUTPATIENT)
Dept: HEALTH INFORMATION MANAGEMENT | Facility: CLINIC | Age: 73
End: 2018-03-07

## 2018-03-27 ENCOUNTER — TRANSFERRED RECORDS (OUTPATIENT)
Dept: HEALTH INFORMATION MANAGEMENT | Facility: CLINIC | Age: 73
End: 2018-03-27

## 2018-03-29 ENCOUNTER — OFFICE VISIT (OUTPATIENT)
Dept: FAMILY MEDICINE | Facility: CLINIC | Age: 73
End: 2018-03-29
Payer: COMMERCIAL

## 2018-03-29 ENCOUNTER — ANTICOAGULATION THERAPY VISIT (OUTPATIENT)
Dept: ANTICOAGULATION | Facility: CLINIC | Age: 73
End: 2018-03-29
Payer: COMMERCIAL

## 2018-03-29 VITALS
HEIGHT: 66 IN | SYSTOLIC BLOOD PRESSURE: 132 MMHG | TEMPERATURE: 98 F | WEIGHT: 211 LBS | RESPIRATION RATE: 18 BRPM | DIASTOLIC BLOOD PRESSURE: 84 MMHG | HEART RATE: 87 BPM | BODY MASS INDEX: 33.91 KG/M2

## 2018-03-29 DIAGNOSIS — I26.99 PULMONARY EMBOLUS, LEFT (H): ICD-10-CM

## 2018-03-29 DIAGNOSIS — I10 BENIGN ESSENTIAL HTN: ICD-10-CM

## 2018-03-29 DIAGNOSIS — Z79.01 LONG-TERM (CURRENT) USE OF ANTICOAGULANTS: ICD-10-CM

## 2018-03-29 DIAGNOSIS — Z01.818 PREOP GENERAL PHYSICAL EXAM: Primary | ICD-10-CM

## 2018-03-29 LAB — INR POINT OF CARE: 1.7 (ref 0.86–1.14)

## 2018-03-29 PROCEDURE — 99207 ZZC NO CHARGE NURSE ONLY: CPT

## 2018-03-29 PROCEDURE — 99214 OFFICE O/P EST MOD 30 MIN: CPT | Performed by: FAMILY MEDICINE

## 2018-03-29 PROCEDURE — 85610 PROTHROMBIN TIME: CPT | Mod: QW

## 2018-03-29 PROCEDURE — 36416 COLLJ CAPILLARY BLOOD SPEC: CPT

## 2018-03-29 RX ORDER — ATENOLOL 50 MG/1
TABLET ORAL
Qty: 180 TABLET | Refills: 1 | Status: SHIPPED | OUTPATIENT
Start: 2018-03-29 | End: 2019-06-14

## 2018-03-29 RX ORDER — WARFARIN SODIUM 2.5 MG/1
TABLET ORAL
Qty: 112 TABLET | Refills: 0 | COMMUNITY
Start: 2018-03-29 | End: 2018-06-05

## 2018-03-29 NOTE — MR AVS SNAPSHOT
Cricket Klein   3/29/2018 10:15 AM   Anticoagulation Therapy Visit    Description:  72 year old male   Provider:  NB ANTI COAG   Department:  [facility] Anticoag           INR as of 3/29/2018     Today's INR 1.7!      Anticoagulation Summary as of 3/29/2018     INR goal 2.0-3.0   Today's INR 1.7!   Full instructions 3/29: 5 mg; 4/7: Hold; 4/8: Hold; 4/9: Hold; 4/10: 5 mg; Otherwise 2.5 mg every day   Next INR check 4/2/2018    Indications   Other and unspecified coagulation defects [D68.9]  Long-term (current) use of anticoagulants [Z79.01] [Z79.01]         Your next Anticoagulation Clinic appointment(s)     Apr 02, 2018  3:30 PM CDT   [facility]AG   [facility]    [address] [place] [address] [phone]              Contact Numbers     Please call [phone] with any problems or questions regarding your therapy.    If you need to cancel and/or reschedule your appointment please call one of the following numbers:  [facility] - [phone]  [place] - [phone]  [place] - [phone]  [facility] [phone]  [place] - [phone]            March 2018 Details    Sun Mon Tue Wed Thu Fri Sat         1               2               3                 4               5               6               7               8               9               10                 11               12               13               14               15               16               17                 18               19               20               21               22               23               24                 25               26               27               28               29      5 mg   See details      30      2.5 mg         31      2.5 mg          Date Details   03/29 This INR check               How to take your warfarin dose     To take:  2.5 mg Take 1 of the 2.5 mg tablets.    To take:  5 mg Take 2 of the 2.5  mg tablets.           April 2018 Details    Sun Mon Tue Wed Thu Fri Sat     1      2.5 mg         2            3               4               5               6               7                 8               9               10               11               12               13               14                 15               16               17               18               19               20               21                 22               23               24               25               26               27               28                 29               30                     Date Details   No additional details    Date of next INR:  4/2/2018         How to take your warfarin dose     To take:  2.5 mg Take 1 of the 2.5 mg tablets.

## 2018-03-29 NOTE — MR AVS SNAPSHOT
After Visit Summary   3/29/2018    Cricket Klein    MRN: [id number]           Patient Information     Date Of Birth          1945        Visit Information        Provider Department      3/29/2018 9:20 AM Saud Ramon MD [facility]        Today's Diagnoses     Preop general physical exam    -  1    Pulmonary embolus, left (H)          Care Instructions      Before Your Surgery      Call your surgeon if there is any change in your health. This includes signs of a cold or flu (such as a sore throat, runny nose, cough, rash or fever).    Do not smoke, drink alcohol or take over the counter medicine (unless your surgeon or primary care doctor tells you to) for the 24 hours before and after surgery.    If you take prescribed drugs: Follow your doctor s orders about which medicines to take and which to stop until after surgery.    Eating and drinking prior to surgery: follow the instructions from your surgeon    Take a shower or bath the night before surgery. Use the soap your surgeon gave you to gently clean your skin. If you do not have soap from your surgeon, use your regular soap. Do not shave or scrub the surgery site.  Wear clean pajamas and have clean sheets on your bed.     1. You are fine for surgery but lets hold on the coumadin for three days prior to surgery.     2. Then resume regular program day after surgery    3.           Follow-ups after your visit        Your next 10 appointments already scheduled     Mar 29, 2018 10:15 AM CDT   Anticoagulation Visit with NB ANTI COAG   [facility] ([facility]    [address] [place] [id number] [phone]            Apr 10, 2018   Procedure with Jabari Smith MD   [facility] (--)    [address] [place] [id number]   [phone]           The medical center is located at [address] [place] [address]. (between [address] in  "[place], [place] miles north of [place]).              Who to contact     If you have questions or need follow up information about today's clinic visit or your schedule please contact [facility] directly at [phone].  Normal or non-critical lab and imaging results will be communicated to you by MyChart, letter or phone within 4 business days after the clinic has received the results. If you do not hear from us within 7 days, please contact the clinic through MyChart or phone. If you have a critical or abnormal lab result, we will notify you by phone as soon as possible.  Submit refill requests through MedPageToday or call your pharmacy and they will forward the refill request to us. Please allow 3 business days for your refill to be completed.          Additional Information About Your Visit        Photos I LikeharFairphone Information     MedPageToday lets you send messages to your doctor, view your test results, renew your prescriptions, schedule appointments and more. To sign up, go to [email or web address] . Click on \"Log in\" on the left side of the screen, which will take you to the Welcome page. Then click on \"Sign up Now\" on the right side of the page.     You will be asked to enter the access code listed below, as well as some personal information. Please follow the directions to create your username and password.     Your access code is: [email or web address]  Expires: 2018  3:27 PM     Your access code will  in 90 days. If you need help or a new code, please call your [facility] or [phone].        Care EveryWhere ID     This is your Care EveryWhere ID. This could be used by other organizations to access your [facility] medical records  [id number]        Your Vitals Were     Pulse Temperature Respirations Height BMI (Body Mass Index)       87 98  F (36.7  C) (Tympanic) 18 5' 6\" (1.676 m) 34.06 kg/m2        Blood Pressure from Last 3 Encounters:   18 132/84   18 159/87   18 " 145/80    Weight from Last 3 Encounters:   03/29/18 211 lb (95.7 kg)   02/06/18 209 lb (94.8 kg)   01/18/18 208 lb (94.3 kg)              Today, you had the following     No orders found for display         Today's Medication Changes          These changes are accurate as of 3/29/18 10:12 AM.  If you have any questions, ask your nurse or doctor.               These medicines have changed or have updated prescriptions.        Dose/Directions    warfarin 2.5 MG tablet   Commonly known as:  COUMADIN   This may have changed:  additional instructions   Used for:  Pulmonary embolus, left (H)   Changed by:  Saud Ramon MD        Take 2.5 mg daily   Quantity:  112 tablet   Refills:  0                Primary Care Provider Office Phone # Fax #    Saud Ramon [phone]       [address] [facility] [address]        Equal Access to Services     [facility]: Hadii laron bradley hadasho Soinessa, waaxda luqadaha, qaybta kaalmada adeegyada, waxay talita hayantoinette carver . So [facility] [phone].    ATENCIÓN: Si habla español, tiene a barton disposición servicios gratuitos de asistencia lingüística. Desiree al [phone].    We comply with applicable federal civil rights laws and [place] laws. We do not discriminate on the basis of race, color, national origin, age, disability, sex, sexual orientation, or gender identity.            Thank you!     Thank you for choosing [facility]  for your care. Our goal is always to provide you with excellent care. Hearing back from our patients is one way we can continue to improve our services. Please take a few minutes to complete the written survey that you may receive in the mail after your visit with us. Thank you!             Your Updated Medication List - Protect others around you: Learn how to safely use, store and throw away your medicines at www.disposemymeds.org.          This list is accurate as of  3/29/18 10:12 AM.  Always use your most recent med list.                   Brand Name Dispense Instructions for use Diagnosis    atenolol 50 MG tablet    TENORMIN    180 tablet    TAKE ONE TABLET BY MOUTH TWICE A DAY    Benign essential HTN       ezetimibe 10 MG tablet    ZETIA    90 tablet    Take 1 tablet (10 mg) by mouth daily    Pure hypercholesterolemia       * order for DME      Equipment being ordered: CPAP  Cricket Klein received a Resmed AirSense 10 Auto. Pressures were set at Auto 10 - 18 cm H2O.        * order for DME      Equipment ordered: RESMED ASV Mask type: Full face  Settings: ASV EPAP 7, PS MIN 4 MAX 15, RR AUTO        vitamin D 1000 UNITS capsule      Take 1 capsule by mouth daily.        warfarin 2.5 MG tablet    COUMADIN    112 tablet    Take 2.5 mg daily    Pulmonary embolus, left (H)       * Notice:  This list has 2 medication(s) that are the same as other medications prescribed for you. Read the directions carefully, and ask your doctor or other care provider to review them with you.

## 2018-03-29 NOTE — PROGRESS NOTES
ANTICOAGULATION FOLLOW-UP CLINIC VISIT    Patient Name:  Cricket Klein  Date:  3/29/2018  Contact Type:  Face to Face/accompanied by spouse    SUBJECTIVE:     Patient Findings     Positives Change in medications (benadryl and an OTC antihistamine for congestion), Inflammation (URI/congestion/cough), Missed doses (unsure of date, thinks it was W 3/21)    Comments Pt denies increased activity or more recent missed doses of warfarin.  Spouse was unsure of name of antihistamine pt has been taking.  Writer instructed pt to take increased dose today (~14%), then resume maintenance dose, recheck INR on Mon 4/2.    Pt is scheduled for carpal tunnel surgery on 4/10, saw Dr. Ramon today for pre-op and instructed to hold warfarin for 3 days prior.           OBJECTIVE    INR Protime   Date Value Ref Range Status   03/29/2018 1.7 (A) 0.86 - 1.14 Final       ASSESSMENT / PLAN  INR assessment SUB    Recheck INR In: 4 DAYS    INR Location Clinic      Anticoagulation Summary as of 3/29/2018     INR goal 2.0-3.0   Today's INR 1.7!   Maintenance plan 2.5 mg (2.5 mg x 1) every day   Full instructions 3/29: 5 mg; 4/7: Hold; 4/8: Hold; 4/9: Hold; 4/10: 5 mg; Otherwise 2.5 mg every day   Weekly total 17.5 mg   Plan last modified Citlalli Sethi, RN (2/19/2018)   Next INR check 4/2/2018   Priority INR   Target end date Indefinite    Indications   Other and unspecified coagulation defects [D68.9]  Long-term (current) use of anticoagulants [Z79.01] [Z79.01]         Anticoagulation Episode Summary     INR check location     Preferred lab     Send INR reminders to [facility] POOL    Comments * Had PE in 2010 following hip surgery. Has heterozygous prothrombin gene mutation. Second PE 7-26-16. Needs lifelong warfarin. was on warfarin [id number]        Anticoagulation Care Providers     Provider Role Specialty Phone number    Saud Ramon MD [facility] [phone]            See the  Encounter Report to view Anticoagulation Flowsheet and Dosing Calendar (Go to Encounters tab in chart review, and find the Anticoagulation Therapy Visit)        Keren Tuttle RN

## 2018-03-29 NOTE — PATIENT INSTRUCTIONS
Before Your Surgery      Call your surgeon if there is any change in your health. This includes signs of a cold or flu (such as a sore throat, runny nose, cough, rash or fever).    Do not smoke, drink alcohol or take over the counter medicine (unless your surgeon or primary care doctor tells you to) for the 24 hours before and after surgery.    If you take prescribed drugs: Follow your doctor s orders about which medicines to take and which to stop until after surgery.    Eating and drinking prior to surgery: follow the instructions from your surgeon    Take a shower or bath the night before surgery. Use the soap your surgeon gave you to gently clean your skin. If you do not have soap from your surgeon, use your regular soap. Do not shave or scrub the surgery site.  Wear clean pajamas and have clean sheets on your bed.     1. You are fine for surgery but lets hold on the coumadin for three days prior to surgery.     2. Then resume regular program day after surgery    3.

## 2018-03-29 NOTE — PROGRESS NOTES
[facility]  [address] [place] [address]  [phone]  Dept: [phone]    PRE-OP EVALUATION:  Today's date: 3/29/2018    Cricket Klein (: 1945) presents for pre-operative evaluation assessment as requested by Dr. Harrell.  He requires evaluation and anesthesia risk assessment prior to undergoing surgery/procedure for treatment of Carpal Tunnel .      Primary Physician: Saud Ramon  Type of Anesthesia Anticipated: General    Patient has a Health Care Directive or Living Will:  YES at home and scanned into chart    Preop Questions 3/29/2018   Who is doing your surgery? micaela   What are you having done? carpel tunnel   Date of Surgery/Procedure:    Facility or Hospital where procedure/surgery will be performed: [place]   1.  Do you have a history of Heart attack, stroke, stent, coronary bypass surgery, or other heart surgery? No   2.  Do you ever have any pain or discomfort in your chest? No   3.  Do you have a history of  Heart Failure? No   4.   Are you troubled by shortness of breath when:  walking on a level surface, or up a slight hill, or at night? No   5.  Do you currently have a cold, bronchitis or other respiratory infection? YES - Runny nose   6.  Do you have a cough, shortness of breath, or wheezing? No   7.  Do you sometimes get pains in the calves of your legs when you walk? No   8. Do you or anyone in your family have previous history of blood clots? YES - Patient had blood clots from embolism in the lung    9.  Do you or does anyone in your family have a serious bleeding problem such as prolonged bleeding following surgeries or cuts? No   10. Have you ever had problems with anemia or been told to take iron pills? No   11. Have you had any abnormal blood loss such as black, tarry or bloody stools? No   12. Have you ever had a blood transfusion? No   13. Have you or any of your relatives ever had problems with anesthesia? YES -  Slow wake up   14. Do you have sleep apnea, excessive snoring or daytime drowsiness? YES - Sleep apnea- c-pap   15. Do you have any prosthetic heart valves? No   16. Do you have prosthetic joints? No         HPI:     HPI related to upcoming procedure:  He is scheduled for RIGHT CARPAL TUNNEL REPAIN       See problem list for active medical problems.  Problems all longstanding and stable, except as noted/documented.  See ROS for pertinent symptoms related to these conditions.                                                                                                  .    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 10/2/2012          Hyperlipidemia LDL goal <130 10/31/2010     Priority: Medium     1/3/2011 Patient did not tolerate simvastatin, atorvastatin and pravastatin on a daily basis because of muscle cramping and weakness. Cholestyramine does not cause significant side effects, but is now doing a very good job of lowering the LDL toward goal.       Other and unspecified coagulation defects 04/13/2010     Priority: Medium     Factor  2 mutation-heterozygote  4/13/10 I had discussed this with Dr Chandler, and she recommended that warfarin for life would be advisable.   Cricket did see Dr. Ramos,  Who recommended no warfarin, and stopped it 2/23/12  See 4/28/12 note of visit with Dr. Ramos. Recommends no warfarin. Would need compression stockings for any surgery. And perhaps prophylaxis.          Pulmonary embolism (H) 03/29/2010     Priority: Medium     Within week after  hip replacement -March 2010.  Hematologist feels  no increase risk for recurrence of pulmonary embolism or DVT due to heterozygote status of Factor 2 mutation.       S/P hip replacement 03/29/2010     Priority: Medium     date of surgery was March 8, 2010. had pulmonary embolus about a week later.       Impotence of organic origin 05/14/2007     Priority: Medium     Pure hypercholesterolemia 03/28/2006     Priority: Medium     Essential hypertension      Priority: Medium     Goal is <130/80  Problem list name updated by automated process. Provider to review       Transient cerebral ischemia      Priority: Medium     April 19, 2007. Transient memory loss.    MRI HEAD SAME DAY    1. Old left frontal depressed skull fracture with underlying gliosis    and encephalomalacia.     2. Nonspecific periventricular white matter ischemic disease adjacent    to the right lateral ventricle anteriorly.    Problem list name updated by automated process. Provider to review        Past Medical History:   Diagnosis Date     TIA      Past Surgical History:   Procedure Laterality Date     ARTHROPLASTY HIP Right 7/28/2017    Procedure: ARTHROPLASTY HIP;  Right total hip arthroplasty;  Surgeon: Jose Miguel Hutchins MD;  Location: [facility]     ARTHROTOMY SHOULDER, ROTATOR CUFF REPAIR, COMBINED  4/2/2012    Procedure:COMBINED ARTHROTOMY SHOULDER, ROTATOR CUFF REPAIR; Left Distal clavicle excision, Subacromial decompression, Rotator cuff repair; Surgeon:JOSE MIGUEL HUTCHINS; Location:[facility] OR     ARTHROTOMY SHOULDER, ROTATOR CUFF REPAIR, COMBINED  10/12/2012    Procedure: COMBINED ARTHROTOMY SHOULDER, ROTATOR CUFF REPAIR;  Right shoulder biceps tenodesiss,subacromial decompression;  Surgeon: Jose Miguel Hutchins MD;  Location: [facility] OR     COLONOSCOPY  03/17/2003    normal     COLONOSCOPY  4/15/2014    Procedure: Colonoscopy;  Surgeon: Angela May MD;  Location: [facility]     SURGICAL HISTORY OF -   1970    achilles tendon repair     SURGICAL HISTORY OF -   3-2010    left hip  relpacement     Current Outpatient Prescriptions   Medication Sig Dispense Refill     warfarin (COUMADIN) 2.5 MG tablet Take 5mg Wednesday/Friday and 2.5mg the rest of the days of the week or as directed by Anticoagulation Clinic 112 tablet 0     order for DME Equipment ordered: RESMED ASV Mask type: Full face  Settings: ASV EPAP 7, PS MIN 4 MAX 15, RR AUTO       atenolol (TENORMIN) 50 MG tablet TAKE ONE TABLET BY MOUTH TWICE A  tablet 1     ezetimibe (ZETIA) 10 MG tablet Take 1 tablet (10 mg) by mouth daily 90 tablet 3     order for DME Equipment being ordered: CPAP  Cricket Klein received a Resmed AirSense 10 Auto. Pressures were set at Auto 10 - 18 cm H2O.       Cholecalciferol (VITAMIN D) 1000 UNITS capsule Take 1 capsule by mouth daily.       OTC products: None, except as noted above    Allergies   Allergen Reactions     Atorvastatin Calcium Other (See Comments)     Myalgia.     Pravastatin Cramps     Myalgias       Zocor [Hmg-Coa-R Inhibitors] Other (See Comments)     Muscle pain      Latex Allergy: NO    Social History   Substance Use Topics     Smoking status: Never Smoker     Smokeless tobacco: Never Used     Alcohol use 0.0 oz/week     0 Standard drinks or equivalent per week      Comment: rare     History   Drug Use No       REVIEW OF SYSTEMS:   CONSTITUTIONAL: NEGATIVE for fever, chills, change in weight  ENT/MOUTH: NEGATIVE for ear, mouth and throat problems  RESP: NEGATIVE for significant cough or SOB  CV: NEGATIVE for chest pain, palpitations or peripheral edema    EXAM:   There were no vitals taken for this visit.  GENERAL APPEARANCE: healthy, alert and no distress  HENT: ear canals and TM's normal and nose and mouth without ulcers or lesions  RESP: lungs clear to auscultation - no rales, rhonchi or wheezes  CV: regular rate and rhythm, normal S1 S2, no S3 or S4 and no murmur, click or rub   ABDOMEN: soft, nontender, no HSM or masses and bowel sounds normal  NEURO: Normal strength and  tone, sensory exam grossly normal, mentation intact and speech normal    DIAGNOSTICS:   No labs or EKG required for low risk surgery (cataract, skin procedure, breast biopsy, etc)    Recent Labs   Lab Test 03/05/18 02/19/18 07/30/17   0640  07/29/17   0710   07/12/17   1028   07/26/16   0150   HGB   --    --    --   11.2*  12.1*   --   13.6   --   13.5   PLT   --    --    --    --   241   --   225   --   230   INR  2.8*  3.5*   < >  1.16*  1.09   < >   --    < >   --    NA   --    --    --    --    --    --   136   --   137   POTASSIUM   --    --    --    --    --    --   4.2   --   4.5   CR   --    --    --    --   0.66   --   0.80   --   0.81    < > = values in this interval not displayed.        IMPRESSION:   Diagnosis/reason for consult: CARPAL TUNNEL     The proposed surgical procedure is considered LOW risk.    REVISED CARDIAC RISK INDEX  The patient has the following serious cardiovascular risks for perioperative complications such as (MI, PE, VFib and 3  AV Block):  No serious cardiac risks  INTERPRETATION: 0 risks: Class I (very low risk - 0.4% complication rate)    The patient has the following additional risks for perioperative complications:  No identified additional risks      ICD-10-CM    1. Preop general physical exam Z01.818        RECOMMENDATIONS:         --Patient is to take all scheduled medications on the day of surgery EXCEPT for modifications listed below.    APPROVAL GIVEN to proceed with proposed procedure, without further diagnostic evaluation    HE HAS HAD SOME MEMORY PROBLEMS SO LOCAL ANES WITH SEDATION WOULD BE APPROPRIATE.       Signed Electronically by: Saud Ramon MD    Copy of this evaluation report is provided to requesting physician.    [facility] Preop Guidelines

## 2018-03-29 NOTE — NURSING NOTE
"Chief Complaint   Patient presents with     Pre-Op Exam     carpal tunnel in the right hand       Initial /84  Pulse 87  Temp 98  F (36.7  C) (Tympanic)  Resp 18  Ht 5' 6\" (1.676 m)  Wt 211 lb (95.7 kg)  BMI 34.06 kg/m2 Estimated body mass index is 34.06 kg/(m^2) as calculated from the following:    Height as of this encounter: 5' 6\" (1.676 m).    Weight as of this encounter: 211 lb (95.7 kg).  Medication Reconciliation: complete    "

## 2018-04-02 ENCOUNTER — ANTICOAGULATION THERAPY VISIT (OUTPATIENT)
Dept: ANTICOAGULATION | Facility: CLINIC | Age: 73
End: 2018-04-02
Payer: COMMERCIAL

## 2018-04-02 DIAGNOSIS — Z79.01 LONG-TERM (CURRENT) USE OF ANTICOAGULANTS: ICD-10-CM

## 2018-04-02 LAB — INR POINT OF CARE: 1.8 (ref 0.86–1.14)

## 2018-04-02 PROCEDURE — 99207 ZZC NO CHARGE NURSE ONLY: CPT

## 2018-04-02 PROCEDURE — 36416 COLLJ CAPILLARY BLOOD SPEC: CPT

## 2018-04-02 PROCEDURE — 85610 PROTHROMBIN TIME: CPT | Mod: QW

## 2018-04-02 NOTE — MR AVS SNAPSHOT
Cricket Klein   4/2/2018 3:30 PM   Anticoagulation Therapy Visit    Description:  72 year old male   Provider:  NB ANTI COAG   Department:  [facility]           INR as of 4/2/2018     Today's INR 1.8!      Anticoagulation Summary as of 4/2/2018     INR goal 2.0-3.0   Today's INR 1.8!   Full instructions 4/7: Hold; 4/8: Hold; 4/9: Hold; 4/10: 6.25 mg; Otherwise 5 mg on Mon, Fri; 2.5 mg all other days   Next INR check 4/16/2018    Indications   Other and unspecified coagulation defects [D68.9]  Long-term (current) use of anticoagulants [Z79.01] [Z79.01]         Your next Anticoagulation Clinic appointment(s)     Apr 16, 2018 10:15 AM CDT   [facility] with [facility]   [facility]    [address] [place] [id number] [phone]              Contact Numbers     Please call [phone] with any problems or questions regarding your therapy.    If you need to cancel and/or reschedule your appointment please call one of the following numbers:  [facility] - [phone]  [place] - [phone]  [facility] [phone]  [facility] [phone]  [place] - [phone]            April 2018 Details    Sun Mon Tue Wed Thu Fri Sat     1               2      5 mg   See details      3      2.5 mg         4      2.5 mg         5      2.5 mg         6      5 mg         7      Hold           8      Hold         9      Hold         10      6.25 mg         11      2.5 mg         12      2.5 mg         13      5 mg         14      2.5 mg           15      2.5 mg         16            17               18               19               20               21                 22               23               24               25               26               27               28                 29               30                     Date Details   04/02 This INR check       Date of next INR:  4/16/2018         How to take your warfarin dose     To  take:  2.5 mg Take 1 of the 2.5 mg tablets.    To take:  5 mg Take 2 of the 2.5 mg tablets.    To take:  6.25 mg Take 2.5 of the 2.5 mg tablets.    Hold Do not take your warfarin dose. See the Details table to the right for additional instructions.

## 2018-04-02 NOTE — PROGRESS NOTES
ANTICOAGULATION FOLLOW-UP CLINIC VISIT    Patient Name:  Cricket Klein  Date:  4/2/2018  Contact Type:  Face to Face    SUBJECTIVE:     Patient Findings     Positives Dental/Other procedures    Comments Patient's warfarin dose was increased 14.3% over the last 7 days and his INR is still subtherapeutic. Writer will have increase another 2.5mg weekly (14.3%)           OBJECTIVE    INR Protime   Date Value Ref Range Status   04/02/2018 1.8 (A) 0.86 - 1.14 Final       ASSESSMENT / PLAN  INR assessment SUB    Recheck INR In: 2 WEEKS    INR Location Clinic      Anticoagulation Summary as of 4/2/2018     INR goal 2.0-3.0   Today's INR 1.8!   Maintenance plan 5 mg (2.5 mg x 2) on Mon, Fri; 2.5 mg (2.5 mg x 1) all other days   Full instructions 4/7: Hold; 4/8: Hold; 4/9: Hold; 4/10: 6.25 mg; Otherwise 5 mg on Mon, Fri; 2.5 mg all other days   Weekly total 22.5 mg   Plan last modified Citlalli Sethi, RN (4/2/2018)   Next INR check 4/16/2018   Priority INR   Target end date Indefinite    Indications   Other and unspecified coagulation defects [D68.9]  Long-term (current) use of anticoagulants [Z79.01] [Z79.01]         Anticoagulation Episode Summary     INR check location     Preferred lab     Send INR reminders to [facility] CLINIC POOL    Comments * Had PE in 2010 following hip surgery. Has heterozygous prothrombin gene mutation. Second PE 7-26-16. Needs lifelong warfarin. was on warfarin [id number]        Anticoagulation Care Providers     Provider Role Specialty Phone number    Saud Ramon MD [facility] [phone]            See the Encounter Report to view Anticoagulation Flowsheet and Dosing Calendar (Go to Encounters tab in chart review, and find the Anticoagulation Therapy Visit)        Citlalli Sethi RN

## 2018-04-09 ENCOUNTER — ANESTHESIA EVENT (OUTPATIENT)
Dept: SURGERY | Facility: CLINIC | Age: 73
End: 2018-04-09
Payer: MEDICARE

## 2018-04-10 ENCOUNTER — ANESTHESIA (OUTPATIENT)
Dept: SURGERY | Facility: CLINIC | Age: 73
End: 2018-04-10
Payer: MEDICARE

## 2018-04-10 ENCOUNTER — HOSPITAL ENCOUNTER (OUTPATIENT)
Facility: CLINIC | Age: 73
Discharge: HOME | End: 2018-04-10
Attending: ORTHOPAEDIC SURGERY | Admitting: ORTHOPAEDIC SURGERY
Payer: MEDICARE

## 2018-04-10 VITALS
DIASTOLIC BLOOD PRESSURE: 74 MMHG | SYSTOLIC BLOOD PRESSURE: 118 MMHG | TEMPERATURE: 98.2 F | BODY MASS INDEX: 33.91 KG/M2 | RESPIRATION RATE: 16 BRPM | HEIGHT: 66 IN | WEIGHT: 211 LBS | OXYGEN SATURATION: 95 %

## 2018-04-10 DIAGNOSIS — G56.01 CARPAL TUNNEL SYNDROME OF RIGHT WRIST: Primary | ICD-10-CM

## 2018-04-10 LAB — INR PPP: 2.14 (ref 0.86–1.14)

## 2018-04-10 PROCEDURE — 25000128 H RX IP 250 OP 636: Performed by: NURSE ANESTHETIST, CERTIFIED REGISTERED

## 2018-04-10 PROCEDURE — 25000125 ZZHC RX 250: Performed by: ORTHOPAEDIC SURGERY

## 2018-04-10 PROCEDURE — A9270 NON-COVERED ITEM OR SERVICE: HCPCS | Mod: GY | Performed by: PHYSICIAN ASSISTANT

## 2018-04-10 PROCEDURE — 85610 PROTHROMBIN TIME: CPT | Performed by: ORTHOPAEDIC SURGERY

## 2018-04-10 PROCEDURE — 37000009 ZZH ANESTHESIA TECHNICAL FEE, EACH ADDTL 15 MIN: Performed by: ORTHOPAEDIC SURGERY

## 2018-04-10 PROCEDURE — 36000050 ZZH SURGERY LEVEL 2 1ST 30 MIN: Performed by: ORTHOPAEDIC SURGERY

## 2018-04-10 PROCEDURE — 40000305 ZZH STATISTIC PRE PROC ASSESS I: Performed by: ORTHOPAEDIC SURGERY

## 2018-04-10 PROCEDURE — 36000052 ZZH SURGERY LEVEL 2 EA 15 ADDTL MIN: Performed by: ORTHOPAEDIC SURGERY

## 2018-04-10 PROCEDURE — 25000128 H RX IP 250 OP 636: Performed by: PHYSICIAN ASSISTANT

## 2018-04-10 PROCEDURE — 37000008 ZZH ANESTHESIA TECHNICAL FEE, 1ST 30 MIN: Performed by: ORTHOPAEDIC SURGERY

## 2018-04-10 PROCEDURE — 25000128 H RX IP 250 OP 636: Performed by: ORTHOPAEDIC SURGERY

## 2018-04-10 PROCEDURE — 71000027 ZZH RECOVERY PHASE 2 EACH 15 MINS: Performed by: ORTHOPAEDIC SURGERY

## 2018-04-10 PROCEDURE — 27210995 ZZH RX 272: Performed by: ORTHOPAEDIC SURGERY

## 2018-04-10 PROCEDURE — 25000132 ZZH RX MED GY IP 250 OP 250 PS 637: Mod: GY | Performed by: PHYSICIAN ASSISTANT

## 2018-04-10 PROCEDURE — 27210794 ZZH OR GENERAL SUPPLY STERILE: Performed by: ORTHOPAEDIC SURGERY

## 2018-04-10 PROCEDURE — 25000125 ZZHC RX 250: Performed by: NURSE ANESTHETIST, CERTIFIED REGISTERED

## 2018-04-10 RX ORDER — SODIUM CHLORIDE, SODIUM LACTATE, POTASSIUM CHLORIDE, CALCIUM CHLORIDE 600; 310; 30; 20 MG/100ML; MG/100ML; MG/100ML; MG/100ML
INJECTION, SOLUTION INTRAVENOUS CONTINUOUS
Status: DISCONTINUED | OUTPATIENT
Start: 2018-04-10 | End: 2018-04-10 | Stop reason: HOSPADM

## 2018-04-10 RX ORDER — PROPOFOL 10 MG/ML
INJECTION, EMULSION INTRAVENOUS PRN
Status: DISCONTINUED | OUTPATIENT
Start: 2018-04-10 | End: 2018-04-10

## 2018-04-10 RX ORDER — ACETAMINOPHEN 325 MG/1
975 TABLET ORAL ONCE
Status: COMPLETED | OUTPATIENT
Start: 2018-04-10 | End: 2018-04-10

## 2018-04-10 RX ORDER — HYDROCODONE BITARTRATE AND ACETAMINOPHEN 5; 325 MG/1; MG/1
1-2 TABLET ORAL EVERY 4 HOURS PRN
Qty: 8 TABLET | Refills: 0
Start: 2018-04-10 | End: 2018-07-16

## 2018-04-10 RX ORDER — ONDANSETRON 2 MG/ML
4 INJECTION INTRAMUSCULAR; INTRAVENOUS EVERY 30 MIN PRN
Status: DISCONTINUED | OUTPATIENT
Start: 2018-04-10 | End: 2018-04-10 | Stop reason: HOSPADM

## 2018-04-10 RX ORDER — FENTANYL CITRATE 50 UG/ML
25-50 INJECTION, SOLUTION INTRAMUSCULAR; INTRAVENOUS
Status: DISCONTINUED | OUTPATIENT
Start: 2018-04-10 | End: 2018-04-10 | Stop reason: HOSPADM

## 2018-04-10 RX ORDER — LIDOCAINE HYDROCHLORIDE 10 MG/ML
INJECTION, SOLUTION INFILTRATION; PERINEURAL PRN
Status: DISCONTINUED | OUTPATIENT
Start: 2018-04-10 | End: 2018-04-10 | Stop reason: HOSPADM

## 2018-04-10 RX ORDER — ONDANSETRON 4 MG/1
4 TABLET, ORALLY DISINTEGRATING ORAL EVERY 30 MIN PRN
Status: DISCONTINUED | OUTPATIENT
Start: 2018-04-10 | End: 2018-04-10 | Stop reason: HOSPADM

## 2018-04-10 RX ORDER — LIDOCAINE 40 MG/G
CREAM TOPICAL
Status: DISCONTINUED | OUTPATIENT
Start: 2018-04-10 | End: 2018-04-10 | Stop reason: HOSPADM

## 2018-04-10 RX ORDER — GABAPENTIN 100 MG/1
100 CAPSULE ORAL
Status: COMPLETED | OUTPATIENT
Start: 2018-04-10 | End: 2018-04-10

## 2018-04-10 RX ORDER — MEPERIDINE HYDROCHLORIDE 25 MG/ML
12.5 INJECTION INTRAMUSCULAR; INTRAVENOUS; SUBCUTANEOUS
Status: DISCONTINUED | OUTPATIENT
Start: 2018-04-10 | End: 2018-04-10 | Stop reason: HOSPADM

## 2018-04-10 RX ORDER — PROPOFOL 10 MG/ML
INJECTION, EMULSION INTRAVENOUS CONTINUOUS PRN
Status: DISCONTINUED | OUTPATIENT
Start: 2018-04-10 | End: 2018-04-10

## 2018-04-10 RX ORDER — NALOXONE HYDROCHLORIDE 0.4 MG/ML
.1-.4 INJECTION, SOLUTION INTRAMUSCULAR; INTRAVENOUS; SUBCUTANEOUS
Status: DISCONTINUED | OUTPATIENT
Start: 2018-04-10 | End: 2018-04-10 | Stop reason: HOSPADM

## 2018-04-10 RX ORDER — HYDROCODONE BITARTRATE AND ACETAMINOPHEN 5; 325 MG/1; MG/1
1 TABLET ORAL
Status: DISCONTINUED | OUTPATIENT
Start: 2018-04-10 | End: 2018-04-10 | Stop reason: HOSPADM

## 2018-04-10 RX ORDER — BUPIVACAINE HYDROCHLORIDE 5 MG/ML
INJECTION, SOLUTION PERINEURAL PRN
Status: DISCONTINUED | OUTPATIENT
Start: 2018-04-10 | End: 2018-04-10 | Stop reason: HOSPADM

## 2018-04-10 RX ORDER — CELECOXIB 200 MG/1
200 CAPSULE ORAL
Status: DISCONTINUED | OUTPATIENT
Start: 2018-04-10 | End: 2018-04-10 | Stop reason: HOSPADM

## 2018-04-10 RX ORDER — LIDOCAINE HYDROCHLORIDE 10 MG/ML
INJECTION, SOLUTION INFILTRATION; PERINEURAL PRN
Status: DISCONTINUED | OUTPATIENT
Start: 2018-04-10 | End: 2018-04-10

## 2018-04-10 RX ORDER — CEFAZOLIN SODIUM 1 G/50ML
1 INJECTION, SOLUTION INTRAVENOUS SEE ADMIN INSTRUCTIONS
Status: DISCONTINUED | OUTPATIENT
Start: 2018-04-10 | End: 2018-04-10 | Stop reason: HOSPADM

## 2018-04-10 RX ORDER — CEFAZOLIN SODIUM 2 G/100ML
2 INJECTION, SOLUTION INTRAVENOUS
Status: COMPLETED | OUTPATIENT
Start: 2018-04-10 | End: 2018-04-10

## 2018-04-10 RX ADMIN — PROPOFOL 100 MCG/KG/MIN: 10 INJECTION, EMULSION INTRAVENOUS at 14:28

## 2018-04-10 RX ADMIN — ACETAMINOPHEN 975 MG: 325 TABLET, FILM COATED ORAL at 13:09

## 2018-04-10 RX ADMIN — SODIUM CHLORIDE, POTASSIUM CHLORIDE, SODIUM LACTATE AND CALCIUM CHLORIDE: 600; 310; 30; 20 INJECTION, SOLUTION INTRAVENOUS at 14:23

## 2018-04-10 RX ADMIN — GABAPENTIN 100 MG: 100 CAPSULE ORAL at 13:09

## 2018-04-10 RX ADMIN — LIDOCAINE HYDROCHLORIDE 50 MG: 10 INJECTION, SOLUTION INFILTRATION; PERINEURAL at 14:28

## 2018-04-10 RX ADMIN — PROPOFOL 50 MG: 10 INJECTION, EMULSION INTRAVENOUS at 14:28

## 2018-04-10 RX ADMIN — CEFAZOLIN SODIUM 2 G: 2 INJECTION, SOLUTION INTRAVENOUS at 14:25

## 2018-04-10 NOTE — BRIEF OP NOTE
[facility] Orthopedic Brief Operative Note    Pre-operative diagnosis: Right carpal tunnel syndrome   Post-operative diagnosis: Same   Procedure: Procedure(s):  RELEASE CARPAL TUNNEL   Surgeon: Jabari Smith   Assistant(s): Jeanne Mora PA-C   Anesthesia: Local anesthesia with sedation   Estimated blood loss: None   Drains: None   Specimens: None   Implants: (See full op note)   Complications: None   Condition: Stable

## 2018-04-10 NOTE — OP NOTE
Procedure Date: 04/10/2018      PREOPERATIVE DIAGNOSIS:  Right carpal tunnel syndrome.      POSTOPERATIVE DIAGNOSIS:  Right carpal tunnel syndrome.      PROCEDURE:  Right open carpal tunnel release.      ANESTHESIA:  Local with monitored anesthesia care.      SURGEON:  Jabari Smith MD      ASSISTANT: Cyndie Alejo.         PROCEDURE AND FINDINGS:  Cricket Klein was taken to the main operating suite.  Once there, the patient was transferred over to the operating table. The patient was given IV conscious sedation for anesthesia.  The right upper extremity was prepped and draped in the usual sterile fashion.  The volar aspect of the hand and distal forearm was infiltrated with 1% lidocaine plain.  The arm was exsanguinated with an Esmarch bandage and the tourniquet was inflated to 250 mmHg.  A #15 blade knife was utilized to make a skin incision in line with the ring finger starting at Osman's line and extending proximally approximately 2 cm.  Dissection was carried down to the subcutaneous tissues with Rios scissors.  Bipolar cautery was utilized to achieve hemostasis.  Transverse carpal ligament was identified and carefully incised in its midsubstance with a [place] blade knife.  Dissection was carried out distally achieving a complete distal release.  This revealed the fat surrounding the superficial palmar arch.  Care was taken to avoid violating the fat in order to avoid damaging the arch.  The most proximal aspect of the transverse carpal ligament as well as the distal volar forearm fascia was incised with Rios scissors.  This was done after first bluntly dissecting superficial then deep to the ligament.  The tips of the scissors were placed around the ligament angling away from the median nerve.  The scissors were passed bluntly proximally.  This achieved a complete proximal release confirmed with a Fence elevator.  In this fashion, a radial-based flap of transverse carpal ligament was created  completely overlying the median nerve to avoid having the median nerve caught in the healing scar tissue.  The wound was irrigated with copious amounts of normal saline with antibiotic.  The skin was closed with 4-0 nylon in horizontal mattress fashion.  A dressing consisting of Adaptic gauze, 4 x 4 fluff, sterile Webril, and Ace bandage was applied.  The tourniquet was let down for a total tourniquet time of 8 minutes.  The patient's fingers pinked up briskly.  The patient was transferred to the transport cart and taken to the recovery room in stable condition breathing spontaneously.       Examination of the median nerve demonstrated notable flattening and attenuation of 15-20% and gross signs of scarring.  The motor branch was similar in appearance to the main body of the median nerve.         GISEL ZUELTA MD             D: 04/10/2018   T: 04/10/2018   MT: MD      Name:     LARRY CR   MRN:      [id number]        Account:        [id number]   :      1945           Procedure Date: 04/10/2018      Document: [id number]

## 2018-04-10 NOTE — PROGRESS NOTES
SPIRITUAL HEALTH SERVICES  SPIRITUAL ASSESSMENT Progress Note  [facility]    Pt, Claudio, had requested  visit prior to SDS.  His wife, Shawna, was present as they waited for his surgery time to arrive.  Claudio stated he had lived in the [place] all his life and was a life-long member of [facility] in [place].  He stated he wasn't too worried about the surgery but welcomed prayer.  Prayer was offered to a successful surgery and healing.    Stan Klein M.A., [facility]  Staff   [facility]  Office: [phone]  Cell: [phone]  Pager [phone]

## 2018-04-10 NOTE — ANESTHESIA PREPROCEDURE EVALUATION
Anesthesia Evaluation     . Pt has had prior anesthetic.            ROS/MED HX    ENT/Pulmonary:     (+)sleep apnea, , . .    Neurologic:     (+)CVA TIA     Cardiovascular:     (+) Dyslipidemia, hypertension----. Taking blood thinners : . . . :. .       METS/Exercise Tolerance:     Hematologic:     (+) History of blood clots -      Musculoskeletal:   (+) arthritis, , , -       GI/Hepatic:         Renal/Genitourinary:         Endo:         Psychiatric:         Infectious Disease:         Malignancy:         Other:                     Physical Exam  Normal systems: cardiovascular, pulmonary and dental    Airway   Mallampati: III  Neck ROM: full    Dental     Cardiovascular       Pulmonary                     Anesthesia Plan      History & Physical Review  History and physical reviewed and following examination; no interval change.    ASA Status:  3 .    NPO Status:  > 8 hours    Plan for MAC with Propofol induction. Maintenance will be TIVA.  Reason for MAC:  Deep or markedly invasive procedure (G8)         Postoperative Care  Postoperative pain management:  IV analgesics.      Consents  Anesthetic plan, risks, benefits and alternatives discussed with:  Patient..                          .

## 2018-04-10 NOTE — DISCHARGE INSTRUCTIONS
Same Day Surgery Discharge Instructions  Special Precautions After Surgery - Adult    1. It is not unusual to feel lightheaded or faint, up to 24 hours after surgery or while taking pain medication.  If you have these symptoms; sit for a few minutes before standing and have someone assist you when getting up.  2. You should rest and relax for the next 24 hours and must have someone stay with you for at least 24 hours after your discharge.  3. DO NOT DRIVE any vehicle or operate mechanical equipment for 24 hours following the end of your surgery.  DO NOT DRIVE while taking narcotic pain medications that have been prescribed by your physician.  If you had a limb operated on, you must be able to use it fully to drive.  4. DO NOT drink alcoholic beverages for 24 hours following surgery or while taking prescription pain medication.  5. Drink clear liquids (apple juice, ginger ale, broth, 7-Up, etc.).  Progress to your regular diet as you feel able.  6. Any questions call your physician and do not make important decisions for 24 hours.     INCISIONAL CARE  ? May remove dressing in 7 days.     Call for an appointment to return to the clinic if you do ont already have one    Medications:  ? Hydrocodone (Norco, Vicodin):  Next dose: at first sigh of pain.  ? Follow the instructions on the bottle.    __________________________________________________________________________________________________________________________________  IMPORTANT NUMBERS:    [facility] [place]:  [phone], [phone]  Pharmacy:  [phone]  Same Day Surgery:  [phone], Monday - Friday until 8:30 p.m.  Urgent Care:  [phone]  Emergency Room:  [phone]      [facility]:  [phone]

## 2018-04-10 NOTE — ANESTHESIA CARE TRANSFER NOTE
Patient: Cricket Klein    Procedure(s):  Right Open Carpal Tunnel Release - Wound Class: I-Clean    Diagnosis: Right carpal tunnel syndrome  Diagnosis Additional Information: No value filed.    Anesthesia Type:   MAC     Note:  Airway :Room Air  Patient transferred to:Phase II  Comments: Patient to Phase II on RA/native airway and is awake and verbal. Report to RN and transfer of care. BP: 114/73 SpO2: 95% in RA RR: 18 HR: 79Handoff Report: Allowed opportunity for questions and acknowledgement of understanding      Vitals: (Last set prior to Anesthesia Care Transfer)    CRNA VITALS  4/10/2018 1424 - 4/10/2018 1458      4/10/2018             Pulse: 54    SpO2: 92 %                Electronically Signed By: JATINDER Argueta CRNA  April 10, 2018  2:58 PM

## 2018-04-10 NOTE — ANESTHESIA POSTPROCEDURE EVALUATION
Patient: Cricket Klein    Procedure(s):  Right Open Carpal Tunnel Release - Wound Class: I-Clean    Diagnosis:Right carpal tunnel syndrome  Diagnosis Additional Information: No value filed.    Anesthesia Type:  MAC    Note:  Anesthesia Post Evaluation    Patient location during evaluation: Phase 2  Patient participation: Able to fully participate in evaluation  Level of consciousness: awake and alert  Pain management: adequate  Airway patency: patent  Cardiovascular status: acceptable  Respiratory status: acceptable  Hydration status: acceptable  PONV: none             Last vitals:  Vitals:    04/10/18 1249   BP: (!) 158/99   Resp: 16   Temp: 36.6  C (97.9  F)   SpO2: 97%         Electronically Signed By: JATINDER Argueta CRNA  April 10, 2018  3:01 PM

## 2018-04-10 NOTE — IP AVS SNAPSHOT
[facility] PreOP/Phase II    [address] [address]    Phone:  [phone]    Fax:  [phone]                                       After Visit Summary   4/10/2018    Cricket Klein    MRN: [id number]           After Visit Summary Signature Page     I have received my discharge instructions, and my questions have been answered. I have discussed any challenges I see with this plan with the nurse or doctor.    ..........................................................................................................................................  Patient/Patient Representative Signature      ..........................................................................................................................................  Patient Representative Print Name and Relationship to Patient    ..................................................               ................................................  Date                                            Time    ..........................................................................................................................................  Reviewed by Signature/Title    ...................................................              ..............................................  Date                                                            Time

## 2018-04-10 NOTE — IP AVS SNAPSHOT
MRN:[id number]                      After Visit Summary   4/10/2018    Cricket Klein    MRN: [id number]           Thank you!     Thank you for choosing [facility] for your care. Our goal is always to provide you with excellent care. Hearing back from our patients is one way we can continue to improve our services. Please take a few minutes to complete the written survey that you may receive in the mail after you visit with us. Thank you!        Patient Information     Date Of Birth          1945        About your hospital stay     You were admitted on:  April 10, 2018 You last received care in the:  [facility] PreOP/Phase II    You were discharged on:  April 10, 2018       Who to Call     For medical emergencies, please call 911.  For non-urgent questions about your medical care, please call your primary care provider or clinic, [phone]  For questions related to your surgery, please call your surgery clinic        Attending Provider     Provider Specialty    Jabari Smith MD Hand Surgery       Primary Care Provider Office Phone # Fax #    Saud Ramon [phone]      After Care Instructions      Diet as Tolerated       Return to diet before surgery, unless instructed otherwise.            Discharge Instructions       Review outpatient procedure discharge instructions with patient as directed by Provider            Discharge Instructions - Lifting Limit (specify)       Lifting limit  5 pounds until seen at Post-op follow up appointment.            Dressing Change        Remove dressing POD#7.  Keep clean.  No ointments.            Notify Provider       For signs and symptoms of infection: Fever greater than 101, redness, swelling, heat at site, drainage, pus.            Return to clinic       Return to clinic in 2 weeks            Shower        Cover dressing if dressing is not going to be changed today            Wound care       Do not  immerse wound in water until sutures removed                  Your next 10 appointments already scheduled     Apr 16, 2018 10:15 AM CDT   Anticoagulation Visit with NB ANTI COAG   [facility] ([facility])    [address] [place] [id number] [phone]              Further instructions from your care team                           Same Day Surgery Discharge Instructions  Special Precautions After Surgery - Adult    1. It is not unusual to feel lightheaded or faint, up to 24 hours after surgery or while taking pain medication.  If you have these symptoms; sit for a few minutes before standing and have someone assist you when getting up.  2. You should rest and relax for the next 24 hours and must have someone stay with you for at least 24 hours after your discharge.  3. DO NOT DRIVE any vehicle or operate mechanical equipment for 24 hours following the end of your surgery.  DO NOT DRIVE while taking narcotic pain medications that have been prescribed by your physician.  If you had a limb operated on, you must be able to use it fully to drive.  4. DO NOT drink alcoholic beverages for 24 hours following surgery or while taking prescription pain medication.  5. Drink clear liquids (apple juice, ginger ale, broth, 7-Up, etc.).  Progress to your regular diet as you feel able.  6. Any questions call your physician and do not make important decisions for 24 hours.     INCISIONAL CARE  ? May remove dressing in 7 days.     Call for an appointment to return to the clinic if you do ont already have one    Medications:  ? Hydrocodone (Norco, Vicodin):  Next dose: at first sigh of pain.  ? Follow the instructions on the bottle.    __________________________________________________________________________________________________________________________________  IMPORTANT NUMBERS:    [facility] Main Number:  [phone], [phone]  Pharmacy:  [phone]  Same Day Surgery:   "[phone], Monday - Friday until 8:30 p.m.  Urgent Care:  [phone]  Emergency Room:  [phone]      [facility]:  [phone]             Pending Results     No orders found from 2018 to 2018.            Admission Information     Date & Time Provider Department Dept. Phone    4/10/2018 Jabari Smith MD [facility] PreOP/Phase -[phone]      Your Vitals Were     Blood Pressure Temperature Respirations Height Weight Pulse Oximetry    158/99 97.9  F (36.6  C) (Oral) 16 1.676 m (5' 6\") 95.7 kg (211 lb) 97%    BMI (Body Mass Index)                   34.06 kg/m2           [employer] Information     [employer] lets you send messages to your doctor, view your test results, renew your prescriptions, schedule appointments and more. To sign up, go to [email or web address] . Click on \"Log in\" on the left side of the screen, which will take you to the Welcome page. Then click on \"Sign up Now\" on the right side of the page.     You will be asked to enter the access code listed below, as well as some personal information. Please follow the directions to create your username and password.     Your access code is: [email or web address]  Expires: 2018  3:27 PM     Your access code will  in 90 days. If you need help or a new code, please call your [facility] clinic or [phone].        Care EveryWhere ID     This is your Care EveryWhere ID. This could be used by other organizations to access your [facility] medical records  [id number]        Equal Access to Services     [facility] ABHI AH: Hadii laron Soot, wasintia umana, jose kaalmajosselin urbano, nory hinkle. So [facility] [phone].    ATENCIÓN: Si habla español, tiene a barton disposición servicios gratuitos de asistencia lingüística. Llame al [phone].    We comply with applicable federal civil rights laws and [place] laws. We do not discriminate on the basis of race, color, national origin, age, " disability, sex, sexual orientation, or gender identity.               Review of your medicines      START taking        Dose / Directions    HYDROcodone-acetaminophen 5-325 MG per tablet   Commonly known as:  NORCO   Used for:  Carpal tunnel syndrome of right wrist        Dose:  1-2 tablet   Take 1-2 tablets by mouth every 4 hours as needed for other (Moderate to Severe Pain)   Quantity:  8 tablet   Refills:  0         CONTINUE these medicines which have NOT CHANGED        Dose / Directions    atenolol 50 MG tablet   Commonly known as:  TENORMIN   Used for:  Benign essential HTN        TAKE ONE TABLET BY MOUTH TWICE A DAY   Quantity:  180 tablet   Refills:  1       ezetimibe 10 MG tablet   Commonly known as:  ZETIA   Used for:  Pure hypercholesterolemia        Dose:  10 mg   Take 1 tablet (10 mg) by mouth daily   Quantity:  90 tablet   Refills:  3       * order for DME        Equipment being ordered: LEXI  Cricket ROB Ernie received a Resmed AirSense 10 Auto. Pressures were set at Auto 10 - 18 cm H2O.   Refills:  0       * order for DME        Equipment ordered: RESMED ASV Mask type: Full face  Settings: ASV EPAP 7, PS MIN 4 MAX 15, RR AUTO   Refills:  0       vitamin D 1000 units capsule        Dose:  1 capsule   Take 1 capsule by mouth daily.   Refills:  0       warfarin 2.5 MG tablet   Commonly known as:  COUMADIN   Used for:  Pulmonary embolus, left (H)        Take 2.5 mg daily   Quantity:  112 tablet   Refills:  0       * Notice:  This list has 2 medication(s) that are the same as other medications prescribed for you. Read the directions carefully, and ask your doctor or other care provider to review them with you.         Where to get your medicines      Some of these will need a paper prescription and others can be bought over the counter. Ask your nurse if you have questions.     You don't need a prescription for these medications     HYDROcodone-acetaminophen 5-325 MG per tablet                Protect  others around you: Learn how to safely use, store and throw away your medicines at www.disposemymeds.org.        Information about OPIOIDS     PRESCRIPTION OPIOIDS: WHAT YOU NEED TO KNOW    Prescription opioids can be used to help relieve moderate to severe pain and are often prescribed following a surgery or injury, or for certain health conditions. These medications can be an important part of treatment but also come with serious risks. It is important to work with your health care provider to make sure you are getting the safest, most effective care.    WHAT ARE THE RISKS AND SIDE EFFECTS OF OPIOID USE?  Prescription opioids carry serious risks of addiction and overdose, especially with prolonged use. An opioid overdose, often marked by slowed breathing can cause sudden death. The use of prescription opioids can have a number of side effects as well, even when taken as directed:      Tolerance - meaning you might need to take more of a medication for the same pain relief    Physical dependence - meaning you have symptoms of withdrawal when a medication is stopped    Increased sensitivity to pain    Constipation    Nausea, vomiting, and dry mouth    Sleepiness and dizziness    Confusion    Depression    Low levels of testosterone that can result in lower sex drive, energy, and strength    Itching and sweating    RISKS ARE GREATER WITH:    History of drug misuse, substance use disorder, or overdose    Mental health conditions (such as depression or anxiety)    Sleep apnea    Older age (65 years or older)    Pregnancy    Avoid alcohol while taking prescription opioids.   Also, unless specifically advised by your health care provider, medications to avoid include:    Benzodiazepines (such as Xanax or Valium)    Muscle relaxants (such as Soma or Flexeril)    Hypnotics (such as Ambien or Lunesta)    Other prescription opioids    KNOW YOUR OPTIONS:  Talk to your health care provider about ways to manage your pain that  do not involve prescription opioids. Some of these options may actually work better and have fewer risks and side effects:    Pain relievers such as acetaminophen, ibuprofen, and naproxen    Some medications that are also used for depression or seizures    Physical therapy and exercise    Cognitive behavioral therapy, a psychological, goal-directed approach, in which patients learn how to modify physical, behavioral, and emotional triggers of pain and stress    IF YOU ARE PRESCRIBED OPIOIDS FOR PAIN:    Never take opioids in greater amounts or more often than prescribed    Follow up with your primary health care provider and work together to create a plan on how to manage your pain.    Talk about ways to help manage your pain that do not involve prescription opioids    Talk about all concerns and side effects    Help prevent misuse and abuse    Never sell or share prescription opioids    Never use another person's prescription opioids    Store prescription opioids in a secure place and out of reach of others (this may include visitors, children, friends, and family)    Visit www.cdc.gov/drugoverdose to learn about risks of opioid abuse and overdose    If you believe you may be struggling with addiction, tell your health care provider and ask for guidance or call [facility]'s National Helpline at 0-457-008-HELP    LEARN MORE / www.cdc.gov/drugoverdose/prescribing/guideline.html    Safely dispose of unused prescription opioids: Find your local drug take-back programs and more information about the importance of safe disposal at www.doseofreality.mn.gov             Medication List: This is a list of all your medications and when to take them. Check marks below indicate your daily home schedule. Keep this list as a reference.      Medications           Morning Afternoon Evening Bedtime As Needed    atenolol 50 MG tablet   Commonly known as:  TENORMIN   TAKE ONE TABLET BY MOUTH TWICE A DAY                                 ezetimibe 10 MG tablet   Commonly known as:  ZETIA   Take 1 tablet (10 mg) by mouth daily                                HYDROcodone-acetaminophen 5-325 MG per tablet   Commonly known as:  NORCO   Take 1-2 tablets by mouth every 4 hours as needed for other (Moderate to Severe Pain)                                * order for DME   Equipment being ordered: LEXI Klein received a Resmed AirSense 10 Auto. Pressures were set at Auto 10 - 18 cm H2O.                                * order for DME   Equipment ordered: RESMED ASV Mask type: Full face  Settings: ASV EPAP 7, PS MIN 4 MAX 15, RR AUTO                                vitamin D 1000 units capsule   Take 1 capsule by mouth daily.                                warfarin 2.5 MG tablet   Commonly known as:  COUMADIN   Take 2.5 mg daily                                * Notice:  This list has 2 medication(s) that are the same as other medications prescribed for you. Read the directions carefully, and ask your doctor or other care provider to review them with you.

## 2018-04-16 ENCOUNTER — ANTICOAGULATION THERAPY VISIT (OUTPATIENT)
Dept: ANTICOAGULATION | Facility: CLINIC | Age: 73
End: 2018-04-16
Payer: COMMERCIAL

## 2018-04-16 DIAGNOSIS — Z79.01 LONG-TERM (CURRENT) USE OF ANTICOAGULANTS: ICD-10-CM

## 2018-04-16 LAB — INR POINT OF CARE: 4.5 (ref 0.86–1.14)

## 2018-04-16 PROCEDURE — 99207 ZZC NO CHARGE NURSE ONLY: CPT

## 2018-04-16 PROCEDURE — 85610 PROTHROMBIN TIME: CPT | Mod: QW

## 2018-04-16 PROCEDURE — 36416 COLLJ CAPILLARY BLOOD SPEC: CPT

## 2018-04-16 NOTE — MR AVS SNAPSHOT
Cricket Klein   4/16/2018 10:15 AM   Anticoagulation Therapy Visit    Description:  72 year old male   Provider:  NB ANTI COAG   Department:  Nb Anticoag           INR as of 4/16/2018     Today's INR 4.5!      Anticoagulation Summary as of 4/16/2018     INR goal 2.0-3.0   Today's INR 4.5!   Full instructions 4/16: Hold; Otherwise 5 mg on Mon, Fri; 2.5 mg all other days   Next INR check 4/23/2018    Indications   Other and unspecified coagulation defects [D68.9]  Long-term (current) use of anticoagulants [Z79.01] [Z79.01]         Your next Anticoagulation Clinic appointment(s)     Apr 23, 2018  9:15 AM CDT   Anticoagulation Visit with NB ANTI COAG   [facility] ([facility])    [address] [place] [address] [phone]              Contact Numbers     Please call [phone] with any problems or questions regarding your therapy.    If you need to cancel and/or reschedule your appointment please call one of the following numbers:  [facility] - [phone]  [facility] [phone]  [facility] [phone]  [facility] [phone]  [place] - [phone]            April 2018 Details    Sun Mon Tue Wed Thu Fri Sat     1               2               3               4               5               6               7                 8               9               10               11               12               13               14                 15               16      Hold   See details      17      2.5 mg         18      2.5 mg         19      2.5 mg         20      5 mg         21      2.5 mg           22      2.5 mg         23            24               25               26               27               28                 29               30                     Date Details   04/16 This INR check       Date of next INR:  4/23/2018         How to take your warfarin dose     To take:  2.5 mg Take 1 of the 2.5 mg tablets.    To take:  5 mg Take 2  of the 2.5 mg tablets.    Hold Do not take your warfarin dose. See the Details table to the right for additional instructions.

## 2018-04-16 NOTE — PROGRESS NOTES
ANTICOAGULATION FOLLOW-UP CLINIC VISIT    Patient Name:  Cricket Klein  Date:  4/16/2018  Contact Type:  Face to Face    SUBJECTIVE:     Patient Findings     Positives Extra doses    Comments Patient denies signs and symptoms of bleeding. Patient was educated regarding what signs and symptoms to look for and also instructed when seek immediate medical attention. Patient verbalized understanding. Patient misunderstood and took two days of warfarin extra dosing. Patient advised to hold today then continue the same warfarin maintenance dose. Patient verbalizes understanding and agrees to plan. No further questions or concerns.             OBJECTIVE    INR Protime   Date Value Ref Range Status   04/16/2018 4.5 (A) 0.86 - 1.14 Final       ASSESSMENT / PLAN  No question data found.  Anticoagulation Summary as of 4/16/2018     INR goal 2.0-3.0   Today's INR 4.5!   Maintenance plan 5 mg (2.5 mg x 2) on Mon, Fri; 2.5 mg (2.5 mg x 1) all other days   Full instructions 4/16: Hold; Otherwise 5 mg on Mon, Fri; 2.5 mg all other days   Weekly total 22.5 mg   Plan last modified Citlalli Sethi RN (4/2/2018)   Next INR check 4/23/2018   Priority INR   Target end date Indefinite    Indications   Other and unspecified coagulation defects [D68.9]  Long-term (current) use of anticoagulants [Z79.01] [Z79.01]         Anticoagulation Episode Summary     INR check location     Preferred lab     Send INR reminders to [facility]    Comments * Had PE in 2010 following hip surgery. Has heterozygous prothrombin gene mutation. Second PE 7-26-16. Needs lifelong warfarin. was on warfarin [id number]        Anticoagulation Care Providers     Provider Role Specialty Phone number    Saud Ramon MD [facility] [phone]            See the Encounter Report to view Anticoagulation Flowsheet and Dosing Calendar (Go to Encounters tab in chart review, and find the Anticoagulation Therapy  Visit)        Citlalli Sethi RN

## 2018-04-23 ENCOUNTER — ANTICOAGULATION THERAPY VISIT (OUTPATIENT)
Dept: ANTICOAGULATION | Facility: CLINIC | Age: 73
End: 2018-04-23
Payer: COMMERCIAL

## 2018-04-23 DIAGNOSIS — Z79.01 LONG-TERM (CURRENT) USE OF ANTICOAGULANTS: ICD-10-CM

## 2018-04-23 LAB — INR POINT OF CARE: 3.2 (ref 0.86–1.14)

## 2018-04-23 PROCEDURE — 85610 PROTHROMBIN TIME: CPT | Mod: QW

## 2018-04-23 PROCEDURE — 36416 COLLJ CAPILLARY BLOOD SPEC: CPT

## 2018-04-23 PROCEDURE — 99207 ZZC NO CHARGE NURSE ONLY: CPT

## 2018-04-23 NOTE — PROGRESS NOTES
ANTICOAGULATION FOLLOW-UP CLINIC VISIT    Patient Name:  Cricket Klein  Date:  4/23/2018  Contact Type:  Face to Face    SUBJECTIVE:     Patient Findings     Positives Missed doses    Comments Patient's wife reports missing last night's warfarin dose. His INR remains elevated today. Patient denies signs and symptoms of bleeding. Patient was educated regarding what signs and symptoms to look for and also instructed when seek immediate medical attention. Patient verbalized understanding.   Writer instructed him to take 1.25mg today and 2.5mg all other days. He will recheck his INR next Monday.           OBJECTIVE    INR Protime   Date Value Ref Range Status   04/23/2018 3.2 (A) 0.86 - 1.14 Final       ASSESSMENT / PLAN  INR assessment SUPRA    Recheck INR In: 1 WEEK    INR Location Clinic      Anticoagulation Summary as of 4/23/2018     INR goal 2.0-3.0   Today's INR 3.2!   Maintenance plan 5 mg (2.5 mg x 2) on Mon, Fri; 2.5 mg (2.5 mg x 1) all other days   Full instructions 4/23: 1.25 mg; 4/27: 2.5 mg; Otherwise 5 mg on Mon, Fri; 2.5 mg all other days   Weekly total 22.5 mg   Plan last modified Citlalli Sethi RN (4/2/2018)   Next INR check 4/30/2018   Priority INR   Target end date Indefinite    Indications   Other and unspecified coagulation defects [D68.9]  Long-term (current) use of anticoagulants [Z79.01] [Z79.01]         Anticoagulation Episode Summary     INR check location     Preferred lab     Send INR reminders to [facility] CL[facility] POOL    Comments * Had PE in 2010 following hip surgery. Has heterozygous prothrombin gene mutation. Second PE 7-26-16. Needs lifelong warfarin. was on warfarin [id number]        Anticoagulation Care Providers     Provider Role Specialty Phone number    Saud Ramon MD [facility] [phone]            See the Encounter Report to view Anticoagulation Flowsheet and Dosing Calendar (Go to Encounters tab in chart review, and find the  Anticoagulation Therapy Visit)        Citlalli Sethi RN

## 2018-04-23 NOTE — MR AVS SNAPSHOT
Cricket Klein   4/23/2018 9:15 AM   Anticoagulation Therapy Visit    Description:  72 year old male   Provider:  NB ANTI COAG   Department:  Nb Anticoag           INR as of 4/23/2018     Today's INR 3.2!      Anticoagulation Summary as of 4/23/2018     INR goal 2.0-3.0   Today's INR 3.2!   Full instructions 4/23: 1.25 mg; 4/27: 2.5 mg; Otherwise 5 mg on Mon, Fri; 2.5 mg all other days   Next INR check 4/30/2018    Indications   Other and unspecified coagulation defects [D68.9]  Long-term (current) use of anticoagulants [Z79.01] [Z79.01]         Your next Anticoagulation Clinic appointment(s)     Apr 30, 2018  1:45 PM CDT   Anticoagulation Visit with NB ANTI COAG   [facility]    [address] [place] [address] [phone]              Contact Numbers     Please call [phone] with any problems or questions regarding your therapy.    If you need to cancel and/or reschedule your appointment please call one of the following numbers:  [facility] - [phone]  [facility] [phone]  [facility] [phone]  [facility] [phone]  [place] - [phone]            April 2018 Details    Sun Mon Tue Wed Thu Fri Sat     1               2               3               4               5               6               7                 8               9               10               11               12               13               14                 15               16               17               18               19               20               21                 22               23      1.25 mg   See details      24      2.5 mg         25      2.5 mg         26      2.5 mg         27      2.5 mg         28      2.5 mg           29      2.5 mg         30                  Date Details   04/23 This INR check       Date of next INR:  4/30/2018         How to take your warfarin dose     To take:  1.25 mg Take 0.5 of a 2.5 mg tablet.     To take:  2.5 mg Take 1 of the 2.5 mg tablets.    To take:  5 mg Take 2 of the 2.5 mg tablets.

## 2018-04-24 ENCOUNTER — TRANSFERRED RECORDS (OUTPATIENT)
Dept: HEALTH INFORMATION MANAGEMENT | Facility: CLINIC | Age: 73
End: 2018-04-24

## 2018-04-25 ENCOUNTER — DOCUMENTATION ONLY (OUTPATIENT)
Dept: SLEEP MEDICINE | Facility: CLINIC | Age: 73
End: 2018-04-25

## 2018-04-25 NOTE — PROGRESS NOTES
6 month [facility]    STM Recheck Visit     Data only recheck     Assessment: Pt meeting objective benchmarks.     Action plan: pt to follow up per provider request (1-2 yrs)      Device type: ASV  PAP settings:PAP settings:   ResMed ASV mode ()     EPAP Fixed 7     PS Min 4     PS Max 15     Rate Mode Auto          Objective measures: 14 day rolling measures      Compliance  100 %      Leak  52.32 lpm  last  upload      AHI 2.69   last  upload      Average number of minutes 416      Objective measure goal  Compliance   Goal >70%  Leak   Goal < 24 lpm  AHI  Goal < 5  Usage  Goal >240

## 2018-04-30 ENCOUNTER — ANTICOAGULATION THERAPY VISIT (OUTPATIENT)
Dept: ANTICOAGULATION | Facility: CLINIC | Age: 73
End: 2018-04-30
Payer: COMMERCIAL

## 2018-04-30 DIAGNOSIS — Z79.01 LONG-TERM (CURRENT) USE OF ANTICOAGULANTS: ICD-10-CM

## 2018-04-30 LAB — INR POINT OF CARE: 1.6 (ref 0.86–1.14)

## 2018-04-30 PROCEDURE — 99207 ZZC NO CHARGE NURSE ONLY: CPT

## 2018-04-30 PROCEDURE — 36416 COLLJ CAPILLARY BLOOD SPEC: CPT

## 2018-04-30 PROCEDURE — 85610 PROTHROMBIN TIME: CPT | Mod: QW

## 2018-04-30 NOTE — MR AVS SNAPSHOT
Cricket Klein   4/30/2018 1:45 PM   Anticoagulation Therapy Visit    Description:  72 year old male   Provider:  NB ANTI COAG   Department:  [facility] Antico[facility]           INR as of 4/30/2018     Today's INR       Anticoagulation Summary as of 4/30/2018     INR goal 2.0-3.0   Today's INR    Full instructions 2.5 mg every day   Next INR check 5/7/2018    Indications   Other and unspecified coagulation defects [D68.9]  Long-term (current) use of anticoagulants [Z79.01] [Z79.01]         Your next Anticoagulation Clinic appointment(s)     May 07, 2018 10:45 AM CDT   Anticoagulation Visit with NB ANTI COAG   [facility] ([facility]    [address] [place] [address] [phone]              Contact Numbers     Please call [phone] with any problems or questions regarding your therapy.    If you need to cancel and/or reschedule your appointment please call one of the following numbers:  [facility] [phone]  [facility] [phone]  [facility] [phone]  [facility] [phone]  [place] - [phone]            April 2018 Details    Sun Mon Tue Wed Thu Fri Sat     1               2               3               4               5               6               7                 8               9               10               11               12               13               14                 15               16               17               18               19               20               21                 22               23               24               25               26               27               28                 29               30      2.5 mg   See details            Date Details   04/30 This INR check               How to take your warfarin dose     To take:  2.5 mg Take 1 of the 2.5 mg tablets.           May 2018 Details    Sun Mon Tue Wed Thu Fri Sat       1      2.5 mg         2      2.5 mg         3      2.5 mg         4       2.5 mg         5      2.5 mg           6      2.5 mg         7            8               9               10               11               12                 13               14               15               16               17               18               19                 20               21               22               23               24               25               26                 27               28               29               30               31                  Date Details   No additional details    Date of next INR:  5/7/2018         How to take your warfarin dose     To take:  2.5 mg Take 1 of the 2.5 mg tablets.

## 2018-04-30 NOTE — PROGRESS NOTES
ANTICOAGULATION FOLLOW-UP CLINIC VISIT    Patient Name:  Cricket Klein  Date:  4/30/2018  Contact Type:  Face to Face    SUBJECTIVE:     Patient Findings     Positives Missed doses    Comments Patient has been very difficult to dose since he is forgetting doses and not taking them the next am as instructed by writer. He forgot another dose this past Thursday which but him sub-therapeutic this week. Writer asked him to take 2.5mg daily and asked his wife to help ensure he doesn't miss any doses this time and if he does to take the dose right away the next day. Patient verbalizes understanding and agrees to plan. No further questions or concerns.             OBJECTIVE    INR Protime   Date Value Ref Range Status   04/30/2018 1.6 (A) 0.86 - 1.14 Final       ASSESSMENT / PLAN  INR assessment SUB    Recheck INR In: 1 WEEK    INR Location Clinic `     Anticoagulation Summary as of 4/30/2018     INR goal 2.0-3.0   Today's INR 1.6!   Maintenance plan 2.5 mg (2.5 mg x 1) every day   Full instructions 2.5 mg every day   Weekly total 17.5 mg   Plan last modified Citlalli Sethi, RN (4/30/2018)   Next INR check 5/7/2018   Priority INR   Target end date Indefinite    Indications   Other and unspecified coagulation defects [D68.9]  Long-term (current) use of anticoagulants [Z79.01] [Z79.01]         Anticoagulation Episode Summary     INR check location     Preferred lab     Send INR reminders to [facility] POOL    Comments * Had PE in 2010 following hip surgery. Has heterozygous prothrombin gene mutation. Second PE 7-26-16. Needs lifelong warfarin. was on warfarin [id number]        Anticoagulation Care Providers     Provider Role Specialty Phone number    Saud Ramon MD [facility] [phone]            See the Encounter Report to view Anticoagulation Flowsheet and Dosing Calendar (Go to Encounters tab in chart review, and find the Anticoagulation Therapy Visit)        Citlalli  TRISTON Sethi

## 2018-05-07 ENCOUNTER — ANTICOAGULATION THERAPY VISIT (OUTPATIENT)
Dept: ANTICOAGULATION | Facility: CLINIC | Age: 73
End: 2018-05-07
Payer: COMMERCIAL

## 2018-05-07 DIAGNOSIS — Z79.01 LONG-TERM (CURRENT) USE OF ANTICOAGULANTS: ICD-10-CM

## 2018-05-07 DIAGNOSIS — E78.00 PURE HYPERCHOLESTEROLEMIA: ICD-10-CM

## 2018-05-07 LAB — INR POINT OF CARE: 2.5 (ref 0.86–1.14)

## 2018-05-07 PROCEDURE — 85610 PROTHROMBIN TIME: CPT | Mod: QW

## 2018-05-07 PROCEDURE — 36416 COLLJ CAPILLARY BLOOD SPEC: CPT

## 2018-05-07 PROCEDURE — 99207 ZZC NO CHARGE NURSE ONLY: CPT

## 2018-05-07 RX ORDER — EZETIMIBE 10 MG/1
10 TABLET ORAL DAILY
Qty: 30 TABLET | Refills: 0 | Status: SHIPPED | OUTPATIENT
Start: 2018-05-07 | End: 2018-07-03

## 2018-05-07 NOTE — PROGRESS NOTES
ANTICOAGULATION FOLLOW-UP CLINIC VISIT    Patient Name:  Cricket Klein  Date:  5/7/2018  Contact Type:  Face to Face    SUBJECTIVE:     Patient Findings     Comments Patient denies missed warfarin doses as well as changes to diet, activity or medications. Patient advised to continue the same warfarin maintenance dose, INR therapeutic.   Patient verbalizes understanding and agrees to plan. No further questions or concerns.               OBJECTIVE    INR Protime   Date Value Ref Range Status   05/07/2018 2.5 (A) 0.86 - 1.14 Final       ASSESSMENT / PLAN  INR assessment THER    Recheck INR In: 6 WEEKS    INR Location Clinic      Anticoagulation Summary as of 5/7/2018     INR goal 2.0-3.0   Today's INR 2.5   Maintenance plan 2.5 mg (2.5 mg x 1) every day   Full instructions 2.5 mg every day   Weekly total 17.5 mg   No change documented Citlalli Sethi RN   Plan last modified Citlalli Sethi RN (4/30/2018)   Next INR check 6/18/2018   Priority INR   Target end date Indefinite    Indications   Other and unspecified coagulation defects [D68.9]  Long-term (current) use of anticoagulants [Z79.01] [Z79.01]         Anticoagulation Episode Summary     INR check location     Preferred lab     Send INR reminders to [facility] CLINIC POOL    Comments * Had PE in 2010 following hip surgery. Has heterozygous prothrombin gene mutation. Second PE 7-26-16. Needs lifelong warfarin. was on warfarin [id number]        Anticoagulation Care Providers     Provider Role Specialty Phone number    Saud Ramon MD [facility] [phone]            See the Encounter Report to view Anticoagulation Flowsheet and Dosing Calendar (Go to Encounters tab in chart review, and find the Anticoagulation Therapy Visit)        Citlalli Sethi RN

## 2018-05-07 NOTE — MR AVS SNAPSHOT
Cricket Klein   5/7/2018 10:45 AM   Anticoagulation Therapy Visit    Description:  72 year old male   Provider:  NB ANTI COALIANE   Department:  [facility] Antico[facility]           INR as of 5/7/2018     Today's INR 2.5      Anticoagulation Summary as of 5/7/2018     INR goal 2.0-3.0   Today's INR 2.5   Full instructions 2.5 mg every day   Next INR check 6/18/2018    Indications   Other and unspecified coagulation defects [D68.9]  Long-term (current) use of anticoagulants [Z79.01] [Z79.01]         Your next Anticoagulation Clinic appointment(s)     Jun 18, 2018 10:30 AM CDT   Anticoagulation Visit with NB ANTI COAG   [facility]    [address] [place] [address] [phone]              Contact Numbers     Please call [phone] with any problems or questions regarding your therapy.    If you need to cancel and/or reschedule your appointment please call one of the following numbers:   [phone]  [facility] [phone]  [facility] [phone]  [facility] [phone]  [place] - [phone]            May 2018 Details    Sun Mon Tue Wed Thu Fri Sat       1               2               3               4               5                 6               7      2.5 mg   See details      8      2.5 mg         9      2.5 mg         10      2.5 mg         11      2.5 mg         12      2.5 mg           13      2.5 mg         14      2.5 mg         15      2.5 mg         16      2.5 mg         17      2.5 mg         18      2.5 mg         19      2.5 mg           20      2.5 mg         21      2.5 mg         22      2.5 mg         23      2.5 mg         24      2.5 mg         25      2.5 mg         26      2.5 mg           27      2.5 mg         28      2.5 mg         29      2.5 mg         30      2.5 mg         31      2.5 mg            Date Details   05/07 This INR check               How to take your warfarin dose     To take:  2.5 mg Take  1 of the 2.5 mg tablets.           June 2018 Details    Sun Mon Tue Wed Thu Fri Sat          1      2.5 mg         2      2.5 mg           3      2.5 mg         4      2.5 mg         5      2.5 mg         6      2.5 mg         7      2.5 mg         8      2.5 mg         9      2.5 mg           10      2.5 mg         11      2.5 mg         12      2.5 mg         13      2.5 mg         14      2.5 mg         15      2.5 mg         16      2.5 mg           17      2.5 mg         18            19               20               21               22               23                 24               25               26               27               28               29               30                Date Details   No additional details    Date of next INR:  6/18/2018         How to take your warfarin dose     To take:  2.5 mg Take 1 of the 2.5 mg tablets.

## 2018-05-07 NOTE — TELEPHONE ENCOUNTER
Medication is being filled for 1 time refill only due to:  Patient needs labs Lipids. Future labs ordered Lipids.  Lab Results   Component Value Date    CHOL 241 03/30/2017     Lab Results   Component Value Date    HDL 61 03/30/2017     Lab Results   Component Value Date     03/30/2017     Lab Results   Component Value Date    TRIG 131 03/30/2017     Lab Results   Component Value Date    CHOLHDLRATIO 3.9 05/13/2015     Gisell MOON RN

## 2018-05-07 NOTE — TELEPHONE ENCOUNTER
"Requested Prescriptions   Pending Prescriptions Disp Refills     ezetimibe (ZETIA) 10 MG tablet [Pharmacy Med Name: EZETIMIBE 10MG TABS] 90 tablet 3     Sig: TAKE ONE TABLET BY MOUTH EVERY DAY    Antihyperlipidemic agents Failed    5/7/2018 11:00 AM       Failed - Lipid panel on file in past 12 mos    Recent Labs   Lab Test  03/30/17   0829   05/13/15   0741   CHOL  241*   < >  237*   TRIG  131   < >  191*   HDL  61   < >  61   LDL  154*   < >  138*   NHDL  180*   < >   --    VLDL   --    --   38*   CHOLHDLRATIO   --    --   3.9    < > = values in this interval not displayed.              Passed - Normal serum ALT on record in past 12 mos    Recent Labs   Lab Test  07/12/17   1028   ALT  28            Passed - Recent (12 mo) or future (30 days) visit within the authorizing provider's specialty    Patient had office visit in the last 12 months or has a visit in the next 30 days with authorizing provider or within the authorizing provider's specialty.  See \"Patient Info\" tab in inbasket, or \"Choose Columns\" in Meds & Orders section of the refill encounter.           Passed - Patient is age 18 years or older      ezetimibe (ZETIA) 10 MG tablet  Last Written Prescription Date:  03/31/2017  Last Fill Quantity: 90 tablet,  # refills: 3   Last office visit: 3/29/2018 with prescribing provider:  FRANCISCO Ramon   Future Office Visit:      Anjana STRICKLAND (DARRELL) (M)      "

## 2018-06-05 DIAGNOSIS — I26.99 PULMONARY EMBOLUS, LEFT (H): ICD-10-CM

## 2018-06-05 RX ORDER — WARFARIN SODIUM 2.5 MG/1
TABLET ORAL
Qty: 112 TABLET | Refills: 0 | Status: SHIPPED | OUTPATIENT
Start: 2018-06-05 | End: 2018-08-20

## 2018-06-18 ENCOUNTER — ANTICOAGULATION THERAPY VISIT (OUTPATIENT)
Dept: ANTICOAGULATION | Facility: CLINIC | Age: 73
End: 2018-06-18
Payer: COMMERCIAL

## 2018-06-18 DIAGNOSIS — Z79.01 LONG-TERM (CURRENT) USE OF ANTICOAGULANTS: ICD-10-CM

## 2018-06-18 LAB — INR POINT OF CARE: 1.9 (ref 0.86–1.14)

## 2018-06-18 PROCEDURE — 85610 PROTHROMBIN TIME: CPT | Mod: QW

## 2018-06-18 PROCEDURE — 36416 COLLJ CAPILLARY BLOOD SPEC: CPT

## 2018-06-18 PROCEDURE — 99207 ZZC NO CHARGE NURSE ONLY: CPT

## 2018-06-18 NOTE — PROGRESS NOTES
ANTICOAGULATION FOLLOW-UP CLINIC VISIT    Patient Name:  Cricket Klein  Date:  6/18/2018  Contact Type:  Face to Face with wife    SUBJECTIVE:     Patient Findings     Positives Missed doses    Comments Unsure of which day he missed. Writer instructed him to take 5mg today then resume his current warfarin maintenance dose. Patient verbalizes understanding and agrees to plan. No further questions or concerns.           OBJECTIVE    INR Protime   Date Value Ref Range Status   06/18/2018 1.9 (A) 0.86 - 1.14 Final       ASSESSMENT / PLAN  INR assessment THER +/- 0.1   Recheck INR In: 6 WEEKS    INR Location Clinic      Anticoagulation Summary as of 6/18/2018     INR goal 2.0-3.0   Today's INR 1.9!   Warfarin maintenance plan 2.5 mg (2.5 mg x 1) every day   Full warfarin instructions 6/18: 5 mg; Otherwise 2.5 mg every day   Weekly warfarin total 17.5 mg   Plan last modified Citlalli Sethi RN (4/30/2018)   Next INR check 7/30/2018   Priority INR   Target end date Indefinite    Indications   Other and unspecified coagulation defects [D68.9]  Long-term (current) use of anticoagulants [Z79.01] [Z79.01]         Anticoagulation Episode Summary     INR check location     Preferred lab     Send INR reminders to [facility] CLINIC POOL    Comments * Had PE in 2010 following hip surgery. Has heterozygous prothrombin gene mutation. Second PE 7-26-16. Needs lifelong warfarin. was on warfarin [id number]        Anticoagulation Care Providers     Provider Role Specialty Phone number    Saud Ramon MD [facility] [phone]            See the Encounter Report to view Anticoagulation Flowsheet and Dosing Calendar (Go to Encounters tab in chart review, and find the Anticoagulation Therapy Visit)        Citlalli Sethi RN

## 2018-06-18 NOTE — MR AVS SNAPSHOT
Cricket Klein   6/18/2018 10:30 AM   Anticoagulation Therapy Visit    Description:  72 year old male   Provider:  NB ANTI HANNA   Department:  [facility]b Anticoag           INR as of 6/18/2018     Today's INR 1.9!      Anticoagulation Summary as of 6/18/2018     INR goal 2.0-3.0   Today's INR 1.9!   Full warfarin instructions 6/18: 5 mg; Otherwise 2.5 mg every day   Next INR check 7/30/2018    Indications   Other and unspecified coagulation defects [D68.9]  Long-term (current) use of anticoagulants [Z79.01] [Z79.01]         Your next Anticoagulation Clinic appointment(s)     Jul 30, 2018  9:45 AM CDT   Anticoagulation Visit with NB ANTI COAG   [facility]    [address] [place] [address] [phone]              Contact Numbers     Please call [phone] with any problems or questions regarding your therapy.    If you need to cancel and/or reschedule your appointment please call one of the following numbers:  [facility] - [phone]  [facility] [phone]  [place] - [phone]  [facility] [phone]  [place] - [phone]            June 2018 Details    Sun Mon Tue Wed Thu Fri Sat          1               2                 3               4               5               6               7               8               9                 10               11               12               13               14               15               16                 17               18      5 mg   See details      19      2.5 mg         20      2.5 mg         21      2.5 mg         22      2.5 mg         23      2.5 mg           24      2.5 mg         25      2.5 mg         26      2.5 mg         27      2.5 mg         28      2.5 mg         29      2.5 mg         30      2.5 mg          Date Details   06/18 This INR check               How to take your warfarin dose     To take:  2.5 mg Take 1 of the 2.5 mg tablets.    To take:  5 mg Take 2 of  the 2.5 mg tablets.           July 2018 Details    Sun Mon Tue Wed Thu Fri Sat     1      2.5 mg         2      2.5 mg         3      2.5 mg         4      2.5 mg         5      2.5 mg         6      2.5 mg         7      2.5 mg           8      2.5 mg         9      2.5 mg         10      2.5 mg         11      2.5 mg         12      2.5 mg         13      2.5 mg         14      2.5 mg           15      2.5 mg         16      2.5 mg         17      2.5 mg         18      2.5 mg         19      2.5 mg         20      2.5 mg         21      2.5 mg           22      2.5 mg         23      2.5 mg         24      2.5 mg         25      2.5 mg         26      2.5 mg         27      2.5 mg         28      2.5 mg           29      2.5 mg         30            31                    Date Details   No additional details    Date of next INR:  7/30/2018         How to take your warfarin dose     To take:  2.5 mg Take 1 of the 2.5 mg tablets.

## 2018-07-03 ENCOUNTER — TELEPHONE (OUTPATIENT)
Dept: NEUROLOGY | Facility: CLINIC | Age: 73
End: 2018-07-03

## 2018-07-03 DIAGNOSIS — E78.00 PURE HYPERCHOLESTEROLEMIA: ICD-10-CM

## 2018-07-03 RX ORDER — EZETIMIBE 10 MG/1
10 TABLET ORAL DAILY
Qty: 30 TABLET | Refills: 0 | Status: SHIPPED | OUTPATIENT
Start: 2018-07-03 | End: 2019-07-26

## 2018-07-03 NOTE — TELEPHONE ENCOUNTER
Per past office visit;  Pt was to do Neuro psych testing and then return to clinic to go over results and discuss a plan.    Call placed to remind/ inquire if testing was done and that any discussion about meds would need to wait until a face to face appt (after testing).    No answer and unable to leave any message.    Ladonna Hardy RN  [facility]

## 2018-07-03 NOTE — TELEPHONE ENCOUNTER
Reason for Call:  Other prescription    Detailed comments: pt wife calling stating would like to get pt on medication for memory issues. He is scheduled for an appt in Aug. His memory and anxiety has gotten worse since last being seen about a year ago.     Phone Number Patient can be reached at: Home number on file [phone] (home)    Best Time: any     Can we leave a detailed message on this number? YES    Call taken on 7/3/2018 at 9:08 AM by Octavia Wilson

## 2018-07-05 NOTE — TELEPHONE ENCOUNTER
Call placed and spoke with spouse.    Confirmed that they have not done the testing.  Advised - due to 1 yr since appt,  Would be needed to await until appt to kennedi further.    Ladonna Hardy RN  [facility]

## 2018-07-16 ENCOUNTER — OFFICE VISIT (OUTPATIENT)
Dept: NEUROLOGY | Facility: CLINIC | Age: 73
End: 2018-07-16
Payer: COMMERCIAL

## 2018-07-16 VITALS
RESPIRATION RATE: 12 BRPM | DIASTOLIC BLOOD PRESSURE: 72 MMHG | SYSTOLIC BLOOD PRESSURE: 138 MMHG | TEMPERATURE: 98.3 F | WEIGHT: 213 LBS | BODY MASS INDEX: 34.38 KG/M2 | HEART RATE: 68 BPM

## 2018-07-16 DIAGNOSIS — R41.89 COGNITIVE IMPAIRMENT: Primary | ICD-10-CM

## 2018-07-16 DIAGNOSIS — Z87.820 H/O TRAUMATIC BRAIN INJURY: ICD-10-CM

## 2018-07-16 PROCEDURE — 99215 OFFICE O/P EST HI 40 MIN: CPT | Performed by: PSYCHIATRY & NEUROLOGY

## 2018-07-16 RX ORDER — DONEPEZIL HYDROCHLORIDE 10 MG/1
10 TABLET, FILM COATED ORAL AT BEDTIME
Qty: 30 TABLET | Refills: 5 | Status: SHIPPED | OUTPATIENT
Start: 2018-08-16 | End: 2019-03-20

## 2018-07-16 RX ORDER — DONEPEZIL HYDROCHLORIDE 5 MG/1
5 TABLET, FILM COATED ORAL AT BEDTIME
Qty: 30 TABLET | Refills: 0 | Status: SHIPPED | OUTPATIENT
Start: 2018-07-16 | End: 2018-11-13 | Stop reason: DRUGHIGH

## 2018-07-16 ASSESSMENT — MONTREAL COGNITIVE ASSESSMENT (MOCA)
9. REPEAT EACH SENTENCE: 2
7. [VIGILENCE] TAP WHEN HEARING DESIGNATED LETTER: 0
4. NAME EACH OF THE THREE ANIMALS SHOWN: 1
11. FOR EACH PAIR OF WORDS, WHAT CATEGORY DO THEY BELONG TO (OUT OF 2): 1
8. SERIAL SUBTRACTION OF 7S: 0
WHAT IS THE TOTAL SCORE (OUT OF 30): 11
VISUOSPATIAL/EXECUTIVE SUBSCORE: 2
WHAT LEVEL OF EDUCATION WAS ATTAINED: 0
6. READ LIST OF DIGITS [FORWARD/BACKWARD]: 2
10. [FLUENCY] NAME WORDS STARTING WITH DESIGNATED LETTER: 0
12. MEMORY INDEX SCORE: 0
13. ORIENTATION SUBSCORE: 3

## 2018-07-16 NOTE — MR AVS SNAPSHOT
After Visit Summary   7/16/2018    Cricket Klein    MRN: [id number]           Patient Information     Date Of Birth          1945        Visit Information        Provider Department      7/16/2018 3:00 PM Emily Oconnor MD [facility]        Today's Diagnoses     Cognitive impairment    -  1    H/O traumatic brain injury          Care Instructions    Plan:    Repeat Neuropsych (cognitive)  testing.  Aricept: 5mg at bedtime for one month, then increase to 10mg at bedtime, if well tolerated.  Stay active, as much as you are able.   Return to clinic after the Neuropsych testing is completed, to discuss results.           Follow-ups after your visit        Additional Services     NEUROPSYCHOLOGY REFERRAL       Your provider has referred you to:    [facility]: Adult Neuropsychology Clinic - [place] [phone] Preferred Provider: Kurtis Mueller Ph.D., L.P., [facility]   [email or web address]    All scheduling is subject to the client's specific insurance plan & benefits, provider/location availability, and provider clinical specialities.  Please arrive 15 minutes early for your first appointment and bring your completed paperwork.    Please be aware that coverage of these services is subject to the terms and limitations of your health insurance plan.  Call member services at your health plan with any benefit or coverage questions.    Please bring the following to your appointment:  >>   Any x-rays, CTs or MRIs which have been performed.  Contact the facility where they were done to arrange for  prior to your scheduled appointment.  Any new CT, MRI or other procedures ordered by your specialist must be performed at a [facility] facility or coordinated by your clinic's referral office.    >>   List of current medications   >>   This referral request   >>   Any documents/labs given to you for this referral                  Your next 10 appointments  already scheduled     Jul 30, 2018  9:45 AM CDT   Anticoagulation Visit with NB ANTI COAG   [facility]    [address] [place] [address] [phone]              Who to contact     If you have questions or need follow up information about today's clinic visit or your schedule please contact [facility] directly at [phone].  Normal or non-critical lab and imaging results will be communicated to you by MyChart, letter or phone within 4 business days after the clinic has received the results. If you do not hear from us within 7 days, please contact the clinic through MyChart or phone. If you have a critical or abnormal lab result, we will notify you by phone as soon as possible.  Submit refill requests through [employer] or call your pharmacy and they will forward the refill request to us. Please allow 3 business days for your refill to be completed.          Additional Information About Your Visit        Care EveryWhere ID     This is your Care EveryWhere ID. This could be used by other organizations to access your [facility] medical records  [id number]        Your Vitals Were     Pulse Temperature Respirations BMI (Body Mass Index)          68 98.3  F (36.8  C) (Oral) 12 34.38 kg/m2         Blood Pressure from Last 3 Encounters:   07/16/18 138/72   04/10/18 118/74   03/29/18 132/84    Weight from Last 3 Encounters:   07/16/18 96.6 kg (213 lb)   04/10/18 95.7 kg (211 lb)   03/29/18 95.7 kg (211 lb)              We Performed the Following     NEUROPSYCHOLOGY REFERRAL          Today's Medication Changes          These changes are accurate as of 7/16/18  3:48 PM.  If you have any questions, ask your nurse or doctor.               Start taking these medicines.        Dose/Directions    * donepezil 5 MG tablet   Commonly known as:  ARICEPT   Used for:  Cognitive impairment   Started by:  Emily Oconnor MD        Dose:  5 mg   Take 1  tablet (5 mg) by mouth At Bedtime   Quantity:  30 tablet   Refills:  0       * donepezil 10 MG tablet   Commonly known as:  ARICEPT   Used for:  Cognitive impairment   Started by:  Emily Oconnor MD        Dose:  10 mg   Start taking on:  8/16/2018   Take 1 tablet (10 mg) by mouth At Bedtime   Quantity:  30 tablet   Refills:  5       * Notice:  This list has 2 medication(s) that are the same as other medications prescribed for you. Read the directions carefully, and ask your doctor or other care provider to review them with you.         Where to get your medicines      These medications were sent to [facility] [address] [place], [place] [address]     Phone:  [phone]     donepezil 10 MG tablet    donepezil 5 MG tablet                Primary Care Provider Office Phone # Fax #    Saud Ramon [phone]       [address] [place] [address]        Equal Access to Services     [facility]Y [facility] Hospital[facility]RELL AH: Hadii aad ku hadasho Soomaali, waaxda luqadaha, qaybta kaalmada adeegyada, waxay idiin haypercyn dory carver . So [facility] [phone].    ATENCIÓN: Si habla español, tiene a barton disposición servicios gratuitos de asistencia lingüística. LlProtestant [facility]acon[facility] [phone].    We comply with applicable federal civil rights laws and [place] laws. We do not discriminate on the basis of race, color, national origin, age, disability, sex, sexual orientation, or gender identity.            Thank you!     Thank you for choosing [facility]  for your care. Our goal is always to provide you with excellent care. Hearing back from our patients is one way we can continue to improve our services. Please take a few minutes to complete the written survey that you may receive in the mail after your visit with us. Thank you!             Your Updated Medication List - Protect others around you: Learn how to safely use, store and  throw away your medicines at www.disposemymeds.org.          This list is accurate as of 7/16/18  3:48 PM.  Always use your most recent med list.                   Brand Name Dispense Instructions for use Diagnosis    atenolol 50 MG tablet    TENORMIN    180 tablet    TAKE ONE TABLET BY MOUTH TWICE A DAY    Benign essential HTN       * donepezil 5 MG tablet    ARICEPT    30 tablet    Take 1 tablet (5 mg) by mouth At Bedtime    Cognitive impairment       * donepezil 10 MG tablet   Start taking on:  8/16/2018    ARICEPT    30 tablet    Take 1 tablet (10 mg) by mouth At Bedtime    Cognitive impairment       ezetimibe 10 MG tablet    ZETIA    30 tablet    Take 1 tablet (10 mg) by mouth daily NEEDS FASTING LAB    Pure hypercholesterolemia       * order for DME      Equipment being ordered: LEXI Klein received a Resmed AirSense 10 Auto. Pressures were set at Auto 10 - 18 cm H2O.        * order for DME      Equipment ordered: RESMED ASV Mask type: Full face  Settings: ASV EPAP 7, PS MIN 4 MAX 15, RR AUTO        vitamin D 1000 units capsule      Take 1 capsule by mouth daily.        warfarin 2.5 MG tablet    COUMADIN    112 tablet    Take 2.5 mg daily    Pulmonary embolus, left (H)       * Notice:  This list has 4 medication(s) that are the same as other medications prescribed for you. Read the directions carefully, and ask your doctor or other care provider to review them with you.

## 2018-07-16 NOTE — PATIENT INSTRUCTIONS
Plan:    Repeat Neuropsych (cognitive)  testing.  Aricept: 5mg at bedtime for one month, then increase to 10mg at bedtime, if well tolerated.  Stay active, as much as you are able.   Return to clinic after the Neuropsych testing is completed, to discuss results.

## 2018-07-16 NOTE — NURSING NOTE
Patient prefers to be contacted: [phone] and letter  Okay to leave detailed message on voicemail: n/a    Gisell CHENEY-CMA

## 2018-07-16 NOTE — LETTER
7/16/2018         RE: Cricket Klein  [address] [place] [address]        Dear Colleague,    Thank you for referring your patient, Cricket Klein, to the [facility]. Please see a copy of my visit note below.    ESTABLISHED PATIENT NEUROLOGY NOTE    DATE OF VISIT: 7/16/2018  MRN: [id number]  PATIENT NAME: Cricket Klein  YOB: 1945    Chief Complaint   Patient presents with     Memory Loss     Recheck     SUBJECTIVE:                                                      HISTORY OF PRESENT ILLNESS:  Cricket is here for follow up regarding memory problems. The patient has a history of head injury as a teenager and short term memory difficulties since. Neuropsych testing in 2016 indicated Left-sided brain dysfunction consistent with Left parenchymal injury (Left skull fracture over the region).     When I met with the patient about 14 months ago, he and his wife noted more slowing down. 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 Wife had taken over much of the driving. He endorsed some anxiety with regards to his poor memory. MoCA was 19/30. I asked him to go back to the [place] for repeat Neuropsych testing and also recommended a driving evaluation and then follow-up with me. No new neuropsych notes in the chart. I note diagnosis of CHRISTIANO.     There is additional history of amnestic events. Prior electroencephalogram was normal. He is on warfarin for history of PE.     The patient says that he has not noticed any change in his memory in the past year. His wife is here with him. She says that he has more problems with language lately, and again, names have always been bad. Wife and the patient seem unaware of my previous recommendation for Neuropsych testing.     He is still driving just local roads around their home. Wife does ride along at times. She is not concerned about safety.   He still takes care of his own ADLs. 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   He says sleep is good and he uses CPAP. Appetite is fine.        CURRENT MEDICATIONS:     Current Outpatient Prescriptions on File Prior to Visit:  atenolol (TENORMIN) 50 MG tablet TAKE ONE TABLET BY MOUTH TWICE A DAY   ezetimibe (ZETIA) 10 MG tablet Take 1 tablet (10 mg) by mouth daily NEEDS FASTING LAB   warfarin (COUMADIN) 2.5 MG tablet Take 2.5 mg daily   Cholecalciferol (VITAMIN D) 1000 UNITS capsule Take 1 capsule by mouth daily.   order for DME Equipment ordered: RESMED ASV Mask type: Full face  Settings: ASV EPAP 7, PS MIN 4 MAX 15, RR AUTO   order for DME Equipment being ordered: CPAP  Cricket Klein received a Resmed AirSense 10 Auto. Pressures were set at Auto 10 - 18 cm H2O.     No current facility-administered medications on file prior to visit.     RECENT DIAGNOSTIC STUDIES:   Labs:   Results for orders placed or performed in visit on 06/18/18   INR point of care   Result Value Ref Range    INR Protime 1.9 (A) 0.86 - 1.14     REVIEW OF SYSTEMS:                                                      10-point review of systems is negative except as mentioned above in HPI.     EXAM:                                                      Physical Exam:   Vitals: /72 (BP Location: Right arm, Patient Position: Sitting, Cuff Size: Adult Large)  Pulse 68  Temp 98.3  F (36.8  C) (Oral)  Resp 12  Wt 96.6 kg (213 lb)  BMI 34.38 kg/m2  BMI= Body mass index is 34.38 kg/(m^2).  GENERAL: NAD.   Neurologic:  MENTAL STATUS: Alert, attentive. Speech is fluent. Occasional word-finding difficulties. Fair comprehension. MOCA: 11/30 (previous score was 19/30). Processing speed seems very slow.   CRANIAL NERVES: Visual fields intact to confrontation. Pupils equally, round and reactive to light. Facial sensation and movement normal. EOM full. Hearing intact to conversation. Trapezius strength intact. Palate moves symmetrically. Tongue midline.  MOTOR: 5/5  in proximal and distal muscle groups of upper and lower extremities. Tone and bulk normal.   DTRs: Intact and symmetric. Babinski juno-going.   SENSATION: Normal light touch throughout. Intact proprioception. Vibration: Normal at both ankles.   COORDINATION: Normal finger nose finger. Finger tapping normal. Knee heel shin normal.  STATION AND GAIT: Romberg negative. Gait appears normal.  CV: RRR. S1, S2.   NECK: No bruits.  Right hand-dominant.     ASSESSMENT and PLAN:                                                      Assessment and Plan:    ICD-10-CM    1. Cognitive impairment R41.89 NEUROPSYCHOLOGY REFERRAL     donepezil (ARICEPT) 5 MG tablet     donepezil (ARICEPT) 10 MG tablet   2. H/O traumatic brain injury Z87.820 NEUROPSYCHOLOGY REFERRAL       Mr. Klein is a pleasant 72 yo man with history of remote brain injury and CHRISTIANO here for follow-up. His MoCA score has dropped rather dramatically since his previous visit and he does seem to be relying on his wife more. No red flags in terms of safety at home, but I do question his ability to navigate with driving. His wife will continue to monitor. I recommend we repeat the Neuropsych testing (as previously-suggested)  Patient is motivated to treat his cognitive deficits.   We will start a low dose of Aricept and increase if well-tolerated. Side effects discussed. The patient and his wife understand and agree with the plan.     Patient Instructions:  Repeat Neuropsych (cognitive)  testing.  Aricept: 5mg at bedtime for one month, then increase to 10mg at bedtime, if well tolerated.  Stay active, as much as you are able.   Return to clinic after the Neuropsych testing is completed, to discuss results.     Total Time: 40 minutes were spent with the patient. More than 50% of the time spent on counseling (as described above in Assessment and Plan) /coordinating the care.    Emily Oconnor MD  Neurology                    Again, thank you for allowing me to  participate in the care of your patient.        Sincerely,        Emily Oconnor MD

## 2018-07-16 NOTE — PROGRESS NOTES
ESTABLISHED PATIENT NEUROLOGY NOTE    DATE OF VISIT: 7/16/2018  MRN: [id number]  PATIENT NAME: Cricket Klein  YOB: 1945    Chief Complaint   Patient presents with     Memory Loss     Recheck     SUBJECTIVE:                                                      HISTORY OF PRESENT ILLNESS:  Cricket is here for follow up regarding memory problems. The patient has a history of head injury as a teenager and short term memory difficulties since. Neuropsych testing in 2016 indicated Left-sided brain dysfunction consistent with Left parenchymal injury (Left skull fracture over the region).     When I met with the patient about 14 months ago, he and his wife noted more slowing down. 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 Wife had taken over much of the driving. He endorsed some anxiety with regards to his poor memory. MoCA was 19/30. I asked him to go back to the [place] [facility] for repeat Neuropsych testing and also recommended a driving evaluation and then follow-up with me. No new neuropsych notes in the chart. I note diagnosis of CHRISTIANO.     There is additional history of amnestic events. Prior electroencephalogram was normal. He is on warfarin for history of PE.     The patient says that he has not noticed any change in his memory in the past year. His wife is here with him. She says that he has more problems with language lately, and again, names have always been bad. Wife and the patient seem unaware of my previous recommendation for Neuropsych testing.     He is still driving just local roads around their home. Wife does ride along at times. She is not concerned about safety.   He still takes care of his own ADLs. 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   He says sleep is good and he uses CPAP. Appetite is fine.        CURRENT MEDICATIONS:     Current Outpatient Prescriptions on File Prior to Visit:  atenolol (TENORMIN) 50 MG tablet TAKE ONE  TABLET BY MOUTH TWICE A DAY   ezetimibe (ZETIA) 10 MG tablet Take 1 tablet (10 mg) by mouth daily NEEDS FASTING LAB   warfarin (COUMADIN) 2.5 MG tablet Take 2.5 mg daily   Cholecalciferol (VITAMIN D) 1000 UNITS capsule Take 1 capsule by mouth daily.   order for DME Equipment ordered: RESMED ASV Mask type: Full face  Settings: ASV EPAP 7, PS MIN 4 MAX 15, RR AUTO   order for DME Equipment being ordered: LEXI Klein received a Resmed AirSense 10 Auto. Pressures were set at Auto 10 - 18 cm H2O.     No current facility-administered medications on file prior to visit.     RECENT DIAGNOSTIC STUDIES:   Labs:   Results for orders placed or performed in visit on 06/18/18   INR point of care   Result Value Ref Range    INR Protime 1.9 (A) 0.86 - 1.14     REVIEW OF SYSTEMS:                                                      10-point review of systems is negative except as mentioned above in HPI.     EXAM:                                                      Physical Exam:   Vitals: /72 (BP Location: Right arm, Patient Position: Sitting, Cuff Size: Adult Large)  Pulse 68  Temp 98.3  F (36.8  C) (Oral)  Resp 12  Wt 96.6 kg (213 lb)  BMI 34.38 kg/m2  BMI= Body mass index is 34.38 kg/(m^2).  GENERAL: NAD.   Neurologic:  MENTAL STATUS: Alert, attentive. Speech is fluent. Occasional word-finding difficulties. Fair comprehension. MOCA: 11/30 (previous score was 19/30). Processing speed seems very slow.   CRANIAL NERVES: Visual fields intact to confrontation. Pupils equally, round and reactive to light. Facial sensation and movement normal. EOM full. Hearing intact to conversation. Trapezius strength intact. Palate moves symmetrically. Tongue midline.  MOTOR: 5/5 in proximal and distal muscle groups of upper and lower extremities. Tone and bulk normal.   DTRs: Intact and symmetric. Babinski juno-going.   SENSATION: Normal light touch throughout. Intact proprioception. Vibration: Normal at both ankles.    COORDINATION: Normal finger nose finger. Finger tapping normal. Knee heel shin normal.  STATION AND GAIT: Romberg negative. Gait appears normal.  CV: RRR. S1, S2.   NECK: No bruits.  Right hand-dominant.     ASSESSMENT and PLAN:                                                      Assessment and Plan:    ICD-10-CM    1. Cognitive impairment R41.89 NEUROPSYCHOLOGY REFERRAL     donepezil (ARICEPT) 5 MG tablet     donepezil (ARICEPT) 10 MG tablet   2. H/O traumatic brain injury Z87.820 NEUROPSYCHOLOGY REFERRAL       Mr. Klein is a pleasant 74 yo man with history of remote brain injury and CHRISTIANO here for follow-up. His MoCA score has dropped rather dramatically since his previous visit and he does seem to be relying on his wife more. No red flags in terms of safety at home, but I do question his ability to navigate with driving. His wife will continue to monitor. I recommend we repeat the Neuropsych testing (as previously-suggested)  Patient is motivated to treat his cognitive deficits.   We will start a low dose of Aricept and increase if well-tolerated. Side effects discussed. The patient and his wife understand and agree with the plan.     Patient Instructions:  Repeat Neuropsych (cognitive)  testing.  Aricept: 5mg at bedtime for one month, then increase to 10mg at bedtime, if well tolerated.  Stay active, as much as you are able.   Return to clinic after the Neuropsych testing is completed, to discuss results.     Total Time: 40 minutes were spent with the patient. More than 50% of the time spent on counseling (as described above in Assessment and Plan) /coordinating the care.    Emily Oconnor MD  Neurology

## 2018-07-30 ENCOUNTER — ANTICOAGULATION THERAPY VISIT (OUTPATIENT)
Dept: ANTICOAGULATION | Facility: CLINIC | Age: 73
End: 2018-07-30
Payer: COMMERCIAL

## 2018-07-30 DIAGNOSIS — Z79.01 LONG-TERM (CURRENT) USE OF ANTICOAGULANTS: Primary | ICD-10-CM

## 2018-07-30 DIAGNOSIS — D68.9 COAGULATION DEFECT (H): ICD-10-CM

## 2018-07-30 LAB — INR POINT OF CARE: 4.6 (ref 0.86–1.14)

## 2018-07-30 PROCEDURE — 36416 COLLJ CAPILLARY BLOOD SPEC: CPT

## 2018-07-30 PROCEDURE — 99207 ZZC NO CHARGE NURSE ONLY: CPT

## 2018-07-30 PROCEDURE — 85610 PROTHROMBIN TIME: CPT | Mod: QW

## 2018-07-30 NOTE — MR AVS SNAPSHOT
Cricket Klein   7/30/2018 9:45 AM   Anticoagulation Therapy Visit    Description:  73 year old male   Provider:  NB ANTI COAG   Department:  [facility] Anticoag           INR as of 7/30/2018     Today's INR 4.6!      Anticoagulation Summary as of 7/30/2018     INR goal 2.0-3.0   Today's INR 4.6!   Full warfarin instructions 7/30: Hold; 7/31: 1.25 mg; Otherwise 2.5 mg every day   Next INR check 8/9/2018    Indications   Other and unspecified coagulation defects [D68.9]  Long-term (current) use of anticoagulants [Z79.01] [Z79.01]         Your next Anticoagulation Clinic appointment(s)     Jul 30, 2018  9:45 AM CDT   Anticoagulation Visit with NB ANTI HANNA   [facility])    [address] [place] [id number] [phone]            Aug 09, 2018 11:15 AM CDT   Anticoagulation Visit with NB ANTI COALIANE   [facility] ([facility]    [address] [place] [id number] [phone]              Contact Numbers     Please call [phone] with any problems or questions regarding your therapy.    If you need to cancel and/or reschedule your appointment please call one of the following numbers:  [facility] - [phone]  [place] - [phone]  [place] - [phone]  [place] - [phone]  [place] - [phone]            July 2018 Details    Sun Mon Tue Wed Thu Fri Sat     1               2               3               4               5               6               7                 8               9               10               11               12               13               14                 15               16               17               18               19               20               21                 22               23               24               25               26               27               28                 29               30      Hold   See details      31      1.25 mg               Date Details   07/30 This INR check               How to take your warfarin dose     To take:  1.25 mg Take 0.5 of a 2.5 mg tablet.    Hold Do not take your warfarin dose. See the Details table to the right for additional instructions.                August 2018 Details    Sun Mon Tue Wed Thu Fri Sat        1      2.5 mg         2      2.5 mg         3      2.5 mg         4      2.5 mg           5      2.5 mg         6      2.5 mg         7      2.5 mg         8      2.5 mg         9            10               11                 12               13               14               15               16               17               18                 19               20               21               22               23               24               25                 26               27               28               29               30               31                 Date Details   No additional details    Date of next INR:  8/9/2018         How to take your warfarin dose     To take:  2.5 mg Take 1 of the 2.5 mg tablets.

## 2018-07-30 NOTE — PROGRESS NOTES
ANTICOAGULATION FOLLOW-UP CLINIC VISIT    Patient Name:  Cricket Klein  Date:  7/30/2018  Contact Type:  Face to Face    SUBJECTIVE:     Patient Findings     Positives Inflammation    Comments Patient is having sciatic nerve pain since last Thursday, he is taking 3 Tylenol daily and walking with crutches it is so painful. Patient denies signs and symptoms of bleeding. Patient was educated regarding what signs and symptoms to be aware of and when seek immediate medical attention at ED versus PCP's Clinic. Patient verbalized understanding. RN instructed him to hold his warfarin today, take 1.25mg tomorrow then resume his maintenance dose. He will recheck his INR in 10 days. Patient verbalizes understanding and agrees to plan. No further questions or concerns.               OBJECTIVE    INR Protime   Date Value Ref Range Status   07/30/2018 4.6 (A) 0.86 - 1.14 Final       ASSESSMENT / PLAN  INR assessment SUPRA    Recheck INR In: 10 DAYS    INR Location Clinic      Anticoagulation Summary as of 7/30/2018     INR goal 2.0-3.0   Today's INR 4.6!   Warfarin maintenance plan 2.5 mg (2.5 mg x 1) every day   Full warfarin instructions 7/30: Hold; 7/31: 1.25 mg; Otherwise 2.5 mg every day   Weekly warfarin total 17.5 mg   Plan last modified Citlalli Sethi RN (4/30/2018)   Next INR check 8/9/2018   Priority INR   Target end date Indefinite    Indications   Other and unspecified coagulation defects [D68.9]  Long-term (current) use of anticoagulants [Z79.01] [Z79.01]         Anticoagulation Episode Summary     INR check location     Preferred lab     Send INR reminders to [facility] CLINIC POOL    Comments * Had PE in 2010 following hip surgery. Has heterozygous prothrombin gene mutation. Second PE 7-26-16. Needs lifelong warfarin. was on warfarin [id number]        Anticoagulation Care Providers     Provider Role Specialty Phone number    Saud Ramon MD [facility] [phone]             See the Encounter Report to view Anticoagulation Flowsheet and Dosing Calendar (Go to Encounters tab in chart review, and find the Anticoagulation Therapy Visit)        Citlalli Sethi RN

## 2018-08-13 ENCOUNTER — ANTICOAGULATION THERAPY VISIT (OUTPATIENT)
Dept: ANTICOAGULATION | Facility: CLINIC | Age: 73
End: 2018-08-13
Payer: COMMERCIAL

## 2018-08-13 DIAGNOSIS — Z79.01 LONG-TERM (CURRENT) USE OF ANTICOAGULANTS: ICD-10-CM

## 2018-08-13 LAB — INR POINT OF CARE: 3.2 (ref 0.86–1.14)

## 2018-08-13 PROCEDURE — 36416 COLLJ CAPILLARY BLOOD SPEC: CPT

## 2018-08-13 PROCEDURE — 99207 ZZC NO CHARGE NURSE ONLY: CPT

## 2018-08-13 PROCEDURE — 85610 PROTHROMBIN TIME: CPT | Mod: QW

## 2018-08-13 NOTE — PROGRESS NOTES
ANTICOAGULATION FOLLOW-UP CLINIC VISIT    Patient Name:  Cricket Klein  Date:  8/13/2018  Contact Type:  Face to Face/Spouse present    SUBJECTIVE:     Patient Findings     Positives Inflammation    Comments Patient states he is feeling better then the previous visit. I instructed him to remain on the maintenance dose and to eat a side of greens for lunch or dinner. Recheck in 4 weeks. Patient denies signs or symptoms of bleeding. Writer educated patient regarding increased bleed risk and when to seek immediate medical attention. Patient verbalized understanding.             OBJECTIVE    INR Protime   Date Value Ref Range Status   08/13/2018 3.2 (A) 0.86 - 1.14 Final       ASSESSMENT / PLAN  INR assessment SUPRA    Recheck INR In: 4 WEEKS    INR Location Clinic      Anticoagulation Summary as of 8/13/2018     INR goal 2.0-3.0   Today's INR 3.2!   Warfarin maintenance plan 2.5 mg (2.5 mg x 1) every day   Full warfarin instructions 2.5 mg every day   Weekly warfarin total 17.5 mg   No change documented Shruthi Hutchison, TRISTON   Plan last modified Citlalli Sethi RN (4/30/2018)   Next INR check 9/10/2018   Priority INR   Target end date Indefinite    Indications   Other and unspecified coagulation defects [D68.9]  Long-term (current) use of anticoagulants [Z79.01] [Z79.01]         Anticoagulation Episode Summary     INR check location     Preferred lab     Send INR reminders to [facility]    Comments * Had PE in 2010 following hip surgery. Has heterozygous prothrombin gene mutation. Second PE 7-26-16. Needs lifelong warfarin. was on warfarin [id number]        Anticoagulation Care Providers     Provider Role Specialty Phone number    Saud Ramon MD [facility] [phone]            See the Encounter Report to view Anticoagulation Flowsheet and Dosing Calendar (Go to Encounters tab in chart review, and find the Anticoagulation Therapy Visit)        Shruthi Peraza  TRISTON Hutchison

## 2018-08-13 NOTE — MR AVS SNAPSHOT
Cricket Klein   8/13/2018 1:15 PM   Anticoagulation Therapy Visit    Description:  73 year old male   Provider:  NB ANTI COAG   Department:  Nb Anticoag           INR as of 8/13/2018     Today's INR 3.2!      Anticoagulation Summary as of 8/13/2018     INR goal 2.0-3.0   Today's INR 3.2!   Full warfarin instructions 2.5 mg every day   Next INR check 9/10/2018    Indications   Other and unspecified coagulation defects [D68.9]  Long-term (current) use of anticoagulants [Z79.01] [Z79.01]         Your next Anticoagulation Clinic appointment(s)     Sep 10, 2018  9:45 AM CDT   [facility]    [address] [place] [address] [phone]              Contact Numbers     Please call [phone] with any problems or questions regarding your therapy.    If you need to cancel and/or reschedule your appointment please call one of the following numbers:  [facility] [phone]  [facility] [phone]  [facility] [phone]  [facility] [phone]  [place] - [phone]            August 2018 Details    Sun Mon Tue Wed Thu Fri Sat        1               2               3               4                 5               6               7               8               9               10               11                 12               13      2.5 mg   See details      14      2.5 mg         15      2.5 mg         16      2.5 mg         17      2.5 mg         18      2.5 mg           19      2.5 mg         20      2.5 mg         21      2.5 mg         22      2.5 mg         23      2.5 mg         24      2.5 mg         25      2.5 mg           26      2.5 mg         27      2.5 mg         28      2.5 mg         29      2.5 mg         30      2.5 mg         31      2.5 mg           Date Details   08/13 This INR check               How to take your warfarin dose     To take:  2.5 mg Take 1 of the 2.5 mg  tablets.           September 2018 Details    Sun Mon Tue Wed Thu Fri Sat           1      2.5 mg           2      2.5 mg         3      2.5 mg         4      2.5 mg         5      2.5 mg         6      2.5 mg         7      2.5 mg         8      2.5 mg           9      2.5 mg         10            11               12               13               14               15                 16               17               18               19               20               21               22                 23               24               25               26               27               28               29                 30                      Date Details   No additional details    Date of next INR:  9/10/2018         How to take your warfarin dose     To take:  2.5 mg Take 1 of the 2.5 mg tablets.

## 2018-08-20 ENCOUNTER — OFFICE VISIT (OUTPATIENT)
Dept: NEUROPSYCHOLOGY | Facility: CLINIC | Age: 73
End: 2018-08-20
Payer: COMMERCIAL

## 2018-08-20 DIAGNOSIS — F03.90 SENILE DEMENTIA, UNCOMPLICATED (H): ICD-10-CM

## 2018-08-20 DIAGNOSIS — R41.3 MEMORY LOSS: ICD-10-CM

## 2018-08-20 DIAGNOSIS — R41.841 COGNITIVE COMMUNICATION DEFICIT: Primary | ICD-10-CM

## 2018-08-20 DIAGNOSIS — I26.99 PULMONARY EMBOLUS, LEFT (H): ICD-10-CM

## 2018-08-20 RX ORDER — WARFARIN SODIUM 2.5 MG/1
TABLET ORAL
Qty: 112 TABLET | Refills: 0 | Status: SHIPPED | OUTPATIENT
Start: 2018-08-20 | End: 2018-12-19

## 2018-08-20 NOTE — PROGRESS NOTES
The patient was seen for neuropsychological evaluation at the request of Emily Fernandes for the purposes of diagnostic clarification and treatment planning.  Three hours of face-to-face testing were provided by this writer.  Please see Dr. Kurtis Mueller's report for a full interpretation of the findings.

## 2018-08-20 NOTE — TELEPHONE ENCOUNTER
Thank you  Jonelle Finch CPht    [facility]  Phone: [phone]  Fax: [phone]

## 2018-08-20 NOTE — MR AVS SNAPSHOT
After Visit Summary   8/20/2018    Cricket Klein    MRN: [id number]           Patient Information     Date Of Birth          1945        Visit Information        Provider Department      8/20/2018 8:00 AM Kurtis Mueller, PhD [facility] Neuropsychology        Today's Diagnoses     Cognitive communication deficit    -  1    Memory loss        Senile dementia, uncomplicated           Follow-ups after your visit        Your next 10 appointments already scheduled     Sep 10, 2018  9:45 AM CDT   [facility]agulation [facility] with NB ANTI COAG   [facility]    [address] [place] [address] [phone]              Who to contact     Please call your clinic at [phone] to:    Ask questions about your health    Make or cancel appointments    Discuss your medicines    Learn about your test results    Speak to your doctor            Additional Information About Your Visit        Care EveryWhere ID     This is your Care EveryWhere ID. This could be used by other organizations to access your [place] medical records  [id number]         Blood Pressure from Last 3 Encounters:   07/16/18 138/72   04/10/18 118/74   03/29/18 132/84    Weight from Last 3 Encounters:   07/16/18 96.6 kg (213 lb)   04/10/18 95.7 kg (211 lb)   03/29/18 95.7 kg (211 lb)              We Performed the Following     [id number] TESTING, PER HR/PSYCHOLOGIST     NEUROPSYCH TESTING BY TECH          Where to get your medicines      These medications were sent to [facility] [address] [address]     Phone:  [phone]     warfarin 2.5 MG tablet          Primary Care Provider Office Phone # Fax #    Saud Ramon [phone]       [address] [facility] [address]        Equal Access to Services     ROSARIO MOE AH: Kaylee Soto  waaxda luqadaha, qaybta kaalmada sundeep, nory pittsaamadan ah. So [facility] [phone].    ATENCIÓN: Si lisethla maegan, tiene a barton disposición servicios gratuitos de asistencia lingüística. Desiree al [phone].    We comply with applicable federal civil rights laws and [place] laws. We do not discriminate on the basis of race, color, national origin, age, disability, sex, sexual orientation, or gender identity.            Thank you!     Thank you for choosing [facility] NEUROPSYCHOLOGY  for your care. Our goal is always to provide you with excellent care. Hearing back from our patients is one way we can continue to improve our services. Please take a few minutes to complete the written survey that you may receive in the mail after your visit with us. Thank you!             Your Updated Medication List - Protect others around you: Learn how to safely use, store and throw away your medicines at www.disposemymeds.org.          This list is accurate as of 8/20/18 11:59 PM.  Always use your most recent med list.                   Brand Name Dispense Instructions for use Diagnosis    atenolol 50 MG tablet    TENORMIN    180 tablet    TAKE ONE TABLET BY MOUTH TWICE A DAY    Benign essential HTN       * donepezil 5 MG tablet    ARICEPT    30 tablet    Take 1 tablet (5 mg) by mouth At Bedtime    Cognitive impairment       * donepezil 10 MG tablet    ARICEPT    30 tablet    Take 1 tablet (10 mg) by mouth At Bedtime    Cognitive impairment       ezetimibe 10 MG tablet    ZETIA    30 tablet    Take 1 tablet (10 mg) by mouth daily NEEDS FASTING LAB    Pure hypercholesterolemia       * order for DME      Equipment being ordered: LEXI Klein received a Resmed AirSense 10 Auto. Pressures were set at Auto 10 - 18 cm H2O.        * order for DME      Equipment ordered: RESMED ASV Mask type: Full face  Settings: ASV EPAP 7, PS MIN 4 MAX 15, RR AUTO        vitamin D 1000 units capsule      Take 1  capsule by mouth daily.        warfarin 2.5 MG tablet    COUMADIN    112 tablet    Take 2.5 mg daily    Pulmonary embolus, left (H)       * Notice:  This list has 4 medication(s) that are the same as other medications prescribed for you. Read the directions carefully, and ask your doctor or other care provider to review them with you.

## 2018-08-24 NOTE — PROGRESS NOTES
Name: Cricket Klein  MR#: [id number]  YOB: 1945  Date of Exam: 08/20/2018    Neuropsychology Laboratory  [address], [facility] [address]  [place]  [address] [phone]  NEUROPSYCHOLOGICAL EVALUATION    IDENTIFYING INFORMATION  Cricket Klein is a 73 year old, right handed, retired  and farmer, with 14 years of formal education. He was accompanied to the evaluation by his wife, Shawna, and his daughter, Roshni.    BACKGROUND INFORMATION / INTERVIEW FINDINGS    Records indicate that Mr. Klein  medical history includes hypertension, sleep apnea with use of CPAP, past TIA, hypercholesterolemia, hyperlipidemia, pulmonary embolism, past hip replacement, and past head injuries. I saw the patient for a neuropsychology exam on 05/12/2016 in the context of concerns about forgetfulness. My exam documented weaknesses that were felt to be primarily consistent with left lateral frontal and left temporal dysfunction. I thought there might be multiple contributing factors including the early stages of a progressive dementing condition such as an atypical Alzheimer's variant. A second possible etiologic consideration was subclinical seizure activity. I felt that a third, and less likely, possibility was that his past brain injury is contributing to some of the weaknesses seen in the exam. In the exam, weaknesses were identified in word retrieval, verbal anterograde memory, and working memory. Please see my report for more details. MRI of his brain on 05/23/2016 documented mild to moderate cerebral atrophy which was slightly progressed since 2007, stable left frontal encephalomalacia, and a few stable nonspecific white matter changes. EEG monitoring in June, 2016 was read as normal. Concerns have been expressed about a decline in his cognition. Note was made that there had been a dramatic decline in his performance on a cognitive screening measure. Additional concerns of  "been raised about his ability to navigate with driving. The current evaluation was requested by Dr. Emily Fernandes, in this context.    On interview, Mr. Klein confirmed the above history. When asked about his cognition, the patient reported \"it goes.\" The patient and his wife indicated that he has had increasing difficulty with retrieving words. His wife stated that he also has had disorientation in the mornings. They noted that this disorientation was particularly bad when he was prescribed pain medications. The patient's daughter indicated that he loses his words and then loses his train of thought. His wife and daughter indicated that there has been a slow progressive decline in his thinking, but with times and days that are better than others. They reported that he re-asks questions. They noted that the quality of his sleep plays a role in his cognition, and his thinking is improved if he is able to use the CPAP. The reported that his thinking on the date of the assessment was a fairly good day for him. They also noted that he is worse if he is agitated or stressed. The patient's wife reported that he is worried about the future. They noted that he has difficulties balancing a checkbook. 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 His daughter described an incident in which he had difficulties replacing batteries in a telephone, but was able to come back to this task later and complete the task successfully. They noted that he may misplace things. They described an episode in which the patient had difficulties with vision, and stated that the picture look black-and-white on their color TV. They also noted that he had possible hallucinations after a surgery.    Regarding mental health, the patient reported that his mood is good. He noted that his mood varies day by day, and there are days when he feels depressed or anxious. His wife noted that he is a little bit more " irritable. His daughter stated that he seems more frustrated as he has a sense that tasks are more daunting than they used to be. He has never had mental health diagnoses or treatments. He denied suicidal ideation.     With respect to other medical background, the patient denied interval TBI, stroke, or seizure. He reported that his sleep has been up and down in the last several nights, and indicated that he got four hours of sleep the night before the current exam. He reported that he usually wakes up several times. He stated that his CPAP helps. His wife and daughter noted that the most he will sleep in a stretch is for five hours. They noted that he had been sleeping in a chair for the last couple of weeks due to sciatic nerve pain. Per records, his current medications include atenolol, cholecalciferol, donepezil, ezetimibe, and warfarin. He stated that he will consume one alcoholic drink per day or less, and denied other substance use.    Mr. Klein lives at home with his wife. He stated that they have grandchildren staying with them still, but the grandchildren are moving out in the very near future. He manages his own basic daily activities. His wife oversees his medicines. She now manages their finances and meal preparation. He used to be involved in managing the finances. He stated that he is not driving much anymore, but still drives locally during good conditions. By way of background, the patient and his wife remain . They have four children, three of whom lived nearby. He denied a change in his educational background or work history. He is retired.    BEHAVIORAL OBSERVATIONS  Mr. Klein was polite and cooperative with the exam. His speech was notable for moderate to severe difficulties with word finding, and moderate to severe dysfluency, but was otherwise normal. His comprehension appeared to be mildly impaired for conversational speech, but severe deficits were noted on testing. His  thought processes were notable for difficulties with memory, difficulties executive functioning, and a tendency to respond yes on recognition tasks. His mood was generally neutral with congruent affect, although he became tearful at times during testing when he was struggling. His effort was good. The current results are felt to be an accurate representation of his cognitive functioning.     RESULTS OF EXAM  His performances on measures of neuropsychological functioning were as follows:     He was severely disoriented to time, and was unable to state the name of the clinic. He was otherwise oriented to place. He was fully oriented to various aspects of personal information. He was unable to provide the name of the current president or any other recent past presidents. Auditory attention for digits was borderline impaired. Mental calculations were low average. Learning of words in a list format was impaired. Delayed recall of list words was impaired, as he was unable to recollect any of the list stimuli. Percent retention of list words was impaired. Delayed recognition of list words was impaired, and notable for poor discrimination with 12 false positive errors. He essentially responded  yes  to each of the items in the recognition portion of this test. Learning and delayed recall of story information were both impaired. He was unable to recollect any of the story details following a delay. Delayed recognition of story information was performed in the borderline impaired range compared to lenient age corrected norms. Immediate memory for simple geometric figures was severely impaired. He was unable to reproduce any of the items on this test. His drawing of a complicated geometric figure was low average. Short delayed recall of the figure was low average. 30 minute delayed recall of the figure was impaired. Delayed recognition of the figure s elements was impaired. Visuospatial judgments for variably oriented lines  were low average. Visual problem solving with blocks was impaired. Comprehension of phrases and short stories was impaired. Repetition of orally presented sentences was borderline impaired. Verbal associative fluency was impaired. Semantic verbal fluency was impaired. Naming to confrontation was impaired. Verbal abstract reasoning was borderline impaired. Speeded visual sequencing under focused attention was low average. A similar measure with a divided attention component was impaired. Speeded word reading was low average. Speeded color naming was impaired. Speeded inhibition of an overlearned response was impaired. Speeded visuomotor coding was borderline impaired. Speeded fine motor dexterity was borderline impaired for the dominant, right hand, and low average for the left hand. His right hand was objectively slower than the left hand on this measure.    He endorsed items consistent with minimal symptoms of depression on a self-report measure.    IMPRESSIONS  Mr. Klein demonstrated cognitive impairments and weaknesses that are compatible with global brain dysfunction, but most particularly so for the left frontal and temporal brain regions, and the right frontal and temporal regions to a slightly lesser degree. There has been marked and global decline in his cognition relative to his prior evaluation in May, 2016. This decline is strongly suggestive of a progressive neurodegenerative disease. The pattern of deficits, with suggestion of selectively severe dysfunction of the left hemisphere, raises the strong question of a somewhat atypical manifestation of Alzheimer s disease, or possibly a dementia syndrome along the lines of a primary progressive aphasia. While it may be the case that his old head injury is contributing at some level, his current deficits and decline are very certainly not solely attributable to the head injury. In this exam, profound deficits were noted in memory, orientation, naming,  verbal fluency, verbal comprehension, executive functioning, nonverbal working memory, and aspects of visual problem solving. Relative preservation was noted in aspects of verbal working memory, visuospatial judgments, orientation to personal information, and left hand fine motor dexterity. He is not reporting elevated symptoms of depression.    RECOMMENDATIONS  Multiple attempts were made to call the patient and his wife over the telephone on 08/23 and 08/24/2018, but I was unable to connect with them at the number they had provided. The voicemail was not set up on the phone, so I was unable to leave a message. If the patient and his wife are interested in feedback, they are welcome to call me at the number listed above.     1. Mr. Klein will require support and supervision of his daily activities. It is likely that his support needs will increase in the future.    2. He should not drive.    3. The patient and his wife could benefit from a referral to a local chapter the Alzheimer s [employer]. www.alz.org  4. He appears to have a  yes  response bias when responding to questions. In order to ascertain his preferences, it will be important to pose questions to him in alternating  yes/no  format to ensure that his needs are being met.     5. Follow-up neuropsychological evaluation could be considered in one year if clinically indicated.    Kurtis Mueller, Ph.D., L.P., [facility]CN   / Licensed Psychologist [id number]  Department of Rehabilitation Medicine  Division of Adult Neuropsychology  [facility]    Time spent:  four hours professional time, including interview, record review, data integration, and report writing (CPT 53333); three hours of testing administered by a psychometrist and interpreted by a neuropsychologist (CPT 76565). Diagnoses: R41.841, R41.3, F03.90.

## 2018-08-24 NOTE — PROGRESS NOTES
__ Orientation            Time          __-80____        Place        ____1____        Personal Info.     ____4___        Presidents          ____0___    WAIS-IV   FSIQ____VCI_____PRI_____ [id number]_[id number]__PSI_       Raw  Age SS  __Similarities  __11__  __5__  __Vocabulary  _____  _____  __Information  _____          _____  __Comprehension _______ _______  __Block Design  __5__  __2_  __Matrix Reas.  ____  _____  __Visual Puzzles _______ _______  __Picture Comp. ______  ______  __Figure Weights _______ _______  __Digit Span  __ 14___ __4__  __Arithmetic  __ 8__   __6_  __L-N Sequencing _______ _______  __Symbol Search ____  _____  __Coding  __19__  __4__  __Cancellation  _______ _______          HVLT-R  Trial   1 2    3     Total                  0             3             4                7                                                 Raw  T  Immediate Recall            7                         <20      Delayed Recall                  0                         <20      Retention (%)                    0                      <20                          Rec/Dis Inc.  # Hits      11      #FPs     12                <20        __Tu-Santo/Lidia Complex Figure Test    Raw  T-score   %ile  Copy   __28.5_         -                 11-16  Imm. Recall __8.5_  __39___ _14_  30  Delay __3.5_  ___25___ _<1__  Recognition __11__   ___<20___ __99__  Time               _ 488                                _>16__    __BVRT  (form C)  Copy E-10 rating ____/4     Raw              zscore  Correct  ___0____ _-3.97__  Incorrect ___20___ _-4.87__    __WRAT-4   Reading SS = 105     %ile = 63        __COWAT   Raw__7___ Tscore__18___   __Semantic Fluency/Animals   Raw = 6  Tscore = 19  __Sentence Repetition   Raw = 9/14  %ile = 7  __BNT   Raw = 22/60 %ile = <3    __Trail Making Test   A =   61         Errors = 0    %ile = 10-15   B = 377         Errors = 0    %ile = <5  __Stroop                       Raw         %ile        Word = _70_       _10-15__        Color =  _37_      _<5        C/W   =  _4_       _<5__    __JoLO   Raw = 19 %ile = 15-17    __Grooved Pegboard   RH = 131          Tscore = 35      Drops = 0   LH = 119           TScore = 39      Drops = 1    __GDS     Raw_2__ Interp. __Minimal___    __WMS-III   LM1 = 2  SS = 1   LM2 = 0  SS = 1   LM2R = 17 Zscore = -1.69

## 2018-08-27 ENCOUNTER — TELEPHONE (OUTPATIENT)
Dept: NEUROLOGY | Facility: CLINIC | Age: 73
End: 2018-08-27

## 2018-08-27 NOTE — LETTER
[facility]  [address] [facility] [address] [facility] [address]  [phone]          August 27, 2018    Cricket ROB [place]                                                                                                                     [address] [facility] [address]            Dear Claudio,    Dr. Fernandes has reviewed the results of your Neuropsychological/ Cognitive Testing of 8/20/18, and has asked me to pass on the following message:    I see you have an appointment to discuss your neuropsych results with me. In the meantime, Dr. Mueller made a note that he'd tried to contact you several times, unsuccessfully. If you would like to discuss the results with him in the meantime, the number is: [phone].     We look forward to seeing you at your upcoming Neurology appointment on 11/13/18 at 9:30am.    Until then, please don't hesitate to contact our office with any additional questions or concerns.         Sincerely,     MARTHA Tam  For Emily Fernandes MD

## 2018-08-27 NOTE — TELEPHONE ENCOUNTER
Please let Claudio know that I see he has an appt to discuss his neuropsych results with me. In the meantime, Dr. Mueller made a note that he tried to contact Claudio several times. If they would like to discuss the results with him in the meantime, the number is: [phone]    Thank you,  TAYE

## 2018-08-27 NOTE — TELEPHONE ENCOUNTER
Tried calling patient, no answer and VM not set up yet to leave VM. Will try patient again at a later time.     Jonelle ROB MA

## 2018-09-10 ENCOUNTER — ANTICOAGULATION THERAPY VISIT (OUTPATIENT)
Dept: ANTICOAGULATION | Facility: CLINIC | Age: 73
End: 2018-09-10
Payer: COMMERCIAL

## 2018-09-10 DIAGNOSIS — Z79.01 LONG-TERM (CURRENT) USE OF ANTICOAGULANTS: ICD-10-CM

## 2018-09-10 LAB — INR POINT OF CARE: 3.2 (ref 0.86–1.14)

## 2018-09-10 PROCEDURE — 85610 PROTHROMBIN TIME: CPT | Mod: QW

## 2018-09-10 PROCEDURE — 36416 COLLJ CAPILLARY BLOOD SPEC: CPT

## 2018-09-10 PROCEDURE — 99207 ZZC NO CHARGE NURSE ONLY: CPT

## 2018-09-10 NOTE — MR AVS SNAPSHOT
Cricket Klein   9/10/2018 9:45 AM   Anticoagulation Therapy Visit    Description:  73 year old male   Provider:  NB ANTI COAG   Department:  Nb Anticoag           INR as of 9/10/2018     Today's INR 3.2!      Anticoagulation Summary as of 9/10/2018     INR goal 2.0-3.0   Today's INR 3.2!   Full warfarin instructions 2.5 mg every day   Next INR check 9/24/2018    Indications   Other and unspecified coagulation defects [D68.9]  Long-term (current) use of anticoagulants [Z79.01] [Z79.01]         Description     Eat a salad 3 x a week.      Your next Anticoagulation Clinic appointment(s)     Sep 24, 2018  9:15 AM CDT   [facility]    [address] [place] [address] [phone]              Contact Numbers     Please call [phone] with any problems or questions regarding your therapy.    If you need to cancel and/or reschedule your appointment please call one of the following numbers:  [facility] [phone]  [facility] [phone]  [place] - [phone]  [facility] [phone]  [place] - [phone]            September 2018 Details    Sun Mon Tue Wed Thu Fri Sat           1                 2               3               4               5               6               7               8                 9               10      2.5 mg   See details      11      2.5 mg         12      2.5 mg         13      2.5 mg         14      2.5 mg         15      2.5 mg           16      2.5 mg         17      2.5 mg         18      2.5 mg         19      2.5 mg         20      2.5 mg         21      2.5 mg         22      2.5 mg           23      2.5 mg         24            25               26               27               28               29                 30                      Date Details   09/10 This INR check       Date of next INR:  9/24/2018         How to take your warfarin dose     To  take:  2.5 mg Take 1 of the 2.5 mg tablets.

## 2018-09-10 NOTE — PROGRESS NOTES
ANTICOAGULATION FOLLOW-UP CLINIC VISIT    Patient Name:  Cricket Klein  Date:  9/10/2018  Contact Type:  Face to Face /Spouse present  SUBJECTIVE:     Patient Findings     Positives Inflammation    Comments Patient states he is still having issues with his sciatica nerve. He is going to the Chiropractor for this concern. His INR has been elevated the last few weeks. Patient states he is only eating a salad one time a week. Writer and patient agreed he would continue on the current maintenance dose eat a salad 3 x a week. We will recheck the INR in 2 weeks. If the INR is elevated, writer will decrease maintenance dose. Patient denies signs or symptoms of bleeding. Writer educated patient regarding increased bleed risk and when to seek immediate medical attention. Patient verbalized understanding.             OBJECTIVE    INR Protime   Date Value Ref Range Status   09/10/2018 3.2 (A) 0.86 - 1.14 Final       ASSESSMENT / PLAN  INR assessment SUPRA    Recheck INR In: 2 WEEKS    INR Location Clinic      Anticoagulation Summary as of 9/10/2018     INR goal 2.0-3.0   Today's INR 3.2!   Warfarin maintenance plan 2.5 mg (2.5 mg x 1) every day   Full warfarin instructions 2.5 mg every day   Weekly warfarin total 17.5 mg   Plan last modified Citlalli Sethi, RN (4/30/2018)   Next INR check 9/24/2018   Priority INR   Target end date Indefinite    Indications   Other and unspecified coagulation defects [D68.9]  Long-term (current) use of anticoagulants [Z79.01] [Z79.01]         Anticoagulation Episode Summary     INR check location     Preferred lab     Send INR reminders to [facility] CLINIC POOL    Comments * Had PE in 2010 following hip surgery. Has heterozygous prothrombin gene mutation. Second PE 7-26-16. Needs lifelong warfarin. was on warfarin [id number]        Anticoagulation Care Providers     Provider Role Specialty Phone number    Saud Ramon MD [facility] [phone]             See the Encounter Report to view Anticoagulation Flowsheet and Dosing Calendar (Go to Encounters tab in chart review, and find the Anticoagulation Therapy Visit)        Shruthi Hutchison RN

## 2018-09-24 ENCOUNTER — ANTICOAGULATION THERAPY VISIT (OUTPATIENT)
Dept: ANTICOAGULATION | Facility: CLINIC | Age: 73
End: 2018-09-24
Payer: COMMERCIAL

## 2018-09-24 DIAGNOSIS — Z79.01 LONG-TERM (CURRENT) USE OF ANTICOAGULANTS: ICD-10-CM

## 2018-09-24 LAB — INR POINT OF CARE: 1.7 (ref 0.86–1.14)

## 2018-09-24 PROCEDURE — 85610 PROTHROMBIN TIME: CPT | Mod: QW

## 2018-09-24 PROCEDURE — 36416 COLLJ CAPILLARY BLOOD SPEC: CPT

## 2018-09-24 PROCEDURE — 99207 ZZC NO CHARGE NURSE ONLY: CPT

## 2018-09-24 NOTE — PROGRESS NOTES
ANTICOAGULATION FOLLOW-UP CLINIC VISIT    Patient Name:  Cricket Klein  Date:  9/24/2018  Contact Type:  Face to Face/Spouse present.    SUBJECTIVE:     Patient Findings     Positives Missed doses    Comments Patient states he did miss two doses. Writer educated the patient and his spouse that if he does miss a dose, it is ok to take the dose the next day. Patient also states his sciatica nerve pain has resolved and he is feeling better. Writer instructed patient to take 5 mg today then to resume maintenance dose. Writer advised patient to avoid eating greens for the next couple days as his INR is low. Recheck in 4 weeks.            OBJECTIVE    INR Protime   Date Value Ref Range Status   09/24/2018 1.7 (A) 0.86 - 1.14 Final       ASSESSMENT / PLAN  INR assessment SUB    Recheck INR In: 4 WEEKS    INR Location Clinic      Anticoagulation Summary as of 9/24/2018     INR goal 2.0-3.0   Today's INR 1.7!   Warfarin maintenance plan 2.5 mg (2.5 mg x 1) every day   Full warfarin instructions 9/24: 5 mg; Otherwise 2.5 mg every day   Weekly warfarin total 17.5 mg   Plan last modified Citlalli Sethi RN (4/30/2018)   Next INR check 10/22/2018   Priority INR   Target end date Indefinite    Indications   Other and unspecified coagulation defects [D68.9]  Long-term (current) use of anticoagulants [Z79.01] [Z79.01]         Anticoagulation Episode Summary     INR check location     Preferred lab     Send INR reminders to [facility]    Comments * Had PE in 2010 following hip surgery. Has heterozygous prothrombin gene mutation. Second PE 7-26-16. Needs lifelong warfarin. was on warfarin [phone]        Anticoagulation Care Providers     Provider Role Specialty Phone number    Saud Ramon MD [facility] [phone]            See the Encounter Report to view Anticoagulation Flowsheet and Dosing Calendar (Go to Encounters tab in chart review, and find the Anticoagulation  Therapy Visit)        Shruthi Hutchison RN

## 2018-09-24 NOTE — MR AVS SNAPSHOT
Cricket Klein   9/24/2018 9:15 AM   Anticoagulation Therapy Visit    Description:  73 year old male   Provider:  NB ANTI COAG   Department:  Nb Anticoag           INR as of 9/24/2018     Today's INR 1.7!      Anticoagulation Summary as of 9/24/2018     INR goal 2.0-3.0   Today's INR 1.7!   Full warfarin instructions 9/24: 5 mg; Otherwise 2.5 mg every day   Next INR check 10/22/2018    Indications   Other and unspecified coagulation defects [D68.9]  Long-term (current) use of anticoagulants [Z79.01] [Z79.01]         Your next Anticoagulation Clinic appointment(s)     Oct 22, 2018  9:30 AM CDT   Anticoagulation Visit with NB ANTI COAG   [facility]    [address] [place] [address] [phone]              Contact Numbers     Please call [phone] with any problems or questions regarding your therapy.    If you need to cancel and/or reschedule your appointment please call one of the following numbers:  [facility] - [phone]  [facility] [phone]  [place] - [phone]  [facility] [phone]  [place] - [phone]            September 2018 Details    Sun Mon Tue Wed Thu Fri Sat           1                 2               3               4               5               6               7               8                 9               10               11               12               13               14               15                 16               17               18               19               20               21               22                 23               24      5 mg   See details      25      2.5 mg         26      2.5 mg         27      2.5 mg         28      2.5 mg         29      2.5 mg           30      2.5 mg                Date Details   09/24 This INR check               How to take your warfarin dose     To take:  2.5 mg Take 1 of the 2.5 mg tablets.    To take:  5 mg Take 2 of the 2.5 mg tablets.            October 2018 Details    Sun Mon Tue Wed Thu Fri Sat      1      2.5 mg         2      2.5 mg         3      2.5 mg         4      2.5 mg         5      2.5 mg         6      2.5 mg           7      2.5 mg         8      2.5 mg         9      2.5 mg         10      2.5 mg         11      2.5 mg         12      2.5 mg         13      2.5 mg           14      2.5 mg         15      2.5 mg         16      2.5 mg         17      2.5 mg         18      2.5 mg         19      2.5 mg         20      2.5 mg           21      2.5 mg         22            23               24               25               26               27                 28               29               30               31                   Date Details   No additional details    Date of next INR:  10/22/2018         How to take your warfarin dose     To take:  2.5 mg Take 1 of the 2.5 mg tablets.

## 2018-10-17 ENCOUNTER — OFFICE VISIT (OUTPATIENT)
Dept: FAMILY MEDICINE | Facility: CLINIC | Age: 73
End: 2018-10-17
Payer: COMMERCIAL

## 2018-10-17 VITALS
OXYGEN SATURATION: 96 % | SYSTOLIC BLOOD PRESSURE: 130 MMHG | TEMPERATURE: 98.7 F | WEIGHT: 217 LBS | BODY MASS INDEX: 35.02 KG/M2 | HEART RATE: 63 BPM | DIASTOLIC BLOOD PRESSURE: 66 MMHG

## 2018-10-17 DIAGNOSIS — J20.9 ACUTE BRONCHITIS, UNSPECIFIED ORGANISM: Primary | ICD-10-CM

## 2018-10-17 PROCEDURE — 99213 OFFICE O/P EST LOW 20 MIN: CPT | Performed by: FAMILY MEDICINE

## 2018-10-17 NOTE — PATIENT INSTRUCTIONS
1. This looks viral in nature.    2. Usually last up to two weeks    3. Lets hold on antibiotics    4. However is not improving by Monday call and leave message.

## 2018-10-17 NOTE — MR AVS SNAPSHOT
After Visit Summary   10/17/2018    Cricket Klein    MRN: [id number]           Patient Information     Date Of Birth          1945        Visit Information        Provider Department      10/17/2018 1:40 PM Saud Ramon MD [facility]        Care Instructions    1. This looks viral in nature.    2. Usually last up to two weeks    3. Lets hold on antibiotics    4. However is not improving by Monday call and leave message.           Follow-ups after your visit        Your next 10 appointments already scheduled     Oct 22, 2018  9:30 AM CDT   Anticoagulation Visit with NB ANTI COAG   [facility] ([facility]    [address] [place] [address] [phone]            Nov 13, 2018  9:30 AM CST   Return Visit with Emily Fernandes MD   [facility]    [address] [facility] [address] [phone]              Who to contact     If you have questions or need follow up information about today's clinic visit or your schedule please contact [facility] directly at [phone].  Normal or non-critical lab and imaging results will be communicated to you by MyChart, letter or phone within 4 business days after the clinic has received the results. If you do not hear from us within 7 days, please contact the clinic through MyChart or phone. If you have a critical or abnormal lab result, we will notify you by phone as soon as possible.  Submit refill requests through [employer] or call your pharmacy and they will forward the refill request to us. Please allow 3 business days for your refill to be completed.          Additional Information About Your Visit        Care EveryWhere ID     This is your Care EveryWhere ID. This could be used by other organizations to access your [facility] medical records  [id number]        Your Vitals Were      Pulse Temperature Pulse Oximetry BMI (Body Mass Index)          63 98.7  F (37.1  C) (Tympanic) 96% 35.02 kg/m2         Blood Pressure from Last 3 Encounters:   10/17/18 130/66   07/16/18 138/72   04/10/18 118/74    Weight from Last 3 Encounters:   10/17/18 217 lb (98.4 kg)   07/16/18 213 lb (96.6 kg)   04/10/18 211 lb (95.7 kg)              Today, you had the following     No orders found for display       Primary Care Provider Office Phone # Fax #    Saud Ramon [phone]       [address] [id number]        Equal Access to Services     ROSARIO MOE : Kaylee godfreyo Charles, waaxda luqadaha, qaybta kaalmada adeegyada, nory carver . So [facility] [phone].    ATENCIÓN: Si habla español, tiene a barton disposición servicios gratuitos de asistencia lingüística. [facility] [phone].    We comply with applicable federal civil rights laws and [place] laws. We do not discriminate on the basis of race, color, national origin, age, disability, sex, sexual orientation, or gender identity.            Thank you!     Thank you for choosing [facility]  for your care. Our goal is always to provide you with excellent care. Hearing back from our patients is one way we can continue to improve our services. Please take a few minutes to complete the written survey that you may receive in the mail after your visit with us. Thank you!             Your Updated Medication List - Protect others around you: Learn how to safely use, store and throw away your medicines at www.disposemymeds.org.          This list is accurate as of 10/17/18  1:44 PM.  Always use your most recent med list.                   Brand Name Dispense Instructions for use Diagnosis    atenolol 50 MG tablet    TENORMIN    180 tablet    TAKE ONE TABLET BY MOUTH TWICE A DAY    Benign essential HTN       * donepezil 5 MG tablet    ARICEPT    30 tablet    Take 1  tablet (5 mg) by mouth At Bedtime    Cognitive impairment       * donepezil 10 MG tablet    ARICEPT    30 tablet    Take 1 tablet (10 mg) by mouth At Bedtime    Cognitive impairment       ezetimibe 10 MG tablet    ZETIA    30 tablet    Take 1 tablet (10 mg) by mouth daily NEEDS FASTING LAB    Pure hypercholesterolemia       * order for DME      Equipment being ordered: LEXI Klein received a Resmed AirSense 10 Auto. Pressures were set at Auto 10 - 18 cm H2O.        * order for DME      Equipment ordered: RESMED ASV Mask type: Full face  Settings: ASV EPAP 7, PS MIN 4 MAX 15, RR AUTO        vitamin D 1000 units capsule      Take 1 capsule by mouth daily.        warfarin 2.5 MG tablet    COUMADIN    112 tablet    Take 2.5 mg daily    Pulmonary embolus, left (H)       * Notice:  This list has 4 medication(s) that are the same as other medications prescribed for you. Read the directions carefully, and ask your doctor or other care provider to review them with you.

## 2018-10-17 NOTE — PROGRESS NOTES
SUBJECTIVE:   Cricket Klein is a 73 year old male who presents to clinic today for the following health issues:      ENT Symptoms  Comes coughing              Symptoms: cc Present Absent Comment   Fever/Chills   x    Fatigue   x    Muscle Aches   x    Eye Irritation   x    Sneezing   x    Nasal Chava/Drg   x    Sinus Pressure/Pain   x    Loss of smell   x    Dental pain   x    Sore Throat   x    Swollen Glands   x    Ear Pain/Fullness   x    Cough  x     Wheeze  x     Chest Pain   x    Shortness of breath   x    Rash   x    Other   x      Symptom duration:  2 weeks   Symptom severity:  moderate   Treatments tried:  OTC   Contacts:  none             Problem list and histories reviewed & adjusted, as indicated.  Additional history: as documented    Patient Active Problem List   Diagnosis     Essential hypertension     Transient cerebral ischemia     Pure hypercholesterolemia     Impotence of organic origin     Pulmonary embolism (H)     S/P hip replacement     Other and unspecified coagulation defects     Hyperlipidemia LDL goal <130     Advanced directives, counseling/discussion     CHRISTIANO (obstructive sleep apnea)     Long-term (current) use of anticoagulants [Z79.01]     Hip arthrosis     Past Surgical History:   Procedure Laterality Date     ARTHROPLASTY HIP Right 7/28/2017    Procedure: ARTHROPLASTY HIP;  Right total hip arthroplasty;  Surgeon: Davy Hutchins MD;  Location: [facility]     ARTHROTOMY SHOULDER, ROTATOR CUFF REPAIR, COMBINED  4/2/2012    Procedure:COMBINED ARTHROTOMY SHOULDER, ROTATOR CUFF REPAIR; Left Distal clavicle excision, Subacromial decompression, Rotator cuff repair; Surgeon:DAVY HUTCHINS; Location:[facility] OR     ARTHROTOMY SHOULDER, ROTATOR CUFF REPAIR, COMBINED  10/12/2012    Procedure: COMBINED ARTHROTOMY SHOULDER, ROTATOR CUFF REPAIR;  Right shoulder biceps tenodesiss,subacromial decompression;  Surgeon: Davy Hutchins MD;  Location: [facility] OR     COLONOSCOPY  03/17/2003     normal     COLONOSCOPY  4/15/2014    Procedure: Colonoscopy;  Surgeon: Angela May MD;  Location: [facility]     RELEASE CARPAL TUNNEL Right 4/10/2018    Procedure: RELEASE CARPAL TUNNEL;  Right Open Carpal Tunnel Release;  Surgeon: Jabari Smith MD;  Location: [facility] OR     SURGICAL HISTORY OF -       achilles tendon repair     SURGICAL HISTORY OF -   3-2010    left hip relpacement       Social History   Substance Use Topics     Smoking status: Never Smoker     Smokeless tobacco: Never Used     Alcohol use 0.0 oz/week     0 Standard drinks or equivalent per week      Comment: rare     Family History   Problem Relation Age of Onset     Thyroid Disease Mother      thyroid cancer     HEART DISEASE Mother       of heart attack     HEART DISEASE Father       of heart attack     Diabetes Father      Circulatory Brother      Alzheimer Disease Brother      Circulatory Brother      Cancer Brother      laryngeal     Circulatory Brother      HEART DISEASE Brother      CHF     Congenital Anomalies Sister      valve     HEART DISEASE Brother      Heart failure     HEART DISEASE Sister      Other - See Comments Other 12     Special needs child         Current Outpatient Prescriptions   Medication Sig Dispense Refill     atenolol (TENORMIN) 50 MG tablet TAKE ONE TABLET BY MOUTH TWICE A  tablet 1     Cholecalciferol (VITAMIN D) 1000 UNITS capsule Take 1 capsule by mouth daily.       donepezil (ARICEPT) 10 MG tablet Take 1 tablet (10 mg) by mouth At Bedtime 30 tablet 5     ezetimibe (ZETIA) 10 MG tablet Take 1 tablet (10 mg) by mouth daily NEEDS FASTING LAB 30 tablet 0     warfarin (COUMADIN) 2.5 MG tablet Take 2.5 mg daily 112 tablet 0     donepezil (ARICEPT) 5 MG tablet Take 1 tablet (5 mg) by mouth At Bedtime 30 tablet 0     order for DME Equipment ordered: RESMED ASV Mask type: Full face  Settings: ASV EPAP 7, PS MIN 4 MAX 15, RR AUTO       order for DME Equipment being ordered: LEXI ROB  Ernie received a Resmed AirSense 10 Auto. Pressures were set at Auto 10 - 18 cm H2O.       Allergies   Allergen Reactions     Atorvastatin Calcium Other (See Comments)     Myalgia.     Pravastatin Cramps     Myalgias       Zocor [Hmg-Coa-R Inhibitors] Other (See Comments)     Muscle pain       Reviewed and updated as needed this visit by clinical staff       Reviewed and updated as needed this visit by Provider         ROS:  CONSTITUTIONAL: NEGATIVE for fever, chills, change in weight  ENT/MOUTH: NEGATIVE for ear, mouth and throat problems  RESP: NEGATIVE for significant cough or SOB  CV: NEGATIVE for chest pain, palpitations or peripheral edema    OBJECTIVE:     /66 (BP Location: Right arm, Patient Position: Chair, Cuff Size: Adult Large)  Pulse 63  Temp 98.7  F (37.1  C) (Tympanic)  Wt 217 lb (98.4 kg)  SpO2 96%  BMI 35.02 kg/m2  Body mass index is 35.02 kg/(m^2).  GENERAL: healthy, alert and no distress  NECK: no adenopathy, no asymmetry, masses, or scars and thyroid normal to palpation  RESP: lungs clear to auscultation - no rales, rhonchi or wheezes  CV: regular rate and rhythm, normal S1 S2, no S3 or S4, no murmur, click or rub, no peripheral edema and peripheral pulses strong  ABDOMEN: soft, nontender, no hepatosplenomegaly, no masses and bowel sounds normal  MS: no gross musculoskeletal defects noted, no edema        ASSESSMENT/PLAN:     ASSESSMENT/PLAN:      ICD-10-CM    1. Acute bronchitis, unspecified organism J20.9        Patient Instructions   1. This looks viral in nature.    2. Usually last up to two weeks    3. Lets hold on antibiotics    4. However is not improving by Monday call and leave message.               There are no diagnoses linked to this encounter.        Saud Ramon MD  [facility]

## 2018-10-17 NOTE — NURSING NOTE
"Initial /66 (BP Location: Right arm, Patient Position: Chair, Cuff Size: Adult Large)  Pulse 63  Temp 98.7  F (37.1  C) (Tympanic)  Wt 217 lb (98.4 kg)  SpO2 96%  BMI 35.02 kg/m2 Estimated body mass index is 35.02 kg/(m^2) as calculated from the following:    Height as of 4/10/18: 5' 6\" (1.676 m).    Weight as of this encounter: 217 lb (98.4 kg). .    Teresa Alcantar    "

## 2018-10-22 ENCOUNTER — ANTICOAGULATION THERAPY VISIT (OUTPATIENT)
Dept: ANTICOAGULATION | Facility: CLINIC | Age: 73
End: 2018-10-22
Payer: COMMERCIAL

## 2018-10-22 ENCOUNTER — ALLIED HEALTH/NURSE VISIT (OUTPATIENT)
Dept: FAMILY MEDICINE | Facility: CLINIC | Age: 73
End: 2018-10-22
Payer: COMMERCIAL

## 2018-10-22 DIAGNOSIS — J20.9 ACUTE BRONCHITIS, UNSPECIFIED ORGANISM: Primary | ICD-10-CM

## 2018-10-22 LAB — INR POINT OF CARE: 2.8 (ref 0.86–1.14)

## 2018-10-22 PROCEDURE — 85610 PROTHROMBIN TIME: CPT | Mod: QW

## 2018-10-22 PROCEDURE — 36416 COLLJ CAPILLARY BLOOD SPEC: CPT

## 2018-10-22 PROCEDURE — 99207 ZZC NO CHARGE NURSE ONLY: CPT

## 2018-10-22 RX ORDER — AZITHROMYCIN 250 MG/1
TABLET, FILM COATED ORAL
Qty: 6 TABLET | Refills: 0 | Status: SHIPPED | OUTPATIENT
Start: 2018-10-22 | End: 2018-11-13

## 2018-10-22 NOTE — PROGRESS NOTES
S-(situation): patient is walk in to clinic accompanied by wife with C/O not feeling any better.  Not worse.  No fever. Has productive cough and some wheezing when takes a deep breath    B-(background): saw Dr Ramon on 10-17-18.    A-(assessment): cough, wheezing.  No fever    R-(recommendations): OK to order azithromycin per Dr Tristen Eric RN

## 2018-10-22 NOTE — PROGRESS NOTES
ADDENDUM: Patient is having a tooth pulled on Thursday and needs an INR the same day. Writer switched Mondays appointment to Thursday at [facility].     Beto DIOP RN, CACP 11/1/2018 at 2:24 PM       ANTICOAGULATION FOLLOW-UP CLINIC VISIT    Patient Name:  Cricket Klein  Date:  10/22/2018  Contact Type:  Face to Face    SUBJECTIVE:     Patient Findings     Positives Inflammation (bronchitis)    Comments No changes in medications, diet, or activity. No concerns with bleeding or bruising. Took warfarin as prescribed.   Patient recently Dx bronchitis. Per patient spouse, MD stated if does not get better by Monday, come back and he will then start abx to treat infection. Patient will schedule appt with MD today. Recalled for 2 weeks d/t may start new abx. Plan is patient will call [facility] if does not start abx and then will be rechecked in 6 weeks from date of last [facility] visit (12/3/18).  Patient verbalizes understanding and agrees with plan. No further questions at this time.                OBJECTIVE    INR Protime   Date Value Ref Range Status   10/22/2018 2.8 (A) 0.86 - 1.14 Final       ASSESSMENT / PLAN  INR assessment THER    Recheck INR In: 2 WEEKS see note   INR Location Clinic      Anticoagulation Summary as of 10/22/2018     INR goal 2.0-3.0   Today's INR 2.8   Warfarin maintenance plan 2.5 mg (2.5 mg x 1) every day   Full warfarin instructions 2.5 mg every day   Weekly warfarin total 17.5 mg   No change documented Yoana Samson RN   Plan last modified Citlalli Sehti RN (4/30/2018)   Next INR check 11/5/2018   Priority INR   Target end date Indefinite    Indications   Other and unspecified coagulation defects [D68.9]  Long-term (current) use of anticoagulants [Z79.01] [Z79.01]         Anticoagulation Episode Summary     INR check location     Preferred lab     Send INR reminders to [facility]    Comments * Had PE in 2010 following hip surgery. Has heterozygous prothrombin gene mutation. Second PE  7-26-16. Needs lifelong warfarin. was on warfarin [id number]        Anticoagulation Care Providers     Provider Role Specialty Phone number    Saud Ramon MD [facility] [phone]            See the Encounter Report to view Anticoagulation Flowsheet and Dosing Calendar (Go to Encounters tab in chart review, and find the Anticoagulation Therapy Visit)        Yoana Samson RN

## 2018-10-22 NOTE — MR AVS SNAPSHOT
Cricket Klein   10/22/2018 9:30 AM   Anticoagulation Therapy Visit    Description:  73 year old male   Provider:  NB ANTI HANNA   Department:  [facility] Anticoag           INR as of 10/22/2018     Today's INR 2.8      Anticoagulation Summary as of 10/22/2018     INR goal 2.0-3.0   Today's INR 2.8   Full warfarin instructions 2.5 mg every day   Next INR check 11/5/2018    Indications   Other and unspecified coagulation defects [D68.9]  Long-term (current) use of anticoagulants [Z79.01] [Z79.01]         Your next Anticoagulation Clinic appointment(s)     Oct 22, 2018  9:30 AM CDT   Anticoagulation Visit with [facility] ANTI COAG   [facility])    [address] [place] [id number] [phone]            Nov 05, 2018  9:15 AM CST   Anticoagulation Visit with [facility] ANTI COAG   [facility]    [address] [place] [id number] [phone]              Contact Numbers     Please call [phone] with any problems or questions regarding your therapy.    If you need to cancel and/or reschedule your appointment please call one of the following numbers:  [facility] - [phone]  [place] - [phone]  [place] - [phone]  [facility] [phone]  [place] - [phone]            October 2018 Details    Sun Mon Tue Wed Thu Fri Sat      1               2               3               4               5               6                 7               8               9               10               11               12               13                 14               15               16               17               18               19               20                 21               22      2.5 mg   See details      23      2.5 mg         24      2.5 mg         25      2.5 mg         26      2.5 mg         27      2.5 mg           28      2.5 mg         29      2.5 mg         30      2.5 mg          31      2.5 mg             Date Details   10/22 This INR check               How to take your warfarin dose     To take:  2.5 mg Take 1 of the 2.5 mg tablets.           November 2018 Details    Sun Mon Tue Wed Thu Fri Sat         1      2.5 mg         2      2.5 mg         3      2.5 mg           4      2.5 mg         5            6               7               8               9               10                 11               12               13               14               15               16               17                 18               19               20               21               22               23               24                 25               26               27               28               29               30                 Date Details   No additional details    Date of next INR:  11/5/2018         How to take your warfarin dose     To take:  2.5 mg Take 1 of the 2.5 mg tablets.

## 2018-11-08 ENCOUNTER — ANTICOAGULATION THERAPY VISIT (OUTPATIENT)
Dept: ANTICOAGULATION | Facility: CLINIC | Age: 73
End: 2018-11-08
Payer: COMMERCIAL

## 2018-11-08 LAB — INR POINT OF CARE: 2.9 (ref 0.86–1.14)

## 2018-11-08 PROCEDURE — 36416 COLLJ CAPILLARY BLOOD SPEC: CPT

## 2018-11-08 PROCEDURE — 85610 PROTHROMBIN TIME: CPT | Mod: QW

## 2018-11-08 NOTE — MR AVS SNAPSHOT
Cricket Klein   11/8/2018 10:15 AM   Anticoagulation Therapy Visit    Description:  73 year old male   Provider:  NB ANTI COAG   Department:  [facility] Anticoag           INR as of 11/8/2018     Today's INR 2.9      Anticoagulation Summary as of 11/8/2018     INR goal 2.0-3.0   Today's INR 2.9   Full warfarin instructions 2.5 mg every day   Next INR check 12/20/2018    Indications   Other and unspecified coagulation defects [D68.9]  Long-term (current) use of anticoagulants [Z79.01] [Z79.01]         Your next Anticoagulation Clinic appointment(s)     Dec 20, 2018 10:15 AM CST   [facility]agulation [facility] with [facility] ANTI COAG   [facility]    [address] [place] [id number] [phone]              Contact Numbers     Please call [phone] with any problems or questions regarding your therapy.    If you need to cancel and/or reschedule your appointment please call one of the following numbers:  [facility] - [phone]  [facility] [phone]  [facility] [phone]  [facility] [place] [phone]  [place] - [phone]            November 2018 Details    Sun Mon Tue Wed Thu Fri Sat         1               2               3                 4               5               6               7               8      2.5 mg   See details      9      2.5 mg         10      2.5 mg           11      2.5 mg         12      2.5 mg         13      2.5 mg         14      2.5 mg         15      2.5 mg         16      2.5 mg         17      2.5 mg           18      2.5 mg         19      2.5 mg         20      2.5 mg         21      2.5 mg         22      2.5 mg         23      2.5 mg         24      2.5 mg           25      2.5 mg         26      2.5 mg         27      2.5 mg         28      2.5 mg         29      2.5 mg         30      2.5 mg           Date Details   11/08 This INR check               How to take your warfarin dose     To take:  2.5 mg Take 1 of the  2.5 mg tablets.           December 2018 Details    Sun Mon Tue Wed Thu Fri Sat           1      2.5 mg           2      2.5 mg         3      2.5 mg         4      2.5 mg         5      2.5 mg         6      2.5 mg         7      2.5 mg         8      2.5 mg           9      2.5 mg         10      2.5 mg         11      2.5 mg         12      2.5 mg         13      2.5 mg         14      2.5 mg         15      2.5 mg           16      2.5 mg         17      2.5 mg         18      2.5 mg         19      2.5 mg         20            21               22                 23               24               25               26               27               28               29                 30               31                     Date Details   No additional details    Date of next INR:  12/20/2018         How to take your warfarin dose     To take:  2.5 mg Take 1 of the 2.5 mg tablets.

## 2018-11-08 NOTE — PROGRESS NOTES
ANTICOAGULATION FOLLOW-UP CLINIC VISIT    Patient Name:  Cricket Klein  Date:  11/8/2018  Contact Type:  Face to Face    SUBJECTIVE:     Patient Findings     Positives No Problem Findings    Comments Pt is having a single tooth extracted today. He is in range and will bring AVS with him to the dental office. He was on a 5 day course of zpak starting 10-22 for bronchitis. No INR checked during this time. Pt reports his cough is better now. He was reminded to let [facility] know of any medication changes, especially abx.     No other changes or concerns. Recheck in 6 weeks.            OBJECTIVE    INR Protime   Date Value Ref Range Status   11/08/2018 2.9 (A) 0.86 - 1.14 Final       ASSESSMENT / PLAN  INR assessment THER    Recheck INR In: 6 WEEKS    INR Location Clinic      Anticoagulation Summary as of 11/8/2018     INR goal 2.0-3.0   Today's INR 2.9   Warfarin maintenance plan 2.5 mg (2.5 mg x 1) every day   Full warfarin instructions 2.5 mg every day   Weekly warfarin total 17.5 mg   No change documented Kimberly Zamora, RN   Plan last modified Citlalli Sethi, RN (4/30/2018)   Next INR check 12/20/2018   Priority INR   Target end date Indefinite    Indications   Other and unspecified coagulation defects [D68.9]  Long-term (current) use of anticoagulants [Z79.01] [Z79.01]         Anticoagulation Episode Summary     INR check location     Preferred lab     Send INR reminders to [facility]    Comments * Had PE in 2010 following hip surgery. Has heterozygous prothrombin gene mutation. Second PE 7-26-16. Needs lifelong warfarin. was on warfarin [phone]        Anticoagulation Care Providers     Provider Role Specialty Phone number    Saud Ramon MD [facility] [phone]            See the Encounter Report to view Anticoagulation Flowsheet and Dosing Calendar (Go to Encounters tab in chart review, and find the Anticoagulation Therapy Visit)        Kimberly Zamora  RN

## 2018-11-13 ENCOUNTER — OFFICE VISIT (OUTPATIENT)
Dept: NEUROLOGY | Facility: CLINIC | Age: 73
End: 2018-11-13
Payer: COMMERCIAL

## 2018-11-13 VITALS
RESPIRATION RATE: 16 BRPM | WEIGHT: 213 LBS | DIASTOLIC BLOOD PRESSURE: 87 MMHG | HEART RATE: 64 BPM | BODY MASS INDEX: 34.38 KG/M2 | SYSTOLIC BLOOD PRESSURE: 141 MMHG | TEMPERATURE: 97.5 F

## 2018-11-13 DIAGNOSIS — Z87.820 HX OF TRAUMATIC BRAIN INJURY: ICD-10-CM

## 2018-11-13 DIAGNOSIS — F03.90 DEMENTIA WITHOUT BEHAVIORAL DISTURBANCE, UNSPECIFIED DEMENTIA TYPE: Primary | ICD-10-CM

## 2018-11-13 PROCEDURE — 99214 OFFICE O/P EST MOD 30 MIN: CPT | Performed by: PSYCHIATRY & NEUROLOGY

## 2018-11-13 NOTE — PROGRESS NOTES
ESTABLISHED PATIENT NEUROLOGY NOTE    DATE OF VISIT: 11/13/2018  MRN: [id number]  PATIENT NAME: Cricket Klein  YOB: 1945    Chief Complaint   Patient presents with     RECHECK     Memory     SUBJECTIVE:                                                      HISTORY OF PRESENT ILLNESS:  Cricket is here for follow up regarding Neuropsych test results. When I met with the patient about 4 months ago, the patient s wife commented that he has had more problems with language lately. I had previously recommended repeat Neuropsych testing, so I recommended this again. The patient has a history of TBI as a teenager and reported short term memory problems since. Neuropsych testing in 2016 indicated Left-sided brain dysfunction consistent with his prior injury. I also recommended we start Aricept for the memory.     The patient arrives 10 minutes late for his appointment today.     Per Dr. Mueller's assessment (8.20.18):  IMPRESSIONS  Mr. Klein demonstrated cognitive impairments and weaknesses that are compatible with global brain dysfunction, but most particularly so for the left frontal and temporal brain regions, and the right frontal and temporal regions to a slightly lesser degree. There has been marked and global decline in his cognition relative to his prior evaluation in May, 2016. This decline is strongly suggestive of a progressive neurodegenerative disease. The pattern of deficits, with suggestion of selectively severe dysfunction of the left hemisphere, raises the strong question of a somewhat atypical manifestation of Alzheimer s disease, or possibly a dementia syndrome along the lines of a primary progressive aphasia. While it may be the case that his old head injury is contributing at some level, his current deficits and decline are very certainly not solely attributable to the head injury. In this exam, profound deficits were noted in memory, orientation, naming, verbal fluency, verbal  comprehension, executive functioning, nonverbal working memory, and aspects of visual problem solving. Relative preservation was noted in aspects of verbal working memory, visuospatial judgments, orientation to personal information, and left hand fine motor dexterity. He is not reporting elevated symptoms of depression.     RECOMMENDATIONS  Multiple attempts were made to call the patient and his wife over the telephone on 08/23 and 08/24/2018, but I was unable to connect with them at the number they had provided. The voicemail was not set up on the phone, so I was unable to leave a message. If the patient and his wife are interested in feedback, they are welcome to call me at the number listed above.      1. Mr. Klein will require support and supervision of his daily activities. It is likely that his support needs will increase in the future.     2. He should not drive.     3. The patient and his wife could benefit from a referral to a local chapter the Alzheimer s Association. www.alz.org  4. He appears to have a  yes  response bias when responding to questions. In order to ascertain his preferences, it will be important to pose questions to him in alternating  yes/no  format to ensure that his needs are being met.      5. Follow-up neuropsychological evaluation could be considered in one year if clinically indicated.    I reviewed the brain MRI results from 2016 again, which did show increased atrophy compared to prior.     The patient is here with his wife. They say that they had some trouble with their phone, and thus the problem with Dr. Mueller not being able to reach them.     She (nor the patient) has not noticed any changes with the Aricept. He denies side effects from the medication. Wife manages medications and says they did increase to the 10mg at bedtime dose and this is going fine.    He has not been driving on the roads. [place] little driving [place].  He stays active by  feeding the animals. His wife has also given him chores to do. Otherwise, she manages the household.    He gets frustrated at times, but otherwise no major changes in mood or behavior. Wife thinks they are managing well at home. Patient endorses good appetite. Sleep continues to be good with CPAP.   Claudio remarks that physically he has no complaints.     CURRENT MEDICATIONS:     Current Outpatient Prescriptions on File Prior to Visit:  atenolol (TENORMIN) 50 MG tablet TAKE ONE TABLET BY MOUTH TWICE A DAY   donepezil (ARICEPT) 10 MG tablet Take 1 tablet (10 mg) by mouth At Bedtime   ezetimibe (ZETIA) 10 MG tablet Take 1 tablet (10 mg) by mouth daily NEEDS FASTING LAB   warfarin (COUMADIN) 2.5 MG tablet Take 2.5 mg daily   Cholecalciferol (VITAMIN D) 1000 UNITS capsule Take 1 capsule by mouth daily.   order for DME Equipment ordered: RESMED ASV Mask type: Full face  Settings: ASV EPAP 7, PS MIN 4 MAX 15, RR AUTO   order for DME Equipment being ordered: CPAP  Cricket Klein received a Resmed AirSense 10 Auto. Pressures were set at Auto 10 - 18 cm H2O.     No current facility-administered medications on file prior to visit.     RECENT DIAGNOSTIC STUDIES:   Labs:   Results for orders placed or performed in visit on 18   INR point of care   Result Value Ref Range    INR Protime 2.9 (A) 0.86 - 1.14       REVIEW OF SYSTEMS:                                                      10-point review of systems is negative except as mentioned above in HPI.     EXAM:                                                      Physical Exam:   Vitals: /87 (BP Location: Right arm, Patient Position: Sitting, Cuff Size: Adult Large)  Pulse 64  Temp 97.5  F (36.4  C) (Tympanic)  Resp 16  Wt 96.6 kg (213 lb)  BMI 34.38 kg/m2  BMI= Body mass index is 34.38 kg/(m^2).  GENERAL: NAD.   Focused Neurologic:  MENTAL STATUS: Alert, attentive. Speech is fluent. Occasional word-finding difficulties. Fair comprehension. Mini-Co/5  (missed hands on clock and all 3 words on recall)). Processing speed seems slow, but not as slow as on prior exam.   CRANIAL NERVES: Visual fields intact to confrontation. Pupils equally, round and reactive to light. Facial  movement normal. EOM full. Hearing intact to conversation. Trapezius strength intact. Palate moves symmetrically. Tongue midline.  MOTOR: 5/5 in proximal and distal muscle groups of upper and lower extremities with brief testing. Tone and bulk normal.   SENSATION: Normal light touch throughout.  COORDINATION: Normal hand opening/closing speed and amplitude.  STATION AND GAIT: Romberg negative. Good postural reflexes. Gait appears normal.  CV: RRR. S1, S2.   NECK: No bruits.  Right hand-dominant.   Exam limited for time.     ASSESSMENT and PLAN:                                                      Assessment and Plan:    ICD-10-CM    1. Dementia without behavioral disturbance, unspecified dementia type F03.90 MR Brain w/o & w Contrast   2. Hx of traumatic brain injury Z87.820 MR Brain w/o & w Contrast        Mr. Klein is a pleasant 72 yo man with history of remote traumatic brain injury and CHRISTIANO here for memory follow-up. We spent the majority of today's visit discussing the recent Neurospych test results. Unfortunately, the visit was shorted than typical, because they came late. We discussed the importance of staying active physically, mentally and socially. We have given them additional resources regarding this. I recommend he continue the Aricept for now, despite questionable benefit. We also discussed repeating the brain MRI , given the change in his performance on the cognitive testing. He and his wife are in agreement with this. Lastly, I did inquire about whether they are interested in Care Coordinator referral and patient's wife declined for now. We will meet again in 6 months.     Patient Instructions:  Continue the Aricept: 10mg at bedtime, for memory.  Stay active as we discussed.  No driving on public roads (for safety).  Review the Neuropsych test results and let me or Dr. Mueller know if you have any questions.   MRI Brain. We will notify you of the results.   Let me know if you change your mid about talking to our social workers regarding resources.  Otherwise, we will plan to follow-up in 6 months.     Total Time: 25 minutes were spent with the patient. More than 50% of the time spent on counseling (as described above in Assessment and Plan/Instructions) /coordinating the care.    Emily Fernandes MD  Neurology

## 2018-11-13 NOTE — MR AVS SNAPSHOT
After Visit Summary   11/13/2018    Cricket Klein    MRN: [id number]           Patient Information     Date Of Birth          1945        Visit Information        Provider Department      11/13/2018 9:30 AM Emily Hilton MD [facility]        Today's Diagnoses     Dementia without behavioral disturbance, unspecified dementia type    -  1    Hx of traumatic brain injury          Care Instructions    Plan:    Continue the Aricept: 10mg at bedtime, for memory.  Stay active as we discussed. No driving on public roads (for safety).  Review the Neuropsych test results and let me or Dr. Mueller know if you have any questions.   MRI Brain. We will notify you of the results.   Let me know if you change your mid about talking to our social workers regarding resources.  Otherwise, we will plan to follow-up in 6 months.           Follow-ups after your visit        Your next 10 appointments already scheduled     Dec 20, 2018 10:15 AM CST   [facility] with NB ANTI COAG   [facility]    [address] [place] [address] [phone]              Future tests that were ordered for you today     Open Future Orders        Priority Expected Expires Ordered    MR Brain w/o & w Contrast Routine  11/13/2019 11/13/2018            Who to contact     If you have questions or need follow up information about today's clinic visit or your schedule please contact [facility] directly at [phone].  Normal or non-critical lab and imaging results will be communicated to you by MyChart, letter or phone within 4 business days after the clinic has received the results. If you do not hear from us within 7 days, please contact the clinic through MyChart or phone. If you have a critical or abnormal lab result, we will notify you by phone as soon as possible.  Submit refill requests through [employer] or call your  pharmacy and they will forward the refill request to us. Please allow 3 business days for your refill to be completed.          Additional Information About Your Visit        Care EveryWhere ID     This is your Care EveryWhere ID. This could be used by other organizations to access your [place] medical records  [id number]        Your Vitals Were     BMI (Body Mass Index)                   34.38 kg/m2            Blood Pressure from Last 3 Encounters:   10/17/18 130/66   07/16/18 138/72   04/10/18 118/74    Weight from Last 3 Encounters:   11/13/18 96.6 kg (213 lb)   10/17/18 98.4 kg (217 lb)   07/16/18 96.6 kg (213 lb)               Primary Care Provider Office Phone # Fax #    Saud Ramon [phone]       [address] [place] [address]        Equal Access to Services     [facility] : Hadii laron bradley hadasho Soomaali, waaxda luqadaha, qaybta kaalmada adeegyada, nory dacostain hayantoinette carver . So [facility] [phone].    ATENCIÓN: Si habla español, tiene a barton disposición servicios gratuitos de asistencia lingüística. Desiree al [phone].    We comply with applicable federal civil rights laws and [place] laws. We do not discriminate on the basis of race, color, national origin, age, disability, sex, sexual orientation, or gender identity.            Thank you!     Thank you for choosing [facility]  for your care. Our goal is always to provide you with excellent care. Hearing back from our patients is one way we can continue to improve our services. Please take a few minutes to complete the written survey that you may receive in the mail after your visit with us. Thank you!             Your Updated Medication List - Protect others around you: Learn how to safely use, store and throw away your medicines at www.disposemymeds.org.          This list is accurate as of 11/13/18 10:09 AM.  Always use your most recent med list.                   Brand  Name Dispense Instructions for use Diagnosis    atenolol 50 MG tablet    TENORMIN    180 tablet    TAKE ONE TABLET BY MOUTH TWICE A DAY    Benign essential HTN       azithromycin 250 MG tablet    ZITHROMAX    6 tablet    Two tablets first day, then one tablet daily for four days.    Acute bronchitis, unspecified organism       * donepezil 5 MG tablet    ARICEPT    30 tablet    Take 1 tablet (5 mg) by mouth At Bedtime    Cognitive impairment       * donepezil 10 MG tablet    ARICEPT    30 tablet    Take 1 tablet (10 mg) by mouth At Bedtime    Cognitive impairment       ezetimibe 10 MG tablet    ZETIA    30 tablet    Take 1 tablet (10 mg) by mouth daily NEEDS FASTING LAB    Pure hypercholesterolemia       * order for DME      Equipment being ordered: CPAP  Cricket Klein received a Resmed AirSense 10 Auto. Pressures were set at Auto 10 - 18 cm H2O.        * order for DME      Equipment ordered: RESMED ASV Mask type: Full face  Settings: ASV EPAP 7, PS MIN 4 MAX 15, RR AUTO        vitamin D 1000 units capsule      Take 1 capsule by mouth daily.        warfarin 2.5 MG tablet    COUMADIN    112 tablet    Take 2.5 mg daily    Pulmonary embolus, left (H)       * Notice:  This list has 4 medication(s) that are the same as other medications prescribed for you. Read the directions carefully, and ask your doctor or other care provider to review them with you.

## 2018-11-13 NOTE — PATIENT INSTRUCTIONS
Plan:    Continue the Aricept: 10mg at bedtime, for memory.  Stay active as we discussed. No driving on public roads (for safety).  Review the Neuropsych test results and let me or Dr. Mueller know if you have any questions.   MRI Brain. We will notify you of the results.   Let me know if you change your mid about talking to our social workers regarding resources.  Otherwise, we will plan to follow-up in 6 months.

## 2018-11-13 NOTE — LETTER
11/13/2018         RE: Cricket Klein  [address]  [place] [address]        Dear Colleague,    Thank you for referring your patient, Cricket Klein, to the [facility]. Please see a copy of my visit note below.    ESTABLISHED PATIENT NEUROLOGY NOTE    DATE OF VISIT: 11/13/2018  MRN: [id number]  PATIENT NAME: Cricket Klein  YOB: 1945    Chief Complaint   Patient presents with     RECHECK     Memory     SUBJECTIVE:                                                      HISTORY OF PRESENT ILLNESS:  Cricket is here for follow up regarding Neuropsych test results. When I met with the patient about 4 months ago, the patient s wife commented that he has had more problems with language lately. I had previously recommended repeat Neuropsych testing, so I recommended this again. The patient has a history of TBI as a teenager and reported short term memory problems since. Neuropsych testing in 2016 indicated Left-sided brain dysfunction consistent with his prior injury. I also recommended we start Aricept for the memory.     The patient arrives 10 minutes late for his appointment today.     Per Dr. Mueller's assessment (8.20.18):  IMPRESSIONS  Mr. Klein demonstrated cognitive impairments and weaknesses that are compatible with global brain dysfunction, but most particularly so for the left frontal and temporal brain regions, and the right frontal and temporal regions to a slightly lesser degree. There has been marked and global decline in his cognition relative to his prior evaluation in May, 2016. This decline is strongly suggestive of a progressive neurodegenerative disease. The pattern of deficits, with suggestion of selectively severe dysfunction of the left hemisphere, raises the strong question of a somewhat atypical manifestation of Alzheimer s disease, or possibly a dementia syndrome along the lines of a primary progressive aphasia. While it may be the case that  his old head injury is contributing at some level, his current deficits and decline are very certainly not solely attributable to the head injury. In this exam, profound deficits were noted in memory, orientation, naming, verbal fluency, verbal comprehension, executive functioning, nonverbal working memory, and aspects of visual problem solving. Relative preservation was noted in aspects of verbal working memory, visuospatial judgments, orientation to personal information, and left hand fine motor dexterity. He is not reporting elevated symptoms of depression.     RECOMMENDATIONS  Multiple attempts were made to call the patient and his wife over the telephone on 08/23 and 08/24/2018, but I was unable to connect with them at the number they had provided. The voicemail was not set up on the phone, so I was unable to leave a message. If the patient and his wife are interested in feedback, they are welcome to call me at the number listed above.      1. Mr. Klein will require support and supervision of his daily activities. It is likely that his support needs will increase in the future.     2. He should not drive.     3. The patient and his wife could benefit from a referral to a local chapter the Alzheimer s Association. www.alz.org  4. He appears to have a  yes  response bias when responding to questions. In order to ascertain his preferences, it will be important to pose questions to him in alternating  yes/no  format to ensure that his needs are being met.      5. Follow-up neuropsychological evaluation could be considered in one year if clinically indicated.    I reviewed the brain MRI results from 2016 again, which did show increased atrophy compared to prior.     The patient is here with his wife. They say that they had some trouble with their phone, and thus the problem with Dr. Mueller not being able to reach them.     She (nor the patient) has not noticed any changes with the Aricept. He denies side  effects from the medication. Wife manages medications and says they did increase to the 10mg at bedtime dose and this is going fine.    He has not been driving on the roads. [place].  He stays active by feeding the animals. His wife has also given him chores to do. Otherwise, she manages the household.    He gets frustrated at times, but otherwise no major changes in mood or behavior. Wife thinks they are managing well at home. Patient endorses good appetite. Sleep continues to be good with CPAP.   Claudio remarks that physically he has no complaints.     CURRENT MEDICATIONS:     Current Outpatient Prescriptions on File Prior to Visit:  atenolol (TENORMIN) 50 MG tablet TAKE ONE TABLET BY MOUTH TWICE A DAY   donepezil (ARICEPT) 10 MG tablet Take 1 tablet (10 mg) by mouth At Bedtime   ezetimibe (ZETIA) 10 MG tablet Take 1 tablet (10 mg) by mouth daily NEEDS FASTING LAB   warfarin (COUMADIN) 2.5 MG tablet Take 2.5 mg daily   Cholecalciferol (VITAMIN D) 1000 UNITS capsule Take 1 capsule by mouth daily.   order for DME Equipment ordered: RESMED ASV Mask type: Full face  Settings: ASV EPAP 7, PS MIN 4 MAX 15, RR AUTO   order for DME Equipment being ordered: CPAP  Cricket Klein received a Resmed AirSense 10 Auto. Pressures were set at Auto 10 - 18 cm H2O.     No current facility-administered medications on file prior to visit.     RECENT DIAGNOSTIC STUDIES:   Labs:   Results for orders placed or performed in visit on 11/08/18   INR point of care   Result Value Ref Range    INR Protime 2.9 (A) 0.86 - 1.14       REVIEW OF SYSTEMS:                                                      10-point review of systems is negative except as mentioned above in HPI.     EXAM:                                                      Physical Exam:   Vitals: /87 (BP Location: Right arm, Patient Position: Sitting, Cuff Size: Adult Large)  Pulse 64  Temp 97.5  F (36.4  C) (Tympanic)  Resp 16   Wt 96.6 kg (213 lb)  BMI 34.38 kg/m2  BMI= Body mass index is 34.38 kg/(m^2).  GENERAL: NAD.   Focused Neurologic:  MENTAL STATUS: Alert, attentive. Speech is fluent. Occasional word-finding difficulties. Fair comprehension. Mini-Co/5 (missed hands on clock and all 3 words on recall)). Processing speed seems slow, but not as slow as on prior exam.   CRANIAL NERVES: Visual fields intact to confrontation. Pupils equally, round and reactive to light. Facial  movement normal. EOM full. Hearing intact to conversation. Trapezius strength intact. Palate moves symmetrically. Tongue midline.  MOTOR: 5/5 in proximal and distal muscle groups of upper and lower extremities with brief testing. Tone and bulk normal.   SENSATION: Normal light touch throughout.  COORDINATION: Normal hand opening/closing speed and amplitude.  STATION AND GAIT: Romberg negative. Good postural reflexes. Gait appears normal.  CV: RRR. S1, S2.   NECK: No bruits.  Right hand-dominant.   Exam limited for time.     ASSESSMENT and PLAN:                                                      Assessment and Plan:    ICD-10-CM    1. Dementia without behavioral disturbance, unspecified dementia type F03.90 MR Brain w/o & w Contrast   2. Hx of traumatic brain injury Z87.820 MR Brain w/o & w Contrast        Mr. Klein is a pleasant 72 yo man with history of remote traumatic brain injury and CHRISTIANO here for memory follow-up. We spent the majority of today's visit discussing the recent Neurospych test results. Unfortunately, the visit was shorted than typical, because they came late. We discussed the importance of staying active physically, mentally and socially. We have given them additional resources regarding this. I recommend he continue the Aricept for now, despite questionable benefit. We also discussed repeating the brain MRI , given the change in his performance on the cognitive testing. He and his wife are in agreement with this. Lastly, I did inquire  about whether they are interested in Care Coordinator referral and patient's wife declined for now. We will meet again in 6 months.     Patient Instructions:  Continue the Aricept: 10mg at bedtime, for memory.  Stay active as we discussed. No driving on public roads (for safety).  Review the Neuropsych test results and let me or Dr. Mueller know if you have any questions.   MRI Brain. We will notify you of the results.   Let me know if you change your mid about talking to our social workers regarding resources.  Otherwise, we will plan to follow-up in 6 months.     Total Time: 25 minutes were spent with the patient. More than 50% of the time spent on counseling (as described above in Assessment and Plan/Instructions) /coordinating the care.    Emily Fernandes MD  Neurology                Again, thank you for allowing me to participate in the care of your patient.        Sincerely,        Emily Fernandes MD

## 2018-11-16 ENCOUNTER — ALLIED HEALTH/NURSE VISIT (OUTPATIENT)
Dept: FAMILY MEDICINE | Facility: CLINIC | Age: 73
End: 2018-11-16
Payer: COMMERCIAL

## 2018-11-16 DIAGNOSIS — Z23 NEED FOR PROPHYLACTIC VACCINATION AND INOCULATION AGAINST INFLUENZA: Primary | ICD-10-CM

## 2018-11-16 PROCEDURE — 90662 IIV NO PRSV INCREASED AG IM: CPT

## 2018-11-16 PROCEDURE — G0008 ADMIN INFLUENZA VIRUS VAC: HCPCS

## 2018-11-16 NOTE — PROGRESS NOTES

## 2018-11-16 NOTE — MR AVS SNAPSHOT
After Visit Summary   11/16/2018    Cricket Klein    MRN: [id number]           Patient Information     Date Of Birth          1945        Visit Information        Provider Department      11/16/2018 10:15 AM FL NB [facility]I/LPN [facility]        Today's Diagnoses     Need for prophylactic vaccination and inoculation against influenza    -  1       Follow-ups after your visit        Your next 10 appointments already scheduled     Nov 20, 2018 11:00 AM CST   MR BRAIN W/O & W CONTRAST with [id number]   [facility] ([facility])    [address] [address]   [phone]           How do I prepare for my exam? (Food and drink instructions) **If you will be receiving sedation or general anesthesia, please see special notes below.**  How do I prepare for my exam? (Other instructions) Take your medicines as usual, unless your doctor tells you not to. You may or may not receive intravenous (IV) contrast for this exam pending the discretion of the Radiologist.  You do not need to do anything special to prepare.  **If you will be receiving sedation or general anesthesia, please see special notes below.**  What should I wear: The MRI machine uses a strong magnet. Please wear clothes without metal (snaps, zippers). A sweatsuit works well, or we may give you a hospital gown. Please remove any body piercings and hair extensions before you arrive. You will also remove watches, jewelry, hairpins, wallets, dentures, partial dental plates and hearing aids. You may wear contact lenses, and you may be able to wear your rings. We have a safe place to keep your personal items, but it is safer to leave them at home.  How long does the exam take: Most tests take 30 to 60 minutes.  HOWEVER, IF YOUR DOCTOR PRESCRIBES ANESTHESIA please plan on spending four to five hours in the recovery room.  What should I bring:  Bring a list of your current medicines to your exam  (including vitamins, minerals and over-the-counter drugs).  Do I need a :  **If you will be receiving sedation or general anesthesia, please see special notes below.**  What should I do after the exam: No Restrictions, You may resume normal activities.  What is this test: MRI (magnetic resonance imaging) uses a strong magnet and radio waves to look inside the body. An MRA (magnetic resonance angiogram) does the same thing, but it lets us look at your blood vessels. A computer turns the radio waves into pictures showing cross sections of the body, much like slices of bread. This helps us see any problems more clearly. You may receive fluid (called  contrast ) before or during your scan. The fluid helps us see the pictures better. We give the fluid through an IV (small needle in your arm).  Who should I call with questions:  Please call the Imaging Department at your exam site with any questions. Directions, parking instructions, and other information is available on our website, [email or web address].  How do I prepare if I m having sedation or anesthesia? **IMPORTANT** THE INSTRUCTIONS BELOW ARE ONLY FOR THOSE PATIENTS WHO HAVE BEEN TOLD THEY WILL RECEIVE SEDATION OR GENERAL ANESTHESIA DURING THEIR MRI PROCEDURE:  IF YOU WILL RECEIVE SEDATION (take medicine to help you relax during your exam): You must get the medicine from your doctor before you arrive. Bring the medicine to the exam. Do not take it at home. Arrive one hour early. Bring someone who can take you home after the test. Your medicine will make you sleepy. After the exam, you may not drive, take a bus or take a taxi by yourself. No eating 8 hours before your exam. You may have clear liquids up until 4 hours before your exam. (Clear liquids include water, clear tea, black coffee and fruit juice without pulp.)  IF YOU WILL RECEIVE ANESTHESIA (be asleep for your exam): Arrive 1 1/2 hours early. Bring someone who can take you home after the test. You  may not drive, take a bus or take a taxi by yourself. No eating 8 hours before your exam. You may have clear liquids up until 4 hours before your exam. (Clear liquids include water, clear tea, black coffee and fruit juice without pulp.)            Dec 20, 2018 10:15 AM CST   [facility] with NB AN[facility]AG   [facility]    [address] [place] [address] [phone]            May 13, 2019  9:30 AM CDT   Return Visit with Emily Fernandes MD   [facility]    [address] [address] [phone]              Who to contact     If you have questions or need follow up information about today's clinic visit or your schedule please contact [facility] directly at [phone].  Normal or non-critical lab and imaging results will be communicated to you by MyChart, letter or phone within 4 business days after the clinic has received the results. If you do not hear from us within 7 days, please contact the clinic through MyChart or phone. If you have a critical or abnormal lab result, we will notify you by phone as soon as possible.  Submit refill requests through [employer] or call your pharmacy and they will forward the refill request to us. Please allow 3 business days for your refill to be completed.          Additional Information About Your Visit        Care EveryWhere ID     This is your Care EveryWhere ID. This could be used by other organizations to access your [place] medical records  [id number]         Blood Pressure from Last 3 Encounters:   11/13/18 141/87   10/17/18 130/66   07/16/18 138/72    Weight from Last 3 Encounters:   11/13/18 213 lb (96.6 kg)   10/17/18 217 lb (98.4 kg)   07/16/18 213 lb (96.6 kg)              We Performed the Following     FLU VACCINE, INCREASED ANTIGEN, PRESV FREE, AGE 65+ [35897]     Vaccine Administration,  Initial [75047]        Primary Care Provider Office Phone # Fax #    Saud Ramon [phone]       [address] [place] [address]        Equal Access to Services     ROSARIO MOE : Hadii laron ku hadvestao Sojose guadalupeali, waaxda luqadaha, qaybta kaalmada adeegyada, waxalva idiin cassidyn dory mccall laRubiantoinette hinkle. So [facility] [phone].    ATENCIÓN: Si habla español, tiene a barton disposición servicios gratuitos de asistencia lingüística. Llame al [phone].    We comply with applicable federal civil rights laws and [place] laws. We do not discriminate on the basis of race, color, national origin, age, disability, sex, sexual orientation, or gender identity.            Thank you!     Thank you for choosing [facility]  for your care. Our goal is always to provide you with excellent care. Hearing back from our patients is one way we can continue to improve our services. Please take a few minutes to complete the written survey that you may receive in the mail after your visit with us. Thank you!             Your Updated Medication List - Protect others around you: Learn how to safely use, store and throw away your medicines at www.disposemymeds.org.          This list is accurate as of 11/16/18 10:33 AM.  Always use your most recent med list.                   Brand Name Dispense Instructions for use Diagnosis    atenolol 50 MG tablet    TENORMIN    180 tablet    TAKE ONE TABLET BY MOUTH TWICE A DAY    Benign essential HTN       donepezil 10 MG tablet    ARICEPT    30 tablet    Take 1 tablet (10 mg) by mouth At Bedtime    Cognitive impairment       ezetimibe 10 MG tablet    ZETIA    30 tablet    Take 1 tablet (10 mg) by mouth daily NEEDS FASTING LAB    Pure hypercholesterolemia       * order for DME      Equipment being ordered: LEXI Klein received a Tysdo AirSense 10 Auto. Pressures were set at Auto 10 - 18 cm H2O.        * order for DME      Equipment  ordered: RESMED ASV Mask type: Full face  Settings: ASV EPAP 7, PS MIN 4 MAX 15, RR AUTO        vitamin D 1000 units capsule      Take 1 capsule by mouth daily.        warfarin 2.5 MG tablet    COUMADIN    112 tablet    Take 2.5 mg daily    Pulmonary embolus, left (H)       * Notice:  This list has 2 medication(s) that are the same as other medications prescribed for you. Read the directions carefully, and ask your doctor or other care provider to review them with you.

## 2018-11-20 ENCOUNTER — HOSPITAL ENCOUNTER (OUTPATIENT)
Dept: MRI IMAGING | Facility: CLINIC | Age: 73
Discharge: HOME | End: 2018-11-20
Attending: PSYCHIATRY & NEUROLOGY | Admitting: PSYCHIATRY & NEUROLOGY
Payer: MEDICARE

## 2018-11-20 DIAGNOSIS — F03.90 DEMENTIA WITHOUT BEHAVIORAL DISTURBANCE, UNSPECIFIED DEMENTIA TYPE: ICD-10-CM

## 2018-11-20 DIAGNOSIS — Z87.820 HX OF TRAUMATIC BRAIN INJURY: ICD-10-CM

## 2018-11-20 LAB
CREAT BLD-MCNC: 0.8 MG/DL (ref 0.66–1.25)
GFR SERPL CREATININE-BSD FRML MDRD: >90 ML/MIN/1.7M2

## 2018-11-20 PROCEDURE — A9585 GADOBUTROL INJECTION: HCPCS | Performed by: PSYCHIATRY & NEUROLOGY

## 2018-11-20 PROCEDURE — 82565 ASSAY OF CREATININE: CPT

## 2018-11-20 PROCEDURE — 70553 MRI BRAIN STEM W/O & W/DYE: CPT

## 2018-11-20 PROCEDURE — 25500064 ZZH RX 255 OP 636: Performed by: PSYCHIATRY & NEUROLOGY

## 2018-11-20 RX ORDER — GADOBUTROL 604.72 MG/ML
10 INJECTION INTRAVENOUS ONCE
Status: COMPLETED | OUTPATIENT
Start: 2018-11-20 | End: 2018-11-20

## 2018-11-20 RX ADMIN — GADOBUTROL 10 ML: 604.72 INJECTION INTRAVENOUS at 11:12

## 2018-11-21 NOTE — PROGRESS NOTES
Please advise Cricket Klein,  1945, that his brain MRI appears stable compared to the 2016 study. This is reassuring. No change in plan.   [phone] (home)   Emily Fernandes

## 2018-12-04 NOTE — MR AVS SNAPSHOT
After Visit Summary   10/22/2018    Cricket Klein    MRN: [id number]           Patient Information     Date Of Birth          1945        Visit Information        Provider Department      10/22/2018 9:45 AM FL NB RN [facility]        Today's Diagnoses     Acute bronchitis, unspecified organism    -  1       Follow-ups after your visit        Your next 10 appointments already scheduled     Nov 05, 2018  9:15 AM CST   Anticoagulation Visit with NB ANTI COAG   [facility] ([facility])    [address] [place] [id number] [phone]            Nov 13, 2018  9:30 AM CST   Return Visit with Emily Fernandes MD   [facility] ([facility]    [address] [facility] [address] [phone]              Who to contact     If you have questions or need follow up information about today's clinic visit or your schedule please contact [facility] directly at [phone].  Normal or non-critical lab and imaging results will be communicated to you by MyChart, letter or phone within 4 business days after the clinic has received the results. If you do not hear from us within 7 days, please contact the clinic through MyChart or phone. If you have a critical or abnormal lab result, we will notify you by phone as soon as possible.  Submit refill requests through [employer] or call your pharmacy and they will forward the refill request to us. Please allow 3 business days for your refill to be completed.          Additional Information About Your Visit        Care EveryWhere ID     This is your Care EveryWhere ID. This could be used by other organizations to access your [facility] medical records  [id number]         Blood Pressure from Last 3 Encounters:   10/17/18 130/66   07/16/18 138/72   04/10/18 118/74    Weight from Last 3 Encounters:   10/17/18 217 lb (98.4 kg)  Telephone Encounter by Talita Hinkle RMA at 07/11/18 10:22 AM     Author:  Talita Hinkle RMA Service:  (none) Author Type:  Medical Assistant     Filed:  07/11/18 10:25 AM Encounter Date:  7/7/2018 Status:  Signed     :  Talita Hinkle RMA (Medical Assistant)            Message sent to the pt via [employer].  Holding 9/19/18 for the pt at 9:30am.  Will await response.[LR1.1M]  Electronically Signed by:    ASHA George , 7/11/2018[LR1.1T]        Revision History        User Key Date/Time User Provider Type Action    > LR1.1 07/11/18 10:25 AM Talita Hinkle RMA Medical Assistant Sign    M - Manual, T - Template               07/16/18 213 lb (96.6 kg)   04/10/18 211 lb (95.7 kg)              Today, you had the following     No orders found for display         Today's Medication Changes          These changes are accurate as of 10/22/18  9:53 AM.  If you have any questions, ask your nurse or doctor.               Start taking these medicines.        Dose/Directions    azithromycin 250 MG tablet   Commonly known as:  ZITHROMAX   Used for:  Acute bronchitis, unspecified organism        Two tablets first day, then one tablet daily for four days.   Quantity:  6 tablet   Refills:  0            Where to get your medicines      These medications were sent to [facility] [place], [place] - [address]  [address] [address]     Phone:  [phone]     azithromycin 250 MG tablet                Primary Care Provider Office Phone # Fax #    Saud Ramon [phone]       [address] [place] [address]        Equal Access to Services     ROSARIO MOE AH: Hadii laron ku hadasho Soomaali, waaxda luqadaha, qaybta kaalmada adeegyada, waxay praneethin haypercyn dory carver . So [facility] [phone].    ATENCIÓN: Si habla español, tiene a barton disposición servicios gratuitos de asistencia lingüística. Llame al [phone].    We comply with applicable federal civil rights laws and [place] laws. We do not discriminate on the basis of race, color, national origin, age, disability, sex, sexual orientation, or gender identity.            Thank you!     Thank you for choosing [facility]  for your care. Our goal is always to provide you with excellent care. Hearing back from our patients is one way we can continue to improve our services. Please take a few minutes to complete the written survey that you may receive in the mail after your visit with us. Thank you!             Your Updated Medication List - Protect others around you: Learn how to safely use, store and  throw away your medicines at www.disposemymeds.org.          This list is accurate as of 10/22/18  9:53 AM.  Always use your most recent med list.                   Brand Name Dispense Instructions for use Diagnosis    atenolol 50 MG tablet    TENORMIN    180 tablet    TAKE ONE TABLET BY MOUTH TWICE A DAY    Benign essential HTN       azithromycin 250 MG tablet    ZITHROMAX    6 tablet    Two tablets first day, then one tablet daily for four days.    Acute bronchitis, unspecified organism       * donepezil 5 MG tablet    ARICEPT    30 tablet    Take 1 tablet (5 mg) by mouth At Bedtime    Cognitive impairment       * donepezil 10 MG tablet    ARICEPT    30 tablet    Take 1 tablet (10 mg) by mouth At Bedtime    Cognitive impairment       ezetimibe 10 MG tablet    ZETIA    30 tablet    Take 1 tablet (10 mg) by mouth daily NEEDS FASTING LAB    Pure hypercholesterolemia       * order for DME      Equipment being ordered: LEXI  Cricket Klein received a Resmed AirSense 10 Auto. Pressures were set at Auto 10 - 18 cm H2O.        * order for DME      Equipment ordered: RESMED ASV Mask type: Full face  Settings: ASV EPAP 7, PS MIN 4 MAX 15, RR AUTO        vitamin D 1000 units capsule      Take 1 capsule by mouth daily.        warfarin 2.5 MG tablet    COUMADIN    112 tablet    Take 2.5 mg daily    Pulmonary embolus, left (H)       * Notice:  This list has 4 medication(s) that are the same as other medications prescribed for you. Read the directions carefully, and ask your doctor or other care provider to review them with you.

## 2018-12-18 DIAGNOSIS — I26.99 PULMONARY EMBOLUS, LEFT (H): ICD-10-CM

## 2018-12-19 RX ORDER — WARFARIN SODIUM 2.5 MG/1
TABLET ORAL
Qty: 90 TABLET | Refills: 0 | Status: SHIPPED | OUTPATIENT
Start: 2018-12-19 | End: 2019-04-17

## 2018-12-19 NOTE — TELEPHONE ENCOUNTER
"Requested Prescriptions   Pending Prescriptions Disp Refills     warfarin (COUMADIN) 2.5 MG tablet 112 tablet 0     Sig: Take 2.5 mg daily    Vitamin K Antagonists Failed - 12/18/2018  4:57 PM       Failed - INR is within goal in the past 6 weeks    Confirm INR is within goal in the past 6 weeks.     Recent Labs   Lab Test 11/08/18   INR 2.9*                      Passed - Recent (12 mo) or future (30 days) visit within the authorizing provider's specialty    Patient had office visit in the last 12 months or has a visit in the next 30 days with authorizing provider or within the authorizing provider's specialty.  See \"Patient Info\" tab in inbasket, or \"Choose Columns\" in Meds & Orders section of the refill encounter.             Passed - Patient is 18 years of age or older        Last Written Prescription Date:  8/20/18  Last Fill Quantity: 112,  # refills: 0   Last office visit: 11/16/2018 with prescribing provider:     Future Office Visit:      "

## 2018-12-20 ENCOUNTER — ANTICOAGULATION THERAPY VISIT (OUTPATIENT)
Dept: ANTICOAGULATION | Facility: CLINIC | Age: 73
End: 2018-12-20
Payer: COMMERCIAL

## 2018-12-20 ENCOUNTER — TELEPHONE (OUTPATIENT)
Dept: NEUROPSYCHOLOGY | Facility: CLINIC | Age: 73
End: 2018-12-20

## 2018-12-20 LAB — INR POINT OF CARE: 1.9 (ref 0.86–1.14)

## 2018-12-20 PROCEDURE — 36416 COLLJ CAPILLARY BLOOD SPEC: CPT

## 2018-12-20 PROCEDURE — 85610 PROTHROMBIN TIME: CPT | Mod: QW

## 2018-12-20 NOTE — TELEPHONE ENCOUNTER
I spoke with Mr. Klein about his neuropsychology test results and my recommendations. He was particularly concerned about my recommendation that he stop driving. I provided my rationale for this recommendation. All questions were then answered to his satisfaction.     Kurtis Mueller, Ph.D., L.P., ABPP-CN    Department of Rehabilitation Medicine  Division of Adult Neuropsychology  [facility]

## 2018-12-20 NOTE — PROGRESS NOTES
ANTICOAGULATION FOLLOW-UP CLINIC VISIT    Patient Name:  Cricket Klein  Date:  2018  Contact Type:  Face to Face    SUBJECTIVE:     Patient Findings     Positives:   Missed doses (12- and -)    Comments:   Patient missed two doses in the last week or so. Writer educated patient and his wife that it is okay to take a missed dose in the morning and then take that day's dose right before bedtime. No other changes or concerns. Will advise to take 1.5 tabs (3.75mg) today instead of 1 tab (2.5 mg) of warfarin to help make up for the missed dose. He will resume usual dosing tomorrow. Recheck INR in 4 weeks.           OBJECTIVE    INR Protime   Date Value Ref Range Status   2018 1.9 (A) 0.86 - 1.14 Final       ASSESSMENT / PLAN  INR assessment THER    Recheck INR In: 4 WEEKS    INR Location Clinic      Anticoagulation Summary  As of 2018    INR goal:   2.0-3.0   TTR:   52.3 % (2.3 y)   INR used for dosin.9! (2018)   Warfarin maintenance plan:   2.5 mg (2.5 mg x 1) every day   Full warfarin instructions:   : 3.75 mg; Otherwise 2.5 mg every day   Weekly warfarin total:   17.5 mg   Plan last modified:   Citlalli Sethi RN (2018)   Next INR check:   2019   Priority:   INR   Target end date:   Indefinite    Indications    Other and unspecified coagulation defects [D68.9]  Long-term (current) use of anticoagulants [Z79.01] [Z79.01]             Anticoagulation Episode Summary     INR check location:       Preferred lab:       Send INR reminders to:   [facility]    Comments:   * Had PE in  following hip surgery. Has heterozygous prothrombin gene mutation. Second PE 17. Needs lifelong warfarin. was on warfarin [id number]        Anticoagulation Care Providers     Provider Role Specialty Phone number    Saud Ramon MD [facility] [phone]            See the Encounter Report to view Anticoagulation Flowsheet and Dosing  Calendar (Go to Encounters tab in chart review, and find the Anticoagulation Therapy Visit)        Kimberly Zamora RN

## 2018-12-29 ENCOUNTER — APPOINTMENT (OUTPATIENT)
Dept: GENERAL RADIOLOGY | Facility: CLINIC | Age: 73
End: 2018-12-29
Attending: EMERGENCY MEDICINE
Payer: MEDICARE

## 2018-12-29 ENCOUNTER — HOSPITAL ENCOUNTER (EMERGENCY)
Facility: CLINIC | Age: 73
Discharge: HOME | End: 2018-12-29
Attending: EMERGENCY MEDICINE | Admitting: EMERGENCY MEDICINE
Payer: MEDICARE

## 2018-12-29 VITALS
DIASTOLIC BLOOD PRESSURE: 83 MMHG | RESPIRATION RATE: 16 BRPM | HEART RATE: 62 BPM | SYSTOLIC BLOOD PRESSURE: 131 MMHG | OXYGEN SATURATION: 98 % | BODY MASS INDEX: 33.89 KG/M2 | WEIGHT: 210 LBS | TEMPERATURE: 97.5 F
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
## 2018-12-29 DIAGNOSIS — S46.912A SHOULDER STRAIN, LEFT, INITIAL ENCOUNTER: ICD-10-CM

## 2018-12-29 DIAGNOSIS — S20.212A CONTUSION OF LEFT CHEST WALL, INITIAL ENCOUNTER: ICD-10-CM

## 2018-12-29 LAB
ANION GAP SERPL CALCULATED.3IONS-SCNC: 4 MMOL/L (ref 3–14)
BASOPHILS # BLD AUTO: 0 10E9/L (ref 0–0.2)
BASOPHILS NFR BLD AUTO: 0.6 %
BUN SERPL-MCNC: 12 MG/DL (ref 7–30)
CALCIUM SERPL-MCNC: 8.5 MG/DL (ref 8.5–10.1)
CHLORIDE SERPL-SCNC: 103 MMOL/L (ref 94–109)
CO2 SERPL-SCNC: 29 MMOL/L (ref 20–32)
CREAT SERPL-MCNC: 1 MG/DL (ref 0.66–1.25)
DIFFERENTIAL METHOD BLD: NORMAL
EOSINOPHIL # BLD AUTO: 0 10E9/L (ref 0–0.7)
EOSINOPHIL NFR BLD AUTO: 0.8 %
ERYTHROCYTE [DISTWIDTH] IN BLOOD BY AUTOMATED COUNT: 13 % (ref 10–15)
GFR SERPL CREATININE-BSD FRML MDRD: 75 ML/MIN/{1.73_M2}
GLUCOSE SERPL-MCNC: 79 MG/DL (ref 70–99)
HCT VFR BLD AUTO: 41.4 % (ref 40–53)
HGB BLD-MCNC: 14 G/DL (ref 13.3–17.7)
IMM GRANULOCYTES # BLD: 0 10E9/L (ref 0–0.4)
IMM GRANULOCYTES NFR BLD: 0.4 %
INR PPP: 1.86 (ref 0.86–1.14)
LYMPHOCYTES # BLD AUTO: 1.3 10E9/L (ref 0.8–5.3)
LYMPHOCYTES NFR BLD AUTO: 24 %
MCH RBC QN AUTO: 30.6 PG (ref 26.5–33)
MCHC RBC AUTO-ENTMCNC: 33.8 G/DL (ref 31.5–36.5)
MCV RBC AUTO: 91 FL (ref 78–100)
MONOCYTES # BLD AUTO: 0.5 10E9/L (ref 0–1.3)
MONOCYTES NFR BLD AUTO: 10 %
NEUTROPHILS # BLD AUTO: 3.4 10E9/L (ref 1.6–8.3)
NEUTROPHILS NFR BLD AUTO: 64.2 %
NRBC # BLD AUTO: 0 10*3/UL
NRBC BLD AUTO-RTO: 0 /100
PLATELET # BLD AUTO: 241 10E9/L (ref 150–450)
POTASSIUM SERPL-SCNC: 4 MMOL/L (ref 3.4–5.3)
RBC # BLD AUTO: 4.57 10E12/L (ref 4.4–5.9)
SODIUM SERPL-SCNC: 136 MMOL/L (ref 133–144)
WBC # BLD AUTO: 5.3 10E9/L (ref 4–11)

## 2018-12-29 PROCEDURE — 73030 X-RAY EXAM OF SHOULDER: CPT | Mod: LT

## 2018-12-29 PROCEDURE — 85610 PROTHROMBIN TIME: CPT | Performed by: EMERGENCY MEDICINE

## 2018-12-29 PROCEDURE — 99284 EMERGENCY DEPT VISIT MOD MDM: CPT | Mod: Z6 | Performed by: EMERGENCY MEDICINE

## 2018-12-29 PROCEDURE — 71101 X-RAY EXAM UNILAT RIBS/CHEST: CPT | Mod: LT

## 2018-12-29 PROCEDURE — 99284 EMERGENCY DEPT VISIT MOD MDM: CPT | Mod: 25

## 2018-12-29 PROCEDURE — 80048 BASIC METABOLIC PNL TOTAL CA: CPT | Performed by: EMERGENCY MEDICINE

## 2018-12-29 PROCEDURE — 85025 COMPLETE CBC W/AUTO DIFF WBC: CPT | Performed by: EMERGENCY MEDICINE

## 2018-12-29 NOTE — ED AVS SNAPSHOT
[facility] Emergency Department  [address] [facility] [address]  Phone:  [phone]  Fax:  [phone]                                    Cricket Klein   MRN: [id number]    Department:  [facility] Emergency Department   Date of Visit:  12/29/2018           After Visit Summary Signature Page    I have received my discharge instructions, and my questions have been answered. I have discussed any challenges I see with this plan with the nurse or doctor.    ..........................................................................................................................................  Patient/Patient Representative Signature      ..........................................................................................................................................  Patient Representative Print Name and Relationship to Patient    ..................................................               ................................................  Date                                   Time    ..........................................................................................................................................  Reviewed by Signature/Title    ...................................................              ..............................................  Date                                               Time          22EPIC Rev 08/18

## 2018-12-29 NOTE — ED PROVIDER NOTES
History     Chief Complaint   Patient presents with     Fall     fell down 6 steps, landed on stomach and L side, taking coumadin, did not hit head, swelling beneath L arm     HPI  Cricket Klein is a 73 year old male who presents after he slipped and fell down approximately 6 steps.  He landed on his left side and presents with mild left shoulder pain.  Previously it was moderate in intensity.  He has had previous shoulder surgery.  He is also has mild to moderate pain in the lateral ribs on the left.  Noted to have a large bruise in that area.  Is on Coumadin.  Denies any cough or hemoptysis.  Did not hit his head or have loss conscious.  Denies neck or back pain.  Denies any anterior chest or abdominal pain.  No nausea or vomiting.  He is right-hand dominant.  Denies any weakness or paresthesias of the extremities.  He was able ambulate thereafter.  No treatment prior to arrival.  He has dementia and is accompanied by his wife.    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 10/2/2012          Hyperlipidemia LDL goal <130 10/31/2010     Priority: Medium     1/3/2011 Patient did not tolerate simvastatin, atorvastatin and pravastatin on a daily basis because of muscle cramping and weakness. Cholestyramine does not cause significant side effects, but is now doing a very good job of lowering the LDL toward goal.       Other and unspecified coagulation defects 04/13/2010     Priority: Medium     Factor  2 mutation-heterozygote  4/13/10 I had discussed this with Dr Chandler, and she recommended that warfarin for life would be  advisable.   Cricket did see Dr. Ramos,  Who recommended no warfarin, and stopped it 2/23/12  See 4/28/12 note of visit with Dr. Ramos. Recommends no warfarin. Would need compression stockings for any surgery. And perhaps prophylaxis.          Pulmonary embolism (H) 03/29/2010     Priority: Medium     Within week after  hip replacement -March 2010.  Hematologist feels no increase risk for recurrence of pulmonary embolism or DVT due to heterozygote status of Factor 2 mutation.       S/P hip replacement 03/29/2010     Priority: Medium     date of surgery was March 8, 2010. had pulmonary embolus about a week later.       Impotence of organic origin 05/14/2007     Priority: Medium     Pure hypercholesterolemia 03/28/2006     Priority: Medium     Essential hypertension      Priority: Medium     Goal is <130/80  Problem list name updated by automated process. Provider to review       Transient cerebral ischemia      Priority: Medium     April 19, 2007. Transient memory loss.    MRI HEAD SAME DAY    1. Old left frontal depressed skull fracture with underlying gliosis    and encephalomalacia.     2. Nonspecific periventricular white matter ischemic disease adjacent    to the right lateral ventricle anteriorly.    Problem list name updated by automated process. Provider to review          Past Medical History:    Past Medical History:   Diagnosis Date     Complication of anesthesia      Hypertension      Sleep apnea      TIA        Past Surgical History:    Past Surgical History:   Procedure Laterality Date     ARTHROPLASTY HIP Right 7/28/2017    Procedure: ARTHROPLASTY HIP;  Right total hip arthroplasty;  Surgeon: Jose Miguel Hutchins MD;  Location: [facility]     ARTHROTOMY SHOULDER, ROTATOR CUFF REPAIR, COMBINED  4/2/2012    Procedure:COMBINED ARTHROTOMY SHOULDER, ROTATOR CUFF REPAIR; Left Distal clavicle excision, Subacromial decompression, Rotator cuff repair; Surgeon:JOSE MIGUEL HUTCHINS; Location:[facility]     ARTHROTOMY  SHOULDER, ROTATOR CUFF REPAIR, COMBINED  10/12/2012    Procedure: COMBINED ARTHROTOMY SHOULDER, ROTATOR CUFF REPAIR;  Right shoulder biceps tenodesiss,subacromial decompression;  Surgeon: Jose Miguel Hutchins MD;  Location: [facility]     COLONOSCOPY  2003    normal     COLONOSCOPY  4/15/2014    Procedure: Colonoscopy;  Surgeon: Angela May MD;  Location: [facility]     RELEASE CARPAL TUNNEL Right 4/10/2018    Procedure: RELEASE CARPAL TUNNEL;  Right Open Carpal Tunnel Release;  Surgeon: Jabari Smith MD;  Location: [facility]     SURGICAL HISTORY OF -       achilles tendon repair     SURGICAL HISTORY OF -   3-2010    left hip relpacement       Family History:    Family History   Problem Relation Age of Onset     Thyroid Disease Mother         thyroid cancer     Heart Disease Mother          of heart attack     Heart Disease Father          of heart attack     Diabetes Father      Circulatory Brother      Alzheimer Disease Brother      Circulatory Brother      Cancer Brother         laryngeal     Circulatory Brother      Heart Disease Brother         CHF     Congenital Anomalies Sister         valve     Heart Disease Brother         Heart failure     Heart Disease Sister      Other - See Comments Other 12        Special needs child       Social History:  Marital Status:   [2]  Social History     Tobacco Use     Smoking status: Never Smoker     Smokeless tobacco: Never Used   Substance Use Topics     Alcohol use: Yes     Alcohol/week: 0.0 oz     Comment: rare     Drug use: No        Medications:      atenolol (TENORMIN) 50 MG tablet   donepezil (ARICEPT) 10 MG tablet   ezetimibe (ZETIA) 10 MG tablet   warfarin (COUMADIN) 2.5 MG tablet   Cholecalciferol (VITAMIN D) 1000 UNITS capsule   order for DME   order for DME         Review of Systems all other systems reviewed and are negative.    Physical Exam   BP: (!) 157/94  Pulse: 76  Temp: 97.5  F (36.4  C)  Resp: 16  Weight: 95.3 kg (210  lb)  SpO2: 98 %      Physical Exam general alert cooperative male in mild to moderate distress.  HEENT is atraumatic.  Neck is supple without limitation.  Palpation of the bony spine is nontender.  No CVA tenderness.  Lungs are clear without adventitious sounds.  He has a scar on his left shoulder from previous surgery.  L tenderness proximally but full range of motion of the shoulder.  No joint effusion.  Distally CMS is intact.  Cardiac auscultation is normal.  He has a 8 cm diameter swollen area left lateral chest.  On palpation there is no crepitus or step-off.  No open sores or bleeding lesions.  Anterior chest is nontender.  Abdomen is obese but benign to palpation.  Other than his mild tenderness to left shoulder the extremities are unremarkable.    ED Course        Procedures           Results for orders placed or performed during the hospital encounter of 12/29/18   XR Ribs & Chest Lt 3v    Narrative    RIBS AND CHEST LEFT THREE VIEW 12/29/2018 2:55 PM     HISTORY: Fall with left lateral rib pain and hematoma.      Impression    IMPRESSION: No evidence of displaced left rib fracture. No left  pleural effusion or pneumothorax. Mild right costophrenic angle  blunting may represent pleural scarring versus a small right pleural  effusion. Linear atelectasis or fibrosis is noted overlapping the  right hemidiaphragm. The lungs are otherwise grossly clear.   Shoulder XR, 2 view left    Narrative    SHOULDER TWO VIEW LEFT 12/29/2018 2:54 PM     HISTORY: Fall with shoulder pain. Previous shoulder surgery.      Impression    IMPRESSION: Greater tuberosity suture anchors and distal clavicular  osteotomy. Moderate narrowing of the subacromial space between the  acromion and humeral head raises the possibility of rotator cuff  atrophy or tear.            Critical Care time:  none               Results for orders placed or performed during the hospital encounter of 12/29/18 (from the past 24 hour(s))   CBC with platelets  differential   Result Value Ref Range    WBC 5.3 4.0 - 11.0 10e9/L    RBC Count 4.57 4.4 - 5.9 10e12/L    Hemoglobin 14.0 13.3 - 17.7 g/dL    Hematocrit 41.4 40.0 - 53.0 %    MCV 91 78 - 100 fl    MCH 30.6 26.5 - 33.0 pg    MCHC 33.8 31.5 - 36.5 g/dL    RDW 13.0 10.0 - 15.0 %    Platelet Count 241 150 - 450 10e9/L    Diff Method Automated Method     % Neutrophils 64.2 %    % Lymphocytes 24.0 %    % Monocytes 10.0 %    % Eosinophils 0.8 %    % Basophils 0.6 %    % Immature Granulocytes 0.4 %    Nucleated RBCs 0 0 /100    Absolute Neutrophil 3.4 1.6 - 8.3 10e9/L    Absolute Lymphocytes 1.3 0.8 - 5.3 10e9/L    Absolute Monocytes 0.5 0.0 - 1.3 10e9/L    Absolute Eosinophils 0.0 0.0 - 0.7 10e9/L    Absolute Basophils 0.0 0.0 - 0.2 10e9/L    Abs Immature Granulocytes 0.0 0 - 0.4 10e9/L    Absolute Nucleated RBC 0.0    INR   Result Value Ref Range    INR 1.86 (H) 0.86 - 1.14   Basic metabolic panel   Result Value Ref Range    Sodium 136 133 - 144 mmol/L    Potassium 4.0 3.4 - 5.3 mmol/L    Chloride 103 94 - 109 mmol/L    Carbon Dioxide 29 20 - 32 mmol/L    Anion Gap 4 3 - 14 mmol/L    Glucose 79 70 - 99 mg/dL    Urea Nitrogen 12 7 - 30 mg/dL    Creatinine 1.00 0.66 - 1.25 mg/dL    GFR Estimate 75 >60 mL/min/[1.73_m2]    GFR Estimate If Black 87 >60 mL/min/[1.73_m2]    Calcium 8.5 8.5 - 10.1 mg/dL   Shoulder XR, 2 view left    Narrative    SHOULDER TWO VIEW LEFT 12/29/2018 2:54 PM     HISTORY: Fall with shoulder pain. Previous shoulder surgery.      Impression    IMPRESSION: Greater tuberosity suture anchors and distal clavicular  osteotomy. Moderate narrowing of the subacromial space between the  acromion and humeral head raises the possibility of rotator cuff  atrophy or tear.   XR Ribs & Chest Lt 3v    Narrative    RIBS AND CHEST LEFT THREE VIEW 12/29/2018 2:55 PM     HISTORY: Fall with left lateral rib pain and hematoma.      Impression    IMPRESSION: No evidence of displaced left rib fracture. No left  pleural effusion  or pneumothorax. Mild right costophrenic angle  blunting may represent pleural scarring versus a small right pleural  effusion. Linear atelectasis or fibrosis is noted overlapping the  right hemidiaphragm. The lungs are otherwise grossly clear.       Medications - No data to display  Blood work was obtained.  A chest x-ray with left rib details and left shoulder x-rays are ordered.  Patient defers pain meds at this time.  Assessments & Plan (with Medical Decision Making)   Cricket Klein is a 73 year old male who presents after he slipped and fell down approximately 6 steps.  He landed on his left side and presents with mild left shoulder pain.  Previously it was moderate in intensity.  He has had previous shoulder surgery.  He is also has mild to moderate pain in the lateral ribs on the left.  Noted to have a large bruise in that area.  Is on Coumadin.  Denies any cough or hemoptysis.  Did not hit his head or have loss conscious.  Denies neck or back pain.  Denies any anterior chest or abdominal pain.  No nausea or vomiting.  He is right-hand dominant.  Denies any weakness or paresthesias of the extremities.  He was able ambulate thereafter.  No treatment prior to arrival.  He has dementia and is accompanied by his wife.  Presentation patient was afebrile and vitally stable.  Is not hypoxic.  His head and neck are unremarkable.  Back was nontender.  Lungs were clear.  He had a contusion over the lateral left chest without crepitus or step-off of the bony components.  Mild discomfort of the proximal shoulder.  Anterior chest and abdomen are benign.  The other extremities are unremarkable.  Imaging of the chest with rib details and of the shoulder showed no acute fractures.  Patient's INR was 1.86.  Suspect he has a contusion.  He is given information regarding this.  Discussed reasons to return for reassessment with he and his wife.  They are in agreement with the plan.  I have reviewed the nursing notes.    I  have reviewed the findings, diagnosis, plan and need for follow up with the patient.          Medication List      There are no discharge medications for this visit.         Final diagnoses:   Fall, initial encounter   Contusion of left chest wall, initial encounter   Shoulder strain, left, initial encounter       12/29/2018   [facility] EMERGENCY DEPARTMENT     Trenton Hernandez MD  12/29/18 [id number]

## 2019-01-10 ENCOUNTER — TELEPHONE (OUTPATIENT)
Dept: NEUROLOGY | Facility: CLINIC | Age: 74
End: 2019-01-10

## 2019-01-10 NOTE — TELEPHONE ENCOUNTER
Call placed to discuss that often times in geriatric and dementia patient's,  the provider is not willing to start these medications over the phone.  (tigre since it seems to be a change since last seen).    They need an office visit and a discussion about the increased risks of use of these medications in this patient population.  Could be seen with PCP to discuss since Neurology is booked out so far.    Reached Pat but she said she had company and wished to call us back later.    Ladonna Hardy RN  [facility]

## 2019-01-10 NOTE — TELEPHONE ENCOUNTER
Reason for Call:  Other prescription    Detailed comments: pt wife calling stating pt is getting antsy feeling, would like to get something for anxiety. More severely the last 3 or 4 days.     Phone Number Patient can be reached at: Home number on file [phone] (home)    Best Time: any     Can we leave a detailed message on this number? YES    Call taken on 1/10/2019 at 11:46 AM by Octavia Wilson

## 2019-01-15 NOTE — TELEPHONE ENCOUNTER
I spoke to Claudio's wife today. She was wanting to get Claudio on some medication for anxiety because she says that he feels antsy sometimes. I told her that an appointment should be set up to discuss this in clinic since he was last seen regarding Dementia and they did not discuss anxiety or the need for anxiety medications. She said that they have an appointment but did not want to wait until then. I suggested that they could also set up an appointment with Claudio's primary care physician regarding this. She said that she agrees with the plan. Rochelle GALINDO Rn

## 2019-01-17 ENCOUNTER — ANTICOAGULATION THERAPY VISIT (OUTPATIENT)
Dept: ANTICOAGULATION | Facility: CLINIC | Age: 74
End: 2019-01-17
Payer: COMMERCIAL

## 2019-01-17 LAB — INR POINT OF CARE: 2.3 (ref 0.86–1.14)

## 2019-01-17 PROCEDURE — 85610 PROTHROMBIN TIME: CPT | Mod: QW

## 2019-01-17 PROCEDURE — 36416 COLLJ CAPILLARY BLOOD SPEC: CPT

## 2019-01-17 PROCEDURE — 99207 ZZC NO CHARGE NURSE ONLY: CPT

## 2019-01-17 NOTE — PROGRESS NOTES
ANTICOAGULATION FOLLOW-UP CLINIC VISIT    Patient Name:  Cricket Klein  Date:  2019  Contact Type:  Face to Face    SUBJECTIVE:     Patient Findings     Positives:   No Problem Findings    Comments:   No changes in diet, activity level, medications (including over the counter), or health. No missed doses of warfarin. Patient took dosing as prescribed. No signs of clots or bleeding concerns. Patient will continue maintenance warfarin dosing.             OBJECTIVE    INR Protime   Date Value Ref Range Status   2019 2.3 (A) 0.86 - 1.14 Final       ASSESSMENT / PLAN  INR assessment THER    Recheck INR In: 6 WEEKS    INR Location Clinic      Anticoagulation Summary  As of 2019    INR goal:   2.0-3.0   TTR:   52.1 % (2.4 y)   INR used for dosin.3 (2019)   Warfarin maintenance plan:   2.5 mg (2.5 mg x 1) every day   Full warfarin instructions:   2.5 mg every day   Weekly warfarin total:   17.5 mg   No change documented:   Kimberly Zamora RN   Plan last modified:   Citlalli Sethi RN (2018)   Next INR check:   2019   Priority:   INR   Target end date:   Indefinite    Indications    Other and unspecified coagulation defects [D68.9]  Long-term (current) use of anticoagulants [Z79.01] [Z79.01]             Anticoagulation Episode Summary     INR check location:       Preferred lab:       Send INR reminders to:   [facility]    Comments:   * Had PE in  following hip surgery. Has heterozygous prothrombin gene mutation. Second PE 16. Needs lifelong warfarin. was on warfarin [id number]        Anticoagulation Care Providers     Provider Role Specialty Phone number    Saud Ramon MD [facility] [phone]            See the Encounter Report to view Anticoagulation Flowsheet and Dosing Calendar (Go to Encounters tab in chart review, and find the Anticoagulation Therapy Visit)        Kimberly Zamora RN

## 2019-02-22 ENCOUNTER — TRANSFERRED RECORDS (OUTPATIENT)
Dept: HEALTH INFORMATION MANAGEMENT | Facility: CLINIC | Age: 74
End: 2019-02-22

## 2019-03-05 ENCOUNTER — ANTICOAGULATION THERAPY VISIT (OUTPATIENT)
Dept: ANTICOAGULATION | Facility: CLINIC | Age: 74
End: 2019-03-05
Payer: COMMERCIAL

## 2019-03-05 LAB — INR POINT OF CARE: 1.9 (ref 0.86–1.14)

## 2019-03-05 PROCEDURE — 99207 ZZC NO CHARGE NURSE ONLY: CPT

## 2019-03-05 PROCEDURE — 85610 PROTHROMBIN TIME: CPT | Mod: QW

## 2019-03-05 PROCEDURE — 36416 COLLJ CAPILLARY BLOOD SPEC: CPT

## 2019-03-05 NOTE — PROGRESS NOTES
ANTICOAGULATION FOLLOW-UP CLINIC VISIT    Patient Name:  Cricket Klein  Date:  3/5/2019  Contact Type:  Face to Face    SUBJECTIVE:     Patient Findings     Positives:   No Problem Findings    Comments:   No changes in medications, diet, or activity. No concerns with bleeding or bruising. Took warfarin as prescribed.   Continue maintenance warfarin dose. Will recheck INR in 6 weeks.   The patient indicates understanding of these issues and agrees with the plan.  Pt had large bowl of spinach salad last night             OBJECTIVE    INR Protime   Date Value Ref Range Status   2019 1.9 (A) 0.86 - 1.14 Final       ASSESSMENT / PLAN  INR assessment THER +/- 0.1   Recheck INR In: 6 WEEKS    INR Location Clinic      Anticoagulation Summary  As of 3/5/2019    INR goal:   2.0-3.0   TTR:   53.3 % (2.5 y)   INR used for dosin.9! (3/5/2019)   Warfarin maintenance plan:   2.5 mg (2.5 mg x 1) every day   Full warfarin instructions:   2.5 mg every day   Weekly warfarin total:   17.5 mg   No change documented:   Yoana Samson RN   Plan last modified:   Citlalli Sethi RN (2018)   Next INR check:   4/15/2019   Priority:   INR   Target end date:   Indefinite    Indications    Other and unspecified coagulation defects [D68.9]  Long-term (current) use of anticoagulants [Z79.01] [Z79.01]             Anticoagulation Episode Summary     INR check location:       Preferred lab:       Send INR reminders to:   [facility]    Comments:   * Had PE in  following hip surgery. Has heterozygous prothrombin gene mutation. Second PE 16. Needs lifelong warfarin. was on warfarin [id number]        Anticoagulation Care Providers     Provider Role Specialty Phone number    Saud Ramon MD [facility] [phone]            See the Encounter Report to view Anticoagulation Flowsheet and Dosing Calendar (Go to Encounters tab in chart review, and find the Anticoagulation  Therapy Visit)        Yoana Samson RN

## 2019-03-20 DIAGNOSIS — R41.89 COGNITIVE IMPAIRMENT: ICD-10-CM

## 2019-03-20 RX ORDER — DONEPEZIL HYDROCHLORIDE 10 MG/1
10 TABLET, FILM COATED ORAL AT BEDTIME
Qty: 30 TABLET | Refills: 2 | Status: SHIPPED | OUTPATIENT
Start: 2019-03-20 | End: 2019-06-26

## 2019-03-20 NOTE — TELEPHONE ENCOUNTER
Reason for Call:  Medication or medication refill:    Do you use a [facility]?  Name of the pharmacy and phone number for the current request:  [facility] - [phone]    Name of the medication requested: donepezil    Other request: LOV:  11/13/18  LAST REFILL:  2/18/19    Can we leave a detailed message on this number? YES    Phone number patient can be reached at: Home number on file [phone] (home)    Best Time: any     Call taken on 3/20/2019 at 12:13 PM by Octavia Wilson

## 2019-03-25 DIAGNOSIS — G47.33 OSA (OBSTRUCTIVE SLEEP APNEA): Primary | Chronic | ICD-10-CM

## 2019-04-15 ENCOUNTER — ANTICOAGULATION THERAPY VISIT (OUTPATIENT)
Dept: ANTICOAGULATION | Facility: CLINIC | Age: 74
End: 2019-04-15
Payer: COMMERCIAL

## 2019-04-15 LAB — INR POINT OF CARE: 3 (ref 0.86–1.14)

## 2019-04-15 PROCEDURE — 85610 PROTHROMBIN TIME: CPT | Mod: QW

## 2019-04-15 PROCEDURE — 36416 COLLJ CAPILLARY BLOOD SPEC: CPT

## 2019-04-15 NOTE — PROGRESS NOTES
ANTICOAGULATION FOLLOW-UP CLINIC VISIT    Patient Name:  Cricket Klein  Date:  4/15/2019  Contact Type:  Face to Face    SUBJECTIVE:     Patient Findings     Comments:   No changes in medications, diet, or activity. No concerns with bleeding or bruising. Took warfarin as prescribed.   Continue maintenance warfarin dose. Will recheck INR in 6 weeks.   Patient verbalizes understanding and agrees with plan. No further questions at this time.                OBJECTIVE    INR Protime   Date Value Ref Range Status   04/15/2019 3.0 (A) 0.86 - 1.14 Final       ASSESSMENT / PLAN  INR assessment THER    Recheck INR In: 6 WEEKS    INR Location Clinic      Anticoagulation Summary  As of 4/15/2019    INR goal:   2.0-3.0   TTR:   54.9 % (2.6 y)   INR used for dosing:   3.0 (4/15/2019)   Warfarin maintenance plan:   2.5 mg (2.5 mg x 1) every day   Full warfarin instructions:   2.5 mg every day   Weekly warfarin total:   17.5 mg   No change documented:   Yoana Samson RN   Plan last modified:   Citlalli Sethi RN (4/30/2018)   Next INR check:   5/30/2019   Priority:   INR   Target end date:   Indefinite    Indications    Other and unspecified coagulation defects [D68.9]  Long-term (current) use of anticoagulants [Z79.01] [Z79.01]             Anticoagulation Episode Summary     INR check location:       Preferred lab:       Send INR reminders to:   [facility]    Comments:   * Had PE in 2010 following hip surgery. Has heterozygous prothrombin gene mutation. Second PE 7-26-16. Needs lifelong warfarin. was on warfarin [id number]        Anticoagulation Care Providers     Provider Role Specialty Phone number    Saud Ramon MD [facility] [phone]            See the Encounter Report to view Anticoagulation Flowsheet and Dosing Calendar (Go to Encounters tab in chart review, and find the Anticoagulation Therapy Visit)        Yoana Samson, [place]

## 2019-04-17 DIAGNOSIS — I26.99 PULMONARY EMBOLUS, LEFT (H): ICD-10-CM

## 2019-04-24 ENCOUNTER — HOSPITAL ENCOUNTER (OUTPATIENT)
Dept: MRI IMAGING | Facility: CLINIC | Age: 74
Discharge: HOME | End: 2019-04-24
Attending: ORTHOPAEDIC SURGERY | Admitting: ORTHOPAEDIC SURGERY
Payer: COMMERCIAL

## 2019-04-24 DIAGNOSIS — S49.90XA SHOULDER INJURY: ICD-10-CM

## 2019-04-24 PROCEDURE — 73221 MRI JOINT UPR EXTREM W/O DYE: CPT | Mod: RT

## 2019-04-25 RX ORDER — WARFARIN SODIUM 2.5 MG/1
TABLET ORAL
Qty: 95 TABLET | Refills: 0 | Status: SHIPPED | OUTPATIENT
Start: 2019-04-25 | End: 2019-07-18

## 2019-04-25 NOTE — TELEPHONE ENCOUNTER
"Requested Prescriptions   Pending Prescriptions Disp Refills     warfarin (COUMADIN) 2.5 MG tablet 95 tablet 0     Sig: Take 2.5 mg daily or As directed by Anticoagulation clinic       Vitamin K Antagonists Failed - 4/25/2019  8:46 AM        Failed - Recent (12 mo) or future (30 days) visit within the authorizing provider's specialty     Patient had office visit in the last 12 months or has a visit in the next 30 days with authorizing provider or within the authorizing provider's specialty.  See \"Patient Info\" tab in inbasket, or \"Choose Columns\" in Meds & Orders section of the refill encounter.              Failed - INR is within goal in the past 6 weeks     Confirm INR is within goal in the past 6 weeks.     Recent Labs   Lab Test 04/15/19   INR 3.0*                       Passed - Medication is active on med list        Passed - Patient is 18 years of age or older        Last Written Prescription Date:  12/19/18  Last Fill Quantity: 90,  # refills: 0   Last office visit: 11/16/2018 with prescribing provider:  10/17/18 Tristen   Future Office Visit:   Next 5 appointments (look out 90 days)    May 13, 2019  9:30 AM CDT  Return Visit with Emily Fernandes MD  [facility] [address] [id number] [phone]           "

## 2019-04-25 NOTE — TELEPHONE ENCOUNTER
Pt's wife says Claudio is out of his Warfarin now. The INR clinic was not able to fill this because Dr Ramon was listed as his PCP and he is no longer with [facility].    Pat says Claudio will see Dr Cruz now.  He will need refill of his Warfarin now.

## 2019-04-25 NOTE — TELEPHONE ENCOUNTER
Due to patient not having a PCP active at [place], the [facility] does not have a provider to refill under our protocol.     Routing back to the care team to refill prescription.    Beto DIOP RN, CACP

## 2019-05-03 ENCOUNTER — TRANSFERRED RECORDS (OUTPATIENT)
Dept: HEALTH INFORMATION MANAGEMENT | Facility: CLINIC | Age: 74
End: 2019-05-03

## 2019-06-06 ENCOUNTER — ANTICOAGULATION THERAPY VISIT (OUTPATIENT)
Dept: ANTICOAGULATION | Facility: CLINIC | Age: 74
End: 2019-06-06
Payer: COMMERCIAL

## 2019-06-06 LAB — INR POINT OF CARE: 2.7 (ref 0.86–1.14)

## 2019-06-06 PROCEDURE — 85610 PROTHROMBIN TIME: CPT | Mod: QW

## 2019-06-06 PROCEDURE — 99207 ZZC NO CHARGE NURSE ONLY: CPT

## 2019-06-06 PROCEDURE — 36416 COLLJ CAPILLARY BLOOD SPEC: CPT

## 2019-06-06 NOTE — PROGRESS NOTES
ANTICOAGULATION FOLLOW-UP CLINIC VISIT    Patient Name:  Cricket Klein  Date:  2019  Contact Type:  Face to Face    SUBJECTIVE:  Patient Findings     Comments:   No changes in medications, activity, health, or diet noted. No bleeding or increased bruising noted. Took warfarin as prescribed.  Patient will continue weekly maintenance dose. INR is therapeutic.   Recheck in 6 weeks.   Patient verbalizes understanding and agrees to plan. No further questions or concerns.          Clinical Outcomes     Negatives:   Major bleeding event, Thromboembolic event, Anticoagulation-related hospital admission, Anticoagulation-related ED visit, Anticoagulation-related fatality    Comments:   No changes in medications, activity, health, or diet noted. No bleeding or increased bruising noted. Took warfarin as prescribed.  Patient will continue weekly maintenance dose. INR is therapeutic.   Recheck in 6 weeks.   Patient verbalizes understanding and agrees to plan. No further questions or concerns.             OBJECTIVE    INR Protime   Date Value Ref Range Status   2019 2.7 (A) 0.86 - 1.14 Final       ASSESSMENT / PLAN  INR assessment THER    Recheck INR In: 6 WEEKS    INR Location Clinic      Anticoagulation Summary  As of 2019    INR goal:   2.0-3.0   TTR:   57.2 % (2.8 y)   INR used for dosin.7 (2019)   Warfarin maintenance plan:   2.5 mg (2.5 mg x 1) every day   Full warfarin instructions:   2.5 mg every day   Weekly warfarin total:   17.5 mg   No change documented:   Shruthi Hutchison RN   Plan last modified:   Citlalli Sethi RN (2018)   Next INR check:   2019   Priority:   INR   Target end date:   Indefinite    Indications    Other and unspecified coagulation defects [D68.9]  Long-term (current) use of anticoagulants [Z79.01] [Z79.01]             Anticoagulation Episode Summary     INR check location:       Preferred lab:       Send INR reminders to:   [facility]     Comments:   * Had PE in 2010 following hip surgery. Has heterozygous prothrombin gene mutation. Second PE 7-26-16. Needs lifelong warfarin. was on warfarin [id number]. Pt switching to Dr. Cruz        Anticoagulation Care Providers     Provider Role Specialty Phone number    Saud Ramon MD [facility] [phone]    Saud Cruz MD  [facility] [phone]            See the Encounter Report to view Anticoagulation Flowsheet and Dosing Calendar (Go to Encounters tab in chart review, and find the Anticoagulation Therapy Visit)        Shruthi Hutchison RN

## 2019-06-14 DIAGNOSIS — I10 BENIGN ESSENTIAL HTN: ICD-10-CM

## 2019-06-14 RX ORDER — ATENOLOL 50 MG/1
50 TABLET ORAL 2 TIMES DAILY
Qty: 180 TABLET | Refills: 0 | Status: SHIPPED | OUTPATIENT
Start: 2019-06-14 | End: 2019-07-26

## 2019-06-26 ENCOUNTER — OFFICE VISIT (OUTPATIENT)
Dept: NEUROLOGY | Facility: CLINIC | Age: 74
End: 2019-06-26
Payer: COMMERCIAL

## 2019-06-26 VITALS
BODY MASS INDEX: 34.35 KG/M2 | RESPIRATION RATE: 12 BRPM | HEART RATE: 66 BPM | DIASTOLIC BLOOD PRESSURE: 76 MMHG | SYSTOLIC BLOOD PRESSURE: 132 MMHG | TEMPERATURE: 97.2 F | WEIGHT: 212.8 LBS

## 2019-06-26 DIAGNOSIS — Z87.820 HISTORY OF TRAUMATIC BRAIN INJURY: Primary | ICD-10-CM

## 2019-06-26 DIAGNOSIS — R41.89 COGNITIVE IMPAIRMENT: ICD-10-CM

## 2019-06-26 PROCEDURE — 99215 OFFICE O/P EST HI 40 MIN: CPT | Performed by: PSYCHIATRY & NEUROLOGY

## 2019-06-26 RX ORDER — DONEPEZIL HYDROCHLORIDE 10 MG/1
10 TABLET, FILM COATED ORAL AT BEDTIME
Qty: 30 TABLET | Refills: 11 | Status: SHIPPED | OUTPATIENT
Start: 2019-06-26 | End: 2020-06-24

## 2019-06-26 ASSESSMENT — MONTREAL COGNITIVE ASSESSMENT (MOCA)
6. READ LIST OF DIGITS [FORWARD/BACKWARD]: 2
WHAT IS THE TOTAL SCORE (OUT OF 30): 7
4. NAME EACH OF THE THREE ANIMALS SHOWN: 1
9. REPEAT EACH SENTENCE: 2
VISUOSPATIAL/EXECUTIVE SUBSCORE: 1
7. [VIGILENCE] TAP WHEN HEARING DESIGNATED LETTER: 0
10. [FLUENCY] NAME WORDS STARTING WITH DESIGNATED LETTER: 0
13. ORIENTATION SUBSCORE: 0
8. SERIAL SUBTRACTION OF 7S: 0
WHAT LEVEL OF EDUCATION WAS ATTAINED: 0
11. FOR EACH PAIR OF WORDS, WHAT CATEGORY DO THEY BELONG TO (OUT OF 2): 1
12. MEMORY INDEX SCORE: 0

## 2019-06-26 NOTE — LETTER
6/26/2019         RE: Cricket Klein  [address] [place] [address]        Dear Colleague,    Thank you for referring your patient, Cricket Klein, to the [facility]. Please see a copy of my visit note below.    ESTABLISHED PATIENT NEUROLOGY NOTE    DATE OF VISIT: 6/26/2019  MRN: [id number]  PATIENT NAME: Cricket Klein  YOB: 1945    Chief Complaint   Patient presents with     RECHECK     dementia     SUBJECTIVE:                                                      HISTORY OF PRESENT ILLNESS:  Cricket is here for follow up regarding dementia. 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 He has additional history of remote traumatic brain injury. He has been instructed not to drive but was still doing some local driving when we met 7 months ago. He has been on Aricept with out clear benefit nor side effects. 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 The patient is on warfarin.      The patient is here with his wife. They say the memory is stable. He still does some chores around the house. He no longer drives. His wife had concerns about him falling asleep at the wheel. He has not been driving at the advice of  (this is reflected in a December phone note). He is not happy about this. Wife does not have any safety concerns. His wife says that sometimes it might be nice to have some additional resources in terms of social situations. [place] she thinks having a group or something Claudio could get involved with would be helpful.     He thinks his mood is good. No major changes in personality, per wife but she says that he has his moments. Appetite is good. Anxiety not mentioned today.       CURRENT MEDICATIONS:     Current Outpatient Medications on File Prior to Visit:  atenolol (TENORMIN) 50 MG tablet Take 1 tablet (50 mg) by  mouth 2 times daily DUE FOR APPOINTMENT June 2019. NO FURTHER REFILLS   Cholecalciferol (VITAMIN D) 1000 UNITS capsule Take 1 capsule by mouth daily.   ezetimibe (ZETIA) 10 MG tablet Take 1 tablet (10 mg) by mouth daily NEEDS FASTING LAB   warfarin (COUMADIN) 2.5 MG tablet Take 2.5 mg daily or As directed by Anticoagulation clinic   order for DME Equipment ordered: RESMED ASV Mask type: Full face  Settings: ASV EPAP 7, PS MIN 4 MAX 15, RR AUTO   order for DME Equipment being ordered: LEXI Klein received a Resmed AirSense 10 Auto. Pressures were set at Auto 10 - 18 cm H2O.     No current facility-administered medications on file prior to visit.     RECENT DIAGNOSTIC STUDIES:   Labs:   Results for orders placed or performed in visit on 06/06/19   INR point of care   Result Value Ref Range    INR Protime 2.7 (A) 0.86 - 1.14       Imaging:   MRI Brain (11.20.18):  IMPRESSION:  1. Cerebral atrophy.  2. Left frontal lobe encephalomalacia most likely due to an old  infarct.  3. Few scattered nonspecific white matter changes which are most  likely due to chronic small vessel ischemic disease given the  patient's age.  4. No evidence for intracranial hemorrhage, acute infarct, any focal  mass lesions.    Imaging reviewed by me. Agree with radiology read.     REVIEW OF SYSTEMS:                                                      10-point review of systems is negative except as mentioned above in HPI.     EXAM:                                                      Physical Exam:   Vitals: /76 (BP Location: Right arm, Patient Position: Sitting, Cuff Size: Adult Large)   Pulse 66   Temp 97.2  F (36.2  C) (Tympanic)   Resp 12   Wt 96.5 kg (212 lb 12.8 oz)   BMI 34.35 kg/m     BMI= Body mass index is 34.35 kg/m .  GENERAL: NAD.   CV: RRR. S1, S2.   NECK: No bruits.  Focused Neurologic:  MENTAL STATUS: Alert, attentive. Speech is fluent. Occasional word-finding difficulties. Fair comprehension. MoCA: 7/30.  Processing speed is slow.  CRANIAL NERVES: Facial movement normal. EOM full. Hearing intact to conversation. Trapezius strength intact. Palate moves symmetrically. Tongue midline.  MOTOR: 5/5 in proximal and distal muscle groups of upper and lower extremities with brief testing. Tone and bulk normal.   SENSATION: Normal light touch throughout.  COORDINATION: Normal hand opening/closing speed and amplitude.  Right hand-dominant.     ASSESSMENT and PLAN:                                                      Assessment and Plan:    ICD-10-CM    1. History of traumatic brain injury Z87.820    2. Cognitive impairment R41.89 donepezil (ARICEPT) 10 MG tablet     CARE COORDINATION REFERRAL     Mr. Klein is a pleasant 72 yo man with history of remote traumatic brain injury and CHRISTIANO here for memory follow-up. He continues to decline objectively but overall seem to be functioning well at home. We discussed adding Namenda to the Aricept. They will think about this. We also talked about the benefit of getting Claudio involved in a group of peers, or perhaps a day program. Wife would like more information about this, so I have put in a care coordination referral. I also mentioned that he could consider a driving evaluation, but I am in agreement with Dr. Mueller that it is best if Claudio continue to abstain from driving. We will plan to meet again in 6 months. I asked Claudio's wife to let us know if any concerns arise in the meantime. She understands and agrees.      Patient Instructions:  Continue the Aricept: 10mg at bedtime, for memory  Consider adding Namenda, also a medication for memory. *Information provided.  Referral to Care Coordinator for resources/community groups.   Stay active as much as you are able.   Return to Neurology in 6 months. Let us know if any concerns arise in the meantime.     Total Time: 40 minutes were spent with the patient. More than 50% of the time spent on counseling (as described above in Assessment and  Plan/Instructions) /coordinating the care.    Emily Fernandes MD  Neurology                    Again, thank you for allowing me to participate in the care of your patient.        Sincerely,        Emily Fernandes MD

## 2019-06-26 NOTE — PATIENT INSTRUCTIONS
Plan:    Continue the Aricept: 10mg at bedtime, for memory  Consider adding Namenda, also a medication for memory. *Information provided.  Referral to Care Coordinator for resources/community groups.   Stay active as much as you are able.   Return to Neurology in 6 months. Let us know if any concerns arise in the meantime.

## 2019-06-26 NOTE — PROGRESS NOTES
ESTABLISHED PATIENT NEUROLOGY NOTE    DATE OF VISIT: 6/26/2019  MRN: [id number]  PATIENT NAME: Cricket Klein  YOB: 1945    Chief Complaint   Patient presents with     RECHECK     dementia     SUBJECTIVE:                                                      HISTORY OF PRESENT ILLNESS:  Cricket is here for follow up regarding dementia. 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 He has additional history of remote traumatic brain injury. He has been instructed not to drive but was still doing some local driving when we met 7 months ago. He has been on Aricept with out clear benefit nor side effects. 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 The patient is on warfarin.      The patient is here with his wife. They say the memory is stable. He still does some chores around the house. He no longer drives. His wife had concerns about him falling asleep at the wheel. He has not been driving at the advice of  (this is reflected in a December phone note). He is not happy about this. Wife does not have any safety concerns. His wife says that sometimes it might be nice to have some additional resources in terms of social situations. [place] and she thinks having a group or something Claudio could get involved with would be helpful.     He thinks his mood is good. No major changes in personality, per wife but she says that he has his moments. Appetite is good. Anxiety not mentioned today.       CURRENT MEDICATIONS:     Current Outpatient Medications on File Prior to Visit:  atenolol (TENORMIN) 50 MG tablet Take 1 tablet (50 mg) by mouth 2 times daily DUE FOR APPOINTMENT June 2019. NO FURTHER REFILLS   Cholecalciferol (VITAMIN D) 1000 UNITS capsule Take 1 capsule by mouth daily.   ezetimibe (ZETIA) 10 MG tablet Take 1 tablet (10 mg) by mouth daily NEEDS FASTING LAB   warfarin  (COUMADIN) 2.5 MG tablet Take 2.5 mg daily or As directed by Anticoagulation clinic   order for DME Equipment ordered: RESMED ASV Mask type: Full face  Settings: ASV EPAP 7, PS MIN 4 MAX 15, RR AUTO   order for DME Equipment being ordered: LEXI Klein received a Resmed AirSense 10 Auto. Pressures were set at Auto 10 - 18 cm H2O.     No current facility-administered medications on file prior to visit.     RECENT DIAGNOSTIC STUDIES:   Labs:   Results for orders placed or performed in visit on 06/06/19   INR point of care   Result Value Ref Range    INR Protime 2.7 (A) 0.86 - 1.14       Imaging:   MRI Brain (11.20.18):  IMPRESSION:  1. Cerebral atrophy.  2. Left frontal lobe encephalomalacia most likely due to an old  infarct.  3. Few scattered nonspecific white matter changes which are most  likely due to chronic small vessel ischemic disease given the  patient's age.  4. No evidence for intracranial hemorrhage, acute infarct, any focal  mass lesions.    Imaging reviewed by me. Agree with radiology read.     REVIEW OF SYSTEMS:                                                      10-point review of systems is negative except as mentioned above in HPI.     EXAM:                                                      Physical Exam:   Vitals: /76 (BP Location: Right arm, Patient Position: Sitting, Cuff Size: Adult Large)   Pulse 66   Temp 97.2  F (36.2  C) (Tympanic)   Resp 12   Wt 96.5 kg (212 lb 12.8 oz)   BMI 34.35 kg/m    BMI= Body mass index is 34.35 kg/m .  GENERAL: NAD.   CV: RRR. S1, S2.   NECK: No bruits.  Focused Neurologic:  MENTAL STATUS: Alert, attentive. Speech is fluent. Occasional word-finding difficulties. Fair comprehension. MoCA: 7/30. Processing speed is slow.  CRANIAL NERVES: Facial movement normal. EOM full. Hearing intact to conversation. Trapezius strength intact. Palate moves symmetrically. Tongue midline.  MOTOR: 5/5 in proximal and distal muscle groups of upper and lower  extremities with brief testing. Tone and bulk normal.   SENSATION: Normal light touch throughout.  COORDINATION: Normal hand opening/closing speed and amplitude.  Right hand-dominant.     ASSESSMENT and PLAN:                                                      Assessment and Plan:    ICD-10-CM    1. History of traumatic brain injury Z87.820    2. Cognitive impairment R41.89 donepezil (ARICEPT) 10 MG tablet     CARE COORDINATION REFERRAL     Mr. Klein is a pleasant 74 yo man with history of remote traumatic brain injury and CHRISTIANO here for memory follow-up. He continues to decline objectively but overall seem to be functioning well at home. We discussed adding Namenda to the Aricept. They will think about this. We also talked about the benefit of getting Claudio involved in a group of peers, or perhaps a day program. Wife would like more information about this, so I have put in a care coordination referral. I also mentioned that he could consider a driving evaluation, but I am in agreement with Dr. Mueller that it is best if Claudio continue to abstain from driving. We will plan to meet again in 6 months. I asked Claudio's wife to let us know if any concerns arise in the meantime. She understands and agrees.      Patient Instructions:  Continue the Aricept: 10mg at bedtime, for memory  Consider adding Namenda, also a medication for memory. *Information provided.  Referral to Care Coordinator for resources/community groups.   Stay active as much as you are able.   Return to Neurology in 6 months. Let us know if any concerns arise in the meantime.     Total Time: 40 minutes were spent with the patient. More than 50% of the time spent on counseling (as described above in Assessment and Plan/Instructions) /coordinating the care.    Emily Fernandes MD  Neurology

## 2019-06-27 ENCOUNTER — PATIENT OUTREACH (OUTPATIENT)
Dept: CARE COORDINATION | Facility: CLINIC | Age: 74
End: 2019-06-27

## 2019-06-27 ENCOUNTER — TELEPHONE (OUTPATIENT)
Dept: NEUROLOGY | Facility: CLINIC | Age: 74
End: 2019-06-27

## 2019-06-27 DIAGNOSIS — R41.3 MEMORY LOSS: Primary | ICD-10-CM

## 2019-06-27 RX ORDER — MEMANTINE HYDROCHLORIDE 5 MG/1
5 TABLET ORAL 2 TIMES DAILY
Qty: 60 TABLET | Refills: 5 | Status: SHIPPED | OUTPATIENT
Start: 2019-06-27 | End: 2020-01-30

## 2019-06-27 NOTE — LETTER
[place] CARE COORDINATION - [facility]  [address] [place] [address]  [phone]    June 27, 2019    Dieter [place]  [address] [facility] [place] [address]      Dear Dieter,    I am a clinic care coordinator who works with Saud Cruz MD at [facility]. I wanted to introduce myself and provide you with my contact information for you to be able to call me with any questions or concerns. I wanted to introduce myself and provide you with my contact information so that you can call me with questions or concerns about your health care. Below is a description of clinic care coordination and how I can further assist you.     The clinic care coordinator is a registered nurse and/or  who understand the health care system. The goal of clinic care coordination is to help you manage your health and improve access to the [facility] in the most efficient manner. The registered nurse can assist you in meeting your health care goals by providing education, coordinating services, and strengthening the communication among your providers. The  can assist you with financial, behavioral, psychosocial, chemical dependency, counseling, and/or psychiatric resources.    Please feel free to contact me at [phone], with any questions or concerns. We at [facility] are focused on providing you with the highest-quality healthcare experience possible and that all starts with you.     Sincerely,     Italia Love, NELLY  [facility] - Care Coordination     [phone]    Enclosed: I have enclosed a copy of a 24 Hour Access Plan. This has helpful phone numbers for you to call when needed. Please keep this in an easy to access place to use as needed.

## 2019-06-27 NOTE — TELEPHONE ENCOUNTER
Reason for Call:  Other prescription    Detailed comments: Pat - wife calling to let the MD know that Claudio would like to try the RX Namenda that you recommended at his appointment yesterday.  Please send to the [facility].    Phone Number Patient can be reached at: Home number on file [phone] (home)    Best Time: today    Can we leave a detailed message on this number? YES    Call taken on 6/27/2019 at 2:22 PM by Anastasiya Sargent

## 2019-06-27 NOTE — PROGRESS NOTES
Clinic Care Coordination Contact  [facility]/Voicemail    Referral Source: Specialist  Clinical Data: Care Coordinator Outreach  Outreach attempted x 1.  Left message with pt to have spouse call,  with call back information and requested return call.  Plan: Care Coordinator will mail out care coordination introduction letter with care coordinator contact information and explanation of care coordination services. Care Coordinator will try to reach patient again in 1-2 business days.  ANISHA Canales, [facility] - Care Coordinator   [facility]  6/27/2019   9:14 AM  [phone]

## 2019-06-27 NOTE — LETTER
Health Care Home - Access Care Plan    About Me:    Patient Name:  Cricket Klein    YOB: 1945  Age:                      73 year old   Andrei MRN:     [id number] Telephone Information:   Home Phone [phone]   Mobile Not on file.       Address:  [address] [place] [address] [place]  [facility] [address] Email address:  No e-mail address on record      Emergency Contact(s)   Name Relationship Lgl Grd Work Phone Home Phone Mobile Phone   MARIA ELENA DONALDSON Spouse  none [phone] none             Health Maintenance:      My Access Plan  Medical Emergency 911   Questions or concerns during clinic hours Primary Clinic Line, I will call the clinic directly: [facility] - [phone]   24 Hour Appointment Line [phone] or  [phone]) (toll free)   24 Hour Nurse Line [phone] (toll free)   Questions or concerns outside clinic hours 24 Hour Appointment Line, I will call the after-hours on-call line:   [facility] [phone] or [phone]) (toll-free)   Preferred Urgent Care     Preferred Hospital [place], [place]  [phone]   Preferred Pharmacy [facility]     Behavioral Health Crisis Line The National Suicide Prevention Lifeline at 1-556.132.8614 or 910                     My Care Team Members  Patient Care Team       Relationship Specialty Notifications Start End    Saud Cruz MD PCP - General Family Practice  4/25/19     Phone: [phone] Fax: [phone] [address] [address]    Emily Hilton MD Referring Physician   3/23/16     referring to neuropsych    Phone: [phone] Fax: [phone]         [address] [facility] [address] [facility] [address]    Kurtis Mueller, PhD LP MD Psychology  3/23/16     Phone: [phone] Fax: [phone]         [address] [facility] [address]    Saud Ramon MD Assigned PCP   4/26/19     Ivan  [facility] Care Coordinator Primary Care - CC Admissions 6/27/19     Phone: [phone] Fax: [phone]               My Medical and Care Information  Problem List   Patient Active Problem List   Diagnosis     Essential hypertension     Transient cerebral ischemia     Pure hypercholesterolemia     Impotence of organic origin     Pulmonary embolism (H)     S/P hip replacement     Other and unspecified coagulation defects     Hyperlipidemia LDL goal <130     Advanced directives, counseling/discussion     CHRISTIANO (obstructive sleep apnea)     Long-term (current) use of anticoagulants [Z79.01]     Hip arthrosis      Current Medications and Allergies:  See printed Medication Report

## 2019-06-27 NOTE — PROGRESS NOTES
Clinic Care Coordination Contact    Clinic Care Coordination Contact  OUTREACH    Referral Information:  Referral Source: Specialist    Primary Diagnosis: Cognitive Impairment    Chief Complaint   Patient presents with     Clinic Care Coordination - Initial     sw        Universal Utilization: No concerns  Clinic Utilization  Difficulty keeping appointments:: No  Compliance Concerns: No  No-Show Concerns: No  No PCP office visit in Past Year: No  Utilization    Last refreshed: 6/26/2019  6:43 PM:  Hospital Admissions 0           Last refreshed: 6/26/2019  6:43 PM:  ED Visits 1           Last refreshed: 6/26/2019  6:43 PM:  No Show Count (past year) 3              Current as of: 6/26/2019  6:43 PM              Clinical Concerns:  Current Medical Concerns: Spouse denied any concerns.  Current Behavioral Concerns: Patient has memory loss and spouse is main caregiver.  They are looking for resources.  Education Provided to patient: Discussed options in the community as well as the family pathways caregiver consultant.  Spouse was in agreement with the referral to the caregiver consultant and this was completed.  Pain  Pain (GOAL):: No  Health Maintenance Reviewed: Due/Overdue   Health Maintenance Due   Topic Date Due     HEPATITIS C SCREENING  1945     ZOSTER IMMUNIZATION (1 of 2) 07/01/1996     MEDICARE ANNUAL WELLNESS VISIT  07/01/2011     AORTIC ANEURYSM SCREENING (SYSTEM ASSIGNED)  07/01/2011     OP ANNUAL INR REFERRAL  01/26/2012     ADVANCE CARE PLANNING  10/02/2017     FALL RISK ASSESSMENT  07/12/2018     PHQ-2  01/01/2019       Clinical Pathway: None    Medication Management:  No concerns noted by spouse.    Functional Status:  Dependent IADLs:: Transportation, Money Management, Meal Preparation, Shopping  Bed or wheelchair confined:: No  Fallen 2 or more times in the past year?: No  Any fall with injury in the past year?: No    Living Situation:  Current living arrangement:: I live in a private home with  spouse  Type of residence:: Private home - stairs    Diet/Exercise/Sleep:  Diet:: Regular  Inadequate nutrition (GOAL):: No  Food Insecurity: No  Tube Feeding: No  Exercise:: Currently not exercising    Transportation:  Transportation concerns (GOAL):: No  Transportation means:: Family, Regular car     Psychosocial:  Hinduism or spiritual beliefs that impact treatment:: No  Mental health DX:: No  Mental health management concern (GOAL):: No  Informal Support system:: Family     Financial/Insurance:   Financial/Insurance concerns (GOAL):: No  Spouse said her main concerns were just looking for resources for socialization and respite.  When asked what kind of respite she was looking for it was someone to stay with patient while she went and did some shopping.  We will send her some resources and caregiver consultant will also call.     Resources and Interventions:  Current Resources:    ;   Community Resources: None  Supplies used at home:: None       Advance Care Plan/Directive  Advanced Care Plans/Directives on file:: Yes  Type Advanced Care Plans/Directives: Advanced Directive - On File    Referrals Placed: Other family pathways caregiver consultant     Goals:         Patient/Caregiver understanding: Will assess and set goals on next call.    Outreach Frequency: monthly  Future Appointments              In 3 weeks NB ANTI COAG [facility]          Plan:  to send access plan, introduction letter, and resources.   to call in about 1 month.  Spouse to accept phone call from family pathways caregiver consultant.    ANISHA Canales, [facility] - Care Coordinator   [facility]  6/27/2019   9:54 AM  [phone]

## 2019-06-27 NOTE — TELEPHONE ENCOUNTER
Called and let patient know that Dr. Fernandes put in his medication order and sent it to the pharmacy.    Maliha DAHL CMA ([facility])

## 2019-06-27 NOTE — LETTER
Resources:    [facility]  [phone]   [address] [place] [address]   Hours: M-F 8-1   Call [phone] by 8:15 am the day you are reserving your meal   Qualifying paperwork required - $4/meal - meals start at 11:45    [facility] Commission on aging/[facility] senior activity center         [phone]    Memory Cafe       [facility] invites the community to Memory Cafe from 9:30-10:30 a.m. at the [facility], [address] [place] [address], [address], [facility]. Come with your loved one or care companion and share your stories and socialize with others who are concerned about memory loss in a relaxed setting. Coffee and treats provided. For more information or to register contact Lorri Gannon at [phone] or email [email or web address]. There is no fee to attend, but donations welcome to off-set costs.      [facility]   [phone]   [facility] [phone]

## 2019-07-18 ENCOUNTER — ANTICOAGULATION THERAPY VISIT (OUTPATIENT)
Dept: ANTICOAGULATION | Facility: CLINIC | Age: 74
End: 2019-07-18
Payer: COMMERCIAL

## 2019-07-18 DIAGNOSIS — Z79.01 LONG TERM CURRENT USE OF ANTICOAGULANT THERAPY: ICD-10-CM

## 2019-07-18 DIAGNOSIS — I26.99 PULMONARY EMBOLUS, LEFT (H): ICD-10-CM

## 2019-07-18 LAB — INR POINT OF CARE: 3 (ref 0.86–1.14)

## 2019-07-18 PROCEDURE — 99207 ZZC NO CHARGE NURSE ONLY: CPT

## 2019-07-18 PROCEDURE — 36416 COLLJ CAPILLARY BLOOD SPEC: CPT

## 2019-07-18 PROCEDURE — 85610 PROTHROMBIN TIME: CPT | Mod: QW

## 2019-07-18 RX ORDER — WARFARIN SODIUM 2.5 MG/1
TABLET ORAL
Qty: 90 TABLET | Refills: 0 | Status: SHIPPED | OUTPATIENT
Start: 2019-07-18 | End: 2019-11-04

## 2019-07-18 NOTE — PROGRESS NOTES
ANTICOAGULATION FOLLOW-UP CLINIC VISIT    Patient Name:  Cricket Klein  Date:  7/18/2019  Contact Type:  Face to Face    SUBJECTIVE:  Patient Findings     Positives:   Change in medications (started namenda end of June), Bruising (bruise on stomach from hauling brush)    Comments:   No changes in diet, activity level, or health. No missed doses of warfarin. Patient took dosing as prescribed. No signs of clots or bleeding concerns. Patient will continue maintenance warfarin dosing.          Clinical Outcomes     Comments:   No changes in diet, activity level, or health. No missed doses of warfarin. Patient took dosing as prescribed. No signs of clots or bleeding concerns. Patient will continue maintenance warfarin dosing.             OBJECTIVE    INR Protime   Date Value Ref Range Status   07/18/2019 3.0 (A) 0.86 - 1.14 Final       ASSESSMENT / PLAN  INR assessment THER    Recheck INR In: 6 WEEKS    INR Location Clinic      Anticoagulation Summary  As of 7/18/2019    INR goal:   2.0-3.0   TTR:   58.9 % (2.9 y)   INR used for dosing:   3.0 (7/18/2019)   Warfarin maintenance plan:   2.5 mg (2.5 mg x 1) every day   Full warfarin instructions:   2.5 mg every day   Weekly warfarin total:   17.5 mg   No change documented:   Kimberly Zamora RN   Plan last modified:   Citlalli Sethi RN (4/30/2018)   Next INR check:   8/29/2019   Priority:   INR   Target end date:   Indefinite    Indications    Other and unspecified coagulation defects [D68.9]  Long-term (current) use of anticoagulants [Z79.01] [Z79.01]             Anticoagulation Episode Summary     INR check location:       Preferred lab:       Send INR reminders to:   [facility]    Comments:   * Had PE in 2010 following hip surgery. Has heterozygous prothrombin gene mutation. Second PE 7-26-16. Needs lifelong warfarin. was on warfarin [id number].        Anticoagulation Care Providers     Provider Role Specialty Phone number    Saud Cruz MD   Family Practice [phone]            See the Encounter Report to view Anticoagulation Flowsheet and Dosing Calendar (Go to Encounters tab in chart review, and find the Anticoagulation Therapy Visit)        Kimberly Zamora RN

## 2019-07-26 ENCOUNTER — OFFICE VISIT (OUTPATIENT)
Dept: FAMILY MEDICINE | Facility: CLINIC | Age: 74
End: 2019-07-26
Payer: COMMERCIAL

## 2019-07-26 VITALS
RESPIRATION RATE: 18 BRPM | TEMPERATURE: 97.7 F | SYSTOLIC BLOOD PRESSURE: 120 MMHG | HEART RATE: 72 BPM | WEIGHT: 214 LBS | BODY MASS INDEX: 34.39 KG/M2 | DIASTOLIC BLOOD PRESSURE: 76 MMHG | HEIGHT: 66 IN

## 2019-07-26 DIAGNOSIS — I10 ESSENTIAL HYPERTENSION WITH GOAL BLOOD PRESSURE LESS THAN 140/90: ICD-10-CM

## 2019-07-26 DIAGNOSIS — H61.22 IMPACTED CERUMEN OF LEFT EAR: Primary | ICD-10-CM

## 2019-07-26 DIAGNOSIS — F03.90 DEMENTIA WITHOUT BEHAVIORAL DISTURBANCE, UNSPECIFIED DEMENTIA TYPE: ICD-10-CM

## 2019-07-26 PROBLEM — G30.9 ALZHEIMER'S DISEASE (H): Status: ACTIVE | Noted: 2019-07-26

## 2019-07-26 PROBLEM — F02.80 ALZHEIMER'S DISEASE (H): Status: ACTIVE | Noted: 2019-07-26

## 2019-07-26 PROCEDURE — 99213 OFFICE O/P EST LOW 20 MIN: CPT | Performed by: FAMILY MEDICINE

## 2019-07-26 RX ORDER — ATENOLOL 50 MG/1
50 TABLET ORAL 2 TIMES DAILY
Qty: 180 TABLET | Refills: 3 | Status: SHIPPED | OUTPATIENT
Start: 2019-07-26 | End: 2020-01-01

## 2019-07-26 ASSESSMENT — PAIN SCALES - GENERAL: PAINLEVEL: NO PAIN (0)

## 2019-07-26 ASSESSMENT — MIFFLIN-ST. JEOR: SCORE: 1658.45

## 2019-07-26 NOTE — PATIENT INSTRUCTIONS
ASSESSMENT:   (H61.22) Impacted cerumen of left ear  (primary encounter diagnosis)  Comment:   Plan: CMA to irrigate ear free of cerumen     (I10) Essential hypertension with goal blood pressure less than 140/90  Comment: doing well  Plan: atenolol (TENORMIN) 50 MG tablet        REfills.  No change in current treatment plan.     (F03.90) Dementia without behavioral disturbance, unspecified dementia type  Comment:   Plan: follow-up with neurology as planned

## 2019-07-26 NOTE — PROGRESS NOTES
"SUBJECTIVE: Cricket Klein is a 74 year old male  Chief Complaint   Patient presents with     Ear Problem     left ear feels plugged.     Hypertension     refill Atenolol.       Medication Reconciliation     Not taking Zetia x 8 months- can this be removed from his med list.     Establish Care     patient had been seeing Dr. Martins       Hypertension Follow-up      Do you check your blood pressure regularly outside of the clinic? Yes     Are you following a low salt diet? No    Are your blood pressures ever more than 140 on the top number (systolic) OR more     than 90 on the bottom number (diastolic), for example 140/90? Yes  But very rarely above 140.    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  Takes 50mg twice daily.   Needed refills.    Labs done in December.    No side effects     Problem 2:   Check left ear- feels plugged. Wife states he needs hearing aids.  He had some pain previously.   He has tinnitus chronically.   Hearing worse.    Some URI recently which has cleared.     Patient Active Problem List   Diagnosis     Essential hypertension     Transient cerebral ischemia     Pure hypercholesterolemia     Impotence of organic origin     Pulmonary embolism (H)     S/P hip replacement     Other and unspecified coagulation defects     Hyperlipidemia LDL goal <130     Advanced directives, counseling/discussion     CHRISTIANO (obstructive sleep apnea)     Long-term (current) use of anticoagulants [Z79.01]     Hip arthrosis     Dementia      ROS:No other health concerns today.   Being followed by neurology for dementia.   On chronic anticoagulation.    OBJECTIVE:Blood pressure 120/76, pulse 72, temperature 97.7  F (36.5  C), temperature source Tympanic, resp. rate 18, height 1.676 m (5' 6\"), weight 97.1 kg (214 lb). BMI=Body mass " index is 34.54 kg/m .  GENERAL APPEARANCE ADULT: Alert, no acute distress, most of the history from spouse.   HENT: right TM normal, left TM not visualized secondary to cerumen  NECK: No adenopathy,masses or thyromegaly     ASSESSMENT:   (H61.22) Impacted cerumen of left ear  (primary encounter diagnosis)  Comment:   Plan: CMA to irrigate ear free of cerumen     (I10) Essential hypertension with goal blood pressure less than 140/90  Comment: doing well  Plan: atenolol (TENORMIN) 50 MG tablet        REfills.  No change in current treatment plan.     (F03.90) Dementia without behavioral disturbance, unspecified dementia type  Comment:   Plan: follow-up with neurology as planned

## 2019-07-26 NOTE — NURSING NOTE
"Chief Complaint   Patient presents with     Ear Problem     left ear feels plugged.     Hypertension     refill Atenolol.       Medication Reconciliation     Not taking Zetia x 8 months- can this be removed from his med list.     Establish Care     patient had been seeing Dr. Rivas       Initial /76 (BP Location: Right arm, Patient Position: Chair, Cuff Size: Adult Large)   Pulse 72   Temp 97.7  F (36.5  C) (Tympanic)   Resp 18   Ht 1.676 m (5' 6\")   Wt 97.1 kg (214 lb)   BMI 34.54 kg/m   Estimated body mass index is 34.54 kg/m  as calculated from the following:    Height as of this encounter: 1.676 m (5' 6\").    Weight as of this encounter: 97.1 kg (214 lb).    Patient presents to the clinic using No DME    Health Maintenance that is potentially due pending provider review:  HCD on file. Wellness- patient will return  n/a    Is there anyone who you would like to be able to receive your results? Yes wife Pat  If yes have patient fill out LETTY  Radha Siu CMA    " non-reactive

## 2019-07-29 ENCOUNTER — PATIENT OUTREACH (OUTPATIENT)
Dept: CARE COORDINATION | Facility: CLINIC | Age: 74
End: 2019-07-29

## 2019-07-29 NOTE — PROGRESS NOTES
Clinic Care Coordination Contact    Follow Up Progress Note      Assessment: Spouse said it is getting a little bit more difficult taking care of patient, especially in the evening.  She does still feel like she is able to manage and to keep him safe.    Goals addressed this encounter:   Goals Addressed                 This Visit's Progress      Psychosocial (pt-stated)        Goal Statement: spouse would like to keep pt at home as long as it is safe.   Measure of Success: pt will be free from injuries and spouse is able to maintain safety as pt progresses with is disease.   Supportive Steps to Achieve: Care coordination, resources  Barriers: progression of disease  Strengths: supportive spouse  Date to Achieve By: 2-28-19  Patient expressed understanding of goal: yes                 Intervention/Education provided during outreach: Discussed progress on resources that were sent and she does not remember getting these so they will be resent.  Encouraged spouse to reach out to resources so that they can get additional supports to help her with keeping patient at home in safe.     Outreach Frequency: monthly    Plan:   Spouse to reach out to resources being sent.  Care Coordinator will follow up in about 1 month.    ANISHA Canales, [facility] - Care Coordinator   [facility]  7/29/2019   12:01 PM  [phone]

## 2019-07-29 NOTE — LETTER
[address] [place] [address]  [phone]      7/29/2019      Jennifer Maribel  [address] [address] [facility] [address]      Dear  Jennifer,    It was a pleasure to speak with you.  I would like to provide you with the enclosed information for your records.  As part of care coordination, we are developing care plans to assist in accomplishing your health care goals.  When we speak next, please feel free to let me know if you want to add or change any information on your care plans.    As always, feel free to contact me if you have any questions or concerns.  I look forward to working with you in the effort to achieve your health care and wellness goals .        Sincerely,      Italia Love, [facility]  Clinic Care Coordination  [phone]

## 2019-07-29 NOTE — PROGRESS NOTES
Clinic Care Coordination Contact  outreach    Call placed to spouse.  She said it was not a good time to talk and said she would call back later.  Provided her with social workers phone number.    Plan:  will wait for return phone call and if no call received  will call in 7-10 business days.    ANISHA Canales, [facility] - Care Coordinator   [facility]  7/29/2019   10:12 AM  [phone]

## 2019-07-29 NOTE — LETTER
[facility]  Complex Care Plan  About Me:    Patient Name:  Cricket Klein    YOB: 1945  Age:         74 year old   Andrei MRN:    [id number] Telephone Information:  Home Phone [phone]   Mobile Not on file.       Address:  [address] [place] [address] [place] [address] Email address:  No e-mail address on record      Emergency Contact(s)    Name Relationship Lgl Grd Work Phone Home Phone Mobile Phone   1. MARIA ELENA KLEIN Spouse  none [phone] none           Primary language:  English     needed? No   [facility] [employer]:  [phone] op. 1  Other communication barriers:    Preferred Method of Communication:  Mail  Current living arrangement:    Mobility Status/ Medical Equipment:      Health Maintenance  Health Maintenance Reviewed:    Health Maintenance Due   Topic Date Due     HEPATITIS C SCREENING  1945     ZOSTER IMMUNIZATION (1 of 2) 07/01/1996     MEDICARE ANNUAL WELLNESS VISIT  07/01/2011     AORTIC ANEURYSM SCREENING (SYSTEM ASSIGNED)  07/01/2011     OP ANNUAL INR REFERRAL  01/26/2012     ADVANCE CARE PLANNING  10/02/2017     Please talk with your provider about what is needed or can continue to wait.        My Access Plan  Medical Emergency 911   Primary Clinic Line [facility] - [phone]   24 Hour Appointment Line [phone] or  [phone]) (toll-free)   24 Hour Nurse Line [phone] (toll-free)   Preferred Urgent Care     Preferred Hospital     Preferred Pharmacy [facility]     Behavioral Health Crisis Line The National Suicide Prevention Lifeline at 1-166.988.5302 or 911             My Care Team Members  Patient Care Team       Relationship Specialty Notifications Start End    Saud Cruz MD PCP - General Family Practice  4/25/19     Phone: [phone] Fax: [phone] [address] [place] [address]    Emily Hilton MD Referring Physician    3/23/16     referring to neuropsych    Phone: [phone] Fax: [phone]         [address] [facility] [address] [facility] [address]    Kurtis Mueller, PhD LP MD Psychology  3/23/16     Phone: [phone] Fax: [phone]         [address] [facility] [address]    Italia Love LSW Lead Care Coordinator Primary Care - CC Admissions 6/27/19     Phone: [phone] Fax: [phone]        Saud Cruz MD Assigned PCP   7/28/19     Phone: [phone] Fax: [phone] [address] [address]            My Care Plans  Self Management and Treatment Plan  Goals and (Comments)  Goals        General    Psychosocial (pt-stated)     Notes - Note created  7/29/2019 11:49 AM by Italia Love LSW    Goal Statement: spouse would like to keep pt at home as long as it is safe.   Measure of Success: pt will be free from injuries and spouse is able to maintain safety as pt progresses with is disease.   Supportive Steps to Achieve: Care coordination, resources  Barriers: progression of disease  Strengths: supportive spouse  Date to Achieve By: 2-28-19  Patient expressed understanding of goal: yes                 Action Plans on File:                       Advance Care Plans/Directives Type:        My Medical and Care Information  Problem List   Patient Active Problem List   Diagnosis     Essential hypertension with goal blood pressure less than 140/90     Transient cerebral ischemia     Pure hypercholesterolemia     Impotence of organic origin     Pulmonary embolism (H)     S/P hip replacement     Other and unspecified coagulation defects     Hyperlipidemia LDL goal <130     Advanced directives, counseling/discussion     CHRISTIANO (obstructive sleep apnea)     Long-term (current) use of anticoagulants [Z79.01]     Hip arthrosis     Dementia      Current Medications and Allergies:  See printed Medication Report.    Care Coordination Start Date: 7/29/2019   Frequency of Care Coordination: monthly    Form Last Updated: 07/29/2019

## 2019-07-29 NOTE — PROGRESS NOTES
Clinic Care Coordination Contact  [facility]/Voicemail       Clinical Data: Care Coordinator Outreach  Outreach attempted x 1, voice mail not set up and Pat did leave a message in return.    Plan:  Care Coordinator will try to reach patient again in 7-10 business days.    ANISHA Canales, [facility] - Care Coordinator   [facility]  7/29/2019   11:13 AM  [phone]

## 2019-08-28 ENCOUNTER — PATIENT OUTREACH (OUTPATIENT)
Dept: CARE COORDINATION | Facility: CLINIC | Age: 74
End: 2019-08-28

## 2019-08-28 NOTE — PROGRESS NOTES
Clinic Care Coordination Contact  [facility]/Voicemail    Referral Source: Specialist  Clinical Data: Care Coordinator Outreach  Outreach attempted x 1.  No voice mail set up.  Plan:  Care Coordinator will try to reach patient again in 5-8 business days.    ANISHA Canales, [facility] - Care Coordinator   [facility]  8/28/2019   2:52 PM  [phone]

## 2019-08-28 NOTE — LETTER
[facility]  [address] [place] [address]  [phone]      9/4/2019      Jennifer Ellery  [address] [facility] [place] [address]      Dear  Jennifer,      I have been attempting to reach you since our last contact. I would like to continue to work with you on the goals from our last conversation and provide the support you may need. I would appreciate if you would give me a call at [phone] and let me know if you would like to continue working together. I know that there are many things that can affect our ability to communicate and hope we can plan better moving forward.     All of us at [facility] are invested in your health and are here to assist you in meeting your goals.     Sincerely,      ANISHA Canales  [facility] - Care Coordination  [facility]   [phone]

## 2019-08-29 ENCOUNTER — ANTICOAGULATION THERAPY VISIT (OUTPATIENT)
Dept: ANTICOAGULATION | Facility: CLINIC | Age: 74
End: 2019-08-29
Payer: COMMERCIAL

## 2019-08-29 DIAGNOSIS — Z79.01 LONG TERM CURRENT USE OF ANTICOAGULANT THERAPY: ICD-10-CM

## 2019-08-29 DIAGNOSIS — D68.9 COAGULATION DEFECT (H): Primary | ICD-10-CM

## 2019-08-29 LAB — INR POINT OF CARE: 3.7 (ref 0.86–1.14)

## 2019-08-29 PROCEDURE — 85610 PROTHROMBIN TIME: CPT | Mod: QW

## 2019-08-29 PROCEDURE — 99207 ZZC NO CHARGE NURSE ONLY: CPT

## 2019-08-29 PROCEDURE — 36416 COLLJ CAPILLARY BLOOD SPEC: CPT

## 2019-08-29 NOTE — PROGRESS NOTES
ANTICOAGULATION FOLLOW-UP CLINIC VISIT    Patient Name:  Cricket Klein  Date:  8/29/2019  Contact Type:  Face to Face    SUBJECTIVE:  Patient Findings     Positives:   Change in diet/appetite (maybe less greens but not much)    Comments:   No changes in activity level, medications (including over the counter), or health. No recent illnesses, diarrhea or swelling. No alcohol use. No missed doses of warfarin. Patient took dosing as prescribed. No signs of clots or bleeding concerns. Patient will hold his dose today, then continue maintenance warfarin dosing.  Recheck in 2 weeks. Patient may need a reduction in maintenance dose if still elevated in 2 weeks.         Clinical Outcomes     Negatives:   Major bleeding event, Thromboembolic event, Anticoagulation-related hospital admission, Anticoagulation-related ED visit, Anticoagulation-related fatality    Comments:   No changes in activity level, medications (including over the counter), or health. No recent illnesses, diarrhea or swelling. No alcohol use. No missed doses of warfarin. Patient took dosing as prescribed. No signs of clots or bleeding concerns. Patient will hold his dose today, then continue maintenance warfarin dosing.  Recheck in 2 weeks. Patient may need a reduction in maintenance dose if still elevated in 2 weeks.            OBJECTIVE    INR Protime   Date Value Ref Range Status   08/29/2019 3.7 (A) 0.86 - 1.14 Final       ASSESSMENT / PLAN  INR assessment SUPRA    Recheck INR In: 2 WEEKS    INR Location Clinic      Anticoagulation Summary  As of 8/29/2019    INR goal:   2.0-3.0   TTR:   56.6 % (3 y)   INR used for dosing:   3.7! (8/29/2019)   Warfarin maintenance plan:   2.5 mg (2.5 mg x 1) every day   Full warfarin instructions:   8/29: Hold; Otherwise 2.5 mg every day   Weekly warfarin total:   17.5 mg   Plan last modified:   Citlalli Sethi RN (4/30/2018)   Next INR check:   9/12/2019   Priority:   INR   Target end date:   Indefinite     Indications    Other and unspecified coagulation defects [D68.9]  Long-term (current) use of anticoagulants [Z79.01] [Z79.01]             Anticoagulation Episode Summary     INR check location:       Preferred lab:       Send INR reminders to:   [facility]    Comments:   * Had PE in 2010 following hip surgery. Has heterozygous prothrombin gene mutation. Second PE 7-26-16. Needs lifelong warfarin. was on warfarin [id number].        Anticoagulation Care Providers     Provider Role Specialty Phone number    Saud Cruz MD  [facility] [phone]            See the Encounter Report to view Anticoagulation Flowsheet and Dosing Calendar (Go to Encounters tab in chart review, and find the Anticoagulation Therapy Visit)        Kimberly Zamora RN

## 2019-09-04 NOTE — PROGRESS NOTES
Clinic Care Coordination Contact  [facility]/Voicemail    Referral Source: Specialist  Clinical Data: Care Coordinator Outreach  Outreach attempted x 2, voicemail not set up yet.    Plan: Care Coordinator will send unable to contact letter with care coordinator contact information via mail. Care Coordinator will do no further outreaches at this time.    ANISHA Canales, [facility] - Care Coordinator   [facility]  9/4/2019   9:16 AM  [phone]

## 2019-09-05 ENCOUNTER — OFFICE VISIT (OUTPATIENT)
Dept: FAMILY MEDICINE | Facility: CLINIC | Age: 74
End: 2019-09-05
Payer: COMMERCIAL

## 2019-09-05 VITALS
TEMPERATURE: 98.6 F | SYSTOLIC BLOOD PRESSURE: 144 MMHG | DIASTOLIC BLOOD PRESSURE: 92 MMHG | HEIGHT: 66 IN | WEIGHT: 215.8 LBS | RESPIRATION RATE: 16 BRPM | HEART RATE: 58 BPM | BODY MASS INDEX: 34.68 KG/M2 | OXYGEN SATURATION: 97 %

## 2019-09-05 DIAGNOSIS — B37.2 YEAST DERMATITIS: Primary | ICD-10-CM

## 2019-09-05 PROCEDURE — 99213 OFFICE O/P EST LOW 20 MIN: CPT | Performed by: NURSE PRACTITIONER

## 2019-09-05 RX ORDER — CETIRIZINE HYDROCHLORIDE 10 MG/1
10 TABLET ORAL DAILY
COMMUNITY

## 2019-09-05 RX ORDER — NYSTATIN 100000 U/G
CREAM TOPICAL 2 TIMES DAILY
Qty: 30 G | Refills: 0 | Status: SHIPPED | OUTPATIENT
Start: 2019-09-05 | End: 2020-07-29

## 2019-09-05 ASSESSMENT — MIFFLIN-ST. JEOR: SCORE: 1666.61

## 2019-09-05 NOTE — PATIENT INSTRUCTIONS
Use cream as directed.    Follow up with your primary care provider if symptoms worsen or do not respond.    Follow-up with your primary care provider next week and as needed.    Indications for emergent return to emergency department discussed with patient, who verbalized good understanding and agreement.  Patient understands the limitations of today's evaluation.         Patient Education     Candida Skin Infection (Adult)  Candida is type of yeast. It grows naturally on the skin and in the mouth. If it grows out of control, it can cause an infection. Candida can cause infections in the genital area, skin folds, in the mouth, and under the breasts. Anyone can get this infection. It is more common in a person with a weak immune system, such as from diabetes, HIV, or cancer. It s also more common in someone who has been on antibiotic therapy. And it s more common people who are overweight or who have incontinence. Wearing tight-fitting clothing and taking part in activities with lots of skin-to-skin contact can also put you at risk.  Candida causes the skin to become bright red and inflamed. The border of the infected part of the skin is often raised. The infection causes pain and itching. Sometimes the skin peels and bleeds. In the mouth, candida is called thrush, and may cause white thickened areas.  A Candida rash is most often treated with an antifungal cream or ointment. The rash will clear a few days after starting the medicine. Infections that don t go away may need a prescription medicine. In rare cases, a bacterial infection can also occur.  Home care  Your healthcare provider will recommend an antifungal cream or ointment for the rash. He or she may also prescribe a medicine for the itch. Follow all instructions for using these medicines. Don t use cornstarch powder. Cornstarch can cause the Candida infection to get worse.  General care:    Keep your skin clean by washing the area twice a day.    Use the  cream as directed until your rash is gone. Once the skin has healed, keep it dry to prevent another infection.     If you are overweight, talk with your healthcare provider about a plan to lose excess weight.    Avoid clothes that fit tightly.  Follow-up care  Follow up with your healthcare provider, or as advised. Your rash will clear in 7 to 14 days. Call your healthcare provider if the rash is not gone after 14 days.  When to seek medical advice  Call your healthcare provider right away if any of these occur:    Pain or redness that gets worse or spreads    Fluid coming from the skin    Yellow crusts on the skin    Fever of 100.4 F (38 C) or higher, or as directed by your healthcare provider  Date Last Reviewed: 9/1/2016 2000-2018 [employer]. [address] [place] [address], [place] [address]. All rights reserved. This information is not intended as a substitute for professional medical care. Always follow your healthcare professional's instructions.

## 2019-09-05 NOTE — NURSING NOTE
"Chief Complaint   Patient presents with     Rash       Initial BP (!) 144/92 (BP Location: Right arm, Patient Position: Chair, Cuff Size: Adult Large)   Pulse 58   Temp 98.6  F (37  C) (Tympanic)   Resp 16   Ht 1.676 m (5' 6\")   Wt 97.9 kg (215 lb 12.8 oz)   SpO2 97%   BMI 34.83 kg/m   Estimated body mass index is 34.83 kg/m  as calculated from the following:    Height as of this encounter: 1.676 m (5' 6\").    Weight as of this encounter: 97.9 kg (215 lb 12.8 oz).    Patient presents to the clinic using No DME    Health Maintenance that is potentially due pending provider review:  NONE    n/a    Is there anyone who you would like to be able to receive your results? Yes  If yes have patient fill out LETTY    "

## 2019-09-05 NOTE — PROGRESS NOTES
Subjective     Cricket Klein is a 74 year old male who presents to clinic today for the following health issues:    HPI   Rash      Duration: a couple days    Description  Location: left axilla  Itching: no    Intensity:  moderate    Accompanying signs and symptoms: painful, burning sensation    History (similar episodes/previous evaluation): None    Precipitating or alleviating factors:  New exposures:  None  Recent travel: no      Therapies tried and outcome: otc lotion this am        Patient Active Problem List   Diagnosis     Essential hypertension with goal blood pressure less than 140/90     Transient cerebral ischemia     Pure hypercholesterolemia     Impotence of organic origin     Pulmonary embolism (H)     S/P hip replacement     Other and unspecified coagulation defects     Hyperlipidemia LDL goal <130     Advanced directives, counseling/discussion     CHRISTIANO (obstructive sleep apnea)     Long-term (current) use of anticoagulants [Z79.01]     Hip arthrosis     Dementia     Past Surgical History:   Procedure Laterality Date     ARTHROPLASTY HIP Right 7/28/2017    Procedure: ARTHROPLASTY HIP;  Right total hip arthroplasty;  Surgeon: Davy Hutchins MD;  Location: [facility]     ARTHROTOMY SHOULDER, ROTATOR CUFF REPAIR, COMBINED  4/2/2012    Procedure:COMBINED ARTHROTOMY SHOULDER, ROTATOR CUFF REPAIR; Left Distal clavicle excision, Subacromial decompression, Rotator cuff repair; Surgeon:DAVY HUTCHINS; Location:[facility]     ARTHROTOMY SHOULDER, ROTATOR CUFF REPAIR, COMBINED  10/12/2012    Procedure: COMBINED ARTHROTOMY SHOULDER, ROTATOR CUFF REPAIR;  Right shoulder biceps tenodesiss,subacromial decompression;  Surgeon: Davy Hutchins MD;  Location: [facility]     COLONOSCOPY  03/17/2003    normal     COLONOSCOPY  4/15/2014    Procedure: Colonoscopy;  Surgeon: Angela May MD;  Location: [facility]     RELEASE CARPAL TUNNEL Right 4/10/2018    Procedure: RELEASE CARPAL TUNNEL;  Right Open  Carpal Tunnel Release;  Surgeon: Jabari Smith MD;  Location: [facility]     SURGICAL HISTORY OF -       achilles tendon repair     SURGICAL HISTORY OF -   3-2010    left hip relpacement       Social History     Tobacco Use     Smoking status: Never Smoker     Smokeless tobacco: Never Used   Substance Use Topics     Alcohol use: Yes     Alcohol/week: 0.0 oz     Comment: rare     Family History   Problem Relation Age of Onset     Thyroid Disease Mother         thyroid cancer     Heart Disease Mother          of heart attack     Heart Disease Father          of heart attack     Diabetes Father      Circulatory Brother      Alzheimer Disease Brother      Circulatory Brother      Cancer Brother         laryngeal     Circulatory Brother      Heart Disease Brother         CHF     Congenital Anomalies Sister         valve     Heart Disease Brother         Heart failure     Heart Disease Sister      Other - See Comments Other 12        Special needs child         Current Outpatient Medications   Medication Sig Dispense Refill     atenolol (TENORMIN) 50 MG tablet Take 1 tablet (50 mg) by mouth 2 times daily 180 tablet 3     cetirizine (ZYRTEC) 10 MG tablet Take 10 mg by mouth daily       donepezil (ARICEPT) 10 MG tablet Take 1 tablet (10 mg) by mouth At Bedtime 30 tablet 11     memantine (NAMENDA) 5 MG tablet Take 1 tablet (5 mg) by mouth 2 times daily 60 tablet 5     nystatin (MYCOSTATIN) 617771 UNIT/GM external cream Apply topically 2 times daily To rash in axilla 30 g 0     order for DME Equipment ordered: RESMED ASV Mask type: Full face  Settings: ASV EPAP 7, PS MIN 4 MAX 15, RR AUTO       order for DME Equipment being ordered: CPAP  Cricket Klein received a Resmed AirSense 10 Auto. Pressures were set at Auto 10 - 18 cm H2O.       Pediatric Multivit-Minerals-C (GUMMY VITAMINS & MINERALS) chewable tablet Take 1 tablet by mouth daily       warfarin (COUMADIN) 2.5 MG tablet Take 2.5 mg daily or As directed  "by Anticoagulation clinic 90 tablet 0     Allergies   Allergen Reactions     Atorvastatin Calcium Other (See Comments)     Myalgia.     Pravastatin Cramps     Myalgias       Zocor [Hmg-Coa-R Inhibitors] Other (See Comments)     Muscle pain         Reviewed and updated as needed this visit by Provider  Tobacco  Allergies  Meds  Problems  Med Hx  Surg Hx  Fam Hx         Review of Systems   ROS COMP: Constitutional, HEENT, cardiovascular, pulmonary, GI, , musculoskeletal, neuro, skin, endocrine and psych systems are negative, except as otherwise noted.      Objective    BP (!) 144/92 (BP Location: Right arm, Patient Position: Chair, Cuff Size: Adult Large)   Pulse 58   Temp 98.6  F (37  C) (Tympanic)   Resp 16   Ht 1.676 m (5' 6\")   Wt 97.9 kg (215 lb 12.8 oz)   SpO2 97%   BMI 34.83 kg/m    Body mass index is 34.83 kg/m .  Physical Exam   GENERAL: healthy, alert and no distress, nontoxic in appearance  EYES: Eyes grossly normal to inspection, PERRL and conjunctivae and sclerae normal  HENT: normocephalic  NECK: supple with full ROM  ABDOMEN: soft, nontender  MS: no gross musculoskeletal defects noted, no edema  Yeast dermatitis without secondary infection in left axilla.    Diagnostic Test Results:  Labs reviewed in Epic  No results found for this or any previous visit (from the past 24 hour(s)).        Assessment & Plan   Problem List Items Addressed This Visit     None      Visit Diagnoses     Yeast dermatitis    -  Primary    Relevant Medications    cetirizine (ZYRTEC) 10 MG tablet    nystatin (MYCOSTATIN) 610846 UNIT/GM external cream    Other Relevant Orders    Hepatitis C Screen Reflex to HCV RNA Quant and Genotype (Completed)             BMI:   Estimated body mass index is 34.83 kg/m  as calculated from the following:    Height as of this encounter: 1.676 m (5' 6\").    Weight as of this encounter: 97.9 kg (215 lb 12.8 oz).   Weight management plan: Patient was referred to their PCP to discuss a " diet and exercise plan.        Patient Instructions   Use cream as directed.    Follow up with your primary care provider if symptoms worsen or do not respond.    Follow-up with your primary care provider next week and as needed.    Indications for emergent return to emergency department discussed with patient, who verbalized good understanding and agreement.  Patient understands the limitations of today's evaluation.         Patient Education     Candida Skin Infection (Adult)  Candida is type of yeast. It grows naturally on the skin and in the mouth. If it grows out of control, it can cause an infection. Candida can cause infections in the genital area, skin folds, in the mouth, and under the breasts. Anyone can get this infection. It is more common in a person with a weak immune system, such as from diabetes, HIV, or cancer. It s also more common in someone who has been on antibiotic therapy. And it s more common people who are overweight or who have incontinence. Wearing tight-fitting clothing and taking part in activities with lots of skin-to-skin contact can also put you at risk.  Candida causes the skin to become bright red and inflamed. The border of the infected part of the skin is often raised. The infection causes pain and itching. Sometimes the skin peels and bleeds. In the mouth, candida is called thrush, and may cause white thickened areas.  A Candida rash is most often treated with an antifungal cream or ointment. The rash will clear a few days after starting the medicine. Infections that don t go away may need a prescription medicine. In rare cases, a bacterial infection can also occur.  Home care  Your healthcare provider will recommend an antifungal cream or ointment for the rash. He or she may also prescribe a medicine for the itch. Follow all instructions for using these medicines. Don t use cornstarch powder. Cornstarch can cause the Candida infection to get worse.  General care:    Keep your  skin clean by washing the area twice a day.    Use the cream as directed until your rash is gone. Once the skin has healed, keep it dry to prevent another infection.     If you are overweight, talk with your healthcare provider about a plan to lose excess weight.    Avoid clothes that fit tightly.  Follow-up care  Follow up with your healthcare provider, or as advised. Your rash will clear in 7 to 14 days. Call your healthcare provider if the rash is not gone after 14 days.  When to seek medical advice  Call your healthcare provider right away if any of these occur:    Pain or redness that gets worse or spreads    Fluid coming from the skin    Yellow crusts on the skin    Fever of 100.4 F (38 C) or higher, or as directed by your healthcare provider  Date Last Reviewed: 9/1/2016 2000-2018 [employer]. [address] [place] [address], [place] [address]. All rights reserved. This information is not intended as a substitute for professional medical care. Always follow your healthcare professional's instructions.             Return in about 1 week (around 9/12/2019) for Follow up with your primary care provider.    JATINDER Copeland [facility]

## 2019-09-12 ENCOUNTER — ANTICOAGULATION THERAPY VISIT (OUTPATIENT)
Dept: ANTICOAGULATION | Facility: CLINIC | Age: 74
End: 2019-09-12
Payer: COMMERCIAL

## 2019-09-12 DIAGNOSIS — Z79.01 LONG TERM CURRENT USE OF ANTICOAGULANT THERAPY: ICD-10-CM

## 2019-09-12 LAB — INR POINT OF CARE: 3 (ref 0.86–1.14)

## 2019-09-12 PROCEDURE — 85610 PROTHROMBIN TIME: CPT | Mod: QW

## 2019-09-12 PROCEDURE — 99207 ZZC NO CHARGE NURSE ONLY: CPT

## 2019-09-12 PROCEDURE — 36416 COLLJ CAPILLARY BLOOD SPEC: CPT

## 2019-09-12 NOTE — PROGRESS NOTES
ANTICOAGULATION FOLLOW-UP CLINIC VISIT    Patient Name:  Cricket Klein  Date:  9/12/2019  Contact Type:  Face to Face    SUBJECTIVE:  Patient Findings     Positives:   Change in medications (nystatin cream)    Comments:   Yeast dermatitis in left axilla. Saw provider on 9-5-19 and has been using nystatin cream. States the rash has improved and he is still using the cream. INR back in range today. No other changes or concerns. Will recheck INR in 4 weeks as patient would like a morning appointment.         Clinical Outcomes     Comments:   Yeast dermatitis in left axilla. Saw provider on 9-5-19 and has been using nystatin cream. States the rash has improved and he is still using the cream. INR back in range today. No other changes or concerns. Will recheck INR in 4 weeks as patient would like a morning appointment.            OBJECTIVE    INR Protime   Date Value Ref Range Status   09/12/2019 3.0 (A) 0.86 - 1.14 Final       ASSESSMENT / PLAN  INR assessment THER    Recheck INR In: 4 WEEKS    INR Location Clinic      Anticoagulation Summary  As of 9/12/2019    INR goal:   2.0-3.0   TTR:   55.9 % (3.1 y)   INR used for dosing:   3.0 (9/12/2019)   Warfarin maintenance plan:   2.5 mg (2.5 mg x 1) every day   Full warfarin instructions:   2.5 mg every day   Weekly warfarin total:   17.5 mg   No change documented:   Kimberly Zamora RN   Plan last modified:   Citlalli Sethi RN (4/30/2018)   Next INR check:   10/3/2019   Priority:   INR   Target end date:   Indefinite    Indications    Other and unspecified coagulation defects [D68.9]  Long-term (current) use of anticoagulants [Z79.01] [Z79.01]             Anticoagulation Episode Summary     INR check location:       Preferred lab:       Send INR reminders to:   [facility]    Comments:   * Had PE in 2010 following hip surgery. Has heterozygous prothrombin gene mutation. Second PE 7-26-16. Needs lifelong warfarin. was on warfarin [id number].         Anticoagulation Care Providers     Provider Role Specialty Phone number    Saud Cruz MD  [facility] [phone]            See the Encounter Report to view Anticoagulation Flowsheet and Dosing Calendar (Go to Encounters tab in chart review, and find the Anticoagulation Therapy Visit)        Kimberly Zamora RN

## 2019-10-14 ENCOUNTER — OFFICE VISIT (OUTPATIENT)
Dept: FAMILY MEDICINE | Facility: CLINIC | Age: 74
End: 2019-10-14
Payer: COMMERCIAL

## 2019-10-14 ENCOUNTER — ANTICOAGULATION THERAPY VISIT (OUTPATIENT)
Dept: ANTICOAGULATION | Facility: CLINIC | Age: 74
End: 2019-10-14
Payer: COMMERCIAL

## 2019-10-14 VITALS
TEMPERATURE: 97.6 F | WEIGHT: 219 LBS | SYSTOLIC BLOOD PRESSURE: 134 MMHG | OXYGEN SATURATION: 97 % | RESPIRATION RATE: 18 BRPM | HEIGHT: 66 IN | DIASTOLIC BLOOD PRESSURE: 82 MMHG | BODY MASS INDEX: 35.2 KG/M2 | HEART RATE: 61 BPM

## 2019-10-14 DIAGNOSIS — Z79.01 LONG TERM CURRENT USE OF ANTICOAGULANT THERAPY: ICD-10-CM

## 2019-10-14 DIAGNOSIS — R32 URINARY INCONTINENCE, UNSPECIFIED TYPE: Primary | ICD-10-CM

## 2019-10-14 DIAGNOSIS — N39.43 BENIGN PROSTATIC HYPERPLASIA WITH POST-VOID DRIBBLING: ICD-10-CM

## 2019-10-14 DIAGNOSIS — N40.1 BENIGN PROSTATIC HYPERPLASIA WITH POST-VOID DRIBBLING: ICD-10-CM

## 2019-10-14 LAB
ALBUMIN UR-MCNC: NEGATIVE MG/DL
APPEARANCE UR: CLEAR
BILIRUB UR QL STRIP: NEGATIVE
COLOR UR AUTO: YELLOW
GLUCOSE UR STRIP-MCNC: NEGATIVE MG/DL
HGB UR QL STRIP: NEGATIVE
INR POINT OF CARE: 2.4 (ref 0.86–1.14)
KETONES UR STRIP-MCNC: NEGATIVE MG/DL
LEUKOCYTE ESTERASE UR QL STRIP: NEGATIVE
NITRATE UR QL: NEGATIVE
PH UR STRIP: 6 PH (ref 5–7)
SOURCE: NORMAL
SP GR UR STRIP: 1.02 (ref 1–1.03)
UROBILINOGEN UR STRIP-ACNC: 0.2 EU/DL (ref 0.2–1)

## 2019-10-14 PROCEDURE — 90662 IIV NO PRSV INCREASED AG IM: CPT | Performed by: FAMILY MEDICINE

## 2019-10-14 PROCEDURE — 36416 COLLJ CAPILLARY BLOOD SPEC: CPT

## 2019-10-14 PROCEDURE — 81003 URINALYSIS AUTO W/O SCOPE: CPT | Performed by: FAMILY MEDICINE

## 2019-10-14 PROCEDURE — G0008 ADMIN INFLUENZA VIRUS VAC: HCPCS | Performed by: FAMILY MEDICINE

## 2019-10-14 PROCEDURE — 85610 PROTHROMBIN TIME: CPT | Mod: QW

## 2019-10-14 PROCEDURE — 99213 OFFICE O/P EST LOW 20 MIN: CPT | Mod: 25 | Performed by: FAMILY MEDICINE

## 2019-10-14 PROCEDURE — 99207 ZZC NO CHARGE NURSE ONLY: CPT

## 2019-10-14 RX ORDER — TAMSULOSIN HYDROCHLORIDE 0.4 MG/1
0.4 CAPSULE ORAL DAILY
Qty: 30 CAPSULE | Refills: 1 | Status: SHIPPED | OUTPATIENT
Start: 2019-10-14 | End: 2020-01-02

## 2019-10-14 ASSESSMENT — MIFFLIN-ST. JEOR: SCORE: 1681.13

## 2019-10-14 NOTE — NURSING NOTE
"Chief Complaint   Patient presents with     Incontinence       Initial /82   Pulse 61   Temp 97.6  F (36.4  C) (Tympanic)   Resp 18   Ht 1.676 m (5' 6\")   Wt 99.3 kg (219 lb)   SpO2 97%   BMI 35.35 kg/m   Estimated body mass index is 35.35 kg/m  as calculated from the following:    Height as of this encounter: 1.676 m (5' 6\").    Weight as of this encounter: 99.3 kg (219 lb).    Patient presents to the clinic using No DME    Health Maintenance that is potentially due pending provider review:  NONE    n/a    Is there anyone who you would like to be able to receive your results? Yes, on file.  If yes have patient fill out LETTY    Raquel Ortiz CMA(AAMA)    "

## 2019-10-14 NOTE — PROGRESS NOTES
ANTICOAGULATION FOLLOW-UP CLINIC VISIT    Patient Name:  Cricket Klein  Date:  10/14/2019  Contact Type:  Face to Face    SUBJECTIVE:  Patient Findings     Comments:   No changes in diet, activity level, medications (including over the counter), or health. No missed doses of warfarin. Patient took dosing as prescribed. No signs of clots or bleeding concerns. Patient will continue maintenance warfarin dosing.          Clinical Outcomes     Negatives:   Major bleeding event, Thromboembolic event, Anticoagulation-related hospital admission, Anticoagulation-related ED visit, Anticoagulation-related fatality    Comments:   No changes in diet, activity level, medications (including over the counter), or health. No missed doses of warfarin. Patient took dosing as prescribed. No signs of clots or bleeding concerns. Patient will continue maintenance warfarin dosing.             OBJECTIVE    INR Protime   Date Value Ref Range Status   10/14/2019 2.4 (A) 0.86 - 1.14 Final       ASSESSMENT / PLAN  INR assessment THER    Recheck INR In: 6 WEEKS    INR Location Clinic      Anticoagulation Summary  As of 10/14/2019    INR goal:   2.0-3.0   TTR:   57.1 % (3.1 y)   INR used for dosin.4 (10/14/2019)   Warfarin maintenance plan:   2.5 mg (2.5 mg x 1) every day   Full warfarin instructions:   2.5 mg every day   Weekly warfarin total:   17.5 mg   No change documented:   Kimberly Zamora RN   Plan last modified:   Citlalli Sethi RN (2018)   Next INR check:   2019   Priority:   INR   Target end date:   Indefinite    Indications    Other and unspecified coagulation defects [D68.9]  Long-term (current) use of anticoagulants [Z79.01] [Z79.01]             Anticoagulation Episode Summary     INR check location:       Preferred lab:       Send INR reminders to:   [facility]    Comments:   * Had PE in  following hip surgery. Has heterozygous prothrombin gene mutation. Second PE 16. Needs lifelong  warfarin. was on warfarin [id number].        Anticoagulation Care Providers     Provider Role Specialty Phone number    Saud Cruz MD  [facility] [phone]            See the Encounter Report to view Anticoagulation Flowsheet and Dosing Calendar (Go to Encounters tab in chart review, and find the Anticoagulation Therapy Visit)        Kimberly Zamora RN

## 2019-10-14 NOTE — PROGRESS NOTES
"Cricket Klein is a 74 year old male comes in today with the following concerns.      1. Discuss incontinence issues.     Flu shot.    Raquel Ortiz CMA([facility])      S: Cricket Klein is a 74 year old male with some incontinence.  Random, dribbles.  Worse over 3 weeks.  No dysuria.  No frequency.  No fever, not ill.      Dementia: progressive.  He's not the best historian, some of this info comes from wife    Problem list, med list, additional histories reviewed and updated, as indicated.      O:/82   Pulse 61   Temp 97.6  F (36.4  C) (Tympanic)   Resp 18   Ht 1.676 m (5' 6\")   Wt 99.3 kg (219 lb)   SpO2 97%   BMI 35.35 kg/m    GEN: Alert and oriented, in no acute distress  ABD: nontender.  No distention or mass appreciated.    Ua: normal    A :incontinence, urine.  BPH?      P;  Try flomax.  If not working, consider ditropan or possibly urology referral.      Can call for long term fills if working well.    "

## 2019-11-21 ENCOUNTER — ANTICOAGULATION THERAPY VISIT (OUTPATIENT)
Dept: ANTICOAGULATION | Facility: CLINIC | Age: 74
End: 2019-11-21
Payer: COMMERCIAL

## 2019-11-21 DIAGNOSIS — Z79.01 LONG TERM CURRENT USE OF ANTICOAGULANT THERAPY: ICD-10-CM

## 2019-11-21 LAB — INR POINT OF CARE: 3.3 (ref 0.86–1.14)

## 2019-11-21 PROCEDURE — 99207 ZZC NO CHARGE NURSE ONLY: CPT

## 2019-11-21 PROCEDURE — 85610 PROTHROMBIN TIME: CPT | Mod: QW

## 2019-11-21 PROCEDURE — 36416 COLLJ CAPILLARY BLOOD SPEC: CPT

## 2019-11-21 NOTE — PATIENT INSTRUCTIONS
Eat a serving of dark greens today. Continue usual dose of warfarin and recheck INR in 3 weeks.   alone

## 2019-11-21 NOTE — PROGRESS NOTES
ANTICOAGULATION FOLLOW-UP CLINIC VISIT    Patient Name:  Cricket Klein  Date:  11/21/2019  Contact Type:  Face to Face    SUBJECTIVE:  Patient Findings     Comments:   No changes in diet, activity level, medications (including over the counter), or health. No recent illnesses, diarrhea, swelling or joint pain. No missed doses of warfarin. Patient took dosing as prescribed. No signs of clots or bleeding concerns. Patient will continue maintenance warfarin dosing. He will eat dark greens today and try to maintain a steady diet of vit K. Recheck in 3 weeks as schedule is full july 2 weeks.           Clinical Outcomes     Negatives:   Major bleeding event, Thromboembolic event, Anticoagulation-related hospital admission, Anticoagulation-related ED visit, Anticoagulation-related fatality    Comments:   No changes in diet, activity level, medications (including over the counter), or health. No recent illnesses, diarrhea, swelling or joint pain. No missed doses of warfarin. Patient took dosing as prescribed. No signs of clots or bleeding concerns. Patient will continue maintenance warfarin dosing. He will eat dark greens today and try to maintain a steady diet of vit K. Recheck in 3 weeks as schedule is full july 2 weeks.              OBJECTIVE    INR Protime   Date Value Ref Range Status   11/21/2019 3.3 (A) 0.86 - 1.14 Final       ASSESSMENT / PLAN  INR assessment SUPRA    Recheck INR In: 3 WEEKS    INR Location Clinic      Anticoagulation Summary  As of 11/21/2019    INR goal:   2.0-3.0   TTR:   57.5 % (3.2 y)   INR used for dosing:   3.3! (11/21/2019)   Warfarin maintenance plan:   2.5 mg (2.5 mg x 1) every day   Full warfarin instructions:   2.5 mg every day   Weekly warfarin total:   17.5 mg   No change documented:   Kimberly Zamora RN   Plan last modified:   Citlalli Sethi RN (4/30/2018)   Next INR check:   12/12/2019   Priority:   INR   Target end date:   Indefinite    Indications    Other and unspecified  coagulation defects [D68.9]  Long-term (current) use of anticoagulants [Z79.01] [Z79.01]             Anticoagulation Episode Summary     INR check location:       Preferred lab:       Send INR reminders to:   [facility]    Comments:   * Had PE in 2010 following hip surgery. Has heterozygous prothrombin gene mutation. Second PE 7-26-16. Needs lifelong warfarin. was on warfarin [id number].        Anticoagulation Care Providers     Provider Role Specialty Phone number    Saud Cruz MD  [facility] [phone]            See the Encounter Report to view Anticoagulation Flowsheet and Dosing Calendar (Go to Encounters tab in chart review, and find the Anticoagulation Therapy Visit)        Kimberly Zamora RN

## 2019-12-19 ENCOUNTER — ANTICOAGULATION THERAPY VISIT (OUTPATIENT)
Dept: ANTICOAGULATION | Facility: CLINIC | Age: 74
End: 2019-12-19
Payer: COMMERCIAL

## 2019-12-19 DIAGNOSIS — I26.99 PULMONARY EMBOLUS, LEFT (H): ICD-10-CM

## 2019-12-19 DIAGNOSIS — Z79.01 LONG TERM CURRENT USE OF ANTICOAGULANT THERAPY: ICD-10-CM

## 2019-12-19 LAB — INR POINT OF CARE: 3.5 (ref 0.86–1.14)

## 2019-12-19 PROCEDURE — 99207 ZZC NO CHARGE NURSE ONLY: CPT

## 2019-12-19 PROCEDURE — 85610 PROTHROMBIN TIME: CPT | Mod: QW

## 2019-12-19 PROCEDURE — 36416 COLLJ CAPILLARY BLOOD SPEC: CPT

## 2019-12-19 RX ORDER — WARFARIN SODIUM 2.5 MG/1
TABLET ORAL
Qty: 90 TABLET | Refills: 0
Start: 2019-12-19 | End: 2020-01-23

## 2019-12-19 NOTE — PROGRESS NOTES
ANTICOAGULATION FOLLOW-UP CLINIC VISIT    Patient Name:  Cricket Klein  Date:  12/19/2019  Contact Type:  Face to Face    SUBJECTIVE:  Patient Findings     Positives:   Change in diet/appetite (has been eating more greens)    Comments:   No changes in activity level, medications (including over the counter), or health. No recent illnesses, diarrhea, swelling or joint pain. No missed doses of warfarin. Patient took dosing as prescribed. INR remains elevated, despite eating more greens. Will reduce warfarin dose by 7% and recheck in 2 weeks. No signs of clots or bleeding concerns        Clinical Outcomes     Comments:   No changes in activity level, medications (including over the counter), or health. No recent illnesses, diarrhea, swelling or joint pain. No missed doses of warfarin. Patient took dosing as prescribed. INR remains elevated, despite eating more greens. Will reduce warfarin dose by 7% and recheck in 2 weeks. No signs of clots or bleeding concerns           OBJECTIVE    INR Protime   Date Value Ref Range Status   12/19/2019 3.5 (A) 0.86 - 1.14 Final       ASSESSMENT / PLAN  INR assessment SUPRA    Recheck INR In: 2 WEEKS    INR Location Clinic      Anticoagulation Summary  As of 12/19/2019    INR goal:   2.0-3.0   TTR:   64.8 % (1 y)   INR used for dosing:   3.5! (12/19/2019)   Warfarin maintenance plan:   1.25 mg (2.5 mg x 0.5) every Thu; 2.5 mg (2.5 mg x 1) all other days   Full warfarin instructions:   1.25 mg every Thu; 2.5 mg all other days   Weekly warfarin total:   16.25 mg   Plan last modified:   Kimberly Zamora RN (12/19/2019)   Next INR check:   1/2/2020   Priority:   INR   Target end date:   Indefinite    Indications    Other and unspecified coagulation defects [D68.9]  Long-term (current) use of anticoagulants [Z79.01] [Z79.01]             Anticoagulation Episode Summary     INR check location:       Preferred lab:       Send INR reminders to:   [facility]    Comments:   * Had  PE in 2010 following hip surgery. Has heterozygous prothrombin gene mutation. Second PE 7-26-16. Needs lifelong warfarin. was on warfarin [id number].        Anticoagulation Care Providers     Provider Role Specialty Phone number    Saud Cruz MD  [facility] [phone]            See the Encounter Report to view Anticoagulation Flowsheet and Dosing Calendar (Go to Encounters tab in chart review, and find the Anticoagulation Therapy Visit)        Kimberly Zamora RN

## 2020-01-01 ENCOUNTER — MEDICAL CORRESPONDENCE (OUTPATIENT)
Dept: HEALTH INFORMATION MANAGEMENT | Facility: CLINIC | Age: 75
End: 2020-01-01

## 2020-01-01 ENCOUNTER — TELEPHONE (OUTPATIENT)
Dept: FAMILY MEDICINE | Facility: CLINIC | Age: 75
End: 2020-01-01

## 2020-01-01 ENCOUNTER — ANTICOAGULATION THERAPY VISIT (OUTPATIENT)
Dept: FAMILY MEDICINE | Facility: CLINIC | Age: 75
End: 2020-01-01

## 2020-01-01 ENCOUNTER — TELEPHONE (OUTPATIENT)
Dept: ANTICOAGULATION | Facility: CLINIC | Age: 75
End: 2020-01-01

## 2020-01-01 ENCOUNTER — ANTICOAGULATION THERAPY VISIT (OUTPATIENT)
Dept: ANTICOAGULATION | Facility: CLINIC | Age: 75
End: 2020-01-01
Payer: COMMERCIAL

## 2020-01-01 ENCOUNTER — ANTICOAGULATION THERAPY VISIT (OUTPATIENT)
Dept: ANTICOAGULATION | Facility: CLINIC | Age: 75
End: 2020-01-01

## 2020-01-01 DIAGNOSIS — B37.2 YEAST DERMATITIS: ICD-10-CM

## 2020-01-01 DIAGNOSIS — G45.9 TRANSIENT CEREBRAL ISCHEMIA, UNSPECIFIED TYPE: ICD-10-CM

## 2020-01-01 DIAGNOSIS — Z86.2: ICD-10-CM

## 2020-01-01 DIAGNOSIS — Z79.01 LONG TERM CURRENT USE OF ANTICOAGULANT THERAPY: ICD-10-CM

## 2020-01-01 DIAGNOSIS — I26.99 PULMONARY EMBOLISM WITHOUT ACUTE COR PULMONALE, UNSPECIFIED CHRONICITY, UNSPECIFIED PULMONARY EMBOLISM TYPE (H): ICD-10-CM

## 2020-01-01 DIAGNOSIS — I26.99 PULMONARY EMBOLUS, LEFT (H): ICD-10-CM

## 2020-01-01 DIAGNOSIS — F03.90 DEMENTIA WITHOUT BEHAVIORAL DISTURBANCE, UNSPECIFIED DEMENTIA TYPE: Chronic | ICD-10-CM

## 2020-01-01 DIAGNOSIS — I26.99 PULMONARY EMBOLISM WITHOUT ACUTE COR PULMONALE, UNSPECIFIED CHRONICITY, UNSPECIFIED PULMONARY EMBOLISM TYPE (H): Chronic | ICD-10-CM

## 2020-01-01 DIAGNOSIS — I10 ESSENTIAL HYPERTENSION WITH GOAL BLOOD PRESSURE LESS THAN 140/90: ICD-10-CM

## 2020-01-01 LAB
CAPILLARY BLOOD COLLECTION: NORMAL
CAPILLARY BLOOD COLLECTION: NORMAL
INR PPP: 1.9 (ref 0.9–1.1)
INR PPP: 2.1 (ref 0.86–1.14)
INR PPP: 2.3 (ref 0.9–1.1)
INR PPP: 2.4 (ref 0.9–1.1)
INR PPP: 2.5 (ref 0.86–1.14)
INR PPP: 2.9 (ref 0.9–1.1)

## 2020-01-01 PROCEDURE — 85610 PROTHROMBIN TIME: CPT | Performed by: FAMILY MEDICINE

## 2020-01-01 PROCEDURE — 36416 COLLJ CAPILLARY BLOOD SPEC: CPT | Performed by: FAMILY MEDICINE

## 2020-01-01 PROCEDURE — 99207 ZZC NO CHARGE NURSE ONLY: CPT

## 2020-01-01 PROCEDURE — 99207 PR NO CHARGE NURSE ONLY: CPT

## 2020-01-01 RX ORDER — ATENOLOL 50 MG/1
TABLET ORAL
Qty: 180 TABLET | Refills: 3 | Status: SHIPPED | OUTPATIENT
Start: 2020-01-01

## 2020-01-01 RX ORDER — CITALOPRAM HYDROBROMIDE 20 MG/1
TABLET ORAL
Qty: 30 TABLET | Refills: 5 | Status: SHIPPED | OUTPATIENT
Start: 2020-01-01

## 2020-01-01 RX ORDER — NYSTATIN 100000 U/G
CREAM TOPICAL 2 TIMES DAILY
Qty: 30 G | Refills: 1 | Status: SHIPPED | OUTPATIENT
Start: 2020-01-01 | End: 2021-01-01

## 2020-01-01 RX ORDER — WARFARIN SODIUM 2.5 MG/1
TABLET ORAL
Qty: 75 TABLET | Refills: 0 | Status: SHIPPED | OUTPATIENT
Start: 2020-01-01 | End: 2021-01-01

## 2020-01-01 RX ORDER — MEMANTINE HYDROCHLORIDE 5 MG/1
TABLET ORAL
Qty: 60 TABLET | Refills: 5 | Status: SHIPPED | OUTPATIENT
Start: 2020-01-01

## 2020-01-02 ENCOUNTER — ANTICOAGULATION THERAPY VISIT (OUTPATIENT)
Dept: ANTICOAGULATION | Facility: CLINIC | Age: 75
End: 2020-01-02
Payer: COMMERCIAL

## 2020-01-02 DIAGNOSIS — Z79.01 LONG TERM CURRENT USE OF ANTICOAGULANT THERAPY: ICD-10-CM

## 2020-01-02 LAB — INR POINT OF CARE: 2.1 (ref 0.86–1.14)

## 2020-01-02 PROCEDURE — 36416 COLLJ CAPILLARY BLOOD SPEC: CPT

## 2020-01-02 PROCEDURE — 85610 PROTHROMBIN TIME: CPT | Mod: QW

## 2020-01-02 PROCEDURE — 99207 ZZC NO CHARGE NURSE ONLY: CPT

## 2020-01-02 NOTE — PROGRESS NOTES
ANTICOAGULATION FOLLOW-UP CLINIC VISIT    Patient Name:  Cricket Klein  Date:  2020  Contact Type:  Face to Face    SUBJECTIVE:  Patient Findings     Positives:   Missed doses    Comments:   No changes in diet, activity level, medications (including over the counter), or health. Patient did miss 2 doses in the last 2 weeks. This is a new maintenance dose for him so will have him recheck again in 2 more weeks to make sure he would not still be supra therapeutic with no missed doses.  No signs of clots or bleeding concerns.           Clinical Outcomes     Comments:   No changes in diet, activity level, medications (including over the counter), or health. Patient did miss 2 doses in the last 2 weeks. This is a new maintenance dose for him so will have him recheck again in 2 more weeks to make sure he would not still be supra therapeutic with no missed doses.  No signs of clots or bleeding concerns.              OBJECTIVE    INR Protime   Date Value Ref Range Status   2020 2.1 (A) 0.86 - 1.14 Final       ASSESSMENT / PLAN  INR assessment THER    Recheck INR In: 2 WEEKS    INR Location Clinic      Anticoagulation Summary  As of 2020    INR goal:   2.0-3.0   TTR:   67.2 % (1 y)   INR used for dosin.1 (2020)   Warfarin maintenance plan:   1.25 mg (2.5 mg x 0.5) every Thu; 2.5 mg (2.5 mg x 1) all other days   Full warfarin instructions:   1.25 mg every Thu; 2.5 mg all other days   Weekly warfarin total:   16.25 mg   No change documented:   Kimberly Zamora RN   Plan last modified:   Kimberly Zamora RN (2019)   Next INR check:   2020   Priority:   INR   Target end date:   Indefinite    Indications    Other and unspecified coagulation defects [D68.9]  Long-term (current) use of anticoagulants [Z79.01] [Z79.01]             Anticoagulation Episode Summary     INR check location:       Preferred lab:       Send INR reminders to:   [facility]    Comments:   * Had PE in   following hip surgery. Has heterozygous prothrombin gene mutation. Second PE 7-26-16. Needs lifelong warfarin. was on warfarin [id number].        Anticoagulation Care Providers     Provider Role Specialty Phone number    Saud Cruz MD  [facility] [phone]            See the Encounter Report to view Anticoagulation Flowsheet and Dosing Calendar (Go to Encounters tab in chart review, and find the Anticoagulation Therapy Visit)        Kimberly Zamora RN

## 2020-01-22 ENCOUNTER — OFFICE VISIT (OUTPATIENT)
Dept: FAMILY MEDICINE | Facility: CLINIC | Age: 75
End: 2020-01-22
Payer: COMMERCIAL

## 2020-01-22 VITALS
SYSTOLIC BLOOD PRESSURE: 126 MMHG | OXYGEN SATURATION: 96 % | RESPIRATION RATE: 16 BRPM | HEART RATE: 62 BPM | WEIGHT: 224.2 LBS | TEMPERATURE: 97.4 F | BODY MASS INDEX: 36.03 KG/M2 | DIASTOLIC BLOOD PRESSURE: 86 MMHG | HEIGHT: 66 IN

## 2020-01-22 DIAGNOSIS — G45.9 TRANSIENT CEREBRAL ISCHEMIA, UNSPECIFIED TYPE: ICD-10-CM

## 2020-01-22 DIAGNOSIS — Z00.00 ENCOUNTER FOR MEDICARE ANNUAL WELLNESS EXAM: Primary | ICD-10-CM

## 2020-01-22 DIAGNOSIS — F03.90 DEMENTIA WITHOUT BEHAVIORAL DISTURBANCE, UNSPECIFIED DEMENTIA TYPE: ICD-10-CM

## 2020-01-22 DIAGNOSIS — I10 ESSENTIAL HYPERTENSION WITH GOAL BLOOD PRESSURE LESS THAN 140/90: ICD-10-CM

## 2020-01-22 DIAGNOSIS — I26.99 PULMONARY EMBOLISM WITHOUT ACUTE COR PULMONALE, UNSPECIFIED CHRONICITY, UNSPECIFIED PULMONARY EMBOLISM TYPE (H): Chronic | ICD-10-CM

## 2020-01-22 DIAGNOSIS — R32 URINARY INCONTINENCE, UNSPECIFIED TYPE: ICD-10-CM

## 2020-01-22 DIAGNOSIS — R09.81 NASAL CONGESTION: ICD-10-CM

## 2020-01-22 DIAGNOSIS — L57.0 AK (ACTINIC KERATOSIS): ICD-10-CM

## 2020-01-22 LAB
ANION GAP SERPL CALCULATED.3IONS-SCNC: 4 MMOL/L (ref 3–14)
BUN SERPL-MCNC: 19 MG/DL (ref 7–30)
CALCIUM SERPL-MCNC: 8.9 MG/DL (ref 8.5–10.1)
CHLORIDE SERPL-SCNC: 104 MMOL/L (ref 94–109)
CO2 SERPL-SCNC: 27 MMOL/L (ref 20–32)
CREAT SERPL-MCNC: 0.97 MG/DL (ref 0.66–1.25)
GFR SERPL CREATININE-BSD FRML MDRD: 77 ML/MIN/{1.73_M2}
GLUCOSE SERPL-MCNC: 95 MG/DL (ref 70–99)
POTASSIUM SERPL-SCNC: 4.5 MMOL/L (ref 3.4–5.3)
SODIUM SERPL-SCNC: 135 MMOL/L (ref 133–144)

## 2020-01-22 PROCEDURE — 36415 COLL VENOUS BLD VENIPUNCTURE: CPT | Performed by: FAMILY MEDICINE

## 2020-01-22 PROCEDURE — 99397 PER PM REEVAL EST PAT 65+ YR: CPT | Mod: 25 | Performed by: FAMILY MEDICINE

## 2020-01-22 PROCEDURE — 17000 DESTRUCT PREMALG LESION: CPT | Performed by: FAMILY MEDICINE

## 2020-01-22 PROCEDURE — 80048 BASIC METABOLIC PNL TOTAL CA: CPT | Performed by: FAMILY MEDICINE

## 2020-01-22 RX ORDER — FLUTICASONE PROPIONATE 50 MCG
2 SPRAY, SUSPENSION (ML) NASAL DAILY
Qty: 16 G | Refills: 11 | Status: SHIPPED | OUTPATIENT
Start: 2020-01-22

## 2020-01-22 ASSESSMENT — ENCOUNTER SYMPTOMS
HEADACHES: 0
CONSTIPATION: 0
DYSURIA: 0
WEAKNESS: 0
PARESTHESIAS: 1
DIZZINESS: 0
CHILLS: 0
EYE PAIN: 1
COUGH: 0
NAUSEA: 0
ABDOMINAL PAIN: 0
SORE THROAT: 0
DIARRHEA: 0
HEMATOCHEZIA: 0
FEVER: 0
MYALGIAS: 0
HEMATURIA: 0
ARTHRALGIAS: 1
JOINT SWELLING: 0
PALPITATIONS: 0
HEARTBURN: 0
SHORTNESS OF BREATH: 1

## 2020-01-22 ASSESSMENT — ACTIVITIES OF DAILY LIVING (ADL)
CURRENT_FUNCTION: MEDICATION ADMINISTRATION REQUIRES ASSISTANCE
CURRENT_FUNCTION: MONEY MANAGEMENT REQUIRES ASSISTANCE
CURRENT_FUNCTION: TELEPHONE REQUIRES ASSISTANCE

## 2020-01-22 ASSESSMENT — MIFFLIN-ST. JEOR: SCORE: 1704.71

## 2020-01-22 NOTE — PROGRESS NOTES
"  SUBJECTIVE:   Cricket Klein is a 74 year old male who presents for Preventive Visit.  Are you in the first 12 months of your Medicare Part B coverage?  No  Chief Complaint   Patient presents with     Physical      Seems to have a lot of congestion in the AM.  Can last until Noon.    He uses CPAP.  Seems better if he keeps CPAP on.  Sometimes is up and does not put CPAP back on.   He has chronic rhinorrhea.    Has tried Benadryl.      Some difficulty urinating.  Not sure if tamulosin helped.  Seen in clinic in October for the urinary symptoms.   He has had difficulty with leaking urine.  Wears pads all the time.      Dementia.  Not sure if Namenda helped.    Not driving.  Sometimes help with dressing or showers.  Walks independently.   Some anxiety.  Some \"sundownders\", becomes fearful.  Has to shut curtains.   Sleep variable. Gets to sleep OK.  Needing supervision.  Has been to support group for Alzheimer's disease.    Physical Health:    In general, how would you rate your overall physical health? good    Outside of work, how many days during the week do you exercise? none    Outside of work, approximately how many minutes a day do you exercise?not applicable    If you drink alcohol do you typically have >3 drinks per day or >7 drinks per week? No    Do you usually eat at least 4 servings of fruit and vegetables a day, include whole grains & fiber and avoid regularly eating high fat or \"junk\" foods? NO    Do you have any problems taking medications regularly?  No    Do you have any side effects from medications? none    Needs assistance for the following daily activities: telephone use, money management and taking medicine    Which of the following safety concerns are present in your home?  none identified     Hearing impairment: Yes, Difficulty following a conversation in a noisy restaurant or crowded room.    Need to ask people to speak up or repeat themselves.    Difficulty understanding soft or whispered " speech.    Difficulty understanding speech on the telephone.    In the past 6 months, have you been bothered by leaking of urine? yes    Mental Health:    In general, how would you rate your overall mental or emotional health? good  PHQ-2 Score:      Do you feel safe in your environment? Yes    Have you ever done Advance Care Planning? (For example, a Health Directive, POLST, or a discussion with a medical provider or your loved ones about your wishes): Yes, advance care planning is on file.    Additional concerns to address?  No    Fall risk:    Cognitive Screenin) Repeat 3 items (Leader, Season, Table)    2) Clock draw: ABNORMAL   3) 3 item recall: Recalls NO objects   Results: 0 items recalled: PROBABLE COGNITIVE IMPAIRMENT, **INFORM PROVIDER**    Mini-CogTM Copyright [employer]. Licensed by the author for use in [facility] [employer]; reprinted with permission (james@[facility]). All rights reserved.      Do you have sleep apnea, excessive snoring or daytime drowsiness?: yes      Social History     Tobacco Use     Smoking status: Never Smoker     Smokeless tobacco: Never Used   Substance Use Topics     Alcohol use: Yes     Alcohol/week: 0.0 standard drinks     Comment: rare                           Current providers sharing in care for this patient include:   Patient Care Team:  Saud Cruz MD as PCP - General (Family Practice)  Emily Hilton MD as Referring Physician  Kurtis Mueller, PhD LP as MD (Psychology)  Saud Cruz MD as Assigned PCP    The following health maintenance items are reviewed in Epic and correct as of today:  Health Maintenance   Topic Date Due     HEPATITIS C SCREENING  1945     ZOSTER IMMUNIZATION (1 of 2) 1996     MEDICARE ANNUAL WELLNESS VISIT  2011     AORTIC ANEURYSM SCREENING (SYSTEM ASSIGNED)  2011     OP ANNUAL INR REFERRAL  2012     ADVANCE CARE PLANNING  10/02/2017     PHQ-2  2020     FALL RISK ASSESSMENT   07/26/2020     LIPID  03/30/2022     COLONOSCOPY  04/15/2024     DTAP/TDAP/TD IMMUNIZATION (3 - Td) 10/16/2027     INFLUENZA VACCINE  Completed     PNEUMOCOCCAL IMMUNIZATION 65+ LOW/MEDIUM RISK  Completed     IPV IMMUNIZATION  Aged Out     MENINGITIS IMMUNIZATION  Aged Out     Patient Active Problem List    Diagnosis Date Noted     Dementia (H) 07/26/2019     Priority: Medium     6/26/2019 neurology visit notes:Most recent Neuropsych testing was in 8.2018 and was consistent with a neurodegenerative process: atypical Alzheimer;s vs PPA.       Hip arthrosis 07/28/2017     Priority: Medium     Long-term (current) use of anticoagulants [Z79.01] 07/27/2016     Priority: Medium     CHRISTIANO (obstructive sleep apnea) 06/02/2015     Priority: Medium     Hyperlipidemia LDL goal <130 10/31/2010     Priority: Medium     1/3/2011 Patient did not tolerate simvastatin, atorvastatin and pravastatin on a daily basis because of muscle cramping and weakness. Cholestyramine does not cause significant side effects, but is now doing a very good job of lowering the LDL toward goal.       Other and unspecified coagulation defects 04/13/2010     Priority: Medium     Factor  2 mutation-heterozygote  4/13/10 I had discussed this with Dr Chandler, and she recommended that warfarin for life would be advisable.   Cricket did see Dr. Ramos,  Who recommended no warfarin, and stopped it 2/23/12  See 4/28/12 note of visit with Dr. Ramos. Recommends no warfarin. Would need compression stockings for any surgery. And perhaps prophylaxis.          Pulmonary embolism (H) 03/29/2010     Priority: Medium     Within week after  hip replacement -March 2010.  Hematologist feels no increase risk for recurrence of pulmonary embolism or DVT due to heterozygote status of Factor 2 mutation.  7/26/2019:He has been on chronic anticoagulation.  pulmonary embolism twice in the past.        S/P hip replacement 03/29/2010     Priority: Medium     date of surgery was March 8,  "2010. had pulmonary embolus about a week later.       Impotence of organic origin 05/14/2007     Priority: Medium     Pure hypercholesterolemia 03/28/2006     Priority: Medium     Essential hypertension with goal blood pressure less than 140/90      Priority: Medium              Transient cerebral ischemia      Priority: Medium     April 19, 2007. Transient memory loss.    MRI HEAD SAME DAY    1. Old left frontal depressed skull fracture with underlying gliosis    and encephalomalacia.     2. Nonspecific periventricular white matter ischemic disease adjacent    to the right lateral ventricle anteriorly.    Problem list name updated by automated process. Provider to review       Advanced directives, counseling/discussion 10/02/2012     Priority: Low     Discussed advance care planning with patient; information given to patient to review. 10/2/2012             Vaccines:current     ROS:  General: POSITIVE for:, weight gain, 14#  Eyes: POSITIVE for:, eyes sore at times.  ENT: POSITIVE for:, nasal congestion, rhinorrhea  Resp: POSITIVE for:, SOB/dyspnea, carrying wood. No shortness of breath walking.   CV: No chest pains or palpitations  GI: No nausea, vomiting,  heartburn, abdominal pain, diarrhea, constipation or change in bowel habits  Some pain right abdomen.  Difficult to tell if present now.  Tells spouse on occasion.   : see above   Musculoskeletal: No significant muscle or joint pains  Neurologic: No headaches, numbness, tingling, weakness, problems with balance or coordination  Psychiatric: see above   Skin: No rashes,worrisome lesions or skin problems    OBJECTIVE:                                                    OBJECTIVE:Blood pressure 126/86, pulse 62, temperature 97.4  F (36.3  C), temperature source Tympanic, resp. rate 16, height 1.676 m (5' 6\"), weight 101.7 kg (224 lb 3.2 oz), SpO2 96 %. BMI=Body mass index is 36.19 kg/m .  GENERAL APPEARANCE ADULT: Alert, no acute distress, obese  EYES: PERRL, EOM " normal, conjunctiva and lids normal  HENT: Ears and TMs normal, oral mucosa and posterior oropharynx normal  NECK: No adenopathy,masses or thyromegaly  RESP: lungs clear to auscultation   CV: normal rate, regular rhythm, no murmur or gallop  ABDOMEN: soft, no organomegaly, masses or tenderness, diastasis recti  MS: extremities normal, no peripheral edema  Poor historian, spouse answers most questions.     ASSESSMENT/PLAN:                                                      (Z00.00) Encounter for Medicare annual wellness exam  (primary encounter diagnosis)    (F03.90) Dementia without behavioral disturbance, unspecified dementia type (H)  Comment: progressive memory loss.   Plan: follow-up with neurology in the next couple months.     (G45.9) Transient cerebral ischemia, unspecified type    (I10) Essential hypertension with goal blood pressure less than 140/90  Comment: doing well  Plan: Basic metabolic panel        Check one blood test.  Refills on atenolol as planned.     (I26.99) Pulmonary embolism without acute cor pulmonale, unspecified chronicity, unspecified pulmonary embolism type (H)  Comment: on chronic warfarin.   Plan: No change in current treatment plan.     (R32) Urinary incontinence, unspecified type  Comment: Some frequency and incontinence.  tamulosin has not helped much.   Plan: UROLOGY ADULT REFERRAL        Schedule an appointment with urologist for consultation to wee what they recommend for bladder problems.     (R09.81) Nasal congestion  Comment: More congestion in AM and chronic runny nose.   Plan: fluticasone (FLONASE) 50 MCG/ACT nasal spray        Try fluticasone nasal spray.  Use Flonase nasal spray, 2 sprays in each nostril once daily.  It may take several days to a week or more to notice a benefit. .    Benadryl can be sedating.  Hopefully the nasal spray will work better.  . An alternative pill is cetirizine which you have been on in the past.  Does is 10mg once daily.     actinic  "keratosis   Treatment was begun with Liquid nitrogen, freeze/thaw/freeze.  Expect redness for a day or two, then possible blister or sometimes blood blister.  Then the wound may get raw and sore and scab over.  Skin spots should steadily heal and look pink for a few weeks.  New skin should be smooth and the rough irregular skin gone.  Recheck if abnormal skin does not disappear with the treatment.     You have a Health care Directive.    I also recommend POLST  Form for decision making about resuscitation.      COUNSELING:      Estimated body mass index is 35.35 kg/m  as calculated from the following:    Height as of 10/14/19: 1.676 m (5' 6\").    Weight as of 10/14/19: 99.3 kg (219 lb).    Weight management plan: Discussed healthy diet and exercise guidelines     reports that he has never smoked. He has never used smokeless tobacco.      Appropriate preventive services were discussed with this patient, including applicable screening as appropriate for cardiovascular disease, diabetes, osteopenia/osteoporosis, and glaucoma.  As appropriate for age/gender, discussed screening for colorectal cancer, prostate cancer, breast cancer, and cervical cancer. Checklist reviewing preventive services available has been given to the patient.    Reviewed patients plan of care and provided an AVS. The Basic Care Plan (routine screening as documented in Health Maintenance) for Cricket meets the Care Plan requirement. This Care Plan has been established and reviewed with the Patient and spouse.    Counseling Resources:  ATP IV Guidelines  Pooled Cohorts Equation Calculator  Breast Cancer Risk Calculator  FRAX Risk Assessment  ICSI Preventive Guidelines  Dietary Guidelines for Americans, 2010  USDA's MyPlate  ASA Prophylaxis  Lung CA Screening    Saud Cruz MD  [facility]  "

## 2020-01-22 NOTE — PATIENT INSTRUCTIONS
Patient Education   Personalized Prevention Plan  You are due for the preventive services outlined below.  Your care team is available to assist you in scheduling these services.  If you have already completed any of these items, please share that information with your care team to update in your medical record.  Health Maintenance Due   Topic Date Due     Hepatitis C Screening  1945     Zoster (Shingles) Vaccine (1 of 2) 07/01/1996     Annual Wellness Visit  07/01/2011     AORTIC ANEURYSM SCREENING (SYSTEM ASSIGNED)  07/01/2011     INR CLINIC REFERRAL - yearly  01/26/2012     Discuss Advance Care Planning  10/02/2017     PHQ-2  01/01/2020           ASSESSMENT/PLAN:                                                      (Z00.00) Encounter for Medicare annual wellness exam  (primary encounter diagnosis)    (F03.90) Dementia without behavioral disturbance, unspecified dementia type (H)  Comment: progressive memory loss.   Plan: follow-up with neurology in the next couple months.     (G45.9) Transient cerebral ischemia, unspecified type    (I10) Essential hypertension with goal blood pressure less than 140/90  Comment: doing well  Plan: Basic metabolic panel        Check one blood test.  Refills on atenolol as planned.     (I26.99) Pulmonary embolism without acute cor pulmonale, unspecified chronicity, unspecified pulmonary embolism type (H)  Comment: on chronic warfarin.   Plan: No change in current treatment plan.     (R32) Urinary incontinence, unspecified type  Comment: Some frequency and incontinence.  tamulosin has not helped much.   Plan: UROLOGY ADULT REFERRAL        Schedule an appointment with urologist for consultation to wee what they recommend for bladder problems.     (R09.81) Nasal congestion  Comment: More congestion in AM and chronic runny nose.   Plan: fluticasone (FLONASE) 50 MCG/ACT nasal spray        Try fluticasone nasal spray.  Use Flonase nasal spray, 2 sprays in each nostril once daily.   It may take several days to a week or more to notice a benefit. .    Benadryl can be sedating.  Hopefully the nasal spray will work better.  . An alternative pill is cetirizine which you have been on in the past.  Does is 10mg once daily.     actinic keratosis   Treatment was begun with Liquid nitrogen, freeze/thaw/freeze.  Expect redness for a day or two, then possible blister or sometimes blood blister.  Then the wound may get raw and sore and scab over.  Skin spots should steadily heal and look pink for a few weeks.  New skin should be smooth and the rough irregular skin gone.  Recheck if abnormal skin does not disappear with the treatment.     You have a Health care Directive.    I also recommend POLST  Form for decision making about resuscitation.

## 2020-01-22 NOTE — NURSING NOTE
"Chief Complaint   Patient presents with     Physical       Initial /86 (BP Location: Right arm, Patient Position: Chair, Cuff Size: Adult Large)   Pulse 62   Temp 97.4  F (36.3  C) (Tympanic)   Resp 16   Ht 1.676 m (5' 6\")   Wt 101.7 kg (224 lb 3.2 oz)   SpO2 96%   BMI 36.19 kg/m   Estimated body mass index is 36.19 kg/m  as calculated from the following:    Height as of this encounter: 1.676 m (5' 6\").    Weight as of this encounter: 101.7 kg (224 lb 3.2 oz).    Patient presents to the clinic using No DME    Health Maintenance that is potentially due pending provider review:  NONE    Shelby Ledezma MA  1:41 PM 1/22/2020  .      "

## 2020-01-22 NOTE — LETTER
January 24, 2020      Cricket Klein  [address] [place] [address]        Dear ,    We are writing to inform you of your test results.    Your test results fall within the expected range(s) or remain unchanged from previous results.  Please continue with current treatment plan.    Resulted Orders   Basic metabolic panel   Result Value Ref Range    Sodium 135 133 - 144 mmol/L    Potassium 4.5 3.4 - 5.3 mmol/L    Chloride 104 94 - 109 mmol/L    Carbon Dioxide 27 20 - 32 mmol/L    Anion Gap 4 3 - 14 mmol/L    Glucose 95 70 - 99 mg/dL    Urea Nitrogen 19 7 - 30 mg/dL    Creatinine 0.97 0.66 - 1.25 mg/dL    GFR Estimate 77 >60 mL/min/[1.73_m2]      Comment:      Non  GFR Calc  Starting 12/18/2018, serum creatinine based estimated GFR (eGFR) will be   calculated using the Chronic Kidney Disease Epidemiology Collaboration   (CKD-EPI) equation.      GFR Estimate If Black 89 >60 mL/min/[1.73_m2]      Comment:       GFR Calc  Starting 12/18/2018, serum creatinine based estimated GFR (eGFR) will be   calculated using the Chronic Kidney Disease Epidemiology Collaboration   (CKD-EPI) equation.      Calcium 8.9 8.5 - 10.1 mg/dL           If you have any questions or concerns, please call the clinic at the number listed above.       Sincerely,        Saud Cruz MD

## 2020-01-23 ENCOUNTER — ANTICOAGULATION THERAPY VISIT (OUTPATIENT)
Dept: ANTICOAGULATION | Facility: CLINIC | Age: 75
End: 2020-01-23
Payer: COMMERCIAL

## 2020-01-23 DIAGNOSIS — I26.99 PULMONARY EMBOLUS, LEFT (H): ICD-10-CM

## 2020-01-23 DIAGNOSIS — Z79.01 LONG TERM CURRENT USE OF ANTICOAGULANT THERAPY: ICD-10-CM

## 2020-01-23 LAB — INR POINT OF CARE: 3.5 (ref 0.86–1.14)

## 2020-01-23 PROCEDURE — 36416 COLLJ CAPILLARY BLOOD SPEC: CPT

## 2020-01-23 PROCEDURE — 85610 PROTHROMBIN TIME: CPT | Mod: QW

## 2020-01-23 PROCEDURE — 99207 ZZC NO CHARGE NURSE ONLY: CPT

## 2020-01-23 RX ORDER — WARFARIN SODIUM 2.5 MG/1
TABLET ORAL
Qty: 90 TABLET | Refills: 0
Start: 2020-01-23 | End: 2020-02-18

## 2020-01-23 NOTE — RESULT ENCOUNTER NOTE
Please call spouse.  The blood chemistries (Basic metabolic panel) are all normal including electrolytes (salt balances in the blood), blood glucose and kidney tests.

## 2020-01-23 NOTE — PROGRESS NOTES
ANTICOAGULATION FOLLOW-UP CLINIC VISIT    Patient Name:  Cricket Klein  Date:  1/23/2020  Contact Type:  Face to Face    SUBJECTIVE:  Patient Findings     Comments:   Patient's maintenance dose was reduced 12-19 but when he rechecked INR on 1-2, he had missed doses prior. Today, he denies missing any doses and took new maintenance dose but INR still supra therapeutic. Will reduce dose another 7.7% and recheck in 2 weeks. No recent illnesses, infections, diarrhea, etc. No other changes. No bleeding concerns. Patient will eat a large serving of broccoli today (he already has a smaller dose of warfarin).         Clinical Outcomes     Negatives:   Major bleeding event, Thromboembolic event, Anticoagulation-related hospital admission, Anticoagulation-related ED visit, Anticoagulation-related fatality    Comments:   Patient's maintenance dose was reduced 12-19 but when he rechecked INR on 1-2, he had missed doses prior. Today, he denies missing any doses and took new maintenance dose but INR still supra therapeutic. Will reduce dose another 7.7% and recheck in 2 weeks. No recent illnesses, infections, diarrhea, etc. No other changes. No bleeding concerns. Patient will eat a large serving of broccoli today (he already has a smaller dose of warfarin).            OBJECTIVE    INR Protime   Date Value Ref Range Status   01/23/2020 3.5 (A) 0.86 - 1.14 Final       ASSESSMENT / PLAN  INR assessment SUPRA    Recheck INR In: 2 WEEKS    INR Location Clinic      Anticoagulation Summary  As of 1/23/2020    INR goal:   2.0-3.0   TTR:   65.9 % (1 y)   INR used for dosing:   3.5! (1/23/2020)   Warfarin maintenance plan:   1.25 mg (2.5 mg x 0.5) every Mon, Thu; 2.5 mg (2.5 mg x 1) all other days   Full warfarin instructions:   1.25 mg every Mon, Thu; 2.5 mg all other days   Weekly warfarin total:   15 mg   Plan last modified:   Kimberly Zamora RN (1/23/2020)   Next INR check:   2/6/2020   Priority:   High   Target end date:    Indefinite    Indications    Other and unspecified coagulation defects [D68.9]  Long-term (current) use of anticoagulants [Z79.01] [Z79.01]             Anticoagulation Episode Summary     INR check location:       Preferred lab:       Send INR reminders to:   [facility]    Comments:   * Had PE in 2010 following hip surgery. Has heterozygous prothrombin gene mutation. Second PE 7-26-16. Needs lifelong warfarin. was on warfarin [id number].        Anticoagulation Care Providers     Provider Role Specialty Phone number    Saud Cruz MD  [facility] [phone]            See the Encounter Report to view Anticoagulation Flowsheet and Dosing Calendar (Go to Encounters tab in chart review, and find the Anticoagulation Therapy Visit)        Kimberly Zamora RN

## 2020-01-27 ENCOUNTER — TELEPHONE (OUTPATIENT)
Dept: FAMILY MEDICINE | Facility: CLINIC | Age: 75
End: 2020-01-27

## 2020-01-27 DIAGNOSIS — F03.90 DEMENTIA WITHOUT BEHAVIORAL DISTURBANCE, UNSPECIFIED DEMENTIA TYPE: Primary | ICD-10-CM

## 2020-01-27 RX ORDER — MIRTAZAPINE 7.5 MG/1
7.5 TABLET, FILM COATED ORAL AT BEDTIME
Qty: 30 TABLET | Refills: 1 | Status: SHIPPED | OUTPATIENT
Start: 2020-01-27 | End: 2020-01-30

## 2020-01-27 NOTE — TELEPHONE ENCOUNTER
Please call.    I suggest trying mirtazapine.  Start with 7.5mg nightly at bedtime.  This help;s for sleep and is good for anxiety.    See how he does in the next 3 weeks or so.  Dose can be increased if needed.   Call about progress in 3-4 weeks.   Caution about possible weight gain on this medication which is not the best but I am hopeful the sleeping help would make this medication a good choice.   HIRAL ZENDEJAS MD

## 2020-01-27 NOTE — TELEPHONE ENCOUNTER
"Reason for call:  Patient reporting a symptom    Symptom or request: Shawna says Claudio was in to see Dr Cruz last week but she didn't want to bring this up in front of Claudio. She says he gets very fearful at night. She says it's \"sundowners\". He thinks there are 2 of her and will want the other one. He gets angry at her and almost delusional. He will see something on TV and then very afraid and fears for their lives. She wonders if there is some medication that Dr Cruz could give him.   [facility] pharmacy    Phone Number patient can be reached at:  Shawna (wife)  Home number on file [phone] (home)    Best Time:  anytime      Call taken on 1/27/2020 at 10:20 AM by Stacy Shane    "

## 2020-01-30 ENCOUNTER — TELEPHONE (OUTPATIENT)
Dept: FAMILY MEDICINE | Facility: CLINIC | Age: 75
End: 2020-01-30

## 2020-01-30 DIAGNOSIS — R41.3 MEMORY LOSS: ICD-10-CM

## 2020-01-30 DIAGNOSIS — F03.90 DEMENTIA WITHOUT BEHAVIORAL DISTURBANCE, UNSPECIFIED DEMENTIA TYPE: ICD-10-CM

## 2020-01-30 RX ORDER — SERTRALINE HYDROCHLORIDE 25 MG/1
25 TABLET, FILM COATED ORAL DAILY
Qty: 30 TABLET | Refills: 1 | Status: SHIPPED | OUTPATIENT
Start: 2020-01-30 | End: 2020-03-04

## 2020-01-30 RX ORDER — MEMANTINE HYDROCHLORIDE 5 MG/1
5 TABLET ORAL 2 TIMES DAILY
Qty: 60 TABLET | Refills: 5 | Status: SHIPPED | OUTPATIENT
Start: 2020-01-30 | End: 2020-03-04

## 2020-01-30 NOTE — TELEPHONE ENCOUNTER
Please call.   Peripheral edema listed as 2% chance with mirtazapine and itching 1-10% in prescribing information.   I suggest different antianxiety medication.  Not as helpful for sleep but hopefully will help anxiousness.   Try sertraline 25mg daily.    Rx sent to [facility] pharmacy.   HIRAL ZENDEJAS MD

## 2020-01-30 NOTE — TELEPHONE ENCOUNTER
Reason for call:  Symptom   Symptom or request: swollen itchey feet     Duration (how long have symptoms been present): 1 day  Have you been treated for this before? No    Additional comments: Started new medication and only took one dose Mirtazapine and the symptoms started but subsided overnight.    Phone number to reach patient:  Home number on file [phone] (home)    Best Time:  any    Can we leave a detailed message on this number?  YES

## 2020-01-30 NOTE — TELEPHONE ENCOUNTER
Requested Prescriptions   Pending Prescriptions Disp Refills     memantine (NAMENDA) 5 MG tablet 60 tablet 5     Sig: Take 1 tablet (5 mg) by mouth 2 times daily       There is no refill protocol information for this order        Last office visit: 6/26/2019 with prescribing provider:  Dr. Fernandes   Future Office Visit:      Denise Behrendt  Specialty CSS

## 2020-01-30 NOTE — LETTER
[facility]  [address]  [place] [address]  Phone: [phone]       January 30, 2020         Cricket Klein  [address] [address]            Dear Cricket:    We are concerned about your health care.  We recently provided you with medication refills.  Many medications require routine follow-up with your doctor.    Your prescription(s) have been refilled so you may have time for the above noted follow-up. Please call to schedule soon so we can assure you have an appointment before your next refills are needed.    Thank you,      Emily Fernandes MD/ tr

## 2020-01-30 NOTE — TELEPHONE ENCOUNTER
Pat called states Mirtazapine helped with sleep but it caused pt's feet to become swollen and itchy. All foot swelling and inflammation went away about 24 hours after dose of Mirtazapine. Any other recommendations for sleep/sun downers? Would you like to see pt? Elly Centeno Rn

## 2020-01-30 NOTE — TELEPHONE ENCOUNTER
Medication refilled per [facility] RN protocol. Letter sent to make appt.    Edie VALENZUELA RN BSN PHN  Specialty Clinics

## 2020-02-10 ENCOUNTER — ANTICOAGULATION THERAPY VISIT (OUTPATIENT)
Dept: ANTICOAGULATION | Facility: CLINIC | Age: 75
End: 2020-02-10
Payer: COMMERCIAL

## 2020-02-10 DIAGNOSIS — Z79.01 LONG TERM CURRENT USE OF ANTICOAGULANT THERAPY: ICD-10-CM

## 2020-02-10 LAB — INR POINT OF CARE: 2.4 (ref 0.86–1.14)

## 2020-02-10 PROCEDURE — 99207 ZZC NO CHARGE NURSE ONLY: CPT

## 2020-02-10 PROCEDURE — 85610 PROTHROMBIN TIME: CPT | Mod: QW

## 2020-02-10 PROCEDURE — 36416 COLLJ CAPILLARY BLOOD SPEC: CPT

## 2020-02-10 NOTE — PROGRESS NOTES
ANTICOAGULATION FOLLOW-UP CLINIC VISIT    Patient Name:  Cricket Klein  Date:  2/10/2020  Contact Type:  Face to Face, accompanied by his wife    SUBJECTIVE:  Patient Findings     Comments:   No acute changes in nellie, medications, diet (vitamin K intake), or activity noted. Took warfarin as instructed, denies any missed doses. No issues with bleeding or unusual bruising noted.         Clinical Outcomes     Negatives:   Major bleeding event, Thromboembolic event, Anticoagulation-related hospital admission, Anticoagulation-related ED visit, Anticoagulation-related fatality    Comments:   No acute changes in nellie, medications, diet (vitamin K intake), or activity noted. Took warfarin as instructed, denies any missed doses. No issues with bleeding or unusual bruising noted.            OBJECTIVE    INR Protime   Date Value Ref Range Status   02/10/2020 2.4 (A) 0.86 - 1.14 Final       ASSESSMENT / PLAN  No question data found.  Anticoagulation Summary  As of 2/10/2020    INR goal:   2.0-3.0   TTR:   63.6 % (1 y)   INR used for dosin.4 (2/10/2020)   Warfarin maintenance plan:   1.25 mg (2.5 mg x 0.5) every Mon, Thu; 2.5 mg (2.5 mg x 1) all other days   Full warfarin instructions:   1.25 mg every Mon, Thu; 2.5 mg all other days   Weekly warfarin total:   15 mg   No change documented:   Hoda Meng RN   Plan last modified:   Kimberly Zamora RN (2020)   Next INR check:   2020   Priority:   High   Target end date:   Indefinite    Indications    Other and unspecified coagulation defects [D68.9]  Long-term (current) use of anticoagulants [Z79.01] [Z79.01]             Anticoagulation Episode Summary     INR check location:       Preferred lab:       Send INR reminders to:   [facility]    Comments:   * Had PE in  following hip surgery. Has heterozygous prothrombin gene mutation. Second PE 16. Needs lifelong warfarin. was on warfarin [id number].        Anticoagulation Care  Providers     Provider Role Specialty Phone number    Saud Cruz MD [facility] [phone]            See the Encounter Report to view Anticoagulation Flowsheet and Dosing Calendar (Go to Encounters tab in chart review, and find the Anticoagulation Therapy Visit)        Hoda Meng RN [facility]

## 2020-02-18 DIAGNOSIS — I26.99 PULMONARY EMBOLUS, LEFT (H): ICD-10-CM

## 2020-02-18 RX ORDER — WARFARIN SODIUM 2.5 MG/1
TABLET ORAL
Qty: 90 TABLET | Refills: 0 | Status: SHIPPED | OUTPATIENT
Start: 2020-02-18 | End: 2020-05-12

## 2020-02-18 NOTE — TELEPHONE ENCOUNTER
Current warfarin dose:  Full warfarin instructions:   1.25 mg every Mon, Thu; 2.5 mg all other days   Weekly warfarin total:   15 mg       Last office visit: 1/22/2020    Last INR result:  INR Protime   Date Value Ref Range Status   02/10/2020 2.4 (A) 0.86 - 1.14 Final       Refill authorized per [facility] protocol.    ZEN Zafar RN BSN

## 2020-02-28 ENCOUNTER — ANTICOAGULATION THERAPY VISIT (OUTPATIENT)
Dept: ANTICOAGULATION | Facility: CLINIC | Age: 75
End: 2020-02-28
Payer: COMMERCIAL

## 2020-02-28 DIAGNOSIS — Z79.01 LONG TERM CURRENT USE OF ANTICOAGULANT THERAPY: ICD-10-CM

## 2020-02-28 LAB — INR POINT OF CARE: 2.5 (ref 0.86–1.14)

## 2020-02-28 PROCEDURE — 99207 ZZC NO CHARGE NURSE ONLY: CPT

## 2020-02-28 PROCEDURE — 36416 COLLJ CAPILLARY BLOOD SPEC: CPT

## 2020-02-28 PROCEDURE — 85610 PROTHROMBIN TIME: CPT | Mod: QW

## 2020-02-28 NOTE — PROGRESS NOTES
ANTICOAGULATION FOLLOW-UP CLINIC VISIT    Patient Name:  Cricket Klein  Date:  2020  Contact Type:  Face to Face    SUBJECTIVE:  Patient Findings     Comments:   No acute changes in nellie, medications, diet (vitamin K intake), or activity noted. Took warfarin as instructed, denies any missed doses. No issues with bleeding or unusual bruising noted.     Will recheck the INR in 3 weeks.        Clinical Outcomes     Negatives:   Major bleeding event, Thromboembolic event, Anticoagulation-related hospital admission, Anticoagulation-related ED visit, Anticoagulation-related fatality    Comments:   No acute changes in nellie, medications, diet (vitamin K intake), or activity noted. Took warfarin as instructed, denies any missed doses. No issues with bleeding or unusual bruising noted.     Will recheck the INR in 3 weeks.           OBJECTIVE    INR Protime   Date Value Ref Range Status   2020 2.5 (A) 0.86 - 1.14 Final       ASSESSMENT / PLAN  No question data found.  Anticoagulation Summary  As of 2020    INR goal:   2.0-3.0   TTR:   65.5 % (1 y)   INR used for dosin.5 (2020)   Warfarin maintenance plan:   1.25 mg (2.5 mg x 0.5) every Mon, Thu; 2.5 mg (2.5 mg x 1) all other days   Full warfarin instructions:   1.25 mg every Mon, Thu; 2.5 mg all other days   Weekly warfarin total:   15 mg   No change documented:   Hoda Meng RN   Plan last modified:   Kimberly Zamora RN (2020)   Next INR check:   3/20/2020   Priority:   High   Target end date:   Indefinite    Indications    Other and unspecified coagulation defects [D68.9]  Long-term (current) use of anticoagulants [Z79.01] [Z79.01]             Anticoagulation Episode Summary     INR check location:       Preferred lab:       Send INR reminders to:   [facility]    Comments:   * Had PE in  following hip surgery. Has heterozygous prothrombin gene mutation. Second PE 16. Needs lifelong warfarin. was on warfarin  [id number].        Anticoagulation Care Providers     Provider Role Specialty Phone number    Saud Cruz MD [facility] [phone]            See the Encounter Report to view Anticoagulation Flowsheet and Dosing Calendar (Go to Encounters tab in chart review, and find the Anticoagulation Therapy Visit)        Hoda Meng RN [facility]

## 2020-03-02 ENCOUNTER — OFFICE VISIT (OUTPATIENT)
Dept: UROLOGY | Facility: CLINIC | Age: 75
End: 2020-03-02
Attending: FAMILY MEDICINE
Payer: COMMERCIAL

## 2020-03-02 VITALS
HEIGHT: 66 IN | BODY MASS INDEX: 36.03 KG/M2 | RESPIRATION RATE: 18 BRPM | TEMPERATURE: 97.7 F | DIASTOLIC BLOOD PRESSURE: 82 MMHG | WEIGHT: 224.21 LBS | HEART RATE: 75 BPM | SYSTOLIC BLOOD PRESSURE: 135 MMHG

## 2020-03-02 DIAGNOSIS — N39.43 BENIGN PROSTATIC HYPERPLASIA WITH POST-VOID DRIBBLING: Primary | ICD-10-CM

## 2020-03-02 DIAGNOSIS — E66.01 MORBID OBESITY (H): ICD-10-CM

## 2020-03-02 DIAGNOSIS — N40.1 BENIGN PROSTATIC HYPERPLASIA WITH POST-VOID DRIBBLING: Primary | ICD-10-CM

## 2020-03-02 LAB
ALBUMIN UR-MCNC: NEGATIVE MG/DL
APPEARANCE UR: CLEAR
BILIRUB UR QL STRIP: NEGATIVE
COLOR UR AUTO: YELLOW
GLUCOSE UR STRIP-MCNC: NEGATIVE MG/DL
HGB UR QL STRIP: NEGATIVE
KETONES UR STRIP-MCNC: NEGATIVE MG/DL
LEUKOCYTE ESTERASE UR QL STRIP: NEGATIVE
NITRATE UR QL: NEGATIVE
NON-SQ EPI CELLS #/AREA URNS LPF: ABNORMAL /LPF
PH UR STRIP: 6 PH (ref 5–7)
RBC #/AREA URNS AUTO: ABNORMAL /HPF
SOURCE: ABNORMAL
SP GR UR STRIP: 1.02 (ref 1–1.03)
URATE CRY #/AREA URNS HPF: ABNORMAL /HPF
UROBILINOGEN UR STRIP-ACNC: 0.2 EU/DL (ref 0.2–1)
WBC #/AREA URNS AUTO: ABNORMAL /HPF

## 2020-03-02 PROCEDURE — 99203 OFFICE O/P NEW LOW 30 MIN: CPT | Mod: 25 | Performed by: UROLOGY

## 2020-03-02 PROCEDURE — 87086 URINE CULTURE/COLONY COUNT: CPT | Performed by: UROLOGY

## 2020-03-02 PROCEDURE — 51798 US URINE CAPACITY MEASURE: CPT | Performed by: UROLOGY

## 2020-03-02 PROCEDURE — 81001 URINALYSIS AUTO W/SCOPE: CPT | Performed by: UROLOGY

## 2020-03-02 RX ORDER — TAMSULOSIN HYDROCHLORIDE 0.4 MG/1
0.4 CAPSULE ORAL DAILY
Qty: 90 CAPSULE | Refills: 3 | Status: SHIPPED | OUTPATIENT
Start: 2020-03-02 | End: 2021-01-01

## 2020-03-02 RX ORDER — FINASTERIDE 5 MG/1
5 TABLET, FILM COATED ORAL DAILY
Qty: 90 TABLET | Refills: 3 | Status: SHIPPED | OUTPATIENT
Start: 2020-03-02 | End: 2021-01-01

## 2020-03-02 ASSESSMENT — MIFFLIN-ST. JEOR: SCORE: 1704.75

## 2020-03-02 NOTE — PROGRESS NOTES
Appointment source: New Patient  Patient name: Cricket Varelas  Urology Staff: Edwar Michael MD    Seen at the request of FRANCISCO Cruz MD    Subjective: This is a 74 year old year old male complaining of urinary frequency and urinary incontinence.    Wife describes day and night incontinence. Currently using a pull up.    Was started on tamsulosin which possible helped to some degree but voiding is still an issue.    Patient has significant dementia.      (wife provided answers)  Review of systems: Constitutional, HEENT, cardiovascular, pulmonary, gi and gu systems are negative, except as otherwise noted.    Problem list and histories reviewed & adjusted, as indicated. Additional history: as documented    Objective:  Examination:    Healthy appearing male  HEENT: anicteric sclera, normal extraocular movements  Respiratory: normal, non-labored breathing  Musculoskeletal: Normal muscular movements  Skin normal temperature, no rash  Psychiatric: dementia significantly reduces ability to answer questions    Phallus without lesion. No evidence of peyronie's plaque  Scrotal contents normal  Rectal examination normal  Prostate benign to palpation    Post void residual 15 mL    Assessment:  Ongoing urinary urgency and urinary urge incontinence.    Plan:  Will continue tamsulosin and add finasteride for better control of urgency and urinary incontinence.

## 2020-03-02 NOTE — PATIENT INSTRUCTIONS
Per physician instructions.    If you have questions or concerns on any instructions given to you by your provider today or if you need to schedule an appointment, you can reach us at [phone].    Thank you!

## 2020-03-03 LAB
BACTERIA SPEC CULT: NO GROWTH
Lab: NORMAL
SPECIMEN SOURCE: NORMAL

## 2020-03-04 ENCOUNTER — OFFICE VISIT (OUTPATIENT)
Dept: FAMILY MEDICINE | Facility: CLINIC | Age: 75
End: 2020-03-04
Payer: COMMERCIAL

## 2020-03-04 VITALS
BODY MASS INDEX: 36.8 KG/M2 | TEMPERATURE: 97.9 F | WEIGHT: 229 LBS | DIASTOLIC BLOOD PRESSURE: 86 MMHG | SYSTOLIC BLOOD PRESSURE: 130 MMHG | HEART RATE: 66 BPM | OXYGEN SATURATION: 98 % | RESPIRATION RATE: 18 BRPM | HEIGHT: 66 IN

## 2020-03-04 DIAGNOSIS — S86.911A KNEE STRAIN, RIGHT, INITIAL ENCOUNTER: Primary | ICD-10-CM

## 2020-03-04 PROCEDURE — 99213 OFFICE O/P EST LOW 20 MIN: CPT | Performed by: FAMILY MEDICINE

## 2020-03-04 ASSESSMENT — MIFFLIN-ST. JEOR: SCORE: 1726.49

## 2020-03-04 NOTE — NURSING NOTE
"Chief Complaint   Patient presents with     Knee Injury       Initial /86   Pulse 66   Temp 97.9  F (36.6  C) (Tympanic)   Resp 18   Ht 1.676 m (5' 6\")   Wt 103.9 kg (229 lb)   SpO2 98%   BMI 36.96 kg/m   Estimated body mass index is 36.96 kg/m  as calculated from the following:    Height as of this encounter: 1.676 m (5' 6\").    Weight as of this encounter: 103.9 kg (229 lb).    Patient presents to the clinic using No DME    Health Maintenance that is potentially due pending provider review:  NONE    n/a    Is there anyone who you would like to be able to receive your results? No  If yes have patient fill out LETTY    Raquel Ortiz CMA(AAMA)    "

## 2020-03-04 NOTE — PROGRESS NOTES
"Cricket Klein is a 74 year old male comes in today with the following concerns.      1. About 10 days ago was going to put boots on and pulled his right knee to cross his other leg and his knee made a loud crunching pop sound. Has been sore, swollen, some redness since happened. Hard to walk on. Some improvement, but not better.    Raquel Ortiz CMA([facility])      *  S: Cricekt Klein is a 74 year old male with R knee pain.  10 days ago, forcibly crossed legs, felt some crunching.  Some swelling, harder to walk on.      Improving.  Today has been pretty good.  Walking around OK.  Stairs and bending over main issues    Problem list, med list, additional histories reviewed and updated, as indicated.      No rash or fever    O:/86   Pulse 66   Temp 97.9  F (36.6  C) (Tympanic)   Resp 18   Ht 1.676 m (5' 6\")   Wt 103.9 kg (229 lb)   SpO2 98%   BMI 36.96 kg/m    GEN: Alert and oriented, in no acute distress  Some joint line pain on R.  Mild effusion.  Not hot/red.   Walking fine  Bends with no crepitus    A: R knee strain, improving    P: reassured them.  Could take a couple days of nsaids, not much more than that, should help it keep settling down.      F/u if not continuing to improve.      "

## 2020-03-19 DIAGNOSIS — Z79.01 LONG TERM CURRENT USE OF ANTICOAGULANT THERAPY: ICD-10-CM

## 2020-03-19 DIAGNOSIS — I26.99 PULMONARY EMBOLISM WITHOUT ACUTE COR PULMONALE, UNSPECIFIED CHRONICITY, UNSPECIFIED PULMONARY EMBOLISM TYPE (H): Chronic | ICD-10-CM

## 2020-03-19 DIAGNOSIS — Z86.2: Primary | ICD-10-CM

## 2020-03-19 DIAGNOSIS — G45.9 TRANSIENT CEREBRAL ISCHEMIA, UNSPECIFIED TYPE: ICD-10-CM

## 2020-03-20 ENCOUNTER — ANTICOAGULATION THERAPY VISIT (OUTPATIENT)
Dept: ANTICOAGULATION | Facility: CLINIC | Age: 75
End: 2020-03-20
Payer: COMMERCIAL

## 2020-03-20 DIAGNOSIS — Z79.01 LONG TERM CURRENT USE OF ANTICOAGULANT THERAPY: ICD-10-CM

## 2020-03-20 LAB — INR POINT OF CARE: 2.1 (ref 0.86–1.14)

## 2020-03-20 PROCEDURE — 85610 PROTHROMBIN TIME: CPT | Mod: QW

## 2020-03-20 PROCEDURE — 99207 ZZC NO CHARGE NURSE ONLY: CPT

## 2020-03-20 PROCEDURE — 36416 COLLJ CAPILLARY BLOOD SPEC: CPT

## 2020-03-20 NOTE — PROGRESS NOTES
ANTICOAGULATION FOLLOW-UP CLINIC VISIT    Patient Name:  Cricket Klein  Date:  3/20/2020  Contact Type:  Face to Face    SUBJECTIVE:  Patient Findings     Comments:   No acute changes in nellie, medications, diet (vitamin K intake), or activity noted. Took warfarin as instructed, denies any missed doses. No issues with bleeding or unusual bruising noted.         Clinical Outcomes     Negatives:   Major bleeding event, Thromboembolic event, Anticoagulation-related hospital admission, Anticoagulation-related ED visit, Anticoagulation-related fatality    Comments:   No acute changes in nellie, medications, diet (vitamin K intake), or activity noted. Took warfarin as instructed, denies any missed doses. No issues with bleeding or unusual bruising noted.            OBJECTIVE    INR Protime   Date Value Ref Range Status   2020 2.1 (A) 0.86 - 1.14 Final       ASSESSMENT / PLAN  No question data found.  Anticoagulation Summary  As of 3/20/2020    INR goal:   2.0-3.0   TTR:   67.8 % (1 y)   INR used for dosin.1 (3/20/2020)   Warfarin maintenance plan:   1.25 mg (2.5 mg x 0.5) every Mon, Thu; 2.5 mg (2.5 mg x 1) all other days   Full warfarin instructions:   1.25 mg every Mon, Thu; 2.5 mg all other days   Weekly warfarin total:   15 mg   No change documented:   Hoda Meng RN   Plan last modified:   Kimberly Zamora RN (2020)   Next INR check:   2020   Priority:   Maintenance   Target end date:   Indefinite    Indications    Other and unspecified coagulation defects [D68.9]  Long-term (current) use of anticoagulants [Z79.01] [Z79.01]             Anticoagulation Episode Summary     INR check location:       Preferred lab:       Send INR reminders to:   [facility]    Comments:   * Had PE in  following hip surgery. Has heterozygous prothrombin gene mutation. Second PE 16. Needs lifelong warfarin. was on warfarin [id number].        Anticoagulation Care Providers     Provider  Role Specialty Phone number    Saud Cruz MD [facility] [phone]            See the Encounter Report to view Anticoagulation Flowsheet and Dosing Calendar (Go to Encounters tab in chart review, and find the Anticoagulation Therapy Visit)        [facility]

## 2020-03-22 DIAGNOSIS — F03.90 DEMENTIA WITHOUT BEHAVIORAL DISTURBANCE, UNSPECIFIED DEMENTIA TYPE: ICD-10-CM

## 2020-03-22 NOTE — TELEPHONE ENCOUNTER
"Requested Prescriptions   Pending Prescriptions Disp Refills     sertraline (ZOLOFT) 25 MG tablet [Pharmacy Med Name: SERTRALINE HCL 25MG TABS] 30 tablet 1     Sig: TAKE ONE TABLET BY MOUTH ONCE DAILY       SSRIs Protocol Failed - 3/22/2020  5:02 AM        Failed - Medication is active on med list        Passed - Recent (12 mo) or future (30 days) visit within the authorizing provider's specialty     Patient has had an office visit with the authorizing provider or a provider within the authorizing providers department within the previous 12 mos or has a future within next 30 days. See \"Patient Info\" tab in inbasket, or \"Choose Columns\" in Meds & Orders section of the refill encounter.      Name from pharmacy: SERTRALINE HCL 25MG TABS           Will file in chart as: sertraline (ZOLOFT) 25 MG tablet     The original prescription was discontinued on 3/4/2020 by Narinder Cardona MD for the following reason: Therapy completed. Renewing this prescription may not be appropriate.          Sig: TAKE ONE TABLET BY MOUTH ONCE DAILY     Disp:  30 tablet    Refills:  1              Passed - Patient is age 18 or older           mirtazapine (REMERON) 7.5 MG tablet [Pharmacy Med Name: MIRTAZAPINE 7.5MG TABS] 30 tablet 1     Sig: TAKE ONE TABLET BY MOUTH AT BEDTIME       Atypical Antidepressants Protocol Failed - 3/22/2020  5:02 AM        Failed - Medication active on med list        Passed - Recent (12 mo) or future (30 days) visit within the authorizing provider's specialty     Patient has had an office visit with the authorizing provider or a provider within the authorizing providers department within the previous 12 mos or has a future within next 30 days. See \"Patient Info\" tab in inbasket, or \"Choose Columns\" in Meds & Orders section of the refill encounter.      Name from pharmacy: MIRTAZAPINE 7.5MG TABS           Will file in chart as: mirtazapine (REMERON) 7.5 MG tablet     The original prescription was discontinued on " 1/30/2020 by Saud Cruz MD. Renewing this prescription may not be appropriate.                Passed - Patient is age 18 or older

## 2020-03-23 RX ORDER — SERTRALINE HYDROCHLORIDE 25 MG/1
TABLET, FILM COATED ORAL
Qty: 30 TABLET | Refills: 0 | Status: SHIPPED | OUTPATIENT
Start: 2020-03-23 | End: 2020-04-02

## 2020-03-23 RX ORDER — MIRTAZAPINE 7.5 MG/1
TABLET, FILM COATED ORAL
Qty: 30 TABLET | Refills: 0 | Status: SHIPPED | OUTPATIENT
Start: 2020-03-23 | End: 2020-04-02

## 2020-04-02 ENCOUNTER — VIRTUAL VISIT (OUTPATIENT)
Dept: NEUROLOGY | Facility: CLINIC | Age: 75
End: 2020-04-02
Payer: COMMERCIAL

## 2020-04-02 DIAGNOSIS — F03.91 DEMENTIA WITH BEHAVIORAL DISTURBANCE, UNSPECIFIED DEMENTIA TYPE: Primary | ICD-10-CM

## 2020-04-02 DIAGNOSIS — Z87.828 HISTORY OF HEAD INJURY: ICD-10-CM

## 2020-04-02 PROCEDURE — 99213 OFFICE O/P EST LOW 20 MIN: CPT | Mod: TEL | Performed by: PSYCHIATRY & NEUROLOGY

## 2020-04-02 NOTE — PATIENT INSTRUCTIONS
Plan:    -- Continue the Aricept (donepezil) 10mg at bedtime.   -- Consider adding Namenda (memantine) for memory as well. *Information provided. We would start Claudio on 5mg twice daily if he wishes to try the medication. Give us a call if you would like me to order this.  -- Keep up the good work keeping Claudio active!  -- Return to Neurology clinic in 3-4 months. Let us know if any concerns arise in the meantime.

## 2020-04-02 NOTE — PROGRESS NOTES
"Cricket Klein is a 74 year old male who is being evaluated via a billable telephone visit.      The patient has been notified of following:     \"This telephone visit will be conducted via a call between you and your physician/provider. We have found that certain health care needs can be provided without the need for a physical exam.  This service lets us provide the care you need with a short phone conversation.  If a prescription is necessary we can send it directly to your pharmacy.  If lab work is needed we can place an order for that and you can then stop by our lab to have the test done at a later time.    If during the course of the call the physician/provider feels a telephone visit is not appropriate, you will not be charged for this service.\"     Patient has given verbal consent for Telephone visit?  Yes    Chief Complaint   Patient presents with     Neurologic Problem     reviewing medications / recheck       I have reviewed and updated the patient's Past Medical History, Social History, Family History and Medication List.    ALLERGIES  Atorvastatin calcium; Pravastatin; and Zocor [hmg-coa-r inhibitors]    Physician Notes:  Mr. Klein is a pleasant 73 yo man followed by Neurology for dementia (atypical Alzheimer's versus PPA per Neuropsych evaluation). He also has history of head injury. Claudio is on Aricept for memory. When we met about 9 months ago, we discussed adding Namenda for memory as well. He and his wife wanted to think about it. He had stopped driving and they felt things were otherwise going well at home. I did put in a referral for resources regarding a day program, other groups for Claudio. I see that [facility] reached out a couple of times but had trouble connecting with Claudio and his wife.     I spoke with Claudio's wife on the phone today. She says that Claudio's memory is declining, but they are managing well with help from family members.     He does some chores around the yard with them " "lately and gets out for walks which seems good for him. He has a hard time staying on task, but is directable. He sometimes gets confused about who his wife is, asks about \"the other lady,\" she says. He does show some evidence of sun-downing and shows some paranoia in the evenings: wants to shut all of the drapes, talks about the [place] war.     He is having some problems with incontinence now too.    She says his mood is up and down. She says he had side effects on the mirtazepine (foot swelling) and the sertraline seems to make Claudio's mood worse so they also stopped this. He is taking the Aricept and has not complained of any side effects with this.     He sleeps well with his CPAP.     Shawna says she did speak with our  and received some phone numbers for support groups and then they did attend a support group in [place] and she got a lot of good resources from there. She does not feel that Claudio will participate in a day program or group craft session at this point.     Assessment and Plan:  Encounter Diagnoses   Name Primary?     Dementia with behavioral disturbance, unspecified dementia type (H) Yes     History of head injury        Mr. Klein is a pleasant 73 yo man followed in Neurology for dementia. I spoke with his wife, Shawna, on the phone today, as he himself would have trouble participating in a phone visit due to his cognitive impairment. Shawna has noticed some progression of his dementia and some possible associated sun-downing. She feels that they are managing well at home though still, with the help of family. Claudio seems to be tolerating the Aricept well. We discussed adding Namenda. I will send some information about the medication for family to review. I would like to see Claudio back in clinic in 3-4 months, as long as it is safe to do so from a public health standpoint. His wife understands and agrees with the plan.    Patient Instructions:  -- Continue the Aricept (donepezil) 10mg at " bedtime.   -- Consider adding Namenda (memantine) for memory as well. *Information provided. We would start Claudio on 5mg twice daily if he wishes to try the medication. Give us a call if you would like me to order this.  -- Keep up the good work keeping Claudio active!  -- Return to Neurology clinic in 3-4 months. Let us know if any concerns arise in the meantime.      Phone call duration: 20 minutes    Emily Fernandes MD  Neurology

## 2020-04-17 ENCOUNTER — ANTICOAGULATION THERAPY VISIT (OUTPATIENT)
Dept: ANTICOAGULATION | Facility: CLINIC | Age: 75
End: 2020-04-17
Payer: COMMERCIAL

## 2020-04-17 DIAGNOSIS — G45.9 TRANSIENT CEREBRAL ISCHEMIA, UNSPECIFIED TYPE: ICD-10-CM

## 2020-04-17 DIAGNOSIS — Z79.01 LONG TERM CURRENT USE OF ANTICOAGULANT THERAPY: ICD-10-CM

## 2020-04-17 DIAGNOSIS — I26.99 PULMONARY EMBOLISM WITHOUT ACUTE COR PULMONALE, UNSPECIFIED CHRONICITY, UNSPECIFIED PULMONARY EMBOLISM TYPE (H): Chronic | ICD-10-CM

## 2020-04-17 DIAGNOSIS — Z86.2: ICD-10-CM

## 2020-04-17 LAB
CAPILLARY BLOOD COLLECTION: NORMAL
INR PPP: 2.4 (ref 0.86–1.14)

## 2020-04-17 PROCEDURE — 99207 ZZC NO CHARGE NURSE ONLY: CPT

## 2020-04-17 PROCEDURE — 36416 COLLJ CAPILLARY BLOOD SPEC: CPT | Performed by: FAMILY MEDICINE

## 2020-04-17 PROCEDURE — 85610 PROTHROMBIN TIME: CPT | Performed by: FAMILY MEDICINE

## 2020-04-17 NOTE — PROGRESS NOTES
ANTICOAGULATION FOLLOW-UP CLINIC VISIT    Patient Name:  Cricket Klein  Date:  2020  Contact Type:  Telephone/ spoke with wife, Shawna    SUBJECTIVE:  Patient Findings     Comments:   No changes in diet, activity level, medications (including over the counter), or health. They are considering adding namenda but have not as of today. Per lexicomp, there is no interaction with namenda and warfarin. No missed doses of warfarin. Patient took dosing as prescribed. No signs of clots or bleeding concerns. Patient will continue maintenance warfarin dosing.          Clinical Outcomes     Negatives:   Major bleeding event, Thromboembolic event, Anticoagulation-related hospital admission, Anticoagulation-related ED visit, Anticoagulation-related fatality    Comments:   No changes in diet, activity level, medications (including over the counter), or health. They are considering adding namenda but have not as of today. Per lexicomp, there is no interaction with namenda and warfarin. No missed doses of warfarin. Patient took dosing as prescribed. No signs of clots or bleeding concerns. Patient will continue maintenance warfarin dosing.             OBJECTIVE    INR   Date Value Ref Range Status   2020 2.40 (H) 0.86 - 1.14 Final     Comment:     This test is intended for monitoring Coumadin therapy.  Results are not   accurate in patients with prolonged INR due to factor deficiency.         ASSESSMENT / PLAN  INR assessment THER    Recheck INR In: 6 WEEKS    INR Location Clinic      Anticoagulation Summary  As of 2020    INR goal:   2.0-3.0   TTR:   67.9 % (1 y)   INR used for dosin.40 (2020)   Warfarin maintenance plan:   1.25 mg (2.5 mg x 0.5) every Mon, Thu; 2.5 mg (2.5 mg x 1) all other days   Full warfarin instructions:   1.25 mg every Mon, Thu; 2.5 mg all other days   Weekly warfarin total:   15 mg   No change documented:   Kimberly Zamora RN   Plan last modified:   Kimberly Zamora RN (2020)    Next INR check:   5/29/2020   Priority:   Maintenance   Target end date:   Indefinite    Indications    Other and unspecified coagulation defects [D68.9]  Long-term (current) use of anticoagulants [Z79.01] [Z79.01]             Anticoagulation Episode Summary     INR check location:       Preferred lab:       Send INR reminders to:   [facility]    Comments:   * Had PE in 2010 following hip surgery. Has heterozygous prothrombin gene mutation. Second PE 7-26-16. Needs lifelong warfarin. was on warfarin [id number]. Do not leave info. on         Anticoagulation Care Providers     Provider Role Specialty Phone number    Saud Cruz MD [facility] [phone]            See the Encounter Report to view Anticoagulation Flowsheet and Dosing Calendar (Go to Encounters tab in chart review, and find the Anticoagulation Therapy Visit)        Kimberly Zamora RN

## 2020-04-18 DIAGNOSIS — F03.90 DEMENTIA WITHOUT BEHAVIORAL DISTURBANCE, UNSPECIFIED DEMENTIA TYPE: ICD-10-CM

## 2020-04-18 NOTE — TELEPHONE ENCOUNTER
"Requested Prescriptions   Pending Prescriptions Disp Refills     mirtazapine (REMERON) 7.5 MG tablet [Pharmacy Med Name: MIRTAZAPINE 7.5MG TABS] 30 tablet 0     Sig: TAKE ONE TABLET BY MOUTH EVERY NIGHT AT BEDTIME       Atypical Antidepressants Protocol Failed - 4/18/2020  5:02 AM        Failed - Medication active on med list        Passed - Recent (12 mo) or future (30 days) visit within the authorizing provider's specialty     Patient has had an office visit with the authorizing provider or a provider within the authorizing providers department within the previous 12 mos or has a future within next 30 days. See \"Patient Info\" tab in inbasket, or \"Choose Columns\" in Meds & Orders section of the refill encounter.              Passed - Patient is age 18 or older           Name from pharmacy: MIRTAZAPINE 7.5MG TABS           Will file in chart as: mirtazapine (REMERON) 7.5 MG tablet     The original prescription was discontinued on 4/2/2020 by Emily Hilton MD for the following reason: Stopped by Patient. Renewing this prescription may not be appropriate       "

## 2020-04-20 RX ORDER — MIRTAZAPINE 7.5 MG/1
TABLET, FILM COATED ORAL
Qty: 30 TABLET | Refills: 0 | OUTPATIENT
Start: 2020-04-20

## 2020-05-06 DIAGNOSIS — G47.33 OSA (OBSTRUCTIVE SLEEP APNEA): Primary | ICD-10-CM

## 2020-05-07 ENCOUNTER — TELEPHONE (OUTPATIENT)
Dept: FAMILY MEDICINE | Facility: CLINIC | Age: 75
End: 2020-05-07

## 2020-05-07 NOTE — TELEPHONE ENCOUNTER
Patient's wife and daughter are calling stating Cricket has 2+ pitting edema on both shins. On his right shin he has a 1 1/2 mm red Venetie that is not open yet but looks like it is close to being an open sore. The back of his calves are not pitting edema but they do have edema in them. Daughter states patient has tube socks on and the sore is right above the tube sock line. She did tell him to stop wearing those because they are clearly digging into his legs. The lower part of his leg is okay but above the tube sock area is swollen.     Please advise.    Krystle Lacy-Station

## 2020-05-07 NOTE — TELEPHONE ENCOUNTER
Pt called, pt has had ongoing leg swelling issues, and now they are concerned that is has developed a pressure area on leg. Currently not open. No fever or warmth to touch. Appointment made for tomorrow. Elly Centeno RN

## 2020-05-08 ENCOUNTER — OFFICE VISIT (OUTPATIENT)
Dept: FAMILY MEDICINE | Facility: CLINIC | Age: 75
End: 2020-05-08
Payer: COMMERCIAL

## 2020-05-08 VITALS
HEART RATE: 63 BPM | WEIGHT: 232 LBS | SYSTOLIC BLOOD PRESSURE: 132 MMHG | DIASTOLIC BLOOD PRESSURE: 80 MMHG | OXYGEN SATURATION: 97 % | HEIGHT: 66 IN | BODY MASS INDEX: 37.28 KG/M2 | TEMPERATURE: 99 F | RESPIRATION RATE: 18 BRPM

## 2020-05-08 DIAGNOSIS — Z79.01 LONG TERM CURRENT USE OF ANTICOAGULANT THERAPY: ICD-10-CM

## 2020-05-08 DIAGNOSIS — R60.0 PEDAL EDEMA: Primary | ICD-10-CM

## 2020-05-08 DIAGNOSIS — I10 BENIGN ESSENTIAL HTN: ICD-10-CM

## 2020-05-08 LAB
NT-PROBNP SERPL-MCNC: 204 PG/ML (ref 0–125)
TSH SERPL DL<=0.005 MIU/L-ACNC: 2.8 MU/L (ref 0.4–4)

## 2020-05-08 PROCEDURE — 83880 ASSAY OF NATRIURETIC PEPTIDE: CPT | Performed by: FAMILY MEDICINE

## 2020-05-08 PROCEDURE — 36415 COLL VENOUS BLD VENIPUNCTURE: CPT | Performed by: FAMILY MEDICINE

## 2020-05-08 PROCEDURE — 99214 OFFICE O/P EST MOD 30 MIN: CPT | Performed by: FAMILY MEDICINE

## 2020-05-08 PROCEDURE — 84443 ASSAY THYROID STIM HORMONE: CPT | Performed by: FAMILY MEDICINE

## 2020-05-08 ASSESSMENT — MIFFLIN-ST. JEOR: SCORE: 1740.1

## 2020-05-08 NOTE — PATIENT INSTRUCTIONS
Patient Education     Coping with Edema  What is edema?  Edema is the build-up of fluid in the body, which causes swelling. Swelling most commonly occurs in the feet, ankles, lower legs or hands.  Swelling can occur in the belly or chest may be a sign of a more severe problem.  Certain medicines or conditions can make the swelling worse.  Symptoms include:    Feet and lower legs get larger when you sit or walk.    Hands feel tight when you make a fist.    When you push on the skin, skin stays dented.    Shiny, tight skin.    Fast weight gain.  How is it treated?  Your care team may give you a medicine to reduce the swelling.  They may also suggest that you meet with a dietitian. He or she can help with food choices to reduce the swelling.  What can I do about the swelling?    Place your feet above your heart 3 times a day: Sit with your feet up on a stool with a pillow. Sit on the bed or couch with two pillows under your feet.    Do not stand for long periods of time.    Wear loose-fitting clothes.    Do not cross your legs.    Reduce the salt in your diet. These foods are high in salt:  ? Chips, soup  ? Frozen meals, TV dinners  ? Patel, lunch meat, ham  ? Sauces (soy, canned spaghetti sauce)    Walk or do other exercise.    Wear compression stockings.    Drink water as normal.    Weigh yourself every day at the same time to keep track of weight gain.  When should I call my care team?  Call your care team if:    You have a hard time breathing.    You gained 5 pounds or more in 1 week.    Your hands or feet feel cold when you touch them.    You are peeing very little or not at all.    Swelling is moving up your arms or legs.    Your tongue is swelling.    You cannot eat for more than a day  If you have any side effects, call us. We can help you manage these problems.  For more information,  see:  www.[employer]ocare.com  www.cancer.org/treatment/treatmentsandsideeffects/physicalsideeffects/dealingwithsymptomsathome  www.cancer.gov/cancertopics/coping/chemotherapy-and-you  Comments:  __________________________________________  __________________________________________  __________________________________________  __________________________________________  __________________________________________  __________________________________________  __________________________________________  For informational purposes only. Not to replace the advice of your health care provider.  Copyright   2014 [employer]. All rights reserved. [employer] 522243 - REV 03/16.  For informational purposes only. Not to replace the advice of your health care provider.  Copyright   2018 [employer]. All rights reserved.           Patient Education     Leg Swelling in Both Legs    Swelling of the feet, ankles, and legs is called edema. It is caused by excess fluid that has collected in the tissues. Extra fluid in the body settles in the lowest part because of gravity. This is why the legs and feet are most affected.  Some of the causes for edema include:    Disease of the heart like congestive heart failure    Standing or sitting for long periods of time    Infection of the feet or legs    Blood pooling in the veins of your legs (venous insufficiency)    Dilated veins in your lower leg (varicose veins)    Garters or other clothing that is tight on your legs. This will cause blood to pool in your legs because the clothing limits blood flow.    Some medicines such as hormones like birth control pills, some blood pressure medicines like calcium channel blockers (amlodipine) and steroids, some antidepressants like MAO inhibitors and tricyclics    Menstrual periods that cause you to retain fluids    Many types of renal disease    Liver failure or cirrhosis    Pregnancy, some swelling is normal, but a sudden increase  in leg swelling or weight gain can be a sign of a dangerous complication of pregnancy    Poor nutrition    Thyroid disease  Medical treatment will depend on what is causing the swelling in your legs. Your healthcare provider may prescribe water pills (diuretics) to get rid of the extra fluid.  Home care  Follow these guidelines when caring for yourself at home:    Don't wear clothing like garters that is tight on your legs.    Keep your legs up while lying or sitting.    If infection, injury, or recent surgery is causing the swelling, stay off your legs as much as possible until symptoms get better.    If your healthcare provider says that your leg swelling is caused by venous insufficiency or varicose veins, don't sit or  one place for long periods of time. Take breaks and walk about every few hours. Brisk walking is a good exercise. It helps circulate the blood that has collected in your leg. Talk with your provider about using support stockings to stop daytime leg swelling.    If your provider says that heart disease is causing your leg swelling, follow a low-salt diet to stop extra fluid from staying in your body. You may also need medicine.  Follow-up care  Follow up with your healthcare provider, or as advised.  When to seek medical advice  Call your healthcare provider right away if any of these occur:    New shortness of breath or chest pain    Shortness of breath or chest pain that gets worse    Swelling in both legs or ankles that gets worse    Swelling of the abdomen    Redness, warmth, or swelling in one leg    Fever of 100.4 F (38 C) or higher, or as directed by your healthcare provider    Yellow color to your skin or eyes    Rapid, unexplained weight gain    Having to sleep upright or use an increased number of pillows  Date Last Reviewed: 3/31/2016    7483-5123 [employer]. [address] [place] [address], [place] [address]. All rights reserved. This information is not intended as a  substitute for professional medical care. Always follow your healthcare professional's instructions.

## 2020-05-08 NOTE — LETTER
May 11, 2020      Cricket Klein  [address] [place] [address]        Dear ,    BNP (marker of heart failure) and thyroid function test came back unremarkable. Let us know if there are any questions.    BNP-N terminal pro   Result Value Ref Range    N-Terminal Pro Bnp 204 (H) 0 - 125 pg/mL   TSH with free T4 reflex   Result Value Ref Range    TSH 2.80 0.40 - 4.00 mU/L       Sincerely,        Marquise Hughes MD

## 2020-05-08 NOTE — PROGRESS NOTES
SUBJECTIVE   Cricket Klein is a 74 year old male who presents with     Leg swelling       Duration: about a week - probably longer    Description (location/character/radiation): both legs are swelling. Getting some red spots starting on the right leg     Intensity:  moderate    Accompanying signs and symptoms: Tender to the touch.  Feet will get swollen a little bit on top at times.     History (similar episodes/previous evaluation): None    Precipitating or alleviating factors: None    Therapies tried and outcome:          PCP   Saud Cruz [phone]    Health Maintenance        Health Maintenance Due   Topic Date Due     HEPATITIS C SCREENING  1945     ZOSTER IMMUNIZATION (1 of 2) 07/01/1996     AORTIC ANEURYSM SCREENING (SYSTEM ASSIGNED)  07/01/2011     OP ANNUAL INR REFERRAL  01/26/2012       HPI        Patient Active Problem List   Diagnosis     Essential hypertension with goal blood pressure less than 140/90     Transient cerebral ischemia     Pure hypercholesterolemia     Impotence of organic origin     Pulmonary embolism (H)     S/P hip replacement     Other and unspecified coagulation defects     Hyperlipidemia LDL goal <130     Advanced directives, counseling/discussion     CHRISTIANO (obstructive sleep apnea)     Long-term (current) use of anticoagulants [Z79.01]     Hip arthrosis     Dementia (H)     Morbid obesity (H)     Current Outpatient Medications   Medication     atenolol (TENORMIN) 50 MG tablet     cetirizine (ZYRTEC) 10 MG tablet     donepezil (ARICEPT) 10 MG tablet     finasteride (PROSCAR) 5 MG tablet     fluticasone (FLONASE) 50 MCG/ACT nasal spray     nystatin (MYCOSTATIN) 450872 UNIT/GM external cream     order for DME     order for DME     Pediatric Multivit-Minerals-C (GUMMY VITAMINS & MINERALS) chewable tablet     tamsulosin (FLOMAX) 0.4 MG capsule     warfarin ANTICOAGULANT (JANTOVEN ANTICOAGULANT) 2.5 MG tablet     No current facility-administered medications for this  visit.        Patient Active Problem List   Diagnosis     Essential hypertension with goal blood pressure less than 140/90     Transient cerebral ischemia     Pure hypercholesterolemia     Impotence of organic origin     Pulmonary embolism (H)     S/P hip replacement     Other and unspecified coagulation defects     Hyperlipidemia LDL goal <130     Advanced directives, counseling/discussion     CHRISTIANO (obstructive sleep apnea)     Long-term (current) use of anticoagulants [Z79.01]     Hip arthrosis     Dementia (H)     Morbid obesity (H)     Past Surgical History:   Procedure Laterality Date     ARTHROPLASTY HIP Right 7/28/2017    Procedure: ARTHROPLASTY HIP;  Right total hip arthroplasty;  Surgeon: Davy Hutchins MD;  Location: [facility]     ARTHROTOMY SHOULDER, ROTATOR CUFF REPAIR, COMBINED  4/2/2012    Procedure:COMBINED ARTHROTOMY SHOULDER, ROTATOR CUFF REPAIR; Left Distal clavicle excision, Subacromial decompression, Rotator cuff repair; Surgeon:DAVY HUTCHINS; Location:[facility]     ARTHROTOMY SHOULDER, ROTATOR CUFF REPAIR, COMBINED  10/12/2012    Procedure: COMBINED ARTHROTOMY SHOULDER, ROTATOR CUFF REPAIR;  Right shoulder biceps tenodesiss,subacromial decompression;  Surgeon: Davy Hutchins MD;  Location: [facility]     COLONOSCOPY  03/17/2003    normal     COLONOSCOPY  4/15/2014    Procedure: Colonoscopy;  Surgeon: Angela May MD;  Location: [facility]     RELEASE CARPAL TUNNEL Right 4/10/2018    Procedure: RELEASE CARPAL TUNNEL;  Right Open Carpal Tunnel Release;  Surgeon: Jabari Smith MD;  Location: [facility]     SURGICAL HISTORY OF -   1970    achilles tendon repair     SURGICAL HISTORY OF -   3-2010    left hip relpacement       Social History     Tobacco Use     Smoking status: Never Smoker     Smokeless tobacco: Never Used   Substance Use Topics     Alcohol use: Yes     Alcohol/week: 0.0 standard drinks     Comment: rare     Family History   Problem Relation Age of Onset      Thyroid Disease Mother         thyroid cancer     Heart Disease Mother          of heart attack     Heart Disease Father          of heart attack     Diabetes Father      Circulatory Brother      Alzheimer Disease Brother      Circulatory Brother      Cancer Brother         laryngeal     Circulatory Brother      Heart Disease Brother         CHF     Congenital Anomalies Sister         valve     Heart Disease Brother         Heart failure     Heart Disease Sister      Other - See Comments Other 12        Special needs child         Current Outpatient Medications   Medication Sig Dispense Refill     atenolol (TENORMIN) 50 MG tablet Take 1 tablet (50 mg) by mouth 2 times daily 180 tablet 3     cetirizine (ZYRTEC) 10 MG tablet Take 10 mg by mouth daily       donepezil (ARICEPT) 10 MG tablet Take 1 tablet (10 mg) by mouth At Bedtime 30 tablet 11     finasteride (PROSCAR) 5 MG tablet Take 1 tablet (5 mg) by mouth daily 90 tablet 3     fluticasone (FLONASE) 50 MCG/ACT nasal spray Spray 2 sprays into both nostrils daily 16 g 11     nystatin (MYCOSTATIN) 988930 UNIT/GM external cream Apply topically 2 times daily To rash in axilla 30 g 0     order for DME Equipment ordered: RESMED ASV Mask type: Full face  Settings: ASV EPAP 7, PS MIN 4 MAX 15, RR AUTO       order for DME Equipment being ordered: CPAP  Cricket Klein received a Resmed AirSense 10 Auto. Pressures were set at Auto 10 - 18 cm H2O.       Pediatric Multivit-Minerals-C (GUMMY VITAMINS & MINERALS) chewable tablet Take 1 tablet by mouth daily       tamsulosin (FLOMAX) 0.4 MG capsule Take 1 capsule (0.4 mg) by mouth daily 90 capsule 3     warfarin ANTICOAGULANT (JANTOVEN ANTICOAGULANT) 2.5 MG tablet TAKE 1.25 mg Mon/Thurs; 2.5 mg all other days OR AS DIRECTED BY ANTICOAGULATION CLINIC 90 tablet 0     Allergies   Allergen Reactions     Atorvastatin Calcium Other (See Comments)     Myalgia.     Pravastatin Cramps     Myalgias       Zocor [Hmg-Coa-R  "Inhibitors] Other (See Comments)     Muscle pain     Recent Labs   Lab Test 01/22/20  1504 12/29/18  1425  07/12/17  1028 03/30/17  0829 07/26/16  0150 03/02/16  1156 02/10/16  1055 05/13/15  0741   LDL  --   --   --   --  154*  --   --  140* 138*   HDL  --   --   --   --  61  --   --  64 61   TRIG  --   --   --   --  131  --   --  154* 191*   ALT  --   --   --  28  --  44  --  45  --    CR 0.97 1.00   < > 0.80  --  0.81  --  0.80 0.87   GFRESTIMATED 77 75   < > >90  Non  GFR Calc    --  >90  Non  GFR Calc    --  >90  Non  GFR Calc   87   GFRESTBLACK 89 87   < > >90   GFR Calc    --  >90   GFR Calc    --  >90   GFR Calc   >90   GFR Calc     POTASSIUM 4.5 4.0  --  4.2  --  4.5  --  4.0 4.0   TSH  --   --   --   --   --   --  1.78  --   --     < > = values in this interval not displayed.      BP Readings from Last 3 Encounters:   05/08/20 132/80   03/04/20 130/86   03/02/20 135/82    Wt Readings from Last 3 Encounters:   05/08/20 105.2 kg (232 lb)   03/04/20 103.9 kg (229 lb)   03/02/20 101.7 kg (224 lb 3.3 oz)                    Reviewed and updated:  Tobacco  Allergies  Meds  Med Hx  Surg Hx  Fam Hx  Soc Hx     ROS:  Constitutional, neuro, ENT, endocrine, pulmonary, cardiac, gastrointestinal, genitourinary, musculoskeletal, integument and psychiatric systems are negative, except as otherwise noted.    PHYSICAL EXAM   /80 (Cuff Size: Adult Large)   Pulse 63   Temp 99  F (37.2  C) (Tympanic)   Resp 18   Ht 1.676 m (5' 6\")   Wt 105.2 kg (232 lb)   SpO2 97%   BMI 37.45 kg/m    Body mass index is 37.45 kg/m .  GENERAL: alert and no distress  NECK: no adenopathy, no asymmetry, masses, or scars and thyroid normal to palpation  RESP: lungs clear to auscultation - no rales, rhonchi or wheezes  CV: regular rate and rhythm, normal S1 S2, no S3 or S4, no murmur, click or rub, full and peripheral " pulses strong, JVP nondistended  ABDOMEN: soft, nontender, no hepatosplenomegaly, no masses and bowel sounds normal  MS: 1+ pedal edema bilaterally, Homans sign negative, pedal pulses 3+, sensation to touch and pressure intact,  SKIN: no suspicious lesions or rashes  NEURO: Normal strength and tone, mentation intact and speech normal            Lab Results   Component Value Date    INR 2.40 04/17/2020    INR 2.1 03/20/2020    INR 2.5 02/28/2020    INR 2.4 02/10/2020    INR 3.5 01/23/2020    INR 2.1 01/02/2020    INR 3.5 12/19/2019    INR 3.3 11/21/2019    INR 2.4 10/14/2019    INR 3.0 09/12/2019    INR 3.7 08/29/2019    INR 3.0 07/18/2019    INR 2.7 06/06/2019    INR 1.86 12/29/2018    INR 2.14 04/10/2018    INR 1.16 07/30/2017    INR 1.09 07/29/2017    INR 0.93 07/28/2017    INR 1.02 07/27/2016    INR 0.97 07/26/2016    INR 0.97 04/19/2012    INR 0.95 04/02/2012    INR 1.36 03/20/2010    INR 1.19 03/19/2010         Echo: 07/26/2016  Interpretation Summary     The right ventricle is normal in structure, function and size.  The left ventricle is normal in structure, function and size.  The visual ejection fraction is estimated at 55-60%.  Doppler interrogation does not demonstrate signifcant stenosis or  insufficiency involvng cardiac valves.      Wt Readings from Last 10 Encounters:   05/08/20 105.2 kg (232 lb)   03/04/20 103.9 kg (229 lb)   03/02/20 101.7 kg (224 lb 3.3 oz)   01/22/20 101.7 kg (224 lb 3.2 oz)   10/14/19 99.3 kg (219 lb)   09/05/19 97.9 kg (215 lb 12.8 oz)   07/26/19 97.1 kg (214 lb)   06/26/19 96.5 kg (212 lb 12.8 oz)   12/29/18 95.3 kg (210 lb)   11/13/18 96.6 kg (213 lb)       Assessment & Plan       ICD-10-CM    1. Pedal edema  R60.0 BNP-N terminal pro     TSH with free T4 reflex   2. Long-term (current) use of anticoagulants [Z79.01]  Z79.01    3. Benign essential HTN  I10         74-year-old male presented with bilateral leg swelling, wife started noticing about a week ago, unsure about for  how long it is been there.  Past medical history significant for multiple comorbidities including hypertension, obesity, dementia, benign prostatic hyperplasia and history of pulmonary bothersome.  Physical examination as described above, weight gain of about 10 pounds for last 6 months or so.  Suspect leg swelling multifactorial including related to obesity and peripheral venous insufficiency.  Well score 0, already on warfarin for pulmonary bothersome, last INR 2.40.  Echocardiogram in 2016 was unremarkable.  We will do BNP and TSH to rule out congestive cardiac failure and hypothyroidism respectively.  Blood pressure stable currently.  Treatment options discussed.  Compression stockings up to thigh-high placed in office today.  Suggested regular walks, leg elevation and avoiding salt.  Medications reviewed and no changes made.  Follow-up in 4 to 6 weeks or earlier if needed.  Written information provided.  Wife understood and in agreement with above plan.  All questions answered.      I spent 25 minutes during this encounter, greater than 50% of the time was spent on education, counseling, reviewing the plan of care, and coordination in regards to his specific condition.       Patient Instructions     Patient Education     Coping with Edema  What is edema?  Edema is the build-up of fluid in the body, which causes swelling. Swelling most commonly occurs in the feet, ankles, lower legs or hands.  Swelling can occur in the belly or chest may be a sign of a more severe problem.  Certain medicines or conditions can make the swelling worse.  Symptoms include:    Feet and lower legs get larger when you sit or walk.    Hands feel tight when you make a fist.    When you push on the skin, skin stays dented.    Shiny, tight skin.    Fast weight gain.  How is it treated?  Your care team may give you a medicine to reduce the swelling.  They may also suggest that you meet with a dietitian. He or she can help with food choices  to reduce the swelling.  What can I do about the swelling?    Place your feet above your heart 3 times a day: Sit with your feet up on a stool with a pillow. Sit on the bed or couch with two pillows under your feet.    Do not stand for long periods of time.    Wear loose-fitting clothes.    Do not cross your legs.    Reduce the salt in your diet. These foods are high in salt:  ? Chips, soup  ? Frozen meals, TV dinners  ? Patel, lunch meat, ham  ? Sauces (soy, canned spaghetti sauce)    Walk or do other exercise.    Wear compression stockings.    Drink water as normal.    Weigh yourself every day at the same time to keep track of weight gain.  When should I call my care team?  Call your care team if:    You have a hard time breathing.    You gained 5 pounds or more in 1 week.    Your hands or feet feel cold when you touch them.    You are peeing very little or not at all.    Swelling is moving up your arms or legs.    Your tongue is swelling.    You cannot eat for more than a day  If you have any side effects, call us. We can help you manage these problems.  For more information, see:  www.[facility].com  www.cancer.org/treatment/treatmentsandsideeffects/physicalsideeffects/dealingwithsymptomsathome  www.cancer.gov/cancertopics/coping/chemotherapy-and-you  Comments:  __________________________________________  __________________________________________  __________________________________________  __________________________________________  __________________________________________  __________________________________________  __________________________________________  For informational purposes only. Not to replace the advice of your health care provider.  Copyright   2014 [employer]. All rights reserved. [employer] 882143 - REV 03/16.  For informational purposes only. Not to replace the advice of your health care provider.  Copyright   2018 [employer]. All rights reserved.            Patient Education     Leg Swelling in Both Legs    Swelling of the feet, ankles, and legs is called edema. It is caused by excess fluid that has collected in the tissues. Extra fluid in the body settles in the lowest part because of gravity. This is why the legs and feet are most affected.  Some of the causes for edema include:    Disease of the heart like congestive heart failure    Standing or sitting for long periods of time    Infection of the feet or legs    Blood pooling in the veins of your legs (venous insufficiency)    Dilated veins in your lower leg (varicose veins)    Garters or other clothing that is tight on your legs. This will cause blood to pool in your legs because the clothing limits blood flow.    Some medicines such as hormones like birth control pills, some blood pressure medicines like calcium channel blockers (amlodipine) and steroids, some antidepressants like MAO inhibitors and tricyclics    Menstrual periods that cause you to retain fluids    Many types of renal disease    Liver failure or cirrhosis    Pregnancy, some swelling is normal, but a sudden increase in leg swelling or weight gain can be a sign of a dangerous complication of pregnancy    Poor nutrition    Thyroid disease  Medical treatment will depend on what is causing the swelling in your legs. Your healthcare provider may prescribe water pills (diuretics) to get rid of the extra fluid.  Home care  Follow these guidelines when caring for yourself at home:    Don't wear clothing like garters that is tight on your legs.    Keep your legs up while lying or sitting.    If infection, injury, or recent surgery is causing the swelling, stay off your legs as much as possible until symptoms get better.    If your healthcare provider says that your leg swelling is caused by venous insufficiency or varicose veins, don't sit or  one place for long periods of time. Take breaks and walk about every few hours. Brisk walking is a  good exercise. It helps circulate the blood that has collected in your leg. Talk with your provider about using support stockings to stop daytime leg swelling.    If your provider says that heart disease is causing your leg swelling, follow a low-salt diet to stop extra fluid from staying in your body. You may also need medicine.  Follow-up care  Follow up with your healthcare provider, or as advised.  When to seek medical advice  Call your healthcare provider right away if any of these occur:    New shortness of breath or chest pain    Shortness of breath or chest pain that gets worse    Swelling in both legs or ankles that gets worse    Swelling of the abdomen    Redness, warmth, or swelling in one leg    Fever of 100.4 F (38 C) or higher, or as directed by your healthcare provider    Yellow color to your skin or eyes    Rapid, unexplained weight gain    Having to sleep upright or use an increased number of pillows  Date Last Reviewed: 3/31/2016    8837-2800 [employer]. [address] [place] [address], [place] [address]. All rights reserved. This information is not intended as a substitute for professional medical care. Always follow your healthcare professional's instructions.               Marquise Hughes MD  [facility]

## 2020-05-08 NOTE — PROGRESS NOTES
Pt was fitted for thigh high RK stockings per [facility] for Dr. Hughes. Three measurements taken per  instructions which yielded a medium regular pair. These were provided, appropriate paperwork signed with spouse in pt's presence (has dx dementia and previous transient cerebral ischemia). Discussed with spouse donning and doffing of TEDs and how to care for them at HS. Encouraged to let us know if they have any difficulties with these.    Gisell MOON RN, BSN

## 2020-05-11 ENCOUNTER — TELEPHONE (OUTPATIENT)
Dept: FAMILY MEDICINE | Facility: CLINIC | Age: 75
End: 2020-05-11

## 2020-05-11 NOTE — TELEPHONE ENCOUNTER
Advised could try compression sock aides or rubber gloves.  I did google this for her and see that [employer] sells these aides  Radha Eric RN

## 2020-05-11 NOTE — TELEPHONE ENCOUNTER
Reason for call:  Patient reporting a symptom    Symptom or request: Shawna (wife) says Claudio saw Dr Hughes last week and he gave him some thigh high support stockings but she can't get them on him. He had a pair of them that go to the knees that he had from before so she put them on him but when she took them off he was swollen from the knees up. She wants to talk to a nurse. She isn't sure what to do.         Have you been treated for this before? Yes    Additional comments:      Phone Number patient can be reached at:  Home number on file [phone] (home)    Best Time:  anytime    Can we leave a detailed message on this number:  YES    Call taken on 5/11/2020 at 8:46 AM by Stacy Shane

## 2020-05-12 DIAGNOSIS — I26.99 PULMONARY EMBOLUS, LEFT (H): ICD-10-CM

## 2020-05-12 RX ORDER — WARFARIN SODIUM 2.5 MG/1
TABLET ORAL
Qty: 90 TABLET | Refills: 0 | Status: SHIPPED | OUTPATIENT
Start: 2020-05-12 | End: 2020-08-17

## 2020-05-12 NOTE — TELEPHONE ENCOUNTER
Current warfarin dose:  Full warfarin instructions:   1.25 mg every Mon, Thu; 2.5 mg all other days   Weekly warfarin total:   15 mg     Last office visit: 5/8/2020    Last INR result:  INR   Date Value Ref Range Status   04/17/2020 2.40 (H) 0.86 - 1.14 Final     Comment:     This test is intended for monitoring Coumadin therapy.  Results are not   accurate in patients with prolonged INR due to factor deficiency.         Refill authorized per [facility] protocol.    ZEN Zafar RN BSN

## 2020-05-19 ENCOUNTER — TELEPHONE (OUTPATIENT)
Dept: FAMILY MEDICINE | Facility: CLINIC | Age: 75
End: 2020-05-19

## 2020-05-19 NOTE — TELEPHONE ENCOUNTER
Reason for call:  Patient reporting a symptom    Symptom or request: Pt is being treated for Edema with compression stockings. During the crinkles by evening there is swollen bubbles where sock has bunched. Please Advise if this is ok    Phone Number patient can be reached at:  Home number on file [phone] (home)    Best Time:  Any Time      Can we leave a detailed message on this number:  YES    Call taken on 5/19/2020 at 1:37 PM by Ladonna Eduardo

## 2020-05-19 NOTE — TELEPHONE ENCOUNTER
I talked with Shawna and told her to be sure that compression socks are pulled up all the way.  Call if further questions  Radha Eric RN

## 2020-05-22 ENCOUNTER — OFFICE VISIT (OUTPATIENT)
Dept: FAMILY MEDICINE | Facility: CLINIC | Age: 75
End: 2020-05-22
Payer: COMMERCIAL

## 2020-05-22 ENCOUNTER — TELEPHONE (OUTPATIENT)
Dept: FAMILY MEDICINE | Facility: CLINIC | Age: 75
End: 2020-05-22

## 2020-05-22 VITALS
OXYGEN SATURATION: 98 % | DIASTOLIC BLOOD PRESSURE: 78 MMHG | HEART RATE: 61 BPM | WEIGHT: 229.4 LBS | SYSTOLIC BLOOD PRESSURE: 138 MMHG | RESPIRATION RATE: 24 BRPM | TEMPERATURE: 97.5 F | BODY MASS INDEX: 37.03 KG/M2

## 2020-05-22 DIAGNOSIS — R60.0 PEDAL EDEMA: Primary | ICD-10-CM

## 2020-05-22 PROBLEM — F03.90 DEMENTIA (H): Chronic | Status: ACTIVE | Noted: 2019-07-26

## 2020-05-22 PROBLEM — E66.01 MORBID OBESITY (H): Chronic | Status: ACTIVE | Noted: 2020-03-02

## 2020-05-22 PROCEDURE — 99213 OFFICE O/P EST LOW 20 MIN: CPT | Performed by: NURSE PRACTITIONER

## 2020-05-22 NOTE — TELEPHONE ENCOUNTER
Reason for Call:  Other call back    Detailed comments:  leg edema Rt leg worse looking for appointment today    Phone Number Patient can be reached at: Home number on file [phone] (home)    Best Time: anytime    Can we leave a detailed message on this number? Not Applicable    Call taken on 5/22/2020 at 7:45 AM by Josefa Nugent

## 2020-05-22 NOTE — PROGRESS NOTES
SUBJECTIVE   Cricket Klein is a  male who presents to clinic today accompanied by his wife for the following health issue(s):       Pedal edema recheck      Duration: 2 weeks    Description (location/character/radiation): lower extremity edema    Intensity:  moderate    Accompanying signs and symptoms: tender spot on right leg    History (similar episodes/previous evaluation): 5/8/20 office visit, was given thigh high stockings butt they were bunching up so patient is today put on knee high stockings    Precipitating or alleviating factors: htn    Therapies tried and outcome: thigh high stockings were too hard, using knee high     5/19/20 Pt is being treated for Edema with compression stockings. During the crinkles by evening there is swollen bubbles where sock has bunched. Please Advise if this is ok    5/8/2020 OV Pedal edema  MS: 1+ pedal edema bilaterally, Homans sign negative, pedal pulses 3+, sensation to touch and pressure intact,  Plan:   BNP  TSH  74-year-old male presented with bilateral leg swelling, wife started noticing about a week ago, unsure about for how long it is been there.  Past medical history significant for multiple comorbidities including hypertension, obesity, dementia, benign prostatic hyperplasia and history of pulmonary bothersome.  Physical examination as described above, weight gain of about 10 pounds for last 6 months or so.  Suspect leg swelling multifactorial including related to obesity and peripheral venous insufficiency.  Well score 0, already on warfarin for pulmonary bothersome, last INR 2.40.  Echocardiogram in 2016 was unremarkable.  We will do BNP and TSH to rule out congestive cardiac failure and hypothyroidism respectively.  Blood pressure stable currently.  Treatment options discussed.  Compression stockings up to thigh-high placed in office today.  Suggested regular walks, leg elevation and avoiding salt.  Medications reviewed and no changes made.  Follow-up in 4  to 6 weeks or earlier if needed.  Written information provided.  Wife understood and in agreement with above plan.  All questions answered.    Health Maintenance        Health Maintenance Due   Topic Date Due     HEPATITIS C SCREENING  1945     ZOSTER IMMUNIZATION (1 of 2) 07/01/1996     AORTIC ANEURYSM SCREENING (SYSTEM ASSIGNED)  07/01/2011     OP ANNUAL INR REFERRAL  01/26/2012       PCP   Saud Cruz [phone]    PROBLEM LIST        Patient Active Problem List   Diagnosis     Essential hypertension with goal blood pressure less than 140/90     Transient cerebral ischemia     Pure hypercholesterolemia     Impotence of organic origin     Pulmonary embolism (H)     S/P hip replacement     Other and unspecified coagulation defects     Hyperlipidemia LDL goal <130     Advanced directives, counseling/discussion     CHRISTIANO (obstructive sleep apnea)     Long-term (current) use of anticoagulants [Z79.01]     Hip arthrosis     Dementia (H)     Morbid obesity (H)       MEDICATIONS        Current Outpatient Medications   Medication     atenolol (TENORMIN) 50 MG tablet     cetirizine (ZYRTEC) 10 MG tablet     donepezil (ARICEPT) 10 MG tablet     finasteride (PROSCAR) 5 MG tablet     fluticasone (FLONASE) 50 MCG/ACT nasal spray     nystatin (MYCOSTATIN) 527425 UNIT/GM external cream     order for DME     order for DME     Pediatric Multivit-Minerals-C (GUMMY VITAMINS & MINERALS) chewable tablet     tamsulosin (FLOMAX) 0.4 MG capsule     warfarin ANTICOAGULANT (JANTOVEN ANTICOAGULANT) 2.5 MG tablet     No current facility-administered medications for this visit.        Reviewed and updated as needed this visit by Provider:  Tobacco  Allergies  Meds  Med Hx  Surg Hx  Fam Hx  Soc Hx     ROS      Constitutional, HEENT, cardiovascular, pulmonary, gi and gu systems are negative, except as otherwise noted.    PHYSICAL EXAM   /78   Pulse 61   Temp 97.5  F (36.4  C)   Resp 24   Wt 104.1 kg (229 lb 6.4  oz)   SpO2 98%   BMI 37.03 kg/m    Body mass index is 37.03 kg/m .  GENERAL APPEARANCE: healthy, alert and no distress  RESP: lungs clear to auscultation - no rales, rhonchi or wheezes  CV: regular rates and rhythm, normal S1 S2, no S3 or S4 and no murmur, click or rub  MS: extremities normal- no gross deformities noted  PSYCH: mentation appears normal and affect normal/bright    ASSESSMENT & PLAN     1. Pedal edema  Chronic, improved  - order for DME; Equipment being ordered: RK Hose  Knee high pair  Heel to back of knee 19 cm  Calf width 15 cm  Dispense: 2 Device; Refill: 0      Risks, benefits, side effects and rationale for treatment plan fully discussed with the patient and understanding expressed.  ENMA Tim-BC  [facility]

## 2020-05-22 NOTE — TELEPHONE ENCOUNTER
Spoke with spouse and appt made for today at 10:20am with Jillian TOBIAS.    Gisell MOON RN, BSN

## 2020-05-22 NOTE — PATIENT INSTRUCTIONS
1. Pedal edema  Chronic, improved  - order for DME; Equipment being ordered: RK Hose  Knee high pair  Heel to back of knee 19 cm  Calf width 15 cm  Dispense: 2 Device; Refill: 0

## 2020-05-29 ENCOUNTER — ANTICOAGULATION THERAPY VISIT (OUTPATIENT)
Dept: ANTICOAGULATION | Facility: CLINIC | Age: 75
End: 2020-05-29
Payer: COMMERCIAL

## 2020-05-29 DIAGNOSIS — Z79.01 LONG TERM CURRENT USE OF ANTICOAGULANT THERAPY: ICD-10-CM

## 2020-05-29 DIAGNOSIS — G45.9 TRANSIENT CEREBRAL ISCHEMIA, UNSPECIFIED TYPE: ICD-10-CM

## 2020-05-29 DIAGNOSIS — I26.99 PULMONARY EMBOLISM WITHOUT ACUTE COR PULMONALE, UNSPECIFIED CHRONICITY, UNSPECIFIED PULMONARY EMBOLISM TYPE (H): Chronic | ICD-10-CM

## 2020-05-29 DIAGNOSIS — Z86.2: ICD-10-CM

## 2020-05-29 LAB
CAPILLARY BLOOD COLLECTION: NORMAL
INR PPP: 3.3 (ref 0.86–1.14)

## 2020-05-29 PROCEDURE — 36416 COLLJ CAPILLARY BLOOD SPEC: CPT | Performed by: FAMILY MEDICINE

## 2020-05-29 PROCEDURE — 99207 ZZC NO CHARGE NURSE ONLY: CPT

## 2020-05-29 PROCEDURE — 85610 PROTHROMBIN TIME: CPT | Performed by: FAMILY MEDICINE

## 2020-05-29 NOTE — PROGRESS NOTES
ANTICOAGULATION FOLLOW-UP CLINIC VISIT    Patient Name:  Cricket Klein  Date:  5/29/2020  Contact Type:  Telephone    SUBJECTIVE:  Patient Findings     Positives:   Change in health (edema), Change in diet/appetite    Comments:   Writer spoke with patient briefly and then was handed over to his wife. She said he has been dealing with LE edema the last 2+ weeks. He is now using compression socks that are helping with this. They also have been eating less salads with spinach. No recent illnesses or diarrhea. No medication or activity changes. Patient plans to eat a spinach salad today and increase greens overall. Writer educated about the need to maintain consistency with vit K foods. Recheck INR in 2 weeks.        Clinical Outcomes     Comments:   Writer spoke with patient briefly and then was handed over to his wife. She said he has been dealing with LE edema the last 2+ weeks. He is now using compression socks that are helping with this. They also have been eating less salads with spinach. No recent illnesses or diarrhea. No medication or activity changes. Patient plans to eat a spinach salad today and increase greens overall. Writer educated about the need to maintain consistency with vit K foods. Recheck INR in 2 weeks.           OBJECTIVE    Recent labs: (last 7 days)     05/29/20  1002   INR 3.30*       ASSESSMENT / PLAN  INR assessment SUPRA    Recheck INR In: 2 WEEKS    INR Location Clinic      Anticoagulation Summary  As of 5/29/2020    INR goal:   2.0-3.0   TTR:   64.1 % (1 y)   INR used for dosing:   3.30! (5/29/2020)   Warfarin maintenance plan:   1.25 mg (2.5 mg x 0.5) every Mon, Thu; 2.5 mg (2.5 mg x 1) all other days   Full warfarin instructions:   1.25 mg every Mon, Thu; 2.5 mg all other days   Weekly warfarin total:   15 mg   No change documented:   Kimberly Zamora RN   Plan last modified:   Kimberly Zamora RN (1/23/2020)   Next INR check:   6/12/2020   Priority:   High   Target end date:    Indefinite    Indications    Other and unspecified coagulation defects [D68.9]  Long-term (current) use of anticoagulants [Z79.01] [Z79.01]             Anticoagulation Episode Summary     INR check location:       Preferred lab:       Send INR reminders to:   [facility]    Comments:   * Had PE in 2010 following hip surgery. Has heterozygous prothrombin gene mutation. Second PE 7-26-16. Needs lifelong warfarin. was on warfarin [id number]. Do not leave info. on         Anticoagulation Care Providers     Provider Role Specialty Phone number    Saud Cruz MD [facility] [phone]            See the Encounter Report to view Anticoagulation Flowsheet and Dosing Calendar (Go to Encounters tab in chart review, and find the Anticoagulation Therapy Visit)        Kimberly Zamora RN

## 2020-06-12 ENCOUNTER — ANTICOAGULATION THERAPY VISIT (OUTPATIENT)
Dept: ANTICOAGULATION | Facility: CLINIC | Age: 75
End: 2020-06-12
Payer: COMMERCIAL

## 2020-06-12 DIAGNOSIS — G45.9 TRANSIENT CEREBRAL ISCHEMIA, UNSPECIFIED TYPE: ICD-10-CM

## 2020-06-12 DIAGNOSIS — Z86.2: ICD-10-CM

## 2020-06-12 DIAGNOSIS — Z79.01 LONG TERM CURRENT USE OF ANTICOAGULANT THERAPY: ICD-10-CM

## 2020-06-12 DIAGNOSIS — I26.99 PULMONARY EMBOLISM WITHOUT ACUTE COR PULMONALE, UNSPECIFIED CHRONICITY, UNSPECIFIED PULMONARY EMBOLISM TYPE (H): Chronic | ICD-10-CM

## 2020-06-12 LAB
CAPILLARY BLOOD COLLECTION: NORMAL
INR PPP: 2.4 (ref 0.86–1.14)

## 2020-06-12 PROCEDURE — 99207 ZZC NO CHARGE NURSE ONLY: CPT

## 2020-06-12 PROCEDURE — 36416 COLLJ CAPILLARY BLOOD SPEC: CPT | Performed by: FAMILY MEDICINE

## 2020-06-12 PROCEDURE — 85610 PROTHROMBIN TIME: CPT | Performed by: FAMILY MEDICINE

## 2020-06-12 NOTE — PROGRESS NOTES
ANTICOAGULATION FOLLOW-UP CLINIC VISIT    Patient Name:  Cricket Klein  Date:  2020  Contact Type:  Telephone    SUBJECTIVE:  Patient Findings     Comments:   No changes in medications, activity, or diet noted. No concerns with clotting, bleeding, or increased bruising noted. Took warfarin as prescribed.  Pt handed the phone to his wife Shawna.  Patient is to continue maintenance warfarin plan, and check INR in 4 weeks.  Pat verbalizes understanding and agrees to plan. No further questions or concerns.        Clinical Outcomes     Negatives:   Major bleeding event, Thromboembolic event, Anticoagulation-related hospital admission, Anticoagulation-related ED visit, Anticoagulation-related fatality    Comments:   No changes in medications, activity, or diet noted. No concerns with clotting, bleeding, or increased bruising noted. Took warfarin as prescribed.  Pt handed the phone to his wife Shawna.  Patient is to continue maintenance warfarin plan, and check INR in 4 weeks.  Pat verbalizes understanding and agrees to plan. No further questions or concerns.           OBJECTIVE    Recent labs: (last 7 days)     20  1010   INR 2.40*       ASSESSMENT / PLAN  INR assessment THER    Recheck INR In: 4 WEEKS    INR Location Clinic      Anticoagulation Summary  As of 2020    INR goal:   2.0-3.0   TTR:   62.8 % (1 y)   INR used for dosin.40 (2020)   Warfarin maintenance plan:   1.25 mg (2.5 mg x 0.5) every Mon, Thu; 2.5 mg (2.5 mg x 1) all other days   Full warfarin instructions:   1.25 mg every Mon, Thu; 2.5 mg all other days   Weekly warfarin total:   15 mg   No change documented:   Bridget Bermeo RN   Plan last modified:   Kimberly Zamora RN (2020)   Next INR check:   7/10/2020   Priority:   Maintenance   Target end date:   Indefinite    Indications    Other and unspecified coagulation defects [D68.9]  Long-term (current) use of anticoagulants [Z79.01] [Z79.01]             Anticoagulation  Episode Summary     INR check location:       Preferred lab:       Send INR reminders to:   [facility]    Comments:   * Had PE in 2010 following hip surgery. Has heterozygous prothrombin gene mutation. Second PE 7-26-16. Needs lifelong warfarin. was on warfarin [id number]. Do not leave info. on VM        Anticoagulation Care Providers     Provider Role Specialty Phone number    Saud Cruz MD [facility] [phone]            See the Encounter Report to view Anticoagulation Flowsheet and Dosing Calendar (Go to Encounters tab in chart review, and find the Anticoagulation Therapy Visit)        Bridget Bermeo RN

## 2020-06-21 DIAGNOSIS — R41.89 COGNITIVE IMPAIRMENT: ICD-10-CM

## 2020-06-23 NOTE — TELEPHONE ENCOUNTER
Routing refill request to provider for review/approval because:  Ordered by neurology    Gisell MOON RN, BSN

## 2020-06-24 RX ORDER — DONEPEZIL HYDROCHLORIDE 10 MG/1
10 TABLET, FILM COATED ORAL AT BEDTIME
Qty: 30 TABLET | Refills: 11 | Status: SHIPPED | OUTPATIENT
Start: 2020-06-24

## 2020-07-21 ENCOUNTER — OFFICE VISIT (OUTPATIENT)
Dept: FAMILY MEDICINE | Facility: CLINIC | Age: 75
End: 2020-07-21
Payer: COMMERCIAL

## 2020-07-21 VITALS
BODY MASS INDEX: 36.96 KG/M2 | TEMPERATURE: 97.5 F | OXYGEN SATURATION: 99 % | HEART RATE: 65 BPM | DIASTOLIC BLOOD PRESSURE: 80 MMHG | SYSTOLIC BLOOD PRESSURE: 144 MMHG | RESPIRATION RATE: 14 BRPM | WEIGHT: 229 LBS

## 2020-07-21 DIAGNOSIS — R60.0 PERIPHERAL EDEMA: ICD-10-CM

## 2020-07-21 DIAGNOSIS — I10 ESSENTIAL HYPERTENSION WITH GOAL BLOOD PRESSURE LESS THAN 140/90: Chronic | ICD-10-CM

## 2020-07-21 DIAGNOSIS — F03.90 DEMENTIA WITHOUT BEHAVIORAL DISTURBANCE, UNSPECIFIED DEMENTIA TYPE: Primary | Chronic | ICD-10-CM

## 2020-07-21 PROCEDURE — 99214 OFFICE O/P EST MOD 30 MIN: CPT | Performed by: FAMILY MEDICINE

## 2020-07-21 RX ORDER — MEMANTINE HYDROCHLORIDE 5 MG/1
5 TABLET ORAL 2 TIMES DAILY
Qty: 60 TABLET | Refills: 1 | Status: SHIPPED | OUTPATIENT
Start: 2020-07-21 | End: 2020-01-01

## 2020-07-21 NOTE — PROGRESS NOTES
"SUBJECTIVE: Cricket Klein is a 75 year old male  Chief Complaint   Patient presents with     Edema        Seen 5/8 and 5/22 for edema.  He has had compression stockings.   LAB TESTING INCLUDED TSH and BNP which were OK.    EDEMA       Duration: ongoing for months     Description (location/character/radiation): right leg swelling     Intensity:  moderate    Accompanying signs and symptoms: edema comes and goes     History (similar episodes/previous evaluation): petal edema in the past     Precipitating or alleviating factors: None    Therapies tried and outcome: knee compression stockings      Usually wearing compression stockings daily.  They do seem to control swelling OK.  A little worse on hot days.  Uses stockings to just below knees.    On during the day and off at night.     Memory poor.  Difficulty with names friends and family, recognition.  Currently on Aricept 10mg daily.   Needs constant supervision.  Grandson living with then and son nearby.   Spouse has little ability to go out.  Tried having someone in but \"didn't work out\".  Claudio was not cooperative.  Gets crabby at night.   Some verbal abuse.    Can get to sleep OK.  Sometimes up at night.    Has not left the house or been lost.   Not driving.     HE has been on mirtazapine in the past-may have increased foot swelling.  sertraline seemd to make mood worse.     Patient Active Problem List   Diagnosis     Essential hypertension with goal blood pressure less than 140/90     Transient cerebral ischemia     Pure hypercholesterolemia     Impotence of organic origin     Pulmonary embolism (H)     S/P hip replacement     Other and unspecified coagulation defects     Hyperlipidemia LDL goal <130     Advanced directives, counseling/discussion     CHRISTIANO (obstructive sleep apnea)     Long-term (current) use of anticoagulants [Z79.01]     Hip arthrosis     Dementia (H)     Morbid obesity (H)     Pedal edema      ROS:General: POSITIVE for:, appetite increased for " a while.  Some weight gain earlier this summer.  Less appetite the past couple weeks.   Uses CPAP at night.  New head gear this week.    Resp: No coughing, wheezing or shortness of breath  CV: No chest pains or palpitations  GI: No nausea, vomiting, abdominal pain, diarrhea, constipation or change in bowel habits  Does have heartburn .  Uses 1/2 tsp soda water.   : POSITIVE for:, incontinence.  NO bladder control.   Musculoskeletal: No significant muscle or joint pains  Psychiatric: see above.  May be sad a bit.     OBJECTIVE:Blood pressure (!) 144/80, pulse 65, temperature 97.5  F (36.4  C), temperature source Tympanic, resp. rate 14, weight 103.9 kg (229 lb), SpO2 99 %. BMI=Body mass index is 36.96 kg/m .  GENERAL APPEARANCE ADULT: Alert, no acute distress, obese  NECK: No adenopathy,masses or thyromegaly  RESP: lungs clear to auscultation   CV: normal rate, regular rhythm, no murmur or gallop  MS: trace edema bilateral in lower legs.    Spouse does most of the talking.     ASSESSMENT:   (F03.90) Dementia without behavioral disturbance, unspecified dementia type (H)  (primary encounter diagnosis)  Comment: increasing memory problems.  Now with urinary incontinence and difficulty recognizing people he should know.  Some agitation.    Plan: memantine (NAMENDA) 5 MG tablet          Continue the donepezil   Add memantine starting with 5mg once daily for a week, then increase to twice daily.   Let me know if side effects related to medication.   follow-up with me or Dr. Fernandes in 1-2 months.   If increasing depression or anxiety/agitation, we could try a different medication.      (I10) Essential hypertension with goal blood pressure less than 140/90  Comment: a little high today  Plan: No change in current treatment plan.     (R60.9) Peripheral edema  Comment: doing well at this time  Plan: Continue compression stockings as you have been-on during the day and off at night.        7/22/2020:Spouse asked me the next  day about something for agitation/anxiety.  He is worse in the afternoon.    PLAN: citalopram 10mg daily.  May increase to 20mg (two pills) daily after 1-2 weeks if needed.   Call with progress in a month.  Let me know if side effects.

## 2020-07-21 NOTE — PATIENT INSTRUCTIONS
ASSESSMENT:   (F03.90) Dementia without behavioral disturbance, unspecified dementia type (H)  (primary encounter diagnosis)  Comment: increasing memory problems.  Now with urinary incontinence and difficulty recognizing people he should know.  Some agitation.    Plan: memantine (NAMENDA) 5 MG tablet          Continue the donepezil   Add memantine starting with 5mg once daily for a week, then increase to twice daily.   Let me know if side effects related to medication.   follow-up with me or Dr. Fernandes in 1-2 months.   If increasing depression or anxiety/agitation, we could try a different medication.      (I10) Essential hypertension with goal blood pressure less than 140/90  Comment: a little high today  Plan: No change in current treatment plan.     (R60.9) Peripheral edema  Comment: doing well at this time  Plan: Continue compression stockings as you have been-on during the day and off at night.

## 2020-07-22 ENCOUNTER — ANTICOAGULATION THERAPY VISIT (OUTPATIENT)
Dept: ANTICOAGULATION | Facility: CLINIC | Age: 75
End: 2020-07-22

## 2020-07-22 ENCOUNTER — DOCUMENTATION ONLY (OUTPATIENT)
Dept: ANTICOAGULATION | Facility: CLINIC | Age: 75
End: 2020-07-22

## 2020-07-22 DIAGNOSIS — Z79.01 LONG TERM CURRENT USE OF ANTICOAGULANT THERAPY: ICD-10-CM

## 2020-07-22 DIAGNOSIS — Z86.2: ICD-10-CM

## 2020-07-22 DIAGNOSIS — G45.9 TRANSIENT CEREBRAL ISCHEMIA, UNSPECIFIED TYPE: ICD-10-CM

## 2020-07-22 DIAGNOSIS — I26.99 PULMONARY EMBOLISM WITHOUT ACUTE COR PULMONALE, UNSPECIFIED CHRONICITY, UNSPECIFIED PULMONARY EMBOLISM TYPE (H): Chronic | ICD-10-CM

## 2020-07-22 DIAGNOSIS — I26.99 PULMONARY EMBOLISM WITHOUT ACUTE COR PULMONALE, UNSPECIFIED CHRONICITY, UNSPECIFIED PULMONARY EMBOLISM TYPE (H): Primary | Chronic | ICD-10-CM

## 2020-07-22 LAB
CAPILLARY BLOOD COLLECTION: NORMAL
INR PPP: 2.6 (ref 0.86–1.14)

## 2020-07-22 PROCEDURE — 36416 COLLJ CAPILLARY BLOOD SPEC: CPT | Performed by: FAMILY MEDICINE

## 2020-07-22 PROCEDURE — 85610 PROTHROMBIN TIME: CPT | Performed by: FAMILY MEDICINE

## 2020-07-22 RX ORDER — CITALOPRAM HYDROBROMIDE 10 MG/1
10 TABLET ORAL DAILY
Qty: 60 TABLET | Refills: 1 | Status: SHIPPED | OUTPATIENT
Start: 2020-07-22 | End: 2020-08-17 | Stop reason: SINTOL

## 2020-07-22 NOTE — PROGRESS NOTES
ANTICOAGULATION MANAGEMENT     Patient Name:  Cricket Klein  Date:  2020    ASSESSMENT /SUBJECTIVE:    Today's INR result of 2.60 is therapeutic. Goal INR of 2.0-3.0      Warfarin dose taken: Less warfarin taken than instructed which may be affecting INR -- she had patient skip a dose on Friday (and took 2.5mg on Thurs instead of 1.25mg) intentionally because of less vitamin K intake    Diet: Decreased greens/vitamin K intake may be affecting INR    Medication changes/ interactions: Potential interaction between celexa (ordered today) and warfarin which may affect subsequent INRs    Previous INR: Therapeutic 2.40    S/S of bleeding or thromboembolism: No    New injury or illness: No    Upcoming surgery, procedure or cardioversion: No    Additional findings: Also started namenda yesterday due to increased memory concerns      PLAN:    Spoke with Pat regarding INR result and instructed:     Warfarin Dosing Instructions: Continue your current warfarin dose 1.25mg Mon,Thurs; 2.5mg all other days    Instructed patient to follow up no later than: 1 week  Lab visit scheduled    Education provided: Impact of vitamin K foods on INR and Importance of taking warfarin as instructed Importance of notifying clinic for changes in medications/health and Discussed avoiding missing doses due to changes in vitamin K intake. It is likely patient is not eating a large amount of vitamin K (unless eating cooked/canned spinach or kale - he is not)       Pat verbalizes understanding and agrees to warfarin dosing plan.    Instructed to call the Anticoagulation Clinic for any changes, questions or concerns. (#[phone])      OBJECTIVE:  Recent labs: (last 7 days)     20  1131   INR 2.60*         INR assessment THER    Recheck INR In: 9 DAYS    INR Location Clinic      Anticoagulation Summary  As of 2020    INR goal:   2.0-3.0   TTR:   64.2 % (1 y)   INR used for dosin.60 (2020)   Warfarin maintenance plan:    1.25 mg (2.5 mg x 0.5) every Mon, Thu; 2.5 mg (2.5 mg x 1) all other days   Full warfarin instructions:   1.25 mg every Mon, Thu; 2.5 mg all other days   Weekly warfarin total:   15 mg   No change documented:   Hoda Meng RN   Plan last modified:   Kimberly Zamora RN (1/23/2020)   Next INR check:   7/31/2020   Priority:   High   Target end date:   Indefinite    Indications    Other and unspecified coagulation defects [D68.9]  Long-term (current) use of anticoagulants [Z79.01] [Z79.01]             Anticoagulation Episode Summary     INR check location:       Preferred lab:       Send INR reminders to:   [facility]    Comments:   * Had PE in 2010 following hip surgery. Has heterozygous prothrombin gene mutation. Second PE 7-26-16. Needs lifelong warfarin. was on warfarin [id number]. Do not leave info. on         Anticoagulation Care Providers     Provider Role Specialty Phone number    Saud Cruz MD [facility] [phone]

## 2020-07-25 ENCOUNTER — NURSE TRIAGE (OUTPATIENT)
Dept: NURSING | Facility: CLINIC | Age: 75
End: 2020-07-25

## 2020-07-26 NOTE — TELEPHONE ENCOUNTER
Wife reports that patient has been getting more confused the past couple days.  Today he slept from noon to 2pm, went to grocery store, only ate a little salad and sat down and fell instantly to sleep and nothing is stirring him.     No air conditioning.     He is still sleeping.  Asked wife to try to arouse him to see if he responds.  Reports that he did arouse and respond.     No other symptoms.  Runny nose and some phlegm that he typically has.  Coughing spell last night at bed time.     Has been going to the bathroom today.  Is incontinent.  Also had a BM.     Seen MD on Tuesday. Started Celexa.  Side effects of celexa are tiredness, sleepiness, yawning.  Discussed with wife.     Will continue to monitor and call if any new symptoms develop.  Patient eating, urinating and responsive.     Italia Jack RN/[facility]    COVID 19 Nurse Triage Plan/Patient Instructions    Please be aware that novel coronavirus (COVID-19) may be circulating in the community. If you develop symptoms such as fever, cough, or SOB or if you have concerns about the presence of another infection including coronavirus (COVID-19), please contact your health care provider or visit [email or web address].     Disposition/Instructions    Home care recommended. Follow home care protocol based instructions.    Thank you for taking steps to prevent the spread of this virus.  o Limit your contact with others.  o Wear a simple mask to cover your cough.  o Wash your hands well and often.    Resources    [facility]: About COVID-19: [email or web address]    CDC: What to Do If You're Sick: www.cdc.gov/coronavirus/2019-ncov/about/steps-when-sick.html    CDC: Ending Home Isolation: www.cdc.gov/coronavirus/2019-ncov/hcp/disposition-in-home-patients.html     CDC: Caring for Someone: www.cdc.gov/coronavirus/2019-ncov/if-you-are-sick/care-for-someone.html     [facility]: Interim Guidance for Hospital Discharge to Home:  "www.health.[facility].mn.us/diseases/coronavirus/hcp/hospdischarge.pdf    [facility] clinical trials (COVID-19 research studies): [email or web address]/umn-clinical-trials     Below are the COVID-19 hotlines at the [place] Department of [facility] ([facility]). Interpreters are available.   o For health questions: Call 748-287-3618 or 1-767.498.2656 (7 a.m. to 7 p.m.)  o For questions about schools and childcare: Call 952-056-1247 or 1-707.144.3901 (7 a.m. to 7 p.m.)     Additional Information    Negative: Drug overdose and nurse unable to answer question    Negative: Caller requesting information not related to medicine    Negative: Caller requesting a prescription for Strep throat and has a positive culture result    Negative: Rash while taking a medication or within 3 days of stopping it    Negative: Immunization reaction suspected    Negative: [1] Asthma AND [2] having symptoms of asthma (cough, wheezing, etc)    Negative: MORE THAN A DOUBLE DOSE of a prescription or over-the-counter (OTC) drug    Negative: [1] DOUBLE DOSE (an extra dose or lesser amount) of over-the-counter (OTC) drug AND [2] any symptoms (e.g., dizziness, nausea, pain, sleepiness)    Negative: [1] DOUBLE DOSE (an extra dose or lesser amount) of prescription drug AND [2] any symptoms (e.g., dizziness, nausea, pain, sleepiness)    Negative: Took another person's prescription drug    Negative: [1] DOUBLE DOSE (an extra dose or lesser amount) of prescription drug AND [2] NO symptoms (Exception: a double dose of antibiotics)    Negative: Diabetes drug error or overdose (e.g., insulin or extra dose)    Negative: [1] Request for URGENT new prescription or refill of \"essential\" medication (i.e., likelihood of harm to patient if not taken) AND [2] triager unable to fill per unit policy    Negative: [1] Prescription not at pharmacy AND [2] was prescribed today by PCP    Negative: Pharmacy calling with prescription questions and triager unable to answer " question    Negative: Caller has urgent medication question about med that PCP prescribed and triager unable to answer question    Negative: Caller has NON-URGENT medication question about med that PCP prescribed and triager unable to answer question    Negative: Caller requesting a NON-URGENT new prescription or refill and triager unable to refill per unit policy    Negative: Caller has medication question about med not prescribed by PCP and triager unable to answer question (e.g., compatibility with other med, storage)    Negative: [1] DOUBLE DOSE (an extra dose or lesser amount) of over-the-counter (OTC) drug AND [2] NO symptoms    Negative: [1] DOUBLE DOSE (an extra dose or lesser amount) of antibiotic drug AND [2] NO symptoms    Caller has medication question only, adult not sick, and triager answers question    Protocols used: MEDICATION QUESTION CALL-A-

## 2020-07-29 DIAGNOSIS — B37.2 YEAST DERMATITIS: ICD-10-CM

## 2020-07-29 RX ORDER — NYSTATIN 100000 U/G
CREAM TOPICAL 2 TIMES DAILY
Qty: 30 G | Refills: 0 | Status: SHIPPED | OUTPATIENT
Start: 2020-07-29 | End: 2020-01-01

## 2020-07-31 DIAGNOSIS — I26.99 PULMONARY EMBOLISM WITHOUT ACUTE COR PULMONALE, UNSPECIFIED CHRONICITY, UNSPECIFIED PULMONARY EMBOLISM TYPE (H): Chronic | ICD-10-CM

## 2020-07-31 DIAGNOSIS — G45.9 TRANSIENT CEREBRAL ISCHEMIA, UNSPECIFIED TYPE: ICD-10-CM

## 2020-07-31 DIAGNOSIS — Z79.01 LONG TERM CURRENT USE OF ANTICOAGULANT THERAPY: ICD-10-CM

## 2020-07-31 DIAGNOSIS — Z86.2: ICD-10-CM

## 2020-07-31 LAB
CAPILLARY BLOOD COLLECTION: NORMAL
INR PPP: 2 (ref 0.86–1.14)

## 2020-07-31 PROCEDURE — 36416 COLLJ CAPILLARY BLOOD SPEC: CPT | Performed by: FAMILY MEDICINE

## 2020-07-31 PROCEDURE — 85610 PROTHROMBIN TIME: CPT | Performed by: FAMILY MEDICINE

## 2020-08-14 DIAGNOSIS — I26.99 PULMONARY EMBOLUS, LEFT (H): ICD-10-CM

## 2020-08-17 ENCOUNTER — OFFICE VISIT (OUTPATIENT)
Dept: FAMILY MEDICINE | Facility: CLINIC | Age: 75
End: 2020-08-17
Payer: COMMERCIAL

## 2020-08-17 ENCOUNTER — ANTICOAGULATION THERAPY VISIT (OUTPATIENT)
Dept: ANTICOAGULATION | Facility: CLINIC | Age: 75
End: 2020-08-17
Payer: COMMERCIAL

## 2020-08-17 VITALS
OXYGEN SATURATION: 95 % | SYSTOLIC BLOOD PRESSURE: 120 MMHG | HEART RATE: 69 BPM | RESPIRATION RATE: 18 BRPM | TEMPERATURE: 98.5 F | WEIGHT: 235.4 LBS | BODY MASS INDEX: 37.99 KG/M2 | DIASTOLIC BLOOD PRESSURE: 66 MMHG

## 2020-08-17 DIAGNOSIS — I26.99 PULMONARY EMBOLISM WITHOUT ACUTE COR PULMONALE, UNSPECIFIED CHRONICITY, UNSPECIFIED PULMONARY EMBOLISM TYPE (H): Chronic | ICD-10-CM

## 2020-08-17 DIAGNOSIS — M79.675 PAIN IN TOES OF BOTH FEET: Primary | ICD-10-CM

## 2020-08-17 DIAGNOSIS — I26.99 PULMONARY EMBOLISM WITHOUT ACUTE COR PULMONALE, UNSPECIFIED CHRONICITY, UNSPECIFIED PULMONARY EMBOLISM TYPE (H): ICD-10-CM

## 2020-08-17 DIAGNOSIS — G45.9 TRANSIENT CEREBRAL ISCHEMIA, UNSPECIFIED TYPE: ICD-10-CM

## 2020-08-17 DIAGNOSIS — Z79.01 LONG TERM CURRENT USE OF ANTICOAGULANT THERAPY: ICD-10-CM

## 2020-08-17 DIAGNOSIS — Z86.2: ICD-10-CM

## 2020-08-17 DIAGNOSIS — M79.674 PAIN IN TOES OF BOTH FEET: Primary | ICD-10-CM

## 2020-08-17 LAB
CAPILLARY BLOOD COLLECTION: NORMAL
INR PPP: 3.3 (ref 0.86–1.14)

## 2020-08-17 PROCEDURE — 99207 ZZC NO CHARGE NURSE ONLY: CPT

## 2020-08-17 PROCEDURE — 85610 PROTHROMBIN TIME: CPT | Performed by: FAMILY MEDICINE

## 2020-08-17 PROCEDURE — 36416 COLLJ CAPILLARY BLOOD SPEC: CPT | Performed by: FAMILY MEDICINE

## 2020-08-17 PROCEDURE — 99213 OFFICE O/P EST LOW 20 MIN: CPT | Performed by: FAMILY MEDICINE

## 2020-08-17 RX ORDER — WARFARIN SODIUM 2.5 MG/1
TABLET ORAL
Qty: 80 TABLET | Refills: 0 | Status: SHIPPED | OUTPATIENT
Start: 2020-08-17 | End: 2020-09-01

## 2020-08-17 NOTE — PROGRESS NOTES
Subjective     Cricket Klein is a 75 year old male who presents to clinic today for the following health issues:    HPI       Chief Complaint   Patient presents with     Ingrown Toenail     check for ingrown toenails on great toes.           Duration: 2 weeks    Description (location/character/radiation): bilateral    Intensity:  mild    Accompanying signs and symptoms: c/o intermittent pain both great toes- hx of ingrown toe nails, Also has had some cracks between toes.    History (similar episodes/previous evaluation):     Precipitating or alleviating factors: None    Therapies tried and outcome: None     S: Cricket Klein is a 75 year old male with toe pain, thought maybe ingrown nail?  Seems to be improving    Problem list, med list, additional histories reviewed and updated, as indicated.      No rash or injury    O:/66 (BP Location: Right arm, Patient Position: Chair, Cuff Size: Adult Large)   Pulse 69   Temp 98.5  F (36.9  C)   Resp 18   Wt 106.8 kg (235 lb 6.4 oz)   SpO2 95%   BMI 37.99 kg/m    Toes with very mild ingrown nails bilaterally.  Not painful right now.  Appears to b e healing    A: ingrown nails, improving    P: talked about nail health, trimming, soaking.  They will treat conservatively and see how things play out.

## 2020-08-17 NOTE — NURSING NOTE
"Chief Complaint   Patient presents with     Ingrown Toenail     left great toe       Initial /66 (BP Location: Right arm, Patient Position: Chair, Cuff Size: Adult Large)   Pulse 69   Temp 98.5  F (36.9  C)   Resp 18   Wt 106.8 kg (235 lb 6.4 oz)   SpO2 95%   BMI 37.99 kg/m   Estimated body mass index is 37.99 kg/m  as calculated from the following:    Height as of 5/8/20: 1.676 m (5' 6\").    Weight as of this encounter: 106.8 kg (235 lb 6.4 oz).    Patient presents to the clinic using No DME    Health Maintenance that is potentially due pending provider review:      Radha Siu CMA    "

## 2020-08-17 NOTE — PROGRESS NOTES
ANTICOAGULATION FOLLOW-UP CLINIC VISIT    Patient Name:  Cricket Klein  Date:  8/17/2020  Contact Type:  Telephone    SUBJECTIVE:  Patient Findings     Positives:   Change in health (ingrown toenails, seen in clinic-conservative management), Change in diet/appetite (less greens)    Comments:   Writer spoke with Claudio, who then handed the phone to his wife Shawna. She reports patient has taken his warfarin as directed, no missed or extra doses. She states he was in the clinic for his ingrown toenails, but they have not been painful for him. She states they have not been eating as many greens this week. Will plan to hold his dose of warfarin today, then resume maintenance dose and recheck INR in 2 weeks. No concerns of bleeding, increased bruising, or signs/symptoms of a blood clot reported.         Clinical Outcomes     Comments:   Writer spoke with Claudio, who then handed the phone to his wife Shawna. She reports patient has taken his warfarin as directed, no missed or extra doses. She states he was in the clinic for his ingrown toenails, but they have not been painful for him. She states they have not been eating as many greens this week. Will plan to hold his dose of warfarin today, then resume maintenance dose and recheck INR in 2 weeks. No concerns of bleeding, increased bruising, or signs/symptoms of a blood clot reported.            OBJECTIVE    Recent labs: (last 7 days)     08/17/20  1624   INR 3.30*       ASSESSMENT / PLAN  INR assessment SUPRA    Recheck INR In: 2 WEEKS    INR Location Outside lab      Anticoagulation Summary  As of 8/17/2020    INR goal:   2.0-3.0   TTR:   70.2 % (1 y)   INR used for dosing:   3.30! 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 Has heterozygous prothrombin gene mutation. Second PE 7-26-16. Needs lifelong warfarin. was on warfarin [id number]. Do not leave info. on         Anticoagulation Care Providers     Provider Role Specialty Phone number    Saud Cruz MD Referring [facility] [phone]            See the Encounter Report to view Anticoagulation Flowsheet and Dosing Calendar (Go to Encounters tab in chart review, and find the Anticoagulation Therapy Visit)      Mandi Zafar RN

## 2020-08-17 NOTE — TELEPHONE ENCOUNTER
Current warfarin dose:  Warfarin maintenance plan:   1.25 mg (2.5 mg x 0.5) every Mon, Thu; 2.5 mg (2.5 mg x 1) all other days   Full warfarin instructions:   1.25 mg every Mon, Thu; 2.5 mg all other days   Weekly warfarin total:   15 mg     Last INR result:  INR   Date Value Ref Range Status   07/31/2020 2.00 (H) 0.86 - 1.14 Final     Comment:     This test is intended for monitoring Coumadin therapy.  Results are not   accurate in patients with prolonged INR due to factor deficiency.       Last office visit: 7/21/2020     Refill authorized per [facility] protocol.    Beto DIOP RN, CACP

## 2020-08-25 ENCOUNTER — TELEPHONE (OUTPATIENT)
Dept: FAMILY MEDICINE | Facility: CLINIC | Age: 75
End: 2020-08-25

## 2020-08-25 DIAGNOSIS — F03.91 DEMENTIA WITH BEHAVIORAL DISTURBANCE, UNSPECIFIED DEMENTIA TYPE: Primary | Chronic | ICD-10-CM

## 2020-08-25 NOTE — TELEPHONE ENCOUNTER
Reason for Call: Request for an order or referral:    Order or referral being requested: Requesting Hospice Order  Meeting with family this week to see if pt Qualify and meets Hospice requirements. In order for pt to qualify they need a order.  Per Family - Alzheimer, no Appetite, Sleeping a lot, currently living with spouse who has Parkinson's her self    Date needed: as soon as possible    Has the patient been seen by the PCP for this problem? YES      Phone number Patient can be reached at:  Other phone number:  Chhaya  [facility] [phone]*    Best Time:  Any Time      Can we leave a detailed message on this number?  YES    Call taken on 8/25/2020 at 8:13 AM by Ladonna Eduardo

## 2020-08-26 NOTE — TELEPHONE ENCOUNTER
Talked with Chhaya and do not needs at this moment should go [facility] instead and will follow up if needed.    Alice James

## 2020-08-27 ENCOUNTER — TELEPHONE (OUTPATIENT)
Dept: FAMILY MEDICINE | Facility: CLINIC | Age: 75
End: 2020-08-27

## 2020-08-27 NOTE — TELEPHONE ENCOUNTER
"S-(situation): Spoke with spouse as pt has dementia. Consent on file.     B-(background): Yesterday, around dinner time, \"he couldn't chew.\"    A-(assessment):   Tooth needs to be pulled however it can't happen until the 1st of October as he needs to see a specialist.   His last INR was too elevated for them to do the procedure.   \"It is too difficult\" for pt to explain what is going on.  Spouse says there only seems to be pain when there is chewing involved.    R-(recommendations): Discussed calling and speaking with dentist first, then call primary care clinic back if they are unable to help pt.     Gisell MOON, RN, BSN      "

## 2020-08-27 NOTE — TELEPHONE ENCOUNTER
Reason for call:  Patient reporting a symptom    Symptom or request: Rt Jaw pain. Pt is having a tooth issue on that side. Wife is wondering if he should be calling his Dentist. This started last night.  Please Advise.    Phone Number patient can be reached at:  Home number on file [phone] (home)    Best Time: Any Time        Can we leave a detailed message on this number:  YES    Call taken on 8/27/2020 at 11:57 AM by Ladonna Eduardo

## 2020-09-01 ENCOUNTER — ANTICOAGULATION THERAPY VISIT (OUTPATIENT)
Dept: ANTICOAGULATION | Facility: CLINIC | Age: 75
End: 2020-09-01
Payer: COMMERCIAL

## 2020-09-01 DIAGNOSIS — Z79.01 LONG TERM CURRENT USE OF ANTICOAGULANT THERAPY: ICD-10-CM

## 2020-09-01 DIAGNOSIS — Z86.2: ICD-10-CM

## 2020-09-01 DIAGNOSIS — I26.99 PULMONARY EMBOLISM WITHOUT ACUTE COR PULMONALE, UNSPECIFIED CHRONICITY, UNSPECIFIED PULMONARY EMBOLISM TYPE (H): ICD-10-CM

## 2020-09-01 DIAGNOSIS — G45.9 TRANSIENT CEREBRAL ISCHEMIA, UNSPECIFIED TYPE: ICD-10-CM

## 2020-09-01 DIAGNOSIS — I26.99 PULMONARY EMBOLUS, LEFT (H): ICD-10-CM

## 2020-09-01 DIAGNOSIS — I26.99 PULMONARY EMBOLISM WITHOUT ACUTE COR PULMONALE, UNSPECIFIED CHRONICITY, UNSPECIFIED PULMONARY EMBOLISM TYPE (H): Chronic | ICD-10-CM

## 2020-09-01 LAB
CAPILLARY BLOOD COLLECTION: NORMAL
INR PPP: 3.1 (ref 0.86–1.14)

## 2020-09-01 PROCEDURE — 99207 ZZC NO CHARGE NURSE ONLY: CPT

## 2020-09-01 PROCEDURE — 36416 COLLJ CAPILLARY BLOOD SPEC: CPT | Performed by: FAMILY MEDICINE

## 2020-09-01 PROCEDURE — 85610 PROTHROMBIN TIME: CPT | Performed by: FAMILY MEDICINE

## 2020-09-01 RX ORDER — WARFARIN SODIUM 2.5 MG/1
TABLET ORAL
Qty: 80 TABLET | Refills: 0 | COMMUNITY
Start: 2020-09-01 | End: 2020-01-01

## 2020-09-01 NOTE — PROGRESS NOTES
Anticoagulation Management    Unable to reach Claudio today.    Today's INR result of 3.10 is supratherapeutic (goal INR of 2.0-3.0).  Result received from: Clinic Lab    Follow up required to discuss dosing instructions and confirm understanding of instructions    No instructions provided. Unable to leave voicemail. Busy signal. Plan to decrease dose to 13.75 mg weekly. Recheck in 2 weeks. May tx to [phone].      Anticoagulation clinic to follow up    Shruthi Hutchison RN  ANTICOAGULATION FOLLOW-UP CLINIC VISIT    Patient Name:  Cricket Klein  Date:  9/1/2020  Contact Type:  Telephone/ Pat-spouse    SUBJECTIVE:  Patient Findings     Positives:   Other complaints (Tooth pain.)    Comments:   No changes in medications, activity, health, or diet noted. No bleeding or increased bruising noted. Took warfarin as prescribed.  Patient has a tooth that needs to be pulled. INR needs to be less then 3.0. Patient can not get in until after October 1st unless there is an opening.   Writer decreased maintenance dose to 13.75 mg weekly.   Recheck in 2 weeks.   Patient verbalizes understanding and agrees to plan. No further questions or concerns.  Patient denies signs or symptoms of bleeding. Writer educated patient's spouse regarding increased bleed risk and when to seek immediate medical attention. Patient's spouse verbalized understanding.          Clinical Outcomes     Negatives:   Major bleeding event, Thromboembolic event, Anticoagulation-related hospital admission, Anticoagulation-related ED visit, Anticoagulation-related fatality    Comments:   No changes in medications, activity, health, or diet noted. No bleeding or increased bruising noted. Took warfarin as prescribed.  Patient has a tooth that needs to be pulled. INR needs to be less then 3.0. Patient can not get in until after October 1st unless there is an opening.   Writer decreased maintenance dose to 13.75 mg weekly.   Recheck in 2 weeks.   Patient  verbalizes understanding and agrees to plan. No further questions or concerns.  Patient denies signs or symptoms of bleeding. Writer educated patient's spouse regarding increased bleed risk and when to seek immediate medical attention. Patient's spouse verbalized understanding.             OBJECTIVE    Recent labs: (last 7 days)     09/01/20  0954   INR 3.10*       ASSESSMENT / PLAN  INR assessment SUB    Recheck INR In: 2 WEEKS    INR Location Outside lab      Anticoagulation Summary  As of 9/1/2020    INR goal:   2.0-3.0   TTR:   70.3 % (1 y)   INR used for dosing:   3.10! 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 Has heterozygous prothrombin gene mutation. Second PE 7-26-16. Needs lifelong warfarin. was on warfarin [id number]. Do not leave info. on         Anticoagulation Care Providers     Provider Role Specialty Phone number    Saud Cruz MD Referring [facility] [phone]            See the Encounter Report to view Anticoagulation Flowsheet and Dosing Calendar (Go to Encounters tab in chart review, and find the Anticoagulation Therapy Visit)        Shruthi Hutchison RN

## 2020-09-15 NOTE — PROGRESS NOTES
ANTICOAGULATION FOLLOW-UP CLINIC VISIT    Patient Name:  Cricket Klein  Date:  9/15/2020  Contact Type:  Telephone    SUBJECTIVE:  Patient Findings     Comments:   No changes in medications, activity, health, or diet noted. No bleeding or increased bruising noted. Took warfarin as prescribed.  Patient will continue weekly maintenance dose. INR is therapeutic.   Recheck in 2 weeks. Will need a return call to make the appointment.   Writer spoke with Claudio who does have a hx of dementia. Pat unavailable.        Clinical Outcomes     Negatives:   Major bleeding event, Thromboembolic event, Anticoagulation-related hospital admission, Anticoagulation-related ED visit, Anticoagulation-related fatality    Comments:   No changes in medications, activity, health, or diet noted. No bleeding or increased bruising noted. Took warfarin as prescribed.  Patient will continue weekly maintenance dose. INR is therapeutic.   Recheck in 2 weeks. Will need a return call to make the appointment.   Writer spoke with Claudio who does have a hx of dementia. Pat unavailable.           OBJECTIVE    Recent labs: (last 7 days)     09/15/20  1156   INR 2.10*       ASSESSMENT / PLAN  INR assessment THER    Recheck INR In: 2 WEEKS    INR Location Outside lab      Anticoagulation Summary  As of 9/15/2020    INR goal:   2.0-3.0   TTR:   72.6 % (1 y)   INR used for dosin.10 (9/15/2020)   Warfarin maintenance plan:   1.25 mg (2.5 mg x 0.5) every Mon, Wed, Fri; 2.5 mg (2.5 mg x 1) all other days   Full warfarin instructions:   1.25 mg every Mon, Wed, Fri; 2.5 mg all other days   Weekly warfarin total:   13.75 mg   Plan last modified:   Shruthi Hutchison RN (2020)   Next INR check:   2020   Priority:   Maintenance   Target end date:   Indefinite    Indications    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 Has heterozygous prothrombin gene mutation. Second PE 7-26-16. Needs lifelong warfarin. was on warfarin [id number]. Do not leave info. on VM        Anticoagulation Care Providers     Provider Role Specialty Phone number    Saud Cruz MD Referring [facility] [phone]            See the Encounter Report to view Anticoagulation Flowsheet and Dosing Calendar (Go to Encounters tab in chart review, and find the Anticoagulation Therapy Visit)        Shruthi Hutchison RN

## 2020-09-16 NOTE — TELEPHONE ENCOUNTER
"Requested Prescriptions   Pending Prescriptions Disp Refills     memantine (NAMENDA) 5 MG tablet [Pharmacy Med Name: MEMANTINE HCL 5MG TABS] 60 tablet 1     Sig: START WITH 1 TABLET BY MOUTH ONCE DAILY FOR 1 WEEK, THEN 1TAB TWICE DAILY       Miscellaneous Dementia Agents Passed - 9/16/2020  5:02 AM        Passed - Recent (12 mo) or future (30 days) visit within the authorizing provider's specialty     Patient has had an office visit with the authorizing provider or a provider within the authorizing providers department within the previous 12 mos or has a future within next 30 days. See \"Patient Info\" tab in inbasket, or \"Choose Columns\" in Meds & Orders section of the refill encounter.              Passed - Medication is active on med list        Passed - Patient is 18 years of age or older           citalopram (CELEXA) 20 MG tablet [Pharmacy Med Name: CITALOPRAM HYDROBROMIDE 20MG TABS] 30 tablet 1     Sig: TAKE ONE-HALF TABLET BY MOUTH EVERY DAY . MAY INCREASE TO ONE TABLET (20 MG) DAILY AFTER 1 TO 2 WEEKS IF NEEDED       SSRIs Protocol Failed - 9/16/2020  5:02 AM        Failed - Medication is active on med list        Passed - Recent (12 mo) or future (30 days) visit within the authorizing provider's specialty     Patient has had an office visit with the authorizing provider or a provider within the authorizing providers department within the previous 12 mos or has a future within next 30 days. See \"Patient Info\" tab in inbasket, or \"Choose Columns\" in Meds & Orders section of the refill encounter.              Passed - Patient is age 18 or older             "

## 2020-10-06 NOTE — LETTER
October 6, 2020      Cricket Klein  [address] [place] [address]      Dear Cricket,    We are contacting you because our records show you were due for an INR on 9/29/2020    There are potentially serious risks when taking warfarin without careful monitoring, and we want to make sure you are safely managed.    Please call the INR clinic at [phone] and we will be happy to help you schedule an appointment.  If there has been a change in your care, or other concerns, please let us know so we can help and/or update our records.      Sincerely,        Anticoagulation Clinic.

## 2020-10-16 NOTE — TELEPHONE ENCOUNTER
2nd attempt   Cricket Klein is overdue for INR check.      Spoke with Pat and scheduled INR appointment on 10/20     Bridget Bermeo RN on 10/16/2020 at 2:39 PM

## 2020-10-20 NOTE — PROGRESS NOTES
ANTICOAGULATION FOLLOW-UP CLINIC VISIT    Patient Name:  Cricket Klein  Date:  10/20/2020  Contact Type:  Telephone/ Pat    SUBJECTIVE:  Patient Findings     Comments:  No changes in medications, activity, health, or diet noted. No bleeding or increased bruising noted. Took warfarin as prescribed.  Patient will continue weekly maintenance dose. INR is therapeutic.   Recheck in 6 weeks.   Patient verbalizes understanding and agrees to plan. No further questions or concerns.          Clinical Outcomes     Negatives:  Major bleeding event, Thromboembolic event, Anticoagulation-related hospital admission, Anticoagulation-related ED visit, Anticoagulation-related fatality    Comments:  No changes in medications, activity, health, or diet noted. No bleeding or increased bruising noted. Took warfarin as prescribed.  Patient will continue weekly maintenance dose. INR is therapeutic.   Recheck in 6 weeks.   Patient verbalizes understanding and agrees to plan. No further questions or concerns.             OBJECTIVE    Recent labs: (last 7 days)     10/20/20  1422   INR 2.50*       ASSESSMENT / PLAN  INR assessment THER    Recheck INR In: 6 WEEKS    INR Location Outside lab      Anticoagulation Summary  As of 10/20/2020    INR goal:  2.0-3.0   TTR:  72.6 % (1 y)   INR used for dosin.50 (10/20/2020)   Warfarin maintenance plan:  1.25 mg (2.5 mg x 0.5) every Mon, Wed, Fri; 2.5 mg (2.5 mg x 1) all other days   Full warfarin instructions:  1.25 mg every Mon, Wed, Fri; 2.5 mg all other days   Weekly warfarin total:  13.75 mg   No change documented:  Shruthi Hutchison RN   Plan last modified:  Shruthi Hutchison RN (2020)   Next INR check:  2020   Priority:  Maintenance   Target end date:  Indefinite    Indications    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 Had PE in 2010 following hip surgery. Has heterozygous prothrombin gene mutation. Second PE 7-26-16. Needs lifelong warfarin. was on warfarin [id number]. Do not leave info. on VM        Anticoagulation Care Providers     Provider Role Specialty Phone number    Saud Cruz MD Referring [facility] [phone]            See the Encounter Report to view Anticoagulation Flowsheet and Dosing Calendar (Go to Encounters tab in chart review, and find the Anticoagulation Therapy Visit)        Shruthi Hutchison RN

## 2020-10-26 NOTE — TELEPHONE ENCOUNTER
Reason for Call: Request for an order or referral:    Order or referral being requested: Hospice Order with Diagnosis of Alzheimer Disease  Please  Fax order to [phone]    Date needed: at your convenience    Has the patient been seen by the PCP for this problem? YES    Additional comments: Any Time      Phone number Patient can be reached at:  Home number on file [phone]     Best Time:  Any Time      Can we leave a detailed message on this number?  YES    Call taken on 10/26/2020 at 8:25 AM by Ladonna dEuardo

## 2020-11-06 NOTE — TELEPHONE ENCOUNTER
[facility] NB Hospice Cert & Plan of Care  Form placed on Provider Cheriton desk for Signature.  Ladonna [place] Sec

## 2020-11-16 NOTE — TELEPHONE ENCOUNTER
Current warfarin dose:  Warfarin maintenance plan:  1.25 mg (2.5 mg x 0.5) every Mon, Wed, Fri; 2.5 mg (2.5 mg x 1) all other days   Full warfarin instructions:  1.25 mg every Mon, Wed, Fri; 2.5 mg all other days   Weekly warfarin total:  13.75 mg     Last INR result:  INR   Date Value Ref Range Status   10/20/2020 2.50 (H) 0.86 - 1.14 Final     Comment:     This test is intended for monitoring Coumadin therapy.  Results are not   accurate in patients with prolonged INR due to factor deficiency.       Last office visit: 8/17/2020     Refill authorized per [facility] protocol.    Beto DIOP RN, CACP

## 2020-11-18 NOTE — TELEPHONE ENCOUNTER
Incoming VM from TRISTON Bunn to report INR today of 2.3 and also to inform that patient has a dental extraction scheduled on Friday.    LM for Oni to return my call. Need to confirm warfarin dose taken, assess for changes, confirm that dentist knows patient takes warfarin and does dentist have a preference for warfarin hold/target INR for extraction?

## 2020-11-18 NOTE — PROGRESS NOTES
ANTICOAGULATION MANAGEMENT     Patient Name:  Cricket Klein  Date:  11/18/2020    ASSESSMENT /SUBJECTIVE:    Today's INR result of 2.3 is therapeutic. Goal INR of 2.0-3.0      Warfarin dose taken: Warfarin taken as instructed    Diet: No new diet changes affecting INR    Medication changes/ interactions: No new medications/supplements affecting INR    Previous INR: Therapeutic     S/S of bleeding or thromboembolism: No    New injury or illness: Dental extraction 1 tooth 11/20/2020    Upcoming surgery, procedure or cardioversion: No    Additional findings: started hospice 10/30/2020      PLAN:    Telephone call with home care nurse TRISTON Bunn regarding INR result and instructed:     Warfarin Dosing Instructions: Continue your current warfarin dose 1.25mg M/W/F, 2.5mg ROW    Instructed patient to follow up no later than: 2 weeks  Patient to recheck with home meter    Education provided: Contact the anticoagulation clinic with any changes, questions or concerns at #[phone]  and OK to eat some extra greens tomorrow if enjoys them to keep INR closer to 2      TRISTON Bunn verbalizes understanding and agrees to warfarin dosing plan.    Instructed to call the Anticoagulation Clinic for any changes, questions or concerns. (#[phone])        [facility]alia Carrera [facility]      OBJECTIVE:  Recent labs: (last 7 days)     11/18/20   INR 2.3*         No question data found.  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    Comments:  * Speak with Pat (wife) with dosing. Had PE in 2010 following hip surgery. Has heterozygous prothrombin gene mutation. Second PE 7-26-16. Needs lifelong warfarin. was on warfarin [id number]. Do not leave info. on         Anticoagulation Care Providers     Provider Role Specialty Phone number    Saud Cruz MD Referring Family Medicine [phone]

## 2020-11-30 NOTE — PROGRESS NOTES
ANTICOAGULATION MANAGEMENT     Patient Name:  Cricket lKein  Date:  2020    ASSESSMENT /SUBJECTIVE:    Today's INR result of 2.9 is therapeutic. Goal INR of 2.0-3.0      Warfarin dose taken: Warfarin taken as instructed    Diet: No new diet changes affecting INR    Medication changes/ interactions: No new medications/supplements affecting INR    Previous INR: Therapeutic     S/S of bleeding or thromboembolism: No    New injury or illness: No    Upcoming surgery, procedure or cardioversion: No    Additional findings: None      PLAN:    Telephone call with home care nurse Oni regarding INR result and instructed:     Warfarin Dosing Instructions: Continue your current warfarin dose 1.25 mg MWF; 2.5 mg all other days    Instructed patient to follow up no later than: 2 weeks  Orders given to  Homecare nurse/facility to recheck    Education provided: Monitoring for bleeding signs and symptoms and Monitoring for clotting signs and symptoms      Nurse Oni verbalizes understanding and agrees to warfarin dosing plan.    Instructed to call the Anticoagulation Clinic for any changes, questions or concerns. (#[phone])        Alice Montoya RN      OBJECTIVE:  Recent labs: (last 7 days)     20   INR 2.9*         No question data found.  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 Had PE in 2010 following hip surgery. Has heterozygous prothrombin gene mutation. Second PE 7-26-16. Needs lifelong warfarin. was on warfarin [id number]. Do not leave info. on         Anticoagulation Care Providers     Provider Role Specialty Phone number    Saud Cruz MD Referring Family Medicine [phone]

## 2020-12-14 NOTE — PROGRESS NOTES
ANTICOAGULATION MANAGEMENT     Patient Name:  Cricket Klein  Date:  2020    ASSESSMENT /SUBJECTIVE:    Today's INR result of 1.9 is subtherapeutic. Goal INR of 2.0-3.0      Warfarin dose taken: Warfarin taken as instructed    Diet: No new diet changes affecting INR    Medication changes/ interactions: Haldol Changed to Scheduled and PRN for agitation    Previous INR: Therapeutic     S/S of bleeding or thromboembolism: No    New injury or illness: No    Upcoming surgery, procedure or cardioversion: No    Additional findings: None      PLAN:    Telephone call with home care nurse romel regarding INR result and instructed:     Warfarin Dosing Instructions: 2.5 mg today then continue your current warfarin dose of 1.25 mg MWF; 2.5 mg all other days    Instructed patient to follow up no later than: 1 week  Orders given to  Homecare nurse/facility to recheck    Education provided: Monitoring for bleeding signs and symptoms and Monitoring for clotting signs and symptoms      romel verbalizes understanding and agrees to warfarin dosing plan.    Instructed to call the Anticoagulation Clinic for any changes, questions or concerns. (#[phone])        Alice Montoya RN      OBJECTIVE:  Recent labs: (last 7 days)     20   INR 1.9*         No question data found.  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 Had PE in 2010 following hip surgery. Has heterozygous prothrombin gene mutation. Second PE 7-26-16. Needs lifelong warfarin. was on warfarin [id number]. Do not leave info. on         Anticoagulation Care Providers     Provider Role Specialty Phone number    Saud Cruz MD Referring Family Medicine [phone]

## 2020-12-21 NOTE — PROGRESS NOTES
ANTICOAGULATION MANAGEMENT     Patient Name:  Cricket Klein  Date:  2020    ASSESSMENT /SUBJECTIVE:    Today's INR result of 2.4 is therapeutic. Goal INR of 2.0-3.0      Warfarin dose taken: Warfarin taken as instructed    Diet: No new diet changes affecting INR    Medication changes/ interactions: No new medications/supplements affecting INR    Previous INR: Subtherapeutic     S/S of bleeding or thromboembolism: No    New injury or illness: No    Upcoming surgery, procedure or cardioversion: No    Additional findings: None      PLAN:    Telephone call with Chhaya siegel at [facility] regarding INR result and instructed:     Warfarin Dosing Instructions: Continue your current warfarin dose 1.25mg MWF & 2.5mg AOD    Instructed patient to follow up no later than: 2 weeks  Orders given to  Homecare nurse/facility to recheck    Education provided: Contact the anticoagulation clinic with any changes, questions or concerns at #[phone]       Chhaya GOLDSTEIN verbalizes understanding and agrees to warfarin dosing plan.    Instructed to call the Anticoagulation Clinic for any changes, questions or concerns. (#[phone])        Piper De La Cruz RN      OBJECTIVE:  Recent labs: (last 7 days)     20   INR 2.4*         No question data found.  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 Had PE in 2010 following hip surgery. Has heterozygous prothrombin gene mutation. Second PE 7-26-16. Needs lifelong warfarin. was on warfarin [id number]. Do not leave info. on         Anticoagulation Care Providers     Provider Role Specialty Phone number    Saud Cruz MD Referring Family Medicine [phone]         Piper De La Torre RN, BSN, PHN

## 2021-01-01 ENCOUNTER — TELEPHONE (OUTPATIENT)
Dept: FAMILY MEDICINE | Facility: CLINIC | Age: 76
End: 2021-01-01

## 2021-01-01 ENCOUNTER — TELEPHONE (OUTPATIENT)
Dept: ORTHOPEDICS | Facility: CLINIC | Age: 76
End: 2021-01-01

## 2021-01-01 ENCOUNTER — ANTICOAGULATION THERAPY VISIT (OUTPATIENT)
Dept: FAMILY MEDICINE | Facility: CLINIC | Age: 76
End: 2021-01-01

## 2021-01-01 ENCOUNTER — DOCUMENTATION ONLY (OUTPATIENT)
Dept: ANTICOAGULATION | Facility: CLINIC | Age: 76
End: 2021-01-01

## 2021-01-01 ENCOUNTER — DOCUMENTATION ONLY (OUTPATIENT)
Dept: FAMILY MEDICINE | Facility: CLINIC | Age: 76
End: 2021-01-01

## 2021-01-01 DIAGNOSIS — I26.99 PULMONARY EMBOLISM WITHOUT ACUTE COR PULMONALE, UNSPECIFIED CHRONICITY, UNSPECIFIED PULMONARY EMBOLISM TYPE (H): ICD-10-CM

## 2021-01-01 DIAGNOSIS — Z79.01 LONG TERM CURRENT USE OF ANTICOAGULANT THERAPY: ICD-10-CM

## 2021-01-01 DIAGNOSIS — N39.43 BENIGN PROSTATIC HYPERPLASIA WITH POST-VOID DRIBBLING: ICD-10-CM

## 2021-01-01 DIAGNOSIS — I26.99 PULMONARY EMBOLUS, LEFT (H): ICD-10-CM

## 2021-01-01 DIAGNOSIS — N39.43 BENIGN PROSTATIC HYPERPLASIA WITH POST-VOID DRIBBLING: Primary | ICD-10-CM

## 2021-01-01 DIAGNOSIS — N40.1 BENIGN PROSTATIC HYPERPLASIA WITH POST-VOID DRIBBLING: ICD-10-CM

## 2021-01-01 DIAGNOSIS — B37.2 YEAST DERMATITIS: ICD-10-CM

## 2021-01-01 DIAGNOSIS — I26.99 PULMONARY EMBOLISM WITHOUT ACUTE COR PULMONALE, UNSPECIFIED CHRONICITY, UNSPECIFIED PULMONARY EMBOLISM TYPE (H): Primary | Chronic | ICD-10-CM

## 2021-01-01 DIAGNOSIS — F03.91 DEMENTIA WITH BEHAVIORAL DISTURBANCE, UNSPECIFIED DEMENTIA TYPE: Primary | ICD-10-CM

## 2021-01-01 DIAGNOSIS — G47.33 OSA (OBSTRUCTIVE SLEEP APNEA): Primary | Chronic | ICD-10-CM

## 2021-01-01 DIAGNOSIS — I26.99 PULMONARY EMBOLISM WITHOUT ACUTE COR PULMONALE, UNSPECIFIED CHRONICITY, UNSPECIFIED PULMONARY EMBOLISM TYPE (H): Primary | ICD-10-CM

## 2021-01-01 DIAGNOSIS — N40.1 BENIGN PROSTATIC HYPERPLASIA WITH POST-VOID DRIBBLING: Primary | ICD-10-CM

## 2021-01-01 DIAGNOSIS — Z79.01 LONG TERM CURRENT USE OF ANTICOAGULANT THERAPY: Primary | ICD-10-CM

## 2021-01-01 LAB
INR PPP: 2.1 (ref 0.9–1.1)
INR PPP: 2.2 (ref 0.9–1.1)
INR PPP: 2.3 (ref 0.9–1.1)
INR PPP: 2.4 (ref 0.9–1.1)
INR PPP: 2.5 (ref 0.9–1.1)
INR PPP: 2.5 (ref 0.9–1.1)
INR PPP: 2.6 (ref 0.9–1.1)
INR PPP: 2.7 (ref 0.9–1.1)
INR PPP: 2.9 (ref 0.9–1.1)
INR PPP: 3 (ref 0.9–1.1)
INR PPP: 3.1 (ref 0.9–1.1)
INR PPP: 3.2 (ref 0.9–1.1)
INR PPP: 3.3 (ref 0.9–1.1)
INR PPP: 3.4 (ref 0.9–1.1)
INR PPP: 3.5 (ref 0.9–1.1)
INR PPP: 3.8 (ref 0.9–1.1)
INR PPP: 3.9 (ref 0.9–1.1)
INR PPP: 4.1 (ref 0.9–1.1)

## 2021-01-01 RX ORDER — WARFARIN SODIUM 2.5 MG/1
TABLET ORAL
Qty: 55 TABLET | Refills: 0 | Status: SHIPPED | OUTPATIENT
Start: 2021-01-01 | End: 2021-01-01

## 2021-01-01 RX ORDER — WARFARIN SODIUM 2.5 MG/1
TABLET ORAL
Qty: 55 TABLET | Refills: 0 | Status: SHIPPED | OUTPATIENT
Start: 2021-01-01

## 2021-01-01 RX ORDER — WARFARIN SODIUM 2.5 MG/1
TABLET ORAL
Qty: 70 TABLET | Refills: 0 | Status: SHIPPED | OUTPATIENT
Start: 2021-01-01 | End: 2021-01-01

## 2021-01-01 RX ORDER — NYSTATIN 100000 U/G
CREAM TOPICAL 2 TIMES DAILY
Qty: 30 G | Refills: 1 | Status: SHIPPED | OUTPATIENT
Start: 2021-01-01

## 2021-01-01 RX ORDER — FINASTERIDE 5 MG/1
5 TABLET, FILM COATED ORAL DAILY
Qty: 90 TABLET | Refills: 0 | Status: SHIPPED | OUTPATIENT
Start: 2021-01-01

## 2021-01-01 RX ORDER — WARFARIN SODIUM 2.5 MG/1
TABLET ORAL
Qty: 75 TABLET | Refills: 0 | Status: SHIPPED | OUTPATIENT
Start: 2021-01-01 | End: 2021-01-01

## 2021-01-01 RX ORDER — FINASTERIDE 5 MG/1
5 TABLET, FILM COATED ORAL DAILY
Qty: 90 TABLET | Refills: 0 | Status: SHIPPED | OUTPATIENT
Start: 2021-01-01 | End: 2021-01-01

## 2021-01-01 RX ORDER — TAMSULOSIN HYDROCHLORIDE 0.4 MG/1
0.4 CAPSULE ORAL DAILY
Qty: 90 CAPSULE | Refills: 0 | Status: SHIPPED | OUTPATIENT
Start: 2021-01-01

## 2021-01-01 RX ORDER — HALOPERIDOL 0.5 MG/1
TABLET ORAL
Qty: 150 TABLET | Refills: 3 | Status: SHIPPED | OUTPATIENT
Start: 2021-01-01

## 2021-01-07 NOTE — TELEPHONE ENCOUNTER
"I called patient back today 01/07/21 at 3:13 pm regarding wife's phone call that, \"pt machine dropped and concerned it's not working properly. \" A man picked up the phone and said patient was not available. I let him know to tell pt I was calling them back about his cpap machine issues and patient can call us back when he gets a chance.    "

## 2021-01-25 NOTE — PROGRESS NOTES
ANTICOAGULATION MANAGEMENT     Patient Name:  Cricket Klein  Date:  2021    ASSESSMENT /SUBJECTIVE:    Today's INR result of 2.9 is therapeutic. Goal INR of 2.0-3.0      Warfarin dose taken: Warfarin taken as instructed    Diet: No new diet changes affecting INR    Medication changes/ interactions: No new medications/supplements affecting INR    Previous INR: Therapeutic     S/S of bleeding or thromboembolism: No    New injury or illness: No    Upcoming surgery, procedure or cardioversion: No    Additional findings: None      PLAN:    Telephone call with home care nurse Oni regarding INR result and instructed:     Warfarin Dosing Instructions: Continue your current warfarin dose 1.25 mg MWF; 2.5 mg ROW    Instructed patient to follow up no later than: 4 weeks  Orders given to  Homecare nurse/facility to recheck    Education provided: None required      Oni verbalizes understanding and agrees to warfarin dosing plan.    Instructed to call the Anticoagulation Clinic for any changes, questions or concerns. (#[phone])        Delphine Granados RN      OBJECTIVE:  Recent labs: (last 7 days)     21   INR 2.9*         INR assessment THER    Recheck INR In: 4 WEEKS    [facility] Location [facility]      Anticoagulation Summary  As of 2021    INR goal:  2.0-3.0   TTR:  87.2 % (1 y)   INR used for dosin.9 (2021)   Warfarin maintenance plan:  1.25 mg (2.5 mg x 0.5) every Mon, Wed, Fri; 2.5 mg (2.5 mg x 1) all other days   Full warfarin instructions:  1.25 mg every Mon, Wed, Fri; 2.5 mg all other days   Weekly warfarin total:  13.75 mg   Plan last modified:  Shruthi Hutchison RN (2020)   Next INR check:  2021   Priority:  Maintenance   Target end date:  Indefinite    Indications    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 Had PE in 2010 following hip surgery. Has heterozygous prothrombin gene mutation. Second PE 7-26-16. Needs lifelong warfarin. was on warfarin [id number]. Do not leave info. on VM        Anticoagulation Care Providers     Provider Role Specialty Phone number    Saud Cruz MD Referring Family Medicine [phone]

## 2021-02-15 NOTE — TELEPHONE ENCOUNTER
Requested Prescriptions   Pending Prescriptions Disp Refills     finasteride (PROSCAR) 5 MG tablet 90 tablet 3     Sig: Take 1 tablet (5 mg) by mouth daily       There is no refill protocol information for this order      Last Written Prescription Date:    Last Fill Quantity: ,  # refills:    Last office visit: 3/2/2020 with prescribing provider:     Future Office Visit:

## 2021-02-16 NOTE — TELEPHONE ENCOUNTER
Signed Prescriptions:                        Disp   Refills    warfarin ANTICOAGULANT (JANTOVEN ANTICOAGU*75 tab*0        Sig: TAKE 1/2 TABLET (1.25MG) BY MOUTH DAILY ON MONDAY,           WEDNESDAY & FRIDAY AND TAKE 1 TABLET (2.5MG)           DAILY ALL OTHER DAYS OR AS DIRECTED BY           ANTICOAGULATION CLINIC  Authorizing Provider: HIRAL ZENDEJAS  Ordering User: SHIRAZ ZAMORA RN refilled medication per [facility] refill protocol.  Shiraz Zamora RN

## 2021-02-22 NOTE — PROGRESS NOTES
ANTICOAGULATION MANAGEMENT     Patient Name:  Cricket Klein  Date:  2/22/2021    ASSESSMENT /SUBJECTIVE:    Today's INR result of 3.4 is supratherapeutic. Goal INR of 2.0-3.0      Warfarin dose taken: Warfarin taken as instructed    Diet: No new diet changes affecting INR    Medication changes/ interactions: No new medications/supplements affecting INR    Previous INR: Therapeutic     S/S of bleeding or thromboembolism: No    New injury or illness: No    Upcoming surgery, procedure or cardioversion: No    Additional findings: None      PLAN:    Telephone call with home care nurse unnamed regarding INR result and instructed:     Warfarin Dosing Instructions: hold tonight then continue your current warfarin dose of 1.25 mg MWF: 2.5 mg ROW    Instructed patient to follow up no later than: 2 weeks  Orders given to  Homecare nurse/facility to recheck    Education provided: Monitoring for bleeding signs and symptoms and When to seek medical attention/emergency care      HC RN verbalizes understanding and agrees to warfarin dosing plan.    Instructed to call the Anticoagulation Clinic for any changes, questions or concerns. (#[phone])        Delphine Granados RN      OBJECTIVE:  Recent labs: (last 7 days)     02/22/21   INR 3.4*         INR assessment SUPRA    Recheck INR In: 2 WEEKS    [facility] Location [facility]      Anticoagulation Summary  As of 2/22/2021    INR goal:  2.0-3.0   TTR:  82.5 % (1 y)   INR used for dosing:  3.4 (2/22/2021)   Warfarin maintenance plan:  1.25 mg (2.5 mg x 0.5) every Mon, Wed, Fri; 2.5 mg (2.5 mg x 1) all other days   Full warfarin instructions:  2/22: Hold; Otherwise 1.25 mg every Mon, Wed, Fri; 2.5 mg all other days   Weekly warfarin total:  13.75 mg   Plan last modified:  Shruthi Hutchison RN (9/1/2020)   Next INR check:  3/8/2021   Priority:  Maintenance   Target end date:  Indefinite    Indications    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 Had PE in 2010 following hip surgery. Has heterozygous prothrombin gene mutation. Second PE 7-26-16. Needs lifelong warfarin. was on warfarin [id number]. Do not leave info. on VM        Anticoagulation Care Providers     Provider Role Specialty Phone number    Saud Cruz MD Referring Family Medicine [phone]

## 2021-03-08 NOTE — PROGRESS NOTES
ANTICOAGULATION MANAGEMENT     Patient Name:  Cricket Klein  Date:  3/8/2021    ASSESSMENT /SUBJECTIVE:    Today's INR result of 3.0 is therapeutic. Goal INR of 2.0-3.0      Warfarin dose taken: Warfarin taken as instructed    Diet: Overall eating less. 1-2 meals/daily    Medication changes/ interactions: No new medications/supplements affecting INR    Previous INR: Supratherapeutic     S/S of bleeding or thromboembolism: No    New injury or illness: No    Upcoming surgery, procedure or cardioversion: No    Additional findings: None      PLAN:    Telephone call with home care nurse Oni regarding INR result and instructed:     Warfarin Dosing Instructions: Continue your current warfarin dose 1.25mg every Mon, Wed, Fri; 2.5mg all other days    Instructed patient to follow up no later than: 2 weeks  Orders given to  Homecare nurse/facility to recheck    Education provided: Target INR goal and significance of current INR result      Oni verbalizes understanding and agrees to warfarin dosing plan.    Instructed to call the Anticoagulation Clinic for any changes, questions or concerns. (#[phone])        Ap Johnston RN      OBJECTIVE:  Recent labs: (last 7 days)     03/08/21   INR 3.0*         No question data found.  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 Had PE in 2010 following hip surgery. Has heterozygous prothrombin gene mutation. Second PE 7-26-16. Needs lifelong warfarin. was on warfarin [id number]. Do not leave info. on VM        Anticoagulation Care Providers     Provider Role Specialty Phone number    Saud Cruz MD Referring Family Medicine [phone]

## 2021-03-24 NOTE — PROGRESS NOTES
ANTICOAGULATION MANAGEMENT     Patient Name:  Cricket Klein  Date:  3/24/2021    ASSESSMENT /SUBJECTIVE:    Today's INR result of 3.3 is supratherapeutic. Goal INR of 2.0-3.0      Warfarin dose taken: Warfarin taken as instructed    Diet: Eating 1-2 meals/day and only 25% of each meal.    Medication changes/ interactions: Augmentin 03/24-03/25.    Previous INR: Therapeutic     S/S of bleeding or thromboembolism: No    New injury or illness: Yes: Spiked a fever yesterday. Treating empirically with Augmentin.  Also had two falls last week.    Upcoming surgery, procedure or cardioversion: No    Additional findings: Rapid COVID was negative today. PCR pending.      PLAN:    Telephone call with home care nurse Oni regarding INR result and instructed:     Warfarin Dosing Instructions: Hold warfarin today then continue your current warfarin dose of 1.25mg every Mon, Wed, Fri; 2.5mg all other days    Instructed patient to follow up no later than: 03/26  Orders given to  Homecare nurse/facility to recheck    Education provided: Target INR goal and significance of current INR result and Potential interaction between warfarin and Augmentin      Oni verbalizes understanding and agrees to warfarin dosing plan.    Instructed to call the Anticoagulation Clinic for any changes, questions or concerns. (#[phone])        Ap Johnston RN      OBJECTIVE:  Recent labs: (last 7 days)     03/24/21   INR 3.3*         No question data found.  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 Had PE in 2010 following hip surgery. Has heterozygous prothrombin gene mutation. Second PE 7-26-16. Needs lifelong warfarin. was on warfarin [id number]. Do not leave info. on         Anticoagulation Care Providers     Provider Role Specialty Phone number    Saud Cruz MD Referring Family Medicine [phone]

## 2021-03-25 NOTE — TELEPHONE ENCOUNTER
Requested Prescriptions   Pending Prescriptions Disp Refills     tamsulosin (FLOMAX) 0.4 MG capsule 90 capsule 3     Sig: Take 1 capsule (0.4 mg) by mouth daily       There is no refill protocol information for this order        Last office visit: 3/2/2020 with prescribing provider:  Dr. Michael   Future Office Visit:          Denise Behrendt  Specialty CSS

## 2021-03-25 NOTE — LETTER
[facility]  [address]  [facility] [address]  Phone: [phone]       March 25, 2021         Cricket Klein  [address] [facility] [place] [address]            Dear Cricket:    We are concerned about your health care.  We recently provided you with medication refills.  Many medications require routine follow-up with your doctor in Urology.    Your prescription(s) have been refilled for 3 months so you may have time for the above noted follow-up. Please call to schedule soon so we can assure you have an appointment before your next refills are needed. Appointments can be in clinic, on the phone, or video.    Thank you,      Chilo Michael MD (Paul)/ denisse

## 2021-03-25 NOTE — TELEPHONE ENCOUNTER
Medication is being filled for 1 time refill only due to:  Patient needs to be seen because it has been more than one year since last visit. Letter sent to make appt.  Edie VALENZUELA RN BSN PHN  Specialty Clinics

## 2021-03-26 NOTE — PROGRESS NOTES
ANTICOAGULATION MANAGEMENT     Patient Name:  Cricket Klein  Date:  3/26/2021    ASSESSMENT /SUBJECTIVE:    Today's INR result of 2.2 is therapeutic. Goal INR of 2.0-3.0      Warfarin dose taken: Warfarin taken as instructed    Diet: No new diet changes affecting INR    Medication changes/ interactions: Augmentin continues through 3/28/21    Previous INR: Supratherapeutic     S/S of bleeding or thromboembolism: No    New injury or illness: No    Upcoming surgery, procedure or cardioversion: No    Additional findings: None      PLAN:    Telephone call with home care nurse Vonnie regarding INR result and instructed:     Warfarin Dosing Instructions: Continue your current warfarin dose 1.25 mg MWF; 2.5 mg ROW    Instructed patient to follow up no later than: 1 week  Orders given to  Homecare nurse/facility to recheck    Education provided: None required      Vonnie verbalizes understanding and agrees to warfarin dosing plan.    Instructed to call the Anticoagulation Clinic for any changes, questions or concerns. (#[phone])        Delphine Granados RN      OBJECTIVE:  Recent labs: (last 7 days)     21   INR 3.3* 2.2*         INR assessment THER    Recheck INR In: 1 WEEK    [facility] Location [facility]      Anticoagulation Summary  As of 3/26/2021    INR goal:  2.0-3.0   TTR:  74.1 % (1 y)   INR used for dosin.2 (3/26/2021)   Warfarin maintenance plan:  1.25 mg (2.5 mg x 0.5) every Mon, Wed, Fri; 2.5 mg (2.5 mg x 1) all other days   Full warfarin instructions:  1.25 mg every Mon, Wed, Fri; 2.5 mg all other days   Weekly warfarin total:  13.75 mg   No change documented:  Mayda Braga RN   Plan last modified:  Shruthi Hutchison RN (2020)   Next INR check:  2021   Priority:  Maintenance   Target end date:  Indefinite    Indications    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 Had PE in 2010 following hip surgery. Has heterozygous prothrombin gene mutation. Second PE 7-26-16. Needs lifelong warfarin. was on warfarin [id number]. Do not leave info. on VM        Anticoagulation Care Providers     Provider Role Specialty Phone number    Saud Cruz MD Referring Family Medicine [phone]

## 2021-04-02 NOTE — PROGRESS NOTES
Message left on Oni's VM to return call to clinic at [phone]     Piper De La Torre, RN, BSN, PHN

## 2021-04-07 NOTE — LETTER
April 7, 2021      Cricket Klein  [address] [address] [address]        To Whom It May Concern,     I am writing on behalf of . Cricket Klein.  He has been a patient at our clinic for many many years.  He has had a steady decline in his health with increasing problems with advanced Alzheimer's dementia.  He is needing increasing amounts of help with his daily cares.  His spouse and family are unable to provide all the care that he needs on a daily basis.  It is my recommendation that he be admitted to memory care unit due to his advanced dementia and need for 24-hour supervision and cares.      Sincerely,        Saud Cruz MD

## 2021-04-07 NOTE — TELEPHONE ENCOUNTER
Can Dr. Cruz please write a letter for [facility] [employer], so pt can go to [facility] in [place] [facility]. Wife is unable to meet the need of the pt and can no longer care for the pt now. Pt is needing more assistance that home care can provide. Pt had an assisted fall 4/6/21 with home care going upstairs, can't even walk into the kitchen, uses a sit to stand to move. Pt is hallucinating more, and combative more even with meds onboard. Pt is deteriorating quickly from 3/28/21         Type of letter, form or note:  Assisted living        Where the form was placed: Given to physician    What number is listed as a contact on the form?: Lorri TAVERAS [phone]         Call taken on 4/7/2021 at 4:33 PM by Ladonna Scott

## 2021-04-23 NOTE — TELEPHONE ENCOUNTER
[facility] - Annual Phy Orders  Form placed on Provider Dr. Anthony parks for Signature.  [facility] Sec

## 2021-04-26 NOTE — TELEPHONE ENCOUNTER
[facility] -  Form placed on Provider Dr. Cruz  deskelly for Signature.

## 2021-04-27 NOTE — TELEPHONE ENCOUNTER
Form received back signed  4/26/21  Faxed Back & Sent to be scanned to this encounter  [facility]

## 2021-04-30 NOTE — PROGRESS NOTES
ANTICOAGULATION MANAGEMENT     Patient Name:  Cricket Klein  Date:  2021    ASSESSMENT /SUBJECTIVE:    Today's INR result of 4.1 is supratherapeutic. Goal INR of 2.0-3.0      Warfarin dose taken: Warfarin taken as instructed    Diet: eating a more balanced diet at assisted living facility then he was at home     Medication changes/ interactions: No new medications/supplements affecting INR    Previous INR: Therapeutic     S/S of bleeding or thromboembolism: No    New injury or illness: No    Upcoming surgery, procedure or cardioversion: No  Additional findings: Taking tylenol TID for pain - having increased pain in feet     PLAN:    Telephone call with home care nurse Shania  regarding INR result and instructed:     Warfarin Dosing Instructions: HOld today and take 1.25mg tomorrow  then continue your current warfarin dose of 1.25mg MWF & 2.5mg OAD    Instructed patient to follow up no later than: 1 week  Orders given to  Homecare nurse/facility to recheck    Education provided: Contact [facility] Anticoagulation: [phone]  with any changes, questions or concerns.       Shania verbalizes understanding and agrees to warfarin dosing plan.    Instructed to call the Anticoagulation Clinic for any changes, questions or concerns. (#[phone])        Piper De La Cruz RN      OBJECTIVE:  Recent labs: (last 7 days)     21   INR 4.1*         No question data found.  Anticoagulation Summary  As of 2021    INR goal:  2.0-3.0   TTR:  71.3 % (1 y)   INR used for dosin.1 (2021)   Warfarin maintenance plan:  1.25 mg (2.5 mg x 0.5) every Mon, Wed, Fri; 2.5 mg (2.5 mg x 1) all other days   Full warfarin instructions:  : Hold; Otherwise 1.25 mg every Mon, Wed, Fri; 2.5 mg all other days   Weekly warfarin total:  13.75 mg   Plan last modified:  Shruthi Hutchison RN (2020)   Next INR check:     Priority:  Maintenance   Target end date:  Indefinite    Indications    Other and  unspecified coagulation defects [D68.9]  Long-term (current) use of anticoagulants [Z79.01] [Z79.01]  Pulmonary embolism without acute cor pulmonale  unspecified chronicity  unspecified pulmonary embolism type (H) [I26.99]             Anticoagulation Episode Summary     INR check location:      Preferred lab:      Send INR reminders to:  CATA ROMAN    Comments:  * Speak with Pat (wife) with dosing. Had PE in 2010 following hip surgery. Has heterozygous prothrombin gene mutation. Second PE 7-26-16. Needs lifelong warfarin. was on warfarin [id number]. Do not leave info. on VM        Anticoagulation Care Providers     Provider Role Specialty Phone number    Saud Cruz MD Referring Family Medicine [phone]

## 2021-05-06 NOTE — PROGRESS NOTES
ANTICOAGULATION MANAGEMENT     Patient Name:  Cricket Klein  Date:  5/6/2021    ASSESSMENT /SUBJECTIVE:    Today's INR result of 3.1 is supratherapeutic. Goal INR of 2.0-3.0      Warfarin dose taken: Warfarin taken as instructed    Diet: No new diet changes affecting INR    Medication changes/ interactions: No new medications/supplements affecting INR    Previous INR: Supratherapeutic     S/S of bleeding or thromboembolism: No    New injury or illness: No    Upcoming surgery, procedure or cardioversion: No    Additional findings: None, No factors to elevated INR.      PLAN:    Telephone call with home care nurse Michelle regarding INR result and instructed:     Warfarin Dosing Instructions: Change your warfarin dose to 2.5mg every Mon, Wed, Sat; 1.25mg all other days  . (9 % change)    Instructed patient to follow up no later than: 1 week  Orders given to  Homecare nurse/facility to recheck    Education provided: Target INR goal and significance of current INR result      Michelle verbalizes understanding and agrees to warfarin dosing plan.    Instructed to call the Anticoagulation Clinic for any changes, questions or concerns. (#[phone])        Ap Johnston RN      OBJECTIVE:  Recent labs: (last 7 days)     04/30/21 05/06/21   INR 4.1* 3.1*         No question data found.  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 Had PE in 2010 following hip surgery. Has heterozygous prothrombin gene mutation. Second PE 7-26-16. Needs lifelong warfarin. was on warfarin [id number]. Do not leave info. on VM        Anticoagulation Care Providers     Provider Role Specialty Phone number    Saud Cruz MD Referring Family Medicine [phone]

## 2021-05-11 NOTE — TELEPHONE ENCOUNTER
Reason for Call: Request for an order or referral:    Order or referral being requested: Claudio needs new face mask for his CPAP. He has one that just cover his nose and wants a full face mask because he breathes out of his mouth and it's hard to breath with the nasal covering only. .    Date needed: at your convenience    Has the patient been seen by the PCP for this problem? YES    Additional comments: Claudio had sleep study years ago in [facility]. He wants to get the mask at the [facility] [employer] store    Phone number Patient can be reached at:  Home number on file [phone] (home)    Best Time:  anytime    Can we leave a detailed message on this number?  YES    Call taken on 5/11/2021 at 1:23 PM by Stacy Shane

## 2021-05-11 NOTE — TELEPHONE ENCOUNTER
Requested Prescriptions   Pending Prescriptions Disp Refills     finasteride (PROSCAR) 5 MG tablet 90 tablet 0     Sig: Take 1 tablet (5 mg) by mouth daily WILL NEED APPT BEFORE NEXT REFILLS       There is no refill protocol information for this order        Last office visit: 3/2/2020 with prescribing provider:  Dr. Michael   Future Office Visit:          Denise Behrendt  Specialty CSS

## 2021-05-11 NOTE — TELEPHONE ENCOUNTER
[place] refill provided. Patient due for office visit- noted on prescription.     Sofya BYNUM RN   Specialty Clinics

## 2021-05-13 NOTE — TELEPHONE ENCOUNTER
ANTICOAGULATION MANAGEMENT     Patient Name:  Cricket Klein  Date:  5/13/2021    ASSESSMENT /SUBJECTIVE:    Today's INR result of 3.5 is supratherapeutic. Goal INR of 2.0-3.0      Warfarin dose taken: Warfarin taken as instructed    Diet: No new diet changes affecting INR    Medication changes/ interactions: No new medications/supplements affecting INR    Previous INR: Supratherapeutic     S/S of bleeding or thromboembolism: No    New injury or illness: No    Upcoming surgery, procedure or cardioversion: No    Additional findings: None      PLAN:    Telephone call with home care nurse Goran regarding INR result and instructed:     Warfarin Dosing Instructions: hold tonight then change your warfarin dose to 2.5 mg on mon/sat and 1.25 mg all other days  . (10 % change)    Instructed patient to follow up no later than: 1 week  Orders given to  Homecare nurse/facility to recheck    Education provided: None required      Goran verbalizes understanding and agrees to warfarin dosing plan.    Instructed to call the Anticoagulation Clinic for any changes, questions or concerns. (#[phone])        Yudi Richards RN      OBJECTIVE:  Recent labs: (last 7 days)     05/13/21   INR 3.5*             Anticoagulation Summary  As of 5/13/2021    INR goal:  2.0-3.0   TTR:  67.7 % (1 y)   INR used for dosing:  3.5 (5/13/2021)   Warfarin maintenance plan:  2.5 mg (2.5 mg x 1) every Mon, Wed, Sat; 1.25 mg (2.5 mg x 0.5) all other days   Full warfarin instructions:  2.5 mg every Mon, Wed, Sat; 1.25 mg all other days   Weekly warfarin total:  12.5 mg   Plan last modified:  Ap Johnston, RN (5/6/2021)   Next INR check:     Priority:  Maintenance   Target end date:  Indefinite    Indications    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 Had PE in 2010 following hip surgery. Has heterozygous prothrombin gene mutation. Second PE 7-26-16. Needs lifelong warfarin. was on warfarin [id number]. Do not leave info. on VM        Anticoagulation Care Providers     Provider Role Specialty Phone number    Saud Cruz MD Referring Family Medicine [phone]

## 2021-05-13 NOTE — TELEPHONE ENCOUNTER
Goran from [facility] calling regarding pt. Please return call to Goran at [phone].  Thanks!  Bridget Bermeo RN on 5/13/2021 at 11:37 AM

## 2021-05-20 NOTE — TELEPHONE ENCOUNTER
Current warfarin dose:  Warfarin maintenance plan:  2.5 mg (2.5 mg x 1) every Mon; 1.25 mg (2.5 mg x 0.5) all other days   Full warfarin instructions:  5/20: Hold; Otherwise 2.5 mg every Mon; 1.25 mg all other days   Weekly warfarin total:  10 mg   Next INR check:  5/27/2021     Last INR result:  INR   Date Value Ref Range Status   05/20/2021 3.9 (A) 0.90 - 1.10 Final     Last office visit: 8/17/2020     Refill authorized per [facility] protocol.    Beto DIOP RN, CACP

## 2021-05-20 NOTE — PROGRESS NOTES
ANTICOAGULATION MANAGEMENT     Patient Name:  Cricket Klein  Date:  5/20/2021    ASSESSMENT /SUBJECTIVE:    Today's INR result of 3.9 is supratherapeutic. Goal INR of 2.0-3.0      Warfarin dose taken: Warfarin taken as instructed    Diet: No new diet changes affecting INR    Medication changes/ interactions: No new medications/supplements affecting INR    Previous INR: Supratherapeutic     S/S of bleeding or thromboembolism: No    New injury or illness: No    Upcoming surgery, procedure or cardioversion: No    Additional findings: No findings to explain elevated number. Pt is on hospice but is not declining as of now per  RN.      PLAN:    Telephone call with home care nurse Oni regarding INR result and instructed:     Warfarin Dosing Instructions: Hold Today then change your warfarin dose to 2.5mg every Mon; 1.25mg all other days  . (11.1 % change)    Instructed patient to follow up no later than: 1 week  Orders given to  Homecare nurse/facility to recheck    Education provided: Target INR goal and significance of current INR result      Oni verbalizes understanding and agrees to warfarin dosing plan.    Instructed to call the Anticoagulation Clinic for any changes, questions or concerns. (#[phone])        Ap Johnston RN      OBJECTIVE:  Recent labs: (last 7 days)     05/20/21   INR 3.9*         No question data found.  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 Had PE in 2010 following hip surgery. Has heterozygous prothrombin gene mutation. Second PE 7-26-16. Needs lifelong warfarin. was on warfarin [id number]. Do not leave info. on VM        Anticoagulation Care Providers     Provider Role Specialty Phone number    Saud Cruz MD Referring Family Medicine [phone]

## 2021-05-21 NOTE — TELEPHONE ENCOUNTER
Current warfarin dose:  Warfarin maintenance plan:  2.5 mg (2.5 mg x 1) every Mon; 1.25 mg (2.5 mg x 0.5) all other days   Full warfarin instructions:  5/20: Hold; Otherwise 2.5 mg every Mon; 1.25 mg all other days   Weekly warfarin total:  10 mg   Next INR check:  5/27/2021     Last INR result:  INR   Date Value Ref Range Status   05/20/2021 3.9 (A) 0.90 - 1.10 Final     Last office visit: 8/17/2020     Refill authorized per [facility] protocol.    Beto DIOP RN, CACP

## 2021-05-27 NOTE — PROGRESS NOTES
ANTICOAGULATION MANAGEMENT     Patient Name:  Cricket Klein  Date:  2021    ASSESSMENT /SUBJECTIVE:    Today's INR result of 2.3 is therapeutic. Goal INR of 2.0-3.0      Warfarin dose taken: Warfarin taken as instructed    Diet: No new diet changes affecting INR    Medication changes/ interactions: No new medications/supplements affecting INR    Previous INR: Supratherapeutic     S/S of bleeding or thromboembolism: No    New injury or illness: No    Upcoming surgery, procedure or cardioversion: No    Additional findings: None      PLAN:    Telephone call with Goran siegel at [facility] facility regarding INR result and instructed:     Warfarin Dosing Instructions: Continue your current warfarin dose 2.5 mg every Mon; 1.25 mg all other days    Instructed patient to follow up no later than: 1 week  Orders given to  Homecare nurse/facility to recheck    Education provided: Please call back if any changes to your diet, medications or how you've been taking warfarin      Goran Nurse verbalizes understanding and agrees to warfarin dosing plan.    Instructed to call the Anticoagulation Clinic for any changes, questions or concerns. (#[phone])        Alice Montoya RN      OBJECTIVE:  Recent labs: (last 7 days)     21   INR 2.3*         No question data found.  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 Had PE in 2010 following hip surgery. Has heterozygous prothrombin gene mutation. Second PE 7-26-16. Needs lifelong warfarin. was on warfarin [id number]. Do not leave info. on VM        Anticoagulation Care Providers     Provider Role Specialty Phone number    Saud Cruz MD Referring Family Medicine [phone]

## 2021-06-03 NOTE — PROGRESS NOTES
ANTICOAGULATION MANAGEMENT     Patient Name:  Cricket Klein  Date:  6/3/2021    ASSESSMENT /SUBJECTIVE:    Today's INR result of 3.2 is supratherapeutic. Goal INR of 2.0-3.0      Warfarin dose taken: Warfarin taken as instructed    Diet: No new diet changes affecting INR    Medication changes/ interactions: No new medications/supplements affecting INR    Previous INR: Therapeutic     S/S of bleeding or thromboembolism: No    New injury or illness: No    Upcoming surgery, procedure or cardioversion: No    Additional findings: Hospice; tapering Atenolol then stopping      PLAN:    Warfarin Dosing Instructions: Hold x 1 day;  then continue your current warfarin dose of 2.5 mg Mon; 1.25 mg all other days    Instructed patient to follow up no later than: 1 week  Orders given to  Homecare nurse/facility to recheck    Education provided: Please call back if any changes to your diet, medications or how you've been taking warfarin    Telephone call with home care nurse Oni whom verbalizes understanding and agrees to plan    Instructed to call the Anticoagulation Clinic for any changes, questions or concerns. (#[phone])        Alice Montoya RN      OBJECTIVE:  Recent labs: (last 7 days)     06/03/21   INR 3.2*         No question data found.  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 Had PE in 2010 following hip surgery. Has heterozygous prothrombin gene mutation. Second PE 7-26-16. Needs lifelong warfarin. was on warfarin [id number]. Do not leave info. on VM        Anticoagulation Care Providers     Provider Role Specialty Phone number    Saud Cruz MD Referring Family Medicine [phone]

## 2021-06-10 NOTE — PROGRESS NOTES
ANTICOAGULATION MANAGEMENT     Patient Name:  Cricket Klein  Date:  6/10/2021    ASSESSMENT /SUBJECTIVE:    Today's INR result of 2.4 is therapeutic. Goal INR of 2.0-3.0      Warfarin dose taken: Warfarin taken as instructed    Diet: No new diet changes affecting INR    Medication changes/ interactions: No new medications/supplements affecting INR    Previous INR: Supratherapeutic     S/S of bleeding or thromboembolism: No    New injury or illness: No    Upcoming surgery, procedure or cardioversion: No    Additional findings: None      PLAN:    Warfarin Dosing Instructions: Continue your current warfarin dose 2.5mg every Mon; 1.25mg all other days    Instructed patient to follow up no later than: 2 weeks  Orders given to  Homecare nurse/facility to recheck    Education provided: Target INR goal and significance of current INR result    Telephone call with  Hospice RN Oni whom verbalizes understanding and agrees to plan    Instructed to call the Anticoagulation Clinic for any changes, questions or concerns. (#[phone])        Ap Johnston RN      OBJECTIVE:  Recent labs: (last 7 days)     06/10/21   INR 2.4*         No question data found.  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 Had PE in 2010 following hip surgery. Has heterozygous prothrombin gene mutation. Second PE 7-26-16. Needs lifelong warfarin. was on warfarin [id number]. Do not leave info. on VM        Anticoagulation Care Providers     Provider Role Specialty Phone number    Saud Cruz MD Referring Family Medicine [phone]

## 2021-06-24 NOTE — PROGRESS NOTES
ANTICOAGULATION MANAGEMENT      Cricket Klein due for annual renewal of referral to anticoagulation monitoring. Order pended for your review and signature.      ANTICOAGULATION SUMMARY      Warfarin indication(s)     PE    Heart valve present?  NO       Current goal range   INR: 2.0-3.0     Goal appropriate for indication? Yes, INR 2-3 appropriate for hx of DVT, PE, hypercoagulable state, Afib, LVAD, or bileaflet AVR without risk factors     Current duration of therapy Indefinite/long term therapy   Time in Therapeutic Range (TTR)  (Goal > 60%) 67%       Office visit with referring provider's group within last year yes on 07/21/2020       Ap Johnston RN

## 2021-06-24 NOTE — PROGRESS NOTES
ANTICOAGULATION MANAGEMENT     Cricket Klein 75 year old male is on warfarin with supratherapeutic INR result. (Goal INR 2.0-3.0)    Recent labs: (last 7 days)     06/24/21   INR 3.8*       ASSESSMENT     Source(s): Patient/Caregiver Call       Warfarin doses taken: Warfarin taken as instructed    Diet: No new diet changes identified    New illness, injury, or hospitalization: No    Medication/supplement changes: None noted    Signs or symptoms of bleeding or clotting: No    Previous INR: Therapeutic last visit; previously outside of goal range    Additional findings: None. No factors to explain elevated INR.     PLAN     Recommended plan for no diet, medication or health factor changes affecting INR     Dosing Instructions: Hold dose then Decrease your warfarin dose to (13 % change) with next INR in 1 week       Summary  As of 6/24/2021    Full warfarin instructions:  6/24: Hold; Otherwise 1.25 mg every day   Next INR check:  7/1/2021             Telephone call with home care nurse Oni whom verbalizes understanding and agrees to plan    Orders given to  Homecare nurse/facility to recheck    Education provided: Target INR goal and significance of current INR result    Plan made per [facility] anticoagulation protocol    Ap Johnston RN  Anticoagulation Clinic  6/24/2021    _______________________________________________________________________     Anticoagulation Episode Summary     Current INR goal:  2.0-3.0   TTR:  66.8 % (1 y)   Target end date:  Indefinite   Send INR reminders to:  CATA ROMAN    Indications    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 Had PE in 2010 following hip surgery. Has heterozygous prothrombin gene mutation. Second PE 7-26-16. Needs  lifelong warfarin. was on warfarin [id number]. Do not leave info. on VM           Anticoagulation Care Providers     Provider Role Specialty Phone number    Saud Cruz MD Referring Family Medicine [phone]

## 2021-07-01 NOTE — PROGRESS NOTES
ANTICOAGULATION MANAGEMENT     Cricket Klein 76 year old male is on warfarin with therapeutic INR result. (Goal INR 2.0-3.0)    Recent labs: (last 7 days)     07/01/21   INR 2.5*       ASSESSMENT     Source(s): Home Care/Facility Nurse       Warfarin doses taken: Warfarin taken as instructed    Diet: No new diet changes identified    New illness, injury, or hospitalization: No    Medication/supplement changes: None noted    Signs or symptoms of bleeding or clotting: No    Previous INR: Supratherapeutic (resulting in dose decrease)    Additional findings: None     PLAN     Recommended plan for no diet, medication or health factor changes affecting INR     Dosing Instructions: Continue your current warfarin dose with next INR in 1 week       Summary  As of 7/1/2021    Full warfarin instructions:  1.25 mg every day   Next INR check:  7/8/2021             Telephone call with home care nurse Oni whom verbalizes understanding and agrees to plan    Orders given to  Homecare nurse/facility to recheck    Education provided: None required    Plan made per [facility] anticoagulation protocol    Hoda Meng RN  Anticoagulation Clinic  7/1/2021    _______________________________________________________________________     Anticoagulation Episode Summary     Current INR goal:  2.0-3.0   TTR:  65.6 % (1 y)   Target end date:  Indefinite   Send INR reminders to:  CATA ROMAN    Indications    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 Had PE in 2010 following hip surgery. Has heterozygous prothrombin gene mutation. Second PE 7-26-16. Needs lifelong warfarin. was on warfarin [id number]. Do not leave info. on VM           Anticoagulation Care Providers     Provider Role Specialty  Phone number    Saud Cruz MD Referring Family Medicine [phone]

## 2021-07-08 NOTE — PROGRESS NOTES
Goran with [facility] left a vm stating she has INR for patient and to please call: [phone]  Alice Montoya, RN  Anticoagulation Nurse - [facility] [place]

## 2021-07-08 NOTE — PROGRESS NOTES
ANTICOAGULATION MANAGEMENT     Cricket Klein 76 year old male is on warfarin with therapeutic INR result. (Goal INR 2.0-3.0)    Recent labs: (last 7 days)     07/08/21   INR 2.1*       ASSESSMENT     Source(s): Patient/Caregiver Call and Home Care/Facility Nurse       Warfarin doses taken: Warfarin taken as instructed    Diet: No new diet changes identified    New illness, injury, or hospitalization: No    Medication/supplement changes: None noted    Signs or symptoms of bleeding or clotting: No    Previous INR: Therapeutic last visit; previously outside of goal range    Additional findings: Family is considering discontinuing warfarin due to patient quality of life versus risk. They do not want to stop it altogether but would like to taper. Explained to home care nurse the mechanism of warfarin and how if its not given at the designated therapeutic maintainence dosing it is not effective in preventing anything. Advised home care/hospice to speak with familly to discuss. home care will return call if warfarin is discontinued but until decided patient will continue with maintenance dosing.     PLAN     Recommended plan for no diet, medication or health factor changes affecting INR     Dosing Instructions: Continue your current warfarin dose with next INR in 2 weeks       Summary  As of 7/8/2021    Full warfarin instructions:  1.25 mg every day   Next INR check:  7/15/2021             Telephone call with home care nurse Goran who verbalizes understanding and agrees to plan    Orders given to  Homecare nurse/facility to recheck    Education provided: None required    Plan made per [facility] anticoagulation protocol    Yudi Richards RN  Anticoagulation Clinic  7/8/2021    _______________________________________________________________________     Anticoagulation Episode Summary     Current INR goal:  2.0-3.0   TTR:  65.6 % (1 y)   Target end date:  Indefinite   Send INR reminders to:  CATA Sapp     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 Had PE in 2010 following hip surgery. Has heterozygous prothrombin gene mutation. Second PE 7-26-16. Needs lifelong warfarin. was on warfarin [id number]. Do not leave info. on VM           Anticoagulation Care Providers     Provider Role Specialty Phone number    Saud Cruz MD Referring Family Medicine [phone]

## 2021-07-21 NOTE — TELEPHONE ENCOUNTER
Routing refill request to provider for review/approval because:  Drug not on the [facility] refill protocol     Isak Baez RN

## 2021-07-21 NOTE — TELEPHONE ENCOUNTER
Can we find out the dose he is actually taking.  Instructions unclear on last prescription.   I believe he is at [facility].   Thanks,  HIRAL ZENDEJAS MD

## 2021-07-23 NOTE — PROGRESS NOTES
ANTICOAGULATION  MANAGEMENT    Cricket Klein is being discharged from the [facility] ([facility]).    Reason for discharge: [facility] hosp[facility] callign to report patient is no longer on warfarin, it has been discontinued by hospice team.    Anticoagulation episode resolved    If patient needs warfarin management in the future, please send a new referral    Yudi Richards RN

## 2022-03-30 NOTE — TELEPHONE ENCOUNTER
Call attempted. Unable to leave message- It looks like this has been discontinued by neurology. Need to clarify if he is taking this. Elly Centeno RN   decreased yue

## 2022-08-06 NOTE — NURSING NOTE
"Chief Complaint   Patient presents with     Leg Swelling     /80 (Cuff Size: Adult Large)   Pulse 63   Temp 99  F (37.2  C) (Tympanic)   Resp 18   Ht 1.676 m (5' 6\")   Wt 105.2 kg (232 lb)   SpO2 97%   BMI 37.45 kg/m   Estimated body mass index is 37.45 kg/m  as calculated from the following:    Height as of this encounter: 1.676 m (5' 6\").    Weight as of this encounter: 105.2 kg (232 lb).  Patient presents to the clinic using No DME      Health Maintenance that is potentially due pending provider review:    Health Maintenance Due   Topic Date Due     HEPATITIS C SCREENING  1945     ZOSTER IMMUNIZATION (1 of 2) 07/01/1996     AORTIC ANEURYSM SCREENING (SYSTEM ASSIGNED)  07/01/2011     OP ANNUAL INR REFERRAL  01/26/2012                " None

## 2023-04-15 NOTE — PROGRESS NOTES
ANTICOAGULATION MANAGEMENT     Patient Name:  Cricket Klein  Date:  2021    ASSESSMENT /SUBJECTIVE:    Today's INR result of 2.7 is therapeutic. Goal INR of 2.0-3.0      Warfarin dose taken: Warfarin taken as instructed    Diet: No new diet changes affecting INR    Medication changes/ interactions: No new medications/supplements affecting INR    Previous INR: Therapeutic     S/S of bleeding or thromboembolism: No    New injury or illness: No    Upcoming surgery, procedure or cardioversion: No    Additional findings: None      PLAN:    Telephone call with home care nurse Oni regarding INR result and instructed:     Warfarin Dosing Instructions: Continue your current warfarin dose 1.25 mg MWF; 2.5 mg ROW    Instructed patient to follow up no later than: 2 weeks  Orders given to  Homecare nurse/facility to recheck    Education provided: None required      Oni verbalizes understanding and agrees to warfarin dosing plan.    Instructed to call the Anticoagulation Clinic for any changes, questions or concerns. (#[phone])        Delphine Granados RN      OBJECTIVE:  Recent labs: (last 7 days)     21   INR 2.7*         INR assessment THER    Recheck INR In: 2 WEEKS    [facility]R Location [facility]      Anticoagulation Summary  As of 2021    INR goal:  2.0-3.0   TTR:  74.1 % (1 y)   INR used for dosin.7 (2021)   Warfarin maintenance plan:  1.25 mg (2.5 mg x 0.5) every Mon, Wed, Fri; 2.5 mg (2.5 mg x 1) all other days   Full warfarin instructions:  1.25 mg every Mon, Wed, Fri; 2.5 mg all other days   Weekly warfarin total:  13.75 mg   No change documented:  Mayda Braga RN   Plan last modified:  Shruthi Hutchison RN (2020)   Next INR check:  2021   Priority:  Maintenance   Target end date:  Indefinite    Indications    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 Had PE in 2010 following hip surgery. Has heterozygous prothrombin gene mutation. Second PE 7-26-16. Needs lifelong warfarin. was on warfarin [id number]. Do not leave info. on VM        Anticoagulation Care Providers     Provider Role Specialty Phone number    Saud Cruz MD Referring Family Medicine [phone]          15-Apr-2023 07:13

## (undated) DEVICE — GLOVE PROTEXIS W/NEU-THERA 7.5  2D73TE75

## (undated) DEVICE — SU ETHILON 4-0 PS-2 18" 1667G

## (undated) DEVICE — STOCKING SLEEVE COMPRESSION CALF LG

## (undated) DEVICE — DRAPE STERI TOWEL SM 1000

## (undated) DEVICE — PACK TOTAL HIP W/POUCH RIVERSIDE LATEX FREE

## (undated) DEVICE — GOWN LG DISP 9515

## (undated) DEVICE — IMM ALUMI HAND XLG 761

## (undated) DEVICE — SU PDO 3-0 STRATAFIX 24CM FS/FS REV CUT SXPD2B419

## (undated) DEVICE — SOL WATER IRRIG 1000ML BOTTLE 07139-09

## (undated) DEVICE — DRAPE IOBAN LG .375X23.5" 6648EZ

## (undated) DEVICE — BONE CLEANING TIP INTERPULSE  0210-010-000

## (undated) DEVICE — PACK HAND

## (undated) DEVICE — GLOVE PROTEXIS BLUE W/NEU-THERA 7.5  2D73EB75

## (undated) DEVICE — TAPE CLOTH ADHESIVE 3" ZONAS

## (undated) DEVICE — PREP DURAPREP 26ML APL 8630

## (undated) DEVICE — PREP CHLORHEXIDINE 4% 4OZ (HIBICLENS) 57504

## (undated) DEVICE — BLADE SAW SAGITTAL STRK 18X90X1.37MM HD SYS 6 6118-137-090

## (undated) DEVICE — BNDG ELASTIC 2"X5YDS UNSTERILE 6611-20

## (undated) DEVICE — GOWN IMPERVIOUS SPECIALTY XLG/XLONG 32474

## (undated) DEVICE — SUCTION IRR SYSTEM W/TIP INTERPULSE

## (undated) DEVICE — SOL NACL 0.9% IRRIG 1000ML BOTTLE 07138-09

## (undated) DEVICE — GLOVE PROTEXIS BLUE W/NEU-THERA 7.0  2D73EB70

## (undated) DEVICE — DRSG STERI STRIP 1X5" R1548

## (undated) DEVICE — GLOVE PROTEXIS W/NEU-THERA 8.0  2D73TE80

## (undated) DEVICE — PREP SKIN SCRUB TRAY 4461A

## (undated) DEVICE — GLOVE PROTEXIS W/NEU-THERA 7.0  2D73TE70

## (undated) DEVICE — PILLOW ABDUCT HIP LG

## (undated) DEVICE — SU VICRYL 1 CT-1 36" UND J947H

## (undated) DEVICE — DRAPE SHEET REV FOLD 3/4 9349

## (undated) DEVICE — DECANTER VIAL 2006S

## (undated) DEVICE — ESU ELEC BLADE 4" COATED

## (undated) DEVICE — SU PDO 1 STRATAFIX 36X36CM CTX TAPERPOINT SXPD2B405

## (undated) DEVICE — GLOVE PROTEXIS W/NEU-THERA 6.5  2D73TE65

## (undated) DEVICE — SU STRATAFIX 3-0 MH 12" PS-2 SXMD1B103

## (undated) DEVICE — BLADE KNIFE BEAVER 376700

## (undated) DEVICE — GOWN XLG DISP 9545

## (undated) RX ORDER — DEXAMETHASONE SODIUM PHOSPHATE 4 MG/ML
INJECTION, SOLUTION INTRA-ARTICULAR; INTRALESIONAL; INTRAMUSCULAR; INTRAVENOUS; SOFT TISSUE
Status: DISPENSED
Start: 2017-07-28

## (undated) RX ORDER — PROPOFOL 10 MG/ML
INJECTION, EMULSION INTRAVENOUS
Status: DISPENSED
Start: 2018-04-10

## (undated) RX ORDER — ACETAMINOPHEN 325 MG/1
TABLET ORAL
Status: DISPENSED
Start: 2018-04-10

## (undated) RX ORDER — LIDOCAINE HYDROCHLORIDE 10 MG/ML
INJECTION, SOLUTION EPIDURAL; INFILTRATION; INTRACAUDAL; PERINEURAL
Status: DISPENSED
Start: 2018-04-10

## (undated) RX ORDER — OXYCODONE HCL 10 MG/1
TABLET, FILM COATED, EXTENDED RELEASE ORAL
Status: DISPENSED
Start: 2017-07-28

## (undated) RX ORDER — FENTANYL CITRATE 50 UG/ML
INJECTION, SOLUTION INTRAMUSCULAR; INTRAVENOUS
Status: DISPENSED
Start: 2017-07-28

## (undated) RX ORDER — LIDOCAINE HYDROCHLORIDE 10 MG/ML
INJECTION, SOLUTION INFILTRATION; PERINEURAL
Status: DISPENSED
Start: 2018-04-10

## (undated) RX ORDER — GABAPENTIN 100 MG/1
CAPSULE ORAL
Status: DISPENSED
Start: 2018-04-10

## (undated) RX ORDER — BUPIVACAINE HYDROCHLORIDE 5 MG/ML
INJECTION, SOLUTION PERINEURAL
Status: DISPENSED
Start: 2018-04-10

## (undated) RX ORDER — LIDOCAINE HYDROCHLORIDE 10 MG/ML
INJECTION, SOLUTION EPIDURAL; INFILTRATION; INTRACAUDAL; PERINEURAL
Status: DISPENSED
Start: 2017-07-28

## (undated) RX ORDER — PROPOFOL 10 MG/ML
INJECTION, EMULSION INTRAVENOUS
Status: DISPENSED
Start: 2017-07-28

## (undated) RX ORDER — ONDANSETRON 2 MG/ML
INJECTION INTRAMUSCULAR; INTRAVENOUS
Status: DISPENSED
Start: 2017-07-28

## (undated) RX ORDER — GABAPENTIN 100 MG/1
CAPSULE ORAL
Status: DISPENSED
Start: 2017-07-28

## (undated) RX ORDER — EPHEDRINE SULFATE 50 MG/ML
INJECTION, SOLUTION INTRAVENOUS
Status: DISPENSED
Start: 2017-07-28